# Patient Record
Sex: FEMALE | Race: WHITE | NOT HISPANIC OR LATINO | Employment: OTHER | URBAN - METROPOLITAN AREA
[De-identification: names, ages, dates, MRNs, and addresses within clinical notes are randomized per-mention and may not be internally consistent; named-entity substitution may affect disease eponyms.]

---

## 2017-01-04 ENCOUNTER — INFUSION (OUTPATIENT)
Dept: ONCOLOGY | Facility: HOSPITAL | Age: 82
End: 2017-01-04

## 2017-01-04 VITALS
DIASTOLIC BLOOD PRESSURE: 58 MMHG | SYSTOLIC BLOOD PRESSURE: 117 MMHG | HEIGHT: 63 IN | RESPIRATION RATE: 16 BRPM | WEIGHT: 129 LBS | BODY MASS INDEX: 22.86 KG/M2 | TEMPERATURE: 97.6 F | HEART RATE: 70 BPM

## 2017-01-04 DIAGNOSIS — D50.0 IRON DEFICIENCY ANEMIA DUE TO CHRONIC BLOOD LOSS: Primary | ICD-10-CM

## 2017-01-04 DIAGNOSIS — Z78.9 MEDICATION INTOLERANCE: ICD-10-CM

## 2017-01-04 DIAGNOSIS — K90.9 MALABSORPTION IN THE ELDERLY: ICD-10-CM

## 2017-01-04 PROCEDURE — 25010000002 FERUMOXYTOL 510 MG/17ML SOLUTION 510 MG VIAL: Performed by: INTERNAL MEDICINE

## 2017-01-04 PROCEDURE — 96365 THER/PROPH/DIAG IV INF INIT: CPT

## 2017-01-04 PROCEDURE — 96374 THER/PROPH/DIAG INJ IV PUSH: CPT

## 2017-01-04 RX ORDER — SODIUM CHLORIDE 9 MG/ML
250 INJECTION, SOLUTION INTRAVENOUS ONCE
Status: COMPLETED | OUTPATIENT
Start: 2017-01-04 | End: 2017-01-04

## 2017-01-04 RX ADMIN — SODIUM CHLORIDE 250 ML: 9 INJECTION, SOLUTION INTRAVENOUS at 13:30

## 2017-01-04 RX ADMIN — FERUMOXYTOL 510 MG: 510 INJECTION INTRAVENOUS at 13:34

## 2017-01-11 ENCOUNTER — INFUSION (OUTPATIENT)
Dept: ONCOLOGY | Facility: HOSPITAL | Age: 82
End: 2017-01-11

## 2017-01-11 VITALS
DIASTOLIC BLOOD PRESSURE: 52 MMHG | TEMPERATURE: 97.8 F | BODY MASS INDEX: 23.39 KG/M2 | HEART RATE: 70 BPM | RESPIRATION RATE: 16 BRPM | SYSTOLIC BLOOD PRESSURE: 123 MMHG | HEIGHT: 63 IN | WEIGHT: 132 LBS

## 2017-01-11 DIAGNOSIS — K90.9 MALABSORPTION IN THE ELDERLY: ICD-10-CM

## 2017-01-11 DIAGNOSIS — Z78.9 MEDICATION INTOLERANCE: ICD-10-CM

## 2017-01-11 DIAGNOSIS — D50.0 IRON DEFICIENCY ANEMIA DUE TO CHRONIC BLOOD LOSS: Primary | ICD-10-CM

## 2017-01-11 PROCEDURE — 25010000002 FERUMOXYTOL 510 MG/17ML SOLUTION 510 MG VIAL: Performed by: INTERNAL MEDICINE

## 2017-01-11 PROCEDURE — 96365 THER/PROPH/DIAG IV INF INIT: CPT

## 2017-01-11 PROCEDURE — 96374 THER/PROPH/DIAG INJ IV PUSH: CPT

## 2017-01-11 RX ORDER — SODIUM CHLORIDE 9 MG/ML
250 INJECTION, SOLUTION INTRAVENOUS ONCE
Status: COMPLETED | OUTPATIENT
Start: 2017-01-11 | End: 2017-01-11

## 2017-01-11 RX ADMIN — FERUMOXYTOL 510 MG: 510 INJECTION INTRAVENOUS at 14:46

## 2017-01-11 RX ADMIN — SODIUM CHLORIDE 250 ML: 9 INJECTION, SOLUTION INTRAVENOUS at 14:44

## 2017-01-25 ENCOUNTER — LAB (OUTPATIENT)
Dept: LAB | Facility: HOSPITAL | Age: 82
End: 2017-01-25

## 2017-01-25 DIAGNOSIS — D50.0 IRON DEFICIENCY ANEMIA DUE TO CHRONIC BLOOD LOSS: ICD-10-CM

## 2017-01-25 LAB
ERYTHROCYTE [DISTWIDTH] IN BLOOD BY AUTOMATED COUNT: 16.5 % (ref 11.3–14.5)
HCT VFR BLD AUTO: 33.4 % (ref 34.5–44)
HGB BLD-MCNC: 10.7 G/DL (ref 11.5–15.5)
LYMPHOCYTES # BLD AUTO: 1 10*3/MM3 (ref 0.6–4.8)
LYMPHOCYTES NFR BLD AUTO: 16.3 % (ref 24–44)
MCH RBC QN AUTO: 28.4 PG (ref 27–31)
MCHC RBC AUTO-ENTMCNC: 31.9 G/DL (ref 32–36)
MCV RBC AUTO: 89.2 FL (ref 80–99)
MONOCYTES # BLD AUTO: 0.3 10*3/MM3 (ref 0–1)
MONOCYTES NFR BLD AUTO: 5.9 % (ref 0–12)
NEUTROPHILS # BLD AUTO: 4.6 10*3/MM3 (ref 1.5–8.3)
NEUTROPHILS NFR BLD AUTO: 77.8 % (ref 41–71)
PLATELET # BLD AUTO: 254 10*3/MM3 (ref 150–450)
PMV BLD AUTO: 7.6 FL (ref 6–12)
RBC # BLD AUTO: 3.75 10*6/MM3 (ref 3.89–5.14)
WBC NRBC COR # BLD: 5.9 10*3/MM3 (ref 3.5–10.8)

## 2017-01-25 PROCEDURE — 85025 COMPLETE CBC W/AUTO DIFF WBC: CPT

## 2017-01-25 PROCEDURE — 36415 COLL VENOUS BLD VENIPUNCTURE: CPT

## 2017-01-26 ENCOUNTER — TELEPHONE (OUTPATIENT)
Dept: ONCOLOGY | Facility: CLINIC | Age: 82
End: 2017-01-26

## 2017-01-26 NOTE — TELEPHONE ENCOUNTER
----- Message from Alena Couch sent at 1/26/2017 11:37 AM EST -----  Regarding: [facility]-LAB RESULTS  Contact: [phone]  Patient called and wants to know her lab results she doesn't feel good and thinks it is low.

## 2017-02-01 ENCOUNTER — APPOINTMENT (OUTPATIENT)
Dept: ONCOLOGY | Facility: HOSPITAL | Age: 82
End: 2017-02-01

## 2017-02-13 ENCOUNTER — LAB (OUTPATIENT)
Dept: LAB | Facility: HOSPITAL | Age: 82
End: 2017-02-13

## 2017-02-13 ENCOUNTER — OFFICE VISIT (OUTPATIENT)
Dept: ONCOLOGY | Facility: CLINIC | Age: 82
End: 2017-02-13

## 2017-02-13 VITALS
RESPIRATION RATE: 16 BRPM | HEIGHT: 63 IN | DIASTOLIC BLOOD PRESSURE: 62 MMHG | TEMPERATURE: 97.6 F | WEIGHT: 132 LBS | BODY MASS INDEX: 23.39 KG/M2 | SYSTOLIC BLOOD PRESSURE: 154 MMHG | HEART RATE: 84 BPM

## 2017-02-13 DIAGNOSIS — D50.0 IRON DEFICIENCY ANEMIA DUE TO CHRONIC BLOOD LOSS: ICD-10-CM

## 2017-02-13 DIAGNOSIS — K90.9 MALABSORPTION IN THE ELDERLY: Primary | ICD-10-CM

## 2017-02-13 LAB
ERYTHROCYTE [DISTWIDTH] IN BLOOD BY AUTOMATED COUNT: 13.8 % (ref 11.3–14.5)
FERRITIN SERPL-MCNC: 68 NG/ML (ref 10–291)
HCT VFR BLD AUTO: 28.6 % (ref 34.5–44)
HGB BLD-MCNC: 9.3 G/DL (ref 11.5–15.5)
LYMPHOCYTES # BLD AUTO: 0.8 10*3/MM3 (ref 0.6–4.8)
LYMPHOCYTES NFR BLD AUTO: 15.3 % (ref 24–44)
MCH RBC QN AUTO: 28.3 PG (ref 27–31)
MCHC RBC AUTO-ENTMCNC: 32.7 G/DL (ref 32–36)
MCV RBC AUTO: 86.7 FL (ref 80–99)
MONOCYTES # BLD AUTO: 0.4 10*3/MM3 (ref 0–1)
MONOCYTES NFR BLD AUTO: 7.9 % (ref 0–12)
NEUTROPHILS # BLD AUTO: 3.9 10*3/MM3 (ref 1.5–8.3)
NEUTROPHILS NFR BLD AUTO: 76.8 % (ref 41–71)
PLATELET # BLD AUTO: 313 10*3/MM3 (ref 150–450)
PMV BLD AUTO: 6.4 FL (ref 6–12)
RBC # BLD AUTO: 3.29 10*6/MM3 (ref 3.89–5.14)
WBC NRBC COR # BLD: 5.1 10*3/MM3 (ref 3.5–10.8)

## 2017-02-13 PROCEDURE — 85025 COMPLETE CBC W/AUTO DIFF WBC: CPT

## 2017-02-13 PROCEDURE — 99213 OFFICE O/P EST LOW 20 MIN: CPT | Performed by: INTERNAL MEDICINE

## 2017-02-13 PROCEDURE — 36415 COLL VENOUS BLD VENIPUNCTURE: CPT

## 2017-02-13 PROCEDURE — 82728 ASSAY OF FERRITIN: CPT | Performed by: INTERNAL MEDICINE

## 2017-02-13 NOTE — PROGRESS NOTES
"PROBLEM LIST:  1. 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 Status post EGD colonoscopy  3. Status post Feraheme infusion   4. Bone Marrow biopsy  - normal - done by Dr. Reeves      REASON FOR VISIT: Follow-up of my anemia    HISTORY OF PRESENT ILLNESS:   85-year-old lady with normocytic anemia likely related to chronic blood loss.  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  She is starting to get more anemic.  Her ferritin has dropped in my plan is to see if it's much lower.  She is symptomatic with shortness of breath likely due to her anemia.      Past medical history, social history and family history was reviewed and unchanged from prior visit.    Review of Systems:    Review of Systems   Constitutional: Positive for fatigue.   HENT: Negative.    Eyes: Negative.    Respiratory: Positive for shortness of breath.    Cardiovascular: Negative.    Gastrointestinal: Negative.    Endocrine: Negative.    Genitourinary: Negative.    Musculoskeletal: Negative.    Skin: Negative.    Allergic/Immunologic: Negative.    Neurological: Negative.    Hematological: Negative.    Psychiatric/Behavioral: Negative.       A comprehensive 14 point review of systems was performed and was negative except as mentioned.      Medications:  The current medication list was reviewed in the EMR    ALLERGIES:    Allergies   Allergen Reactions   • Augmentin [Amoxicillin-Pot Clavulanate]    • Celebrex [Celecoxib]    • Codeine Sulfate    • Cozaar [Losartan]    • Crestor [Rosuvastatin]    • Dexlansoprazole    • Lipitor [Atorvastatin]    • Lisinopril    • Nexium [Esomeprazole Magnesium]    • Norvasc [Amlodipine]    • Sulfadiazine    • Toprol Xl [Metoprolol Tartrate]    • Zetia [Ezetimibe]    • Zocor [Simvastatin]          Physical Exam    VITAL SIGNS:  Visit Vitals   • /62   • Pulse 84   • Temp 97.6 °F (36.4 °C) (Temporal Artery )   • Resp 16   • Ht 63\" (160 cm)   • Wt 132 lb (59.9 kg)   • BMI 23.38 kg/m2        Performance " Status: 1    General: well appearing, in no acute distress  HEENT: sclera anicteric, oropharynx clear, neck is supple  Lymphatics: no cervical, supraclavicular, or axillary adenopathy  Cardiovascular: regular rate and rhythm, no murmurs, rubs or gallops  Lungs: clear to auscultation bilaterally  Abdomen: soft, nontender, nondistended.  No palpable organomegaly  Extremities: no lower extremity edema  Skin: no rashes, lesions, bruising, or petechiae  Msk:  Shows no weakness of the large muscle groups  Psych: Mood is stable        RECENT LABS:    Lab Results   Component Value Date    WBC 5.10 02/13/2017    HGB 9.3 (L) 02/13/2017    HCT 28.6 (L) 02/13/2017    MCV 86.7 02/13/2017     02/13/2017     Lab Results   Component Value Date    FERRITIN 25.00 12/05/2016    FERRITIN 192.00 09/26/2016    FERRITIN 15.00 08/29/2016       Assessment/Plan    85-year-old lady with normocytic anemia requiring periodic blood transfusions.  Awaiting her ferritin today.  If this is much lower than we'll plan for IV infusion of iron in the next couple weeks.  Her hemoglobin is 9.3 today and has dropped from 10.7.  We'll see her back in my clinic in 3 months plan to infuse feraheme in the next couple weeks.    Rahat Morris MD  [facility] Hematology and Oncology    2/13/2017

## 2017-03-22 ENCOUNTER — TELEPHONE (OUTPATIENT)
Dept: ONCOLOGY | Facility: CLINIC | Age: 82
End: 2017-03-22

## 2017-03-22 ENCOUNTER — APPOINTMENT (OUTPATIENT)
Dept: LAB | Facility: HOSPITAL | Age: 82
End: 2017-03-22

## 2017-03-22 DIAGNOSIS — D50.0 IRON DEFICIENCY ANEMIA DUE TO CHRONIC BLOOD LOSS: Primary | ICD-10-CM

## 2017-03-22 DIAGNOSIS — K90.9 MALABSORPTION IN THE ELDERLY: ICD-10-CM

## 2017-03-22 DIAGNOSIS — D50.0 IRON DEFICIENCY ANEMIA DUE TO CHRONIC BLOOD LOSS: ICD-10-CM

## 2017-03-22 DIAGNOSIS — Z78.9 MEDICATION INTOLERANCE: ICD-10-CM

## 2017-03-22 LAB
ERYTHROCYTE [DISTWIDTH] IN BLOOD BY AUTOMATED COUNT: 16.9 % (ref 11.3–14.5)
HCT VFR BLD AUTO: 28.9 % (ref 34.5–44)
HGB BLD-MCNC: 8.6 G/DL (ref 11.5–15.5)
LYMPHOCYTES # BLD AUTO: 0.8 10*3/MM3 (ref 0.6–4.8)
LYMPHOCYTES NFR BLD AUTO: 14.9 % (ref 24–44)
MCH RBC QN AUTO: 23.1 PG (ref 27–31)
MCHC RBC AUTO-ENTMCNC: 29.8 G/DL (ref 32–36)
MCV RBC AUTO: 77.6 FL (ref 80–99)
MONOCYTES # BLD AUTO: 0.3 10*3/MM3 (ref 0–1)
MONOCYTES NFR BLD AUTO: 5.2 % (ref 0–12)
NEUTROPHILS # BLD AUTO: 4.5 10*3/MM3 (ref 1.5–8.3)
NEUTROPHILS NFR BLD AUTO: 79.9 % (ref 41–71)
PLATELET # BLD AUTO: 288 10*3/MM3 (ref 150–450)
PMV BLD AUTO: 7.6 FL (ref 6–12)
RBC # BLD AUTO: 3.72 10*6/MM3 (ref 3.89–5.14)
WBC NRBC COR # BLD: 5.6 10*3/MM3 (ref 3.5–10.8)

## 2017-03-22 PROCEDURE — 36415 COLL VENOUS BLD VENIPUNCTURE: CPT | Performed by: INTERNAL MEDICINE

## 2017-03-22 PROCEDURE — 85025 COMPLETE CBC W/AUTO DIFF WBC: CPT | Performed by: INTERNAL MEDICINE

## 2017-03-22 RX ORDER — SODIUM CHLORIDE 9 MG/ML
250 INJECTION, SOLUTION INTRAVENOUS ONCE
Status: CANCELLED | OUTPATIENT
Start: 2017-03-22 | End: 2017-03-22

## 2017-03-22 NOTE — TELEPHONE ENCOUNTER
Dr Hoff verbal order for cbc, HGB 8.6 Dr Hoff verbal orders for ferriheme to be infused 3/27/17 scheduled for 2 pm.  I could not reach patient, but did talk to her daughter to give date and time for infusion.

## 2017-03-27 ENCOUNTER — INFUSION (OUTPATIENT)
Dept: ONCOLOGY | Facility: HOSPITAL | Age: 82
End: 2017-03-27

## 2017-03-27 VITALS
TEMPERATURE: 97.9 F | HEIGHT: 63 IN | HEART RATE: 68 BPM | BODY MASS INDEX: 23.39 KG/M2 | RESPIRATION RATE: 16 BRPM | WEIGHT: 132 LBS | DIASTOLIC BLOOD PRESSURE: 43 MMHG | SYSTOLIC BLOOD PRESSURE: 112 MMHG

## 2017-03-27 DIAGNOSIS — K90.9 MALABSORPTION IN THE ELDERLY: ICD-10-CM

## 2017-03-27 DIAGNOSIS — Z78.9 MEDICATION INTOLERANCE: ICD-10-CM

## 2017-03-27 DIAGNOSIS — D50.0 IRON DEFICIENCY ANEMIA DUE TO CHRONIC BLOOD LOSS: Primary | ICD-10-CM

## 2017-03-27 PROCEDURE — 25010000002 FERUMOXYTOL 510 MG/17ML SOLUTION 510 MG VIAL: Performed by: INTERNAL MEDICINE

## 2017-03-27 PROCEDURE — 96374 THER/PROPH/DIAG INJ IV PUSH: CPT

## 2017-03-27 PROCEDURE — 96365 THER/PROPH/DIAG IV INF INIT: CPT

## 2017-03-27 RX ORDER — SODIUM CHLORIDE 9 MG/ML
250 INJECTION, SOLUTION INTRAVENOUS ONCE
Status: CANCELLED | OUTPATIENT
Start: 2017-03-27

## 2017-03-27 RX ORDER — SODIUM CHLORIDE 9 MG/ML
250 INJECTION, SOLUTION INTRAVENOUS ONCE
Status: CANCELLED | OUTPATIENT
Start: 2017-04-03

## 2017-03-27 RX ORDER — SODIUM CHLORIDE 9 MG/ML
250 INJECTION, SOLUTION INTRAVENOUS ONCE
Status: COMPLETED | OUTPATIENT
Start: 2017-03-27 | End: 2017-03-27

## 2017-03-27 RX ADMIN — FERUMOXYTOL 510 MG: 510 INJECTION INTRAVENOUS at 14:20

## 2017-03-27 RX ADMIN — SODIUM CHLORIDE 250 ML: 9 INJECTION, SOLUTION INTRAVENOUS at 14:20

## 2017-05-05 ENCOUNTER — LAB (OUTPATIENT)
Dept: LAB | Facility: HOSPITAL | Age: 82
End: 2017-05-05

## 2017-05-05 ENCOUNTER — INFUSION (OUTPATIENT)
Dept: ONCOLOGY | Facility: HOSPITAL | Age: 82
End: 2017-05-05

## 2017-05-05 VITALS
RESPIRATION RATE: 16 BRPM | WEIGHT: 133 LBS | SYSTOLIC BLOOD PRESSURE: 139 MMHG | HEIGHT: 63 IN | DIASTOLIC BLOOD PRESSURE: 86 MMHG | BODY MASS INDEX: 23.57 KG/M2 | TEMPERATURE: 97.2 F | HEART RATE: 81 BPM

## 2017-05-05 DIAGNOSIS — K90.9 MALABSORPTION IN THE ELDERLY: ICD-10-CM

## 2017-05-05 DIAGNOSIS — D50.0 IRON DEFICIENCY ANEMIA DUE TO CHRONIC BLOOD LOSS: ICD-10-CM

## 2017-05-05 DIAGNOSIS — D50.0 IRON DEFICIENCY ANEMIA DUE TO CHRONIC BLOOD LOSS: Primary | ICD-10-CM

## 2017-05-05 LAB
ABO GROUP BLD: NORMAL
ANTI-K: NORMAL
BLD GP AB SCN SERPL QL ELUTION: NEGATIVE
BLD GP AB SCN SERPL QL: POSITIVE
DAT IGG GEL: POSITIVE
ERYTHROCYTE [DISTWIDTH] IN BLOOD BY AUTOMATED COUNT: 20.1 % (ref 11.3–14.5)
FERRITIN SERPL-MCNC: 24 NG/ML (ref 10–291)
HCT VFR BLD AUTO: 23.1 % (ref 34.5–44)
HGB BLD-MCNC: 6.9 G/DL (ref 11.5–15.5)
LYMPHOCYTES # BLD AUTO: 0.9 10*3/MM3 (ref 0.6–4.8)
LYMPHOCYTES NFR BLD AUTO: 24 % (ref 24–44)
MCH RBC QN AUTO: 23 PG (ref 27–31)
MCHC RBC AUTO-ENTMCNC: 29.7 G/DL (ref 32–36)
MCV RBC AUTO: 77.4 FL (ref 80–99)
MONOCYTES # BLD AUTO: 0.2 10*3/MM3 (ref 0–1)
MONOCYTES NFR BLD AUTO: 6.3 % (ref 0–12)
NEUTROPHILS # BLD AUTO: 2.6 10*3/MM3 (ref 1.5–8.3)
NEUTROPHILS NFR BLD AUTO: 69.7 % (ref 41–71)
PLATELET # BLD AUTO: 243 10*3/MM3 (ref 150–450)
PMV BLD AUTO: 7.5 FL (ref 6–12)
RBC # BLD AUTO: 2.98 10*6/MM3 (ref 3.89–5.14)
RH BLD: NEGATIVE
WARM AUTOANTIBODY: NORMAL
WBC NRBC COR # BLD: 3.8 10*3/MM3 (ref 3.5–10.8)

## 2017-05-05 PROCEDURE — 86902 BLOOD TYPE ANTIGEN DONOR EA: CPT

## 2017-05-05 PROCEDURE — 36415 COLL VENOUS BLD VENIPUNCTURE: CPT | Performed by: INTERNAL MEDICINE

## 2017-05-05 PROCEDURE — 86860 RBC ANTIBODY ELUTION: CPT

## 2017-05-05 PROCEDURE — 86850 RBC ANTIBODY SCREEN: CPT

## 2017-05-05 PROCEDURE — 86880 COOMBS TEST DIRECT: CPT

## 2017-05-05 PROCEDURE — 86901 BLOOD TYPING SEROLOGIC RH(D): CPT

## 2017-05-05 PROCEDURE — 86900 BLOOD TYPING SEROLOGIC ABO: CPT

## 2017-05-05 PROCEDURE — 86870 RBC ANTIBODY IDENTIFICATION: CPT

## 2017-05-05 PROCEDURE — 36415 COLL VENOUS BLD VENIPUNCTURE: CPT

## 2017-05-05 PROCEDURE — 86920 COMPATIBILITY TEST SPIN: CPT

## 2017-05-05 PROCEDURE — 86905 BLOOD TYPING RBC ANTIGENS: CPT

## 2017-05-05 PROCEDURE — 86922 COMPATIBILITY TEST ANTIGLOB: CPT

## 2017-05-05 RX ORDER — SODIUM CHLORIDE 9 MG/ML
250 INJECTION, SOLUTION INTRAVENOUS AS NEEDED
Status: CANCELLED | OUTPATIENT
Start: 2017-05-05

## 2017-05-05 RX ORDER — ACETAMINOPHEN 325 MG/1
650 TABLET ORAL ONCE
Status: CANCELLED | OUTPATIENT
Start: 2017-05-05 | End: 2017-05-05

## 2017-05-05 RX ORDER — SODIUM CHLORIDE 9 MG/ML
250 INJECTION, SOLUTION INTRAVENOUS AS NEEDED
Status: DISCONTINUED | OUTPATIENT
Start: 2017-05-05 | End: 2017-05-05 | Stop reason: HOSPADM

## 2017-05-05 RX ORDER — DIPHENHYDRAMINE HCL 25 MG
25 CAPSULE ORAL ONCE
Status: CANCELLED | OUTPATIENT
Start: 2017-05-05 | End: 2017-05-05

## 2017-05-05 RX ORDER — DIPHENHYDRAMINE HCL 25 MG
25 CAPSULE ORAL ONCE
Status: DISCONTINUED | OUTPATIENT
Start: 2017-05-05 | End: 2017-05-05 | Stop reason: HOSPADM

## 2017-05-05 RX ORDER — ACETAMINOPHEN 325 MG/1
650 TABLET ORAL ONCE
Status: DISCONTINUED | OUTPATIENT
Start: 2017-05-05 | End: 2017-05-05 | Stop reason: HOSPADM

## 2017-05-08 ENCOUNTER — INFUSION (OUTPATIENT)
Dept: ONCOLOGY | Facility: HOSPITAL | Age: 82
End: 2017-05-08

## 2017-05-08 VITALS
SYSTOLIC BLOOD PRESSURE: 111 MMHG | RESPIRATION RATE: 18 BRPM | TEMPERATURE: 98.1 F | HEART RATE: 64 BPM | DIASTOLIC BLOOD PRESSURE: 47 MMHG

## 2017-05-08 DIAGNOSIS — D50.0 IRON DEFICIENCY ANEMIA DUE TO CHRONIC BLOOD LOSS: ICD-10-CM

## 2017-05-08 DIAGNOSIS — K90.9 MALABSORPTION IN THE ELDERLY: ICD-10-CM

## 2017-05-08 PROCEDURE — 63710000001 DIPHENHYDRAMINE PER 50 MG: Performed by: INTERNAL MEDICINE

## 2017-05-08 PROCEDURE — P9016 RBC LEUKOCYTES REDUCED: HCPCS

## 2017-05-08 PROCEDURE — 86900 BLOOD TYPING SEROLOGIC ABO: CPT

## 2017-05-08 PROCEDURE — 36430 TRANSFUSION BLD/BLD COMPNT: CPT

## 2017-05-08 RX ORDER — SODIUM CHLORIDE 9 MG/ML
250 INJECTION, SOLUTION INTRAVENOUS ONCE
Status: CANCELLED | OUTPATIENT
Start: 2017-05-12

## 2017-05-08 RX ORDER — SODIUM CHLORIDE 9 MG/ML
250 INJECTION, SOLUTION INTRAVENOUS AS NEEDED
Status: DISCONTINUED | OUTPATIENT
Start: 2017-05-08 | End: 2017-05-08 | Stop reason: HOSPADM

## 2017-05-08 RX ORDER — ACETAMINOPHEN 325 MG/1
650 TABLET ORAL ONCE
Status: COMPLETED | OUTPATIENT
Start: 2017-05-08 | End: 2017-05-08

## 2017-05-08 RX ORDER — DIPHENHYDRAMINE HCL 25 MG
25 CAPSULE ORAL ONCE
Status: COMPLETED | OUTPATIENT
Start: 2017-05-08 | End: 2017-05-08

## 2017-05-08 RX ADMIN — ACETAMINOPHEN 650 MG: 325 TABLET ORAL at 07:45

## 2017-05-08 RX ADMIN — DIPHENHYDRAMINE HYDROCHLORIDE 25 MG: 25 CAPSULE ORAL at 07:48

## 2017-05-09 LAB
ABO + RH BLD: NORMAL
ABO + RH BLD: NORMAL
BH BB BLOOD EXPIRATION DATE: NORMAL
BH BB BLOOD EXPIRATION DATE: NORMAL
BH BB BLOOD TYPE BARCODE: 1700
BH BB BLOOD TYPE BARCODE: 1700
BH BB DISPENSE STATUS: NORMAL
BH BB DISPENSE STATUS: NORMAL
BH BB PRODUCT CODE: NORMAL
BH BB PRODUCT CODE: NORMAL
BH BB UNIT NUMBER: NORMAL
BH BB UNIT NUMBER: NORMAL
CROSSMATCH INTERPRETATION: NORMAL
CROSSMATCH INTERPRETATION: NORMAL
UNIT  ABO: NORMAL
UNIT  ABO: NORMAL
UNIT  RH: NORMAL
UNIT  RH: NORMAL

## 2017-05-12 ENCOUNTER — INFUSION (OUTPATIENT)
Dept: ONCOLOGY | Facility: HOSPITAL | Age: 82
End: 2017-05-12

## 2017-05-12 ENCOUNTER — OFFICE VISIT (OUTPATIENT)
Dept: ONCOLOGY | Facility: CLINIC | Age: 82
End: 2017-05-12

## 2017-05-12 VITALS
WEIGHT: 134 LBS | TEMPERATURE: 97.8 F | HEART RATE: 68 BPM | RESPIRATION RATE: 16 BRPM | SYSTOLIC BLOOD PRESSURE: 126 MMHG | HEIGHT: 63 IN | BODY MASS INDEX: 23.74 KG/M2 | DIASTOLIC BLOOD PRESSURE: 52 MMHG

## 2017-05-12 VITALS
DIASTOLIC BLOOD PRESSURE: 72 MMHG | BODY MASS INDEX: 23.74 KG/M2 | RESPIRATION RATE: 16 BRPM | HEART RATE: 78 BPM | SYSTOLIC BLOOD PRESSURE: 135 MMHG | WEIGHT: 134 LBS | HEIGHT: 63 IN | TEMPERATURE: 97.8 F

## 2017-05-12 DIAGNOSIS — D50.0 IRON DEFICIENCY ANEMIA DUE TO CHRONIC BLOOD LOSS: Primary | ICD-10-CM

## 2017-05-12 DIAGNOSIS — K90.9 MALABSORPTION IN THE ELDERLY: ICD-10-CM

## 2017-05-12 DIAGNOSIS — Z78.9 MEDICATION INTOLERANCE: ICD-10-CM

## 2017-05-12 LAB — FERRITIN SERPL-MCNC: 46 NG/ML (ref 10–291)

## 2017-05-12 PROCEDURE — 96375 TX/PRO/DX INJ NEW DRUG ADDON: CPT

## 2017-05-12 PROCEDURE — 25010000002 FERUMOXYTOL 510 MG/17ML SOLUTION 510 MG VIAL: Performed by: INTERNAL MEDICINE

## 2017-05-12 PROCEDURE — 99214 OFFICE O/P EST MOD 30 MIN: CPT | Performed by: INTERNAL MEDICINE

## 2017-05-12 PROCEDURE — A9270 NON-COVERED ITEM OR SERVICE: HCPCS | Performed by: INTERNAL MEDICINE

## 2017-05-12 PROCEDURE — 96374 THER/PROPH/DIAG INJ IV PUSH: CPT

## 2017-05-12 PROCEDURE — 63710000001 DIPHENHYDRAMINE PER 50 MG: Performed by: INTERNAL MEDICINE

## 2017-05-12 PROCEDURE — 82728 ASSAY OF FERRITIN: CPT | Performed by: INTERNAL MEDICINE

## 2017-05-12 RX ORDER — SODIUM CHLORIDE 9 MG/ML
250 INJECTION, SOLUTION INTRAVENOUS ONCE
Status: DISCONTINUED | OUTPATIENT
Start: 2017-05-12 | End: 2017-05-12 | Stop reason: HOSPADM

## 2017-05-12 RX ORDER — DIPHENHYDRAMINE HCL 25 MG
25 CAPSULE ORAL ONCE
Status: COMPLETED | OUTPATIENT
Start: 2017-05-12 | End: 2017-05-12

## 2017-05-12 RX ORDER — SODIUM CHLORIDE 9 MG/ML
250 INJECTION, SOLUTION INTRAVENOUS ONCE
Status: CANCELLED | OUTPATIENT
Start: 2017-05-19

## 2017-05-12 RX ADMIN — FERUMOXYTOL 510 MG: 510 INJECTION INTRAVENOUS at 14:24

## 2017-05-12 RX ADMIN — FAMOTIDINE 20 MG: 10 INJECTION, SOLUTION INTRAVENOUS at 14:18

## 2017-05-12 RX ADMIN — DIPHENHYDRAMINE HYDROCHLORIDE 25 MG: 25 CAPSULE ORAL at 14:18

## 2017-05-19 ENCOUNTER — INFUSION (OUTPATIENT)
Dept: ONCOLOGY | Facility: HOSPITAL | Age: 82
End: 2017-05-19

## 2017-05-19 VITALS
HEIGHT: 63 IN | TEMPERATURE: 96.8 F | HEART RATE: 73 BPM | SYSTOLIC BLOOD PRESSURE: 120 MMHG | WEIGHT: 131 LBS | RESPIRATION RATE: 16 BRPM | BODY MASS INDEX: 23.21 KG/M2 | DIASTOLIC BLOOD PRESSURE: 46 MMHG

## 2017-05-19 DIAGNOSIS — Z78.9 MEDICATION INTOLERANCE: ICD-10-CM

## 2017-05-19 DIAGNOSIS — K90.9 MALABSORPTION IN THE ELDERLY: Primary | ICD-10-CM

## 2017-05-19 DIAGNOSIS — D50.0 IRON DEFICIENCY ANEMIA DUE TO CHRONIC BLOOD LOSS: ICD-10-CM

## 2017-05-19 PROCEDURE — 96375 TX/PRO/DX INJ NEW DRUG ADDON: CPT

## 2017-05-19 PROCEDURE — A9270 NON-COVERED ITEM OR SERVICE: HCPCS | Performed by: INTERNAL MEDICINE

## 2017-05-19 PROCEDURE — 63710000001 DIPHENHYDRAMINE PER 50 MG: Performed by: INTERNAL MEDICINE

## 2017-05-19 PROCEDURE — 25010000002 FERUMOXYTOL 510 MG/17ML SOLUTION 510 MG VIAL: Performed by: INTERNAL MEDICINE

## 2017-05-19 PROCEDURE — 96374 THER/PROPH/DIAG INJ IV PUSH: CPT

## 2017-05-19 RX ORDER — SODIUM CHLORIDE 9 MG/ML
250 INJECTION, SOLUTION INTRAVENOUS ONCE
Status: CANCELLED | OUTPATIENT
Start: 2017-05-26

## 2017-05-19 RX ORDER — SODIUM CHLORIDE 9 MG/ML
250 INJECTION, SOLUTION INTRAVENOUS ONCE
Status: DISCONTINUED | OUTPATIENT
Start: 2017-05-19 | End: 2017-05-19 | Stop reason: HOSPADM

## 2017-05-19 RX ORDER — DIPHENHYDRAMINE HCL 25 MG
25 CAPSULE ORAL ONCE
Status: COMPLETED | OUTPATIENT
Start: 2017-05-19 | End: 2017-05-19

## 2017-05-19 RX ORDER — DIPHENHYDRAMINE HCL 25 MG
25 CAPSULE ORAL ONCE
Status: CANCELLED | OUTPATIENT
Start: 2017-05-26

## 2017-05-19 RX ORDER — DIPHENHYDRAMINE HCL 25 MG
25 CAPSULE ORAL ONCE
Status: CANCELLED | OUTPATIENT
Start: 2017-05-19

## 2017-05-19 RX ADMIN — DIPHENHYDRAMINE HYDROCHLORIDE 25 MG: 25 CAPSULE ORAL at 14:40

## 2017-05-19 RX ADMIN — FERUMOXYTOL 510 MG: 510 INJECTION INTRAVENOUS at 14:46

## 2017-05-19 RX ADMIN — FAMOTIDINE 20 MG: 10 INJECTION, SOLUTION INTRAVENOUS at 14:44

## 2017-06-30 ENCOUNTER — LAB (OUTPATIENT)
Dept: LAB | Facility: HOSPITAL | Age: 82
End: 2017-06-30

## 2017-06-30 ENCOUNTER — OFFICE VISIT (OUTPATIENT)
Dept: ONCOLOGY | Facility: CLINIC | Age: 82
End: 2017-06-30

## 2017-06-30 VITALS
TEMPERATURE: 97.9 F | RESPIRATION RATE: 16 BRPM | WEIGHT: 133 LBS | HEIGHT: 63 IN | DIASTOLIC BLOOD PRESSURE: 72 MMHG | BODY MASS INDEX: 23.57 KG/M2 | SYSTOLIC BLOOD PRESSURE: 140 MMHG | HEART RATE: 74 BPM

## 2017-06-30 DIAGNOSIS — D50.0 IRON DEFICIENCY ANEMIA DUE TO CHRONIC BLOOD LOSS: Primary | ICD-10-CM

## 2017-06-30 DIAGNOSIS — K90.9 MALABSORPTION IN THE ELDERLY: ICD-10-CM

## 2017-06-30 DIAGNOSIS — D50.0 IRON DEFICIENCY ANEMIA DUE TO CHRONIC BLOOD LOSS: ICD-10-CM

## 2017-06-30 LAB
ERYTHROCYTE [DISTWIDTH] IN BLOOD BY AUTOMATED COUNT: 17.1 % (ref 11.3–14.5)
FERRITIN SERPL-MCNC: 31 NG/ML (ref 10–291)
HCT VFR BLD AUTO: 27.1 % (ref 34.5–44)
HGB BLD-MCNC: 8.3 G/DL (ref 11.5–15.5)
LYMPHOCYTES # BLD AUTO: 0.8 10*3/MM3 (ref 0.6–4.8)
LYMPHOCYTES NFR BLD AUTO: 17 % (ref 24–44)
MCH RBC QN AUTO: 25.7 PG (ref 27–31)
MCHC RBC AUTO-ENTMCNC: 30.8 G/DL (ref 32–36)
MCV RBC AUTO: 83.3 FL (ref 80–99)
MONOCYTES # BLD AUTO: 0.4 10*3/MM3 (ref 0–1)
MONOCYTES NFR BLD AUTO: 8.2 % (ref 0–12)
NEUTROPHILS # BLD AUTO: 3.5 10*3/MM3 (ref 1.5–8.3)
NEUTROPHILS NFR BLD AUTO: 74.8 % (ref 41–71)
PLATELET # BLD AUTO: 263 10*3/MM3 (ref 150–450)
PMV BLD AUTO: 7 FL (ref 6–12)
RBC # BLD AUTO: 3.25 10*6/MM3 (ref 3.89–5.14)
WBC NRBC COR # BLD: 4.7 10*3/MM3 (ref 3.5–10.8)

## 2017-06-30 PROCEDURE — 85025 COMPLETE CBC W/AUTO DIFF WBC: CPT

## 2017-06-30 PROCEDURE — 99213 OFFICE O/P EST LOW 20 MIN: CPT | Performed by: INTERNAL MEDICINE

## 2017-06-30 PROCEDURE — 36415 COLL VENOUS BLD VENIPUNCTURE: CPT

## 2017-06-30 PROCEDURE — 82728 ASSAY OF FERRITIN: CPT | Performed by: INTERNAL MEDICINE

## 2017-07-03 NOTE — PROGRESS NOTES
PROBLEM LIST:  1. 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 Status post EGD colonoscopy  3. Status post Feraheme infusion   4. Bone Marrow biopsy  - normal - done by Dr. Reeves      REASON FOR VISIT: Follow-up of my anemia    HISTORY OF PRESENT ILLNESS:   85-year-old lady with normocytic anemia likely related to chronic blood loss.  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  She has been regularly receiving iron infusions and sometimes blood transfusions.  She definitely is having significant iron loss is likely from her bowel.  Clinically otherwise stable.  He denies any headaches or vision changes of michael hematochezia.      Past medical history, social history and family history was reviewed and unchanged from prior visit.    Review of Systems:    Review of Systems   Constitutional: Positive for activity change and fatigue.   HENT: Negative.    Eyes: Negative.    Respiratory: Positive for shortness of breath.    Cardiovascular: Negative.    Gastrointestinal: Negative.    Endocrine: Negative.    Genitourinary: Negative.    Musculoskeletal: Negative.    Skin: Negative.    Allergic/Immunologic: Negative.    Neurological: Negative.    Hematological: Negative.    Psychiatric/Behavioral: Negative.       A comprehensive 14 point review of systems was performed and was negative except as mentioned.      Medications:  The current medication list was reviewed in the EMR    ALLERGIES:    Allergies   Allergen Reactions   • Augmentin [Amoxicillin-Pot Clavulanate]    • Celebrex [Celecoxib]    • Codeine Sulfate    • Cozaar [Losartan]    • Crestor [Rosuvastatin]    • Dexlansoprazole    • Lipitor [Atorvastatin]    • Lisinopril    • Nexium [Esomeprazole Magnesium]    • Norvasc [Amlodipine]    • Sulfadiazine    • Toprol Xl [Metoprolol Tartrate]    • Zetia [Ezetimibe]    • Zocor [Simvastatin]          Physical Exam    VITAL SIGNS:  /72 Comment: LUE  Pulse 74  Temp 97.9 °F (36.6 °C) (Temporal Artery )   Resp 16  " Ht 63\" (160 cm)  Wt 133 lb (60.3 kg)  BMI 23.56 kg/m2     Performance Status: 1    General: well appearing, in no acute distress  HEENT: sclera anicteric, oropharynx clear, neck is supple  Lymphatics: no cervical, supraclavicular, or axillary adenopathy  Cardiovascular: regular rate and rhythm, no murmurs, rubs or gallops  Lungs: clear to auscultation bilaterally  Abdomen: soft, nontender, nondistended.  No palpable organomegaly  Extremities: no lower extremity edema  Skin: no rashes, lesions, bruising, or petechiae  Msk:  Shows no weakness of the large muscle groups  Psych: Mood is stable        RECENT LABS:    Lab Results   Component Value Date    WBC 4.70 06/30/2017    HGB 8.3 (L) 06/30/2017    HCT 27.1 (L) 06/30/2017    MCV 83.3 06/30/2017     06/30/2017     Lab Results   Component Value Date    FERRITIN 31.00 06/30/2017    FERRITIN 46.00 05/12/2017    FERRITIN 24.00 05/05/2017       Assessment/Plan    85-year-old lady with normocytic anemia requiring periodic blood transfusions likely secondary to bleeding AVMs.    Her hemoglobin is relatively stable today.  Her ferritin continues to drop again.  She may require more iron.  I may consider loading her with more iron to get her up to a higher level.  But at this time I think it's reasonable to wait and see how long it takes before she requires another transfusion.  I am going to repeat her labs once a month.  If she is symptomatic.      Rahat Morris MD  [facility] Hematology and Oncology    7/3/2017                  "

## 2017-07-12 ENCOUNTER — TELEPHONE (OUTPATIENT)
Dept: ONCOLOGY | Facility: CLINIC | Age: 82
End: 2017-07-12

## 2017-07-12 ENCOUNTER — LAB (OUTPATIENT)
Dept: LAB | Facility: HOSPITAL | Age: 82
End: 2017-07-12

## 2017-07-12 DIAGNOSIS — D50.0 IRON DEFICIENCY ANEMIA DUE TO CHRONIC BLOOD LOSS: ICD-10-CM

## 2017-07-12 DIAGNOSIS — K90.9 MALABSORPTION IN THE ELDERLY: ICD-10-CM

## 2017-07-12 DIAGNOSIS — Z78.9 MEDICATION INTOLERANCE: ICD-10-CM

## 2017-07-12 DIAGNOSIS — D50.0 IRON DEFICIENCY ANEMIA SECONDARY TO BLOOD LOSS (CHRONIC): Primary | ICD-10-CM

## 2017-07-12 LAB
ERYTHROCYTE [DISTWIDTH] IN BLOOD BY AUTOMATED COUNT: 17.8 % (ref 11.3–14.5)
HCT VFR BLD AUTO: 22 % (ref 34.5–44)
HGB BLD-MCNC: 6.8 G/DL (ref 11.5–15.5)
MCH RBC QN AUTO: 24.7 PG (ref 27–31)
MCHC RBC AUTO-ENTMCNC: 31 G/DL (ref 32–36)
MCV RBC AUTO: 79.7 FL (ref 80–99)
PLATELET # BLD AUTO: 333 10*3/MM3 (ref 150–450)
PMV BLD AUTO: 7.1 FL (ref 6–12)
RBC # BLD AUTO: 2.76 10*6/MM3 (ref 3.89–5.14)
WBC NRBC COR # BLD: 10.4 10*3/MM3 (ref 3.5–10.8)

## 2017-07-12 PROCEDURE — 36415 COLL VENOUS BLD VENIPUNCTURE: CPT

## 2017-07-12 PROCEDURE — 85027 COMPLETE CBC AUTOMATED: CPT

## 2017-07-12 RX ORDER — DIPHENHYDRAMINE HCL 25 MG
25 CAPSULE ORAL ONCE
Status: CANCELLED | OUTPATIENT
Start: 2017-07-12 | End: 2017-07-12

## 2017-07-12 RX ORDER — SODIUM CHLORIDE 9 MG/ML
250 INJECTION, SOLUTION INTRAVENOUS ONCE
Status: CANCELLED | OUTPATIENT
Start: 2017-07-28

## 2017-07-12 RX ORDER — SODIUM CHLORIDE 9 MG/ML
250 INJECTION, SOLUTION INTRAVENOUS AS NEEDED
Status: CANCELLED | OUTPATIENT
Start: 2017-07-12

## 2017-07-12 RX ORDER — SODIUM CHLORIDE 9 MG/ML
250 INJECTION, SOLUTION INTRAVENOUS ONCE
Status: CANCELLED | OUTPATIENT
Start: 2017-09-27

## 2017-07-12 RX ORDER — ACETAMINOPHEN 325 MG/1
650 TABLET ORAL ONCE
Status: CANCELLED | OUTPATIENT
Start: 2017-07-12 | End: 2017-07-12

## 2017-07-12 RX ORDER — SODIUM CHLORIDE 9 MG/ML
250 INJECTION, SOLUTION INTRAVENOUS ONCE
Status: CANCELLED | OUTPATIENT
Start: 2017-07-21

## 2017-07-12 NOTE — TELEPHONE ENCOUNTER
Returned daughters called and informed that type and cross can be done on Thursday and blood transfused on Friday.  Daughter verbalized understanding.

## 2017-07-12 NOTE — TELEPHONE ENCOUNTER
Left message with patients daughter, due to patients phone wasn't accepting calls.  Patient scheduled for blood infusion 7/14/17 8 am, and Iron infusion for next week 7/21/17 1:30 pm  Daughter to call back with any further questions

## 2017-07-13 ENCOUNTER — INFUSION (OUTPATIENT)
Dept: ONCOLOGY | Facility: HOSPITAL | Age: 82
End: 2017-07-13

## 2017-07-13 VITALS
DIASTOLIC BLOOD PRESSURE: 74 MMHG | WEIGHT: 132 LBS | TEMPERATURE: 97.8 F | SYSTOLIC BLOOD PRESSURE: 168 MMHG | HEART RATE: 91 BPM | RESPIRATION RATE: 18 BRPM | BODY MASS INDEX: 23.39 KG/M2 | HEIGHT: 63 IN

## 2017-07-13 DIAGNOSIS — D50.0 IRON DEFICIENCY ANEMIA SECONDARY TO BLOOD LOSS (CHRONIC): ICD-10-CM

## 2017-07-13 LAB
ABO GROUP BLD: NORMAL
ANTI-E: NORMAL
BLD GP AB SCN SERPL QL ELUTION: NEGATIVE
BLD GP AB SCN SERPL QL: POSITIVE
DAT IGG GEL: POSITIVE
LISS SCREEN: POSITIVE
RH BLD: NEGATIVE
WARM AUTOANTIBODY: NORMAL

## 2017-07-13 PROCEDURE — 86850 RBC ANTIBODY SCREEN: CPT

## 2017-07-13 PROCEDURE — 86902 BLOOD TYPE ANTIGEN DONOR EA: CPT

## 2017-07-13 PROCEDURE — 86860 RBC ANTIBODY ELUTION: CPT

## 2017-07-13 PROCEDURE — 86900 BLOOD TYPING SEROLOGIC ABO: CPT

## 2017-07-13 PROCEDURE — 86922 COMPATIBILITY TEST ANTIGLOB: CPT

## 2017-07-13 PROCEDURE — 86870 RBC ANTIBODY IDENTIFICATION: CPT

## 2017-07-13 PROCEDURE — 36415 COLL VENOUS BLD VENIPUNCTURE: CPT

## 2017-07-13 PROCEDURE — 86901 BLOOD TYPING SEROLOGIC RH(D): CPT

## 2017-07-13 PROCEDURE — 86880 COOMBS TEST DIRECT: CPT

## 2017-07-13 PROCEDURE — 86920 COMPATIBILITY TEST SPIN: CPT

## 2017-07-14 ENCOUNTER — INFUSION (OUTPATIENT)
Dept: ONCOLOGY | Facility: HOSPITAL | Age: 82
End: 2017-07-14

## 2017-07-14 VITALS
HEART RATE: 88 BPM | WEIGHT: 132 LBS | SYSTOLIC BLOOD PRESSURE: 152 MMHG | DIASTOLIC BLOOD PRESSURE: 61 MMHG | TEMPERATURE: 98 F | RESPIRATION RATE: 18 BRPM | HEIGHT: 63 IN | BODY MASS INDEX: 23.39 KG/M2

## 2017-07-14 DIAGNOSIS — D50.0 IRON DEFICIENCY ANEMIA SECONDARY TO BLOOD LOSS (CHRONIC): ICD-10-CM

## 2017-07-14 PROCEDURE — 86900 BLOOD TYPING SEROLOGIC ABO: CPT

## 2017-07-14 PROCEDURE — 63710000001 DIPHENHYDRAMINE PER 50 MG: Performed by: INTERNAL MEDICINE

## 2017-07-14 PROCEDURE — 36430 TRANSFUSION BLD/BLD COMPNT: CPT

## 2017-07-14 PROCEDURE — P9016 RBC LEUKOCYTES REDUCED: HCPCS

## 2017-07-14 RX ORDER — ACETAMINOPHEN 325 MG/1
650 TABLET ORAL ONCE
Status: COMPLETED | OUTPATIENT
Start: 2017-07-14 | End: 2017-07-14

## 2017-07-14 RX ORDER — SODIUM CHLORIDE 9 MG/ML
250 INJECTION, SOLUTION INTRAVENOUS AS NEEDED
Status: DISCONTINUED | OUTPATIENT
Start: 2017-07-14 | End: 2017-07-14 | Stop reason: HOSPADM

## 2017-07-14 RX ORDER — DIPHENHYDRAMINE HCL 25 MG
25 CAPSULE ORAL ONCE
Status: COMPLETED | OUTPATIENT
Start: 2017-07-14 | End: 2017-07-14

## 2017-07-14 RX ADMIN — SODIUM CHLORIDE 250 ML: 9 INJECTION, SOLUTION INTRAVENOUS at 08:20

## 2017-07-14 RX ADMIN — ACETAMINOPHEN 650 MG: 325 TABLET, FILM COATED ORAL at 08:30

## 2017-07-14 RX ADMIN — DIPHENHYDRAMINE HYDROCHLORIDE 25 MG: 25 CAPSULE ORAL at 08:31

## 2017-07-15 LAB
ABO + RH BLD: NORMAL
ABO + RH BLD: NORMAL
BH BB BLOOD EXPIRATION DATE: NORMAL
BH BB BLOOD EXPIRATION DATE: NORMAL
BH BB BLOOD TYPE BARCODE: 9500
BH BB BLOOD TYPE BARCODE: 9500
BH BB DISPENSE STATUS: NORMAL
BH BB DISPENSE STATUS: NORMAL
BH BB PRODUCT CODE: NORMAL
BH BB PRODUCT CODE: NORMAL
BH BB UNIT NUMBER: NORMAL
BH BB UNIT NUMBER: NORMAL
CROSSMATCH INTERPRETATION: NORMAL
CROSSMATCH INTERPRETATION: NORMAL
UNIT  ABO: NORMAL
UNIT  ABO: NORMAL
UNIT  RH: NORMAL
UNIT  RH: NORMAL

## 2017-07-21 ENCOUNTER — INFUSION (OUTPATIENT)
Dept: ONCOLOGY | Facility: HOSPITAL | Age: 82
End: 2017-07-21

## 2017-07-21 VITALS
WEIGHT: 132 LBS | BODY MASS INDEX: 23.39 KG/M2 | HEIGHT: 63 IN | HEART RATE: 68 BPM | RESPIRATION RATE: 18 BRPM | SYSTOLIC BLOOD PRESSURE: 117 MMHG | TEMPERATURE: 97.7 F | DIASTOLIC BLOOD PRESSURE: 45 MMHG

## 2017-07-21 DIAGNOSIS — D50.0 IRON DEFICIENCY ANEMIA DUE TO CHRONIC BLOOD LOSS: ICD-10-CM

## 2017-07-21 DIAGNOSIS — K90.9 MALABSORPTION IN THE ELDERLY: Primary | ICD-10-CM

## 2017-07-21 DIAGNOSIS — Z78.9 MEDICATION INTOLERANCE: ICD-10-CM

## 2017-07-21 PROCEDURE — 25010000002 FERUMOXYTOL 510 MG/17ML SOLUTION 510 MG VIAL: Performed by: INTERNAL MEDICINE

## 2017-07-21 PROCEDURE — 96374 THER/PROPH/DIAG INJ IV PUSH: CPT

## 2017-07-21 RX ADMIN — FERUMOXYTOL 510 MG: 510 INJECTION INTRAVENOUS at 13:37

## 2017-07-28 ENCOUNTER — INFUSION (OUTPATIENT)
Dept: ONCOLOGY | Facility: HOSPITAL | Age: 82
End: 2017-07-28

## 2017-07-28 VITALS
RESPIRATION RATE: 16 BRPM | WEIGHT: 132 LBS | BODY MASS INDEX: 23.39 KG/M2 | SYSTOLIC BLOOD PRESSURE: 174 MMHG | HEIGHT: 63 IN | HEART RATE: 78 BPM | TEMPERATURE: 97.5 F | DIASTOLIC BLOOD PRESSURE: 75 MMHG

## 2017-07-28 DIAGNOSIS — D50.0 IRON DEFICIENCY ANEMIA DUE TO CHRONIC BLOOD LOSS: Primary | ICD-10-CM

## 2017-07-28 DIAGNOSIS — Z78.9 MEDICATION INTOLERANCE: ICD-10-CM

## 2017-07-28 DIAGNOSIS — K90.9 MALABSORPTION IN THE ELDERLY: ICD-10-CM

## 2017-07-28 PROCEDURE — 96374 THER/PROPH/DIAG INJ IV PUSH: CPT

## 2017-07-28 PROCEDURE — 25010000002 FERUMOXYTOL 510 MG/17ML SOLUTION 510 MG VIAL: Performed by: INTERNAL MEDICINE

## 2017-07-28 RX ORDER — SODIUM CHLORIDE 9 MG/ML
250 INJECTION, SOLUTION INTRAVENOUS ONCE
Status: COMPLETED | OUTPATIENT
Start: 2017-07-28 | End: 2017-07-28

## 2017-07-28 RX ADMIN — FERUMOXYTOL 510 MG: 510 INJECTION INTRAVENOUS at 13:40

## 2017-07-28 RX ADMIN — SODIUM CHLORIDE 250 ML: 9 INJECTION, SOLUTION INTRAVENOUS at 13:40

## 2017-09-25 ENCOUNTER — LAB (OUTPATIENT)
Dept: ONCOLOGY | Facility: HOSPITAL | Age: 82
End: 2017-09-25

## 2017-09-25 ENCOUNTER — LAB (OUTPATIENT)
Dept: LAB | Facility: HOSPITAL | Age: 82
End: 2017-09-25

## 2017-09-25 VITALS
RESPIRATION RATE: 16 BRPM | WEIGHT: 136 LBS | DIASTOLIC BLOOD PRESSURE: 64 MMHG | HEIGHT: 63 IN | HEART RATE: 79 BPM | SYSTOLIC BLOOD PRESSURE: 139 MMHG | BODY MASS INDEX: 24.1 KG/M2 | TEMPERATURE: 97.5 F

## 2017-09-25 DIAGNOSIS — D50.0 IRON DEFICIENCY ANEMIA DUE TO CHRONIC BLOOD LOSS: Primary | ICD-10-CM

## 2017-09-25 DIAGNOSIS — K90.9 MALABSORPTION IN THE ELDERLY: ICD-10-CM

## 2017-09-25 DIAGNOSIS — D50.0 IRON DEFICIENCY ANEMIA DUE TO CHRONIC BLOOD LOSS: ICD-10-CM

## 2017-09-25 LAB
ABO GROUP BLD: NORMAL
ANTI-E: NORMAL
BLD GP AB SCN SERPL QL ELUTION: NEGATIVE
BLD GP AB SCN SERPL QL: POSITIVE
DAT C3: NEGATIVE
DAT IGG GEL: POSITIVE
ERYTHROCYTE [DISTWIDTH] IN BLOOD BY AUTOMATED COUNT: 19.1 % (ref 11.3–14.5)
HCT VFR BLD AUTO: 22.7 % (ref 34.5–44)
HGB BLD-MCNC: 6.6 G/DL (ref 11.5–15.5)
MCH RBC QN AUTO: 23.3 PG (ref 27–31)
MCHC RBC AUTO-ENTMCNC: 29.2 G/DL (ref 32–36)
MCV RBC AUTO: 79.8 FL (ref 80–99)
PLATELET # BLD AUTO: 264 10*3/MM3 (ref 150–450)
PMV BLD AUTO: 7.6 FL (ref 6–12)
RBC # BLD AUTO: 2.85 10*6/MM3 (ref 3.89–5.14)
RH BLD: NEGATIVE
WARM AUTOANTIBODY: NORMAL
WBC NRBC COR # BLD: 4.9 10*3/MM3 (ref 3.5–10.8)

## 2017-09-25 PROCEDURE — 86920 COMPATIBILITY TEST SPIN: CPT

## 2017-09-25 PROCEDURE — 86860 RBC ANTIBODY ELUTION: CPT

## 2017-09-25 PROCEDURE — 86880 COOMBS TEST DIRECT: CPT

## 2017-09-25 PROCEDURE — 36415 COLL VENOUS BLD VENIPUNCTURE: CPT

## 2017-09-25 PROCEDURE — 86900 BLOOD TYPING SEROLOGIC ABO: CPT

## 2017-09-25 PROCEDURE — 86850 RBC ANTIBODY SCREEN: CPT

## 2017-09-25 PROCEDURE — 86902 BLOOD TYPE ANTIGEN DONOR EA: CPT

## 2017-09-25 PROCEDURE — 85027 COMPLETE CBC AUTOMATED: CPT

## 2017-09-25 PROCEDURE — 86901 BLOOD TYPING SEROLOGIC RH(D): CPT

## 2017-09-25 PROCEDURE — 86870 RBC ANTIBODY IDENTIFICATION: CPT

## 2017-09-25 PROCEDURE — 86922 COMPATIBILITY TEST ANTIGLOB: CPT

## 2017-09-25 RX ORDER — ACETAMINOPHEN 325 MG/1
325 TABLET ORAL ONCE
Status: CANCELLED | OUTPATIENT
Start: 2017-09-25 | End: 2017-09-25

## 2017-09-25 RX ORDER — SODIUM CHLORIDE 9 MG/ML
250 INJECTION, SOLUTION INTRAVENOUS AS NEEDED
Status: CANCELLED | OUTPATIENT
Start: 2017-09-25

## 2017-09-25 RX ORDER — DIPHENHYDRAMINE HCL 25 MG
25 CAPSULE ORAL ONCE
Status: CANCELLED | OUTPATIENT
Start: 2017-09-25 | End: 2017-09-25

## 2017-09-27 ENCOUNTER — INFUSION (OUTPATIENT)
Dept: ONCOLOGY | Facility: HOSPITAL | Age: 82
End: 2017-09-27

## 2017-09-27 VITALS
DIASTOLIC BLOOD PRESSURE: 45 MMHG | SYSTOLIC BLOOD PRESSURE: 121 MMHG | HEART RATE: 70 BPM | RESPIRATION RATE: 16 BRPM | TEMPERATURE: 97.2 F | BODY MASS INDEX: 24.45 KG/M2 | HEIGHT: 63 IN | WEIGHT: 138 LBS

## 2017-09-27 DIAGNOSIS — D50.0 IRON DEFICIENCY ANEMIA DUE TO CHRONIC BLOOD LOSS: Primary | ICD-10-CM

## 2017-09-27 DIAGNOSIS — Z78.9 MEDICATION INTOLERANCE: ICD-10-CM

## 2017-09-27 DIAGNOSIS — K90.9 MALABSORPTION IN THE ELDERLY: ICD-10-CM

## 2017-09-27 PROCEDURE — 63710000001 DIPHENHYDRAMINE PER 50 MG: Performed by: INTERNAL MEDICINE

## 2017-09-27 PROCEDURE — 25010000002 FERUMOXYTOL 510 MG/17ML SOLUTION 510 MG VIAL: Performed by: INTERNAL MEDICINE

## 2017-09-27 PROCEDURE — 86900 BLOOD TYPING SEROLOGIC ABO: CPT

## 2017-09-27 PROCEDURE — P9016 RBC LEUKOCYTES REDUCED: HCPCS

## 2017-09-27 PROCEDURE — 96374 THER/PROPH/DIAG INJ IV PUSH: CPT

## 2017-09-27 PROCEDURE — 36430 TRANSFUSION BLD/BLD COMPNT: CPT

## 2017-09-27 RX ORDER — ACETAMINOPHEN 325 MG/1
325 TABLET ORAL ONCE
Status: COMPLETED | OUTPATIENT
Start: 2017-09-27 | End: 2017-09-27

## 2017-09-27 RX ORDER — DIPHENHYDRAMINE HCL 25 MG
25 CAPSULE ORAL ONCE
Status: COMPLETED | OUTPATIENT
Start: 2017-09-27 | End: 2017-09-27

## 2017-09-27 RX ADMIN — DIPHENHYDRAMINE HYDROCHLORIDE 25 MG: 25 CAPSULE ORAL at 08:38

## 2017-09-27 RX ADMIN — FERUMOXYTOL 510 MG: 510 INJECTION INTRAVENOUS at 13:20

## 2017-09-27 RX ADMIN — ACETAMINOPHEN 325 MG: 325 TABLET ORAL at 08:38

## 2017-09-29 ENCOUNTER — OFFICE VISIT (OUTPATIENT)
Dept: ONCOLOGY | Facility: CLINIC | Age: 82
End: 2017-09-29

## 2017-09-29 VITALS
BODY MASS INDEX: 24.27 KG/M2 | DIASTOLIC BLOOD PRESSURE: 56 MMHG | TEMPERATURE: 97.3 F | RESPIRATION RATE: 16 BRPM | HEART RATE: 72 BPM | HEIGHT: 63 IN | SYSTOLIC BLOOD PRESSURE: 129 MMHG | WEIGHT: 137 LBS

## 2017-09-29 DIAGNOSIS — Z78.9 MEDICATION INTOLERANCE: ICD-10-CM

## 2017-09-29 DIAGNOSIS — K90.9 MALABSORPTION IN THE ELDERLY: ICD-10-CM

## 2017-09-29 DIAGNOSIS — D50.0 IRON DEFICIENCY ANEMIA DUE TO CHRONIC BLOOD LOSS: ICD-10-CM

## 2017-09-29 PROCEDURE — 99214 OFFICE O/P EST MOD 30 MIN: CPT | Performed by: INTERNAL MEDICINE

## 2017-09-29 RX ORDER — SODIUM CHLORIDE 9 MG/ML
250 INJECTION, SOLUTION INTRAVENOUS ONCE
Status: CANCELLED | OUTPATIENT
Start: 2017-11-09

## 2017-09-29 RX ORDER — OXYBUTYNIN CHLORIDE 10 MG/1
TABLET, EXTENDED RELEASE ORAL
COMMUNITY
Start: 2017-09-22 | End: 2018-05-09

## 2017-09-29 RX ORDER — SODIUM CHLORIDE 9 MG/ML
250 INJECTION, SOLUTION INTRAVENOUS ONCE
Status: CANCELLED | OUTPATIENT
Start: 2017-11-16

## 2017-09-29 RX ORDER — CIPROFLOXACIN 500 MG/1
TABLET, FILM COATED ORAL
COMMUNITY
Start: 2017-09-22 | End: 2018-02-21

## 2017-09-30 NOTE — PROGRESS NOTES
PROBLEM LIST:  1. 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 Status post EGD colonoscopy  3. Status post Feraheme infusion   4. Bone Marrow biopsy  - normal - done by Dr. Reeves      REASON FOR VISIT: Follow-up of my anemia    HISTORY OF PRESENT ILLNESS:   86-year-old lady with normocytic anemia likely related to chronic blood loss.  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   She had been feeling very fatigued over the last 2 weeks.  Repeat CBC showed a hemoglobin in the 6 range.  She was transfused with 2 units of blood.  Along with iron infusion.  She has done remarkably well with some improvement in her performance.  Otherwise denies any recent infections or other issues.    Past medical history, social history and family history was reviewed and unchanged from prior visit.    Review of Systems:    Review of Systems   Constitutional: Positive for activity change and fatigue.   HENT: Negative.    Eyes: Negative.    Respiratory: Positive for shortness of breath.    Cardiovascular: Negative.    Gastrointestinal: Negative.    Endocrine: Negative.    Genitourinary: Negative.    Musculoskeletal: Negative.    Skin: Negative.    Allergic/Immunologic: Negative.    Neurological: Negative.    Hematological: Negative.    Psychiatric/Behavioral: Negative.       A comprehensive 14 point review of systems was performed and was negative except as mentioned.      Medications:  The current medication list was reviewed in the EMR    ALLERGIES:    Allergies   Allergen Reactions   • Augmentin [Amoxicillin-Pot Clavulanate]    • Celebrex [Celecoxib]    • Codeine Sulfate    • Cozaar [Losartan]    • Crestor [Rosuvastatin]    • Dexlansoprazole    • Lipitor [Atorvastatin]    • Lisinopril    • Nexium [Esomeprazole Magnesium]    • Norvasc [Amlodipine]    • Sulfadiazine    • Toprol Xl [Metoprolol Tartrate]    • Zetia [Ezetimibe]    • Zocor [Simvastatin]          Physical Exam    VITAL SIGNS:  /56 Comment: ANGELINA  Pulse 72   "Temp 97.3 °F (36.3 °C) (Temporal Artery )   Resp 16  Ht 63\" (160 cm)  Wt 137 lb (62.1 kg)  BMI 24.27 kg/m2     Performance Status: 1    General: well appearing, in no acute distress  HEENT: sclera anicteric, oropharynx clear, neck is supple  Lymphatics: no cervical, supraclavicular, or axillary adenopathy  Cardiovascular: regular rate and rhythm, no murmurs, rubs or gallops  Lungs: clear to auscultation bilaterally  Abdomen: soft, nontender, nondistended.  No palpable organomegaly  Extremities: no lower extremity edema  Skin: no rashes, lesions, bruising, or petechiae  Msk:  Shows no weakness of the large muscle groups  Psych: Mood is stable        RECENT LABS:    Lab Results   Component Value Date    WBC 4.90 09/25/2017    HGB 6.6 (L) 09/25/2017    HCT 22.7 (L) 09/25/2017    MCV 79.8 (L) 09/25/2017     09/25/2017     Lab Results   Component Value Date    FERRITIN 31.00 06/30/2017    FERRITIN 46.00 05/12/2017    FERRITIN 24.00 05/05/2017       Assessment/Plan    86-year-old lady with normocytic anemia requiring periodic blood transfusions likely secondary to bleeding AVMs.    I will continue blood checks every month.  She will need to have another iron infusion in 1 month.  This will limit her need for further transfusion.  Return to clinic in 3 months.    Rahat Morris MD  [facility] Hematology and Oncology    9/30/2017                  "

## 2017-10-16 ENCOUNTER — LAB (OUTPATIENT)
Dept: LAB | Facility: HOSPITAL | Age: 82
End: 2017-10-16

## 2017-10-16 DIAGNOSIS — D50.0 IRON DEFICIENCY ANEMIA DUE TO CHRONIC BLOOD LOSS: ICD-10-CM

## 2017-10-16 DIAGNOSIS — K90.9 MALABSORPTION IN THE ELDERLY: ICD-10-CM

## 2017-10-16 LAB
ERYTHROCYTE [DISTWIDTH] IN BLOOD BY AUTOMATED COUNT: 21.3 % (ref 11.3–14.5)
HCT VFR BLD AUTO: 34.5 % (ref 34.5–44)
HGB BLD-MCNC: 10.5 G/DL (ref 11.5–15.5)
MCH RBC QN AUTO: 25.7 PG (ref 27–31)
MCHC RBC AUTO-ENTMCNC: 30.5 G/DL (ref 32–36)
MCV RBC AUTO: 84.2 FL (ref 80–99)
PLATELET # BLD AUTO: 299 10*3/MM3 (ref 150–450)
PMV BLD AUTO: 7.6 FL (ref 6–12)
RBC # BLD AUTO: 4.1 10*6/MM3 (ref 3.89–5.14)
WBC NRBC COR # BLD: 4.3 10*3/MM3 (ref 3.5–10.8)

## 2017-10-16 PROCEDURE — 36415 COLL VENOUS BLD VENIPUNCTURE: CPT

## 2017-10-16 PROCEDURE — 85027 COMPLETE CBC AUTOMATED: CPT

## 2017-11-09 ENCOUNTER — LAB (OUTPATIENT)
Dept: LAB | Facility: HOSPITAL | Age: 82
End: 2017-11-09

## 2017-11-09 ENCOUNTER — OFFICE VISIT (OUTPATIENT)
Dept: ONCOLOGY | Facility: CLINIC | Age: 82
End: 2017-11-09

## 2017-11-09 ENCOUNTER — INFUSION (OUTPATIENT)
Dept: ONCOLOGY | Facility: HOSPITAL | Age: 82
End: 2017-11-09

## 2017-11-09 VITALS — DIASTOLIC BLOOD PRESSURE: 42 MMHG | HEART RATE: 66 BPM | SYSTOLIC BLOOD PRESSURE: 114 MMHG

## 2017-11-09 VITALS
BODY MASS INDEX: 24.1 KG/M2 | SYSTOLIC BLOOD PRESSURE: 136 MMHG | DIASTOLIC BLOOD PRESSURE: 48 MMHG | TEMPERATURE: 97 F | RESPIRATION RATE: 16 BRPM | HEIGHT: 63 IN | HEART RATE: 83 BPM | WEIGHT: 136 LBS

## 2017-11-09 DIAGNOSIS — Z78.9 MEDICATION INTOLERANCE: ICD-10-CM

## 2017-11-09 DIAGNOSIS — D50.0 IRON DEFICIENCY ANEMIA DUE TO CHRONIC BLOOD LOSS: ICD-10-CM

## 2017-11-09 DIAGNOSIS — K90.9 MALABSORPTION IN THE ELDERLY: ICD-10-CM

## 2017-11-09 DIAGNOSIS — K90.9 MALABSORPTION IN THE ELDERLY: Primary | ICD-10-CM

## 2017-11-09 LAB
ERYTHROCYTE [DISTWIDTH] IN BLOOD BY AUTOMATED COUNT: 19.9 % (ref 11.3–14.5)
FERRITIN SERPL-MCNC: 17 NG/ML (ref 10–291)
HCT VFR BLD AUTO: 25.8 % (ref 34.5–44)
HGB BLD-MCNC: 7.9 G/DL (ref 11.5–15.5)
IRON 24H UR-MRATE: 7 MCG/DL (ref 50–175)
IRON SATN MFR SERPL: 3 % (ref 15–50)
MCH RBC QN AUTO: 24.5 PG (ref 27–31)
MCHC RBC AUTO-ENTMCNC: 30.8 G/DL (ref 32–36)
MCV RBC AUTO: 79.5 FL (ref 80–99)
PLATELET # BLD AUTO: 323 10*3/MM3 (ref 150–450)
PMV BLD AUTO: 6.5 FL (ref 6–12)
RBC # BLD AUTO: 3.25 10*6/MM3 (ref 3.89–5.14)
TIBC SERPL-MCNC: 268 MCG/DL (ref 250–450)
WBC NRBC COR # BLD: 8.9 10*3/MM3 (ref 3.5–10.8)

## 2017-11-09 PROCEDURE — 36415 COLL VENOUS BLD VENIPUNCTURE: CPT

## 2017-11-09 PROCEDURE — 83540 ASSAY OF IRON: CPT

## 2017-11-09 PROCEDURE — 96374 THER/PROPH/DIAG INJ IV PUSH: CPT

## 2017-11-09 PROCEDURE — 25010000002 FERUMOXYTOL 510 MG/17ML SOLUTION 510 MG VIAL: Performed by: INTERNAL MEDICINE

## 2017-11-09 PROCEDURE — 96365 THER/PROPH/DIAG IV INF INIT: CPT

## 2017-11-09 PROCEDURE — 99214 OFFICE O/P EST MOD 30 MIN: CPT | Performed by: INTERNAL MEDICINE

## 2017-11-09 PROCEDURE — 83550 IRON BINDING TEST: CPT

## 2017-11-09 PROCEDURE — 85027 COMPLETE CBC AUTOMATED: CPT

## 2017-11-09 PROCEDURE — 82728 ASSAY OF FERRITIN: CPT | Performed by: INTERNAL MEDICINE

## 2017-11-09 RX ORDER — SODIUM CHLORIDE 9 MG/ML
250 INJECTION, SOLUTION INTRAVENOUS ONCE
Status: DISCONTINUED | OUTPATIENT
Start: 2017-11-09 | End: 2017-11-09 | Stop reason: HOSPADM

## 2017-11-09 RX ORDER — SODIUM CHLORIDE 9 MG/ML
250 INJECTION, SOLUTION INTRAVENOUS ONCE
Status: CANCELLED | OUTPATIENT
Start: 2018-01-10

## 2017-11-09 RX ORDER — SODIUM CHLORIDE 9 MG/ML
250 INJECTION, SOLUTION INTRAVENOUS ONCE
Status: CANCELLED | OUTPATIENT
Start: 2018-01-03

## 2017-11-09 RX ADMIN — FERUMOXYTOL 510 MG: 510 INJECTION INTRAVENOUS at 14:10

## 2017-11-09 NOTE — PROGRESS NOTES
"PROBLEM LIST:  1. 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 Status post EGD colonoscopy  3. Status post Feraheme infusion periodically  4. Bone Marrow biopsy  - normal - done by Dr. Reeves      REASON FOR VISIT: Follow-up of my anemia    HISTORY OF PRESENT ILLNESS:   86-year-old lady with normocytic anemia likely related to chronic blood loss.  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   She reports getting more fatigued recently. Also with dyspneic with exertion.      Past medical history, social history and family history was reviewed and unchanged from prior visit.    Review of Systems:    Review of Systems   Constitutional: Positive for fatigue.   HENT: Negative.    Eyes: Negative.    Respiratory: Positive for shortness of breath.    Cardiovascular: Negative.    Gastrointestinal: Negative.    Endocrine: Negative.    Genitourinary: Negative.    Musculoskeletal: Negative.    Skin: Negative.    Allergic/Immunologic: Negative.    Neurological: Negative.    Hematological: Negative.    Psychiatric/Behavioral: Negative.       A comprehensive 14 point review of systems was performed and was negative except as mentioned.      Medications:  The current medication list was reviewed in the EMR    ALLERGIES:    Allergies   Allergen Reactions   • Augmentin [Amoxicillin-Pot Clavulanate]    • Celebrex [Celecoxib]    • Codeine Sulfate    • Cozaar [Losartan]    • Crestor [Rosuvastatin]    • Dexlansoprazole    • Lipitor [Atorvastatin]    • Lisinopril    • Nexium [Esomeprazole Magnesium]    • Norvasc [Amlodipine]    • Sulfadiazine    • Toprol Xl [Metoprolol Tartrate]    • Zetia [Ezetimibe]    • Zocor [Simvastatin]          Physical Exam    VITAL SIGNS:  /48 Comment: LUE  Pulse 83  Temp 97 °F (36.1 °C) (Temporal Artery )   Resp 16  Ht 63\" (160 cm)  Wt 136 lb (61.7 kg)  BMI 24.09 kg/m2     Performance Status: 1    General: well appearing, in no acute distress  HEENT: sclera anicteric, oropharynx clear, neck " is supple  Lymphatics: no cervical, supraclavicular, or axillary adenopathy  Cardiovascular: regular rate and rhythm, no murmurs, rubs or gallops  Lungs: clear to auscultation bilaterally  Abdomen: soft, nontender, nondistended.  No palpable organomegaly  Extremities: no lower extremity edema  Skin: no rashes, lesions, bruising, or petechiae  Msk:  Shows no weakness of the large muscle groups  Psych: Mood is stable        RECENT LABS:    Lab Results   Component Value Date    WBC 8.90 11/09/2017    HGB 7.9 (L) 11/09/2017    HCT 25.8 (L) 11/09/2017    MCV 79.5 (L) 11/09/2017     11/09/2017     Lab Results   Component Value Date    FERRITIN 31.00 06/30/2017    FERRITIN 46.00 05/12/2017    FERRITIN 24.00 05/05/2017     Lab Results   Component Value Date    IRON 7 (L) 11/09/2017   ]      Assessment/Plan    86-year-old lady with normocytic anemia requiring periodic blood transfusions likely secondary to bleeding AVMs.   We'll re-dose her with iron infusions.  She will need it every other month I suspect.  I like to recheck her labs in few weeks to see if there has been any changes.        Rahat Morris MD  [facility] Hematology and Oncology    11/9/2017

## 2017-11-16 ENCOUNTER — INFUSION (OUTPATIENT)
Dept: ONCOLOGY | Facility: HOSPITAL | Age: 82
End: 2017-11-16

## 2017-11-16 VITALS
TEMPERATURE: 97.3 F | RESPIRATION RATE: 16 BRPM | BODY MASS INDEX: 23.92 KG/M2 | WEIGHT: 135 LBS | SYSTOLIC BLOOD PRESSURE: 132 MMHG | HEART RATE: 74 BPM | HEIGHT: 63 IN | DIASTOLIC BLOOD PRESSURE: 46 MMHG

## 2017-11-16 DIAGNOSIS — Z78.9 MEDICATION INTOLERANCE: ICD-10-CM

## 2017-11-16 DIAGNOSIS — K90.9 MALABSORPTION IN THE ELDERLY: ICD-10-CM

## 2017-11-16 DIAGNOSIS — D50.0 IRON DEFICIENCY ANEMIA DUE TO CHRONIC BLOOD LOSS: Primary | ICD-10-CM

## 2017-11-16 PROCEDURE — 25010000002 FERUMOXYTOL 510 MG/17ML SOLUTION 510 MG VIAL: Performed by: INTERNAL MEDICINE

## 2017-11-16 PROCEDURE — 96374 THER/PROPH/DIAG INJ IV PUSH: CPT

## 2017-11-16 RX ORDER — SODIUM CHLORIDE 9 MG/ML
250 INJECTION, SOLUTION INTRAVENOUS ONCE
Status: COMPLETED | OUTPATIENT
Start: 2017-11-16 | End: 2017-11-16

## 2017-11-16 RX ADMIN — SODIUM CHLORIDE 250 ML: 9 INJECTION, SOLUTION INTRAVENOUS at 13:04

## 2017-11-16 RX ADMIN — FERUMOXYTOL 510 MG: 510 INJECTION INTRAVENOUS at 13:04

## 2017-12-18 ENCOUNTER — LAB (OUTPATIENT)
Dept: LAB | Facility: HOSPITAL | Age: 82
End: 2017-12-18

## 2017-12-18 DIAGNOSIS — K90.9 MALABSORPTION IN THE ELDERLY: ICD-10-CM

## 2017-12-18 DIAGNOSIS — D50.0 IRON DEFICIENCY ANEMIA DUE TO CHRONIC BLOOD LOSS: ICD-10-CM

## 2017-12-18 LAB
ERYTHROCYTE [DISTWIDTH] IN BLOOD BY AUTOMATED COUNT: 18.9 % (ref 11.3–14.5)
HCT VFR BLD AUTO: 33.1 % (ref 34.5–44)
HGB BLD-MCNC: 10.2 G/DL (ref 11.5–15.5)
MCH RBC QN AUTO: 26.5 PG (ref 27–31)
MCHC RBC AUTO-ENTMCNC: 30.8 G/DL (ref 32–36)
MCV RBC AUTO: 86.1 FL (ref 80–99)
PLATELET # BLD AUTO: 333 10*3/MM3 (ref 150–450)
PMV BLD AUTO: 7.4 FL (ref 6–12)
RBC # BLD AUTO: 3.84 10*6/MM3 (ref 3.89–5.14)
WBC NRBC COR # BLD: 5.8 10*3/MM3 (ref 3.5–10.8)

## 2017-12-18 PROCEDURE — 85027 COMPLETE CBC AUTOMATED: CPT

## 2017-12-18 PROCEDURE — 36415 COLL VENOUS BLD VENIPUNCTURE: CPT

## 2018-01-03 ENCOUNTER — LAB (OUTPATIENT)
Dept: LAB | Facility: HOSPITAL | Age: 83
End: 2018-01-03

## 2018-01-03 ENCOUNTER — OFFICE VISIT (OUTPATIENT)
Dept: ONCOLOGY | Facility: CLINIC | Age: 83
End: 2018-01-03

## 2018-01-03 ENCOUNTER — INFUSION (OUTPATIENT)
Dept: ONCOLOGY | Facility: HOSPITAL | Age: 83
End: 2018-01-03

## 2018-01-03 VITALS
DIASTOLIC BLOOD PRESSURE: 101 MMHG | RESPIRATION RATE: 16 BRPM | BODY MASS INDEX: 24.45 KG/M2 | SYSTOLIC BLOOD PRESSURE: 135 MMHG | TEMPERATURE: 96.8 F | WEIGHT: 138 LBS | HEART RATE: 84 BPM

## 2018-01-03 VITALS — SYSTOLIC BLOOD PRESSURE: 150 MMHG | HEART RATE: 76 BPM | DIASTOLIC BLOOD PRESSURE: 57 MMHG

## 2018-01-03 DIAGNOSIS — D50.0 IRON DEFICIENCY ANEMIA DUE TO CHRONIC BLOOD LOSS: Primary | ICD-10-CM

## 2018-01-03 DIAGNOSIS — Z78.9 MEDICATION INTOLERANCE: ICD-10-CM

## 2018-01-03 DIAGNOSIS — K90.9 MALABSORPTION IN THE ELDERLY: ICD-10-CM

## 2018-01-03 DIAGNOSIS — D50.0 IRON DEFICIENCY ANEMIA DUE TO CHRONIC BLOOD LOSS: ICD-10-CM

## 2018-01-03 LAB
ERYTHROCYTE [DISTWIDTH] IN BLOOD BY AUTOMATED COUNT: 18.2 % (ref 11.3–14.5)
FERRITIN SERPL-MCNC: 23 NG/ML (ref 10–291)
HCT VFR BLD AUTO: 29.1 % (ref 34.5–44)
HGB BLD-MCNC: 8.8 G/DL (ref 11.5–15.5)
IRON 24H UR-MRATE: 13 MCG/DL (ref 50–175)
IRON SATN MFR SERPL: 5 % (ref 15–50)
MCH RBC QN AUTO: 24.8 PG (ref 27–31)
MCHC RBC AUTO-ENTMCNC: 30.2 G/DL (ref 32–36)
MCV RBC AUTO: 82.1 FL (ref 80–99)
PLATELET # BLD AUTO: 339 10*3/MM3 (ref 150–450)
PMV BLD AUTO: 7.8 FL (ref 6–12)
RBC # BLD AUTO: 3.54 10*6/MM3 (ref 3.89–5.14)
TIBC SERPL-MCNC: 273 MCG/DL (ref 250–450)
WBC NRBC COR # BLD: 5.1 10*3/MM3 (ref 3.5–10.8)

## 2018-01-03 PROCEDURE — 36415 COLL VENOUS BLD VENIPUNCTURE: CPT

## 2018-01-03 PROCEDURE — 83550 IRON BINDING TEST: CPT

## 2018-01-03 PROCEDURE — 85027 COMPLETE CBC AUTOMATED: CPT

## 2018-01-03 PROCEDURE — 96374 THER/PROPH/DIAG INJ IV PUSH: CPT

## 2018-01-03 PROCEDURE — 99213 OFFICE O/P EST LOW 20 MIN: CPT | Performed by: INTERNAL MEDICINE

## 2018-01-03 PROCEDURE — 83540 ASSAY OF IRON: CPT

## 2018-01-03 PROCEDURE — 82728 ASSAY OF FERRITIN: CPT

## 2018-01-03 PROCEDURE — 25010000002 FERUMOXYTOL 510 MG/17ML SOLUTION 510 MG VIAL: Performed by: INTERNAL MEDICINE

## 2018-01-03 RX ORDER — SODIUM CHLORIDE 9 MG/ML
250 INJECTION, SOLUTION INTRAVENOUS ONCE
Status: CANCELLED | OUTPATIENT
Start: 2018-02-28

## 2018-01-03 RX ORDER — SODIUM CHLORIDE 9 MG/ML
250 INJECTION, SOLUTION INTRAVENOUS ONCE
Status: CANCELLED | OUTPATIENT
Start: 2018-02-21

## 2018-01-03 RX ADMIN — FERUMOXYTOL 510 MG: 510 INJECTION INTRAVENOUS at 14:23

## 2018-01-04 NOTE — PROGRESS NOTES
PROBLEM LIST:  1. 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 Status post EGD colonoscopy  3. Status post Feraheme infusion periodically  4. Bone Marrow biopsy  - normal - done by Dr. Reeves      REASON FOR VISIT: Follow-up of my anemia    HISTORY OF PRESENT ILLNESS:   86-year-old lady with normocytic anemia likely related to chronic blood loss.  She continues to have significant loss of iron.  This is likely from bleeding AVMs.  Otherwise fatigue seems to be an issue.    Past medical history, social history and family history was reviewed and unchanged from prior visit.    Review of Systems:    Review of Systems   Constitutional: Positive for fatigue.   HENT: Negative.    Eyes: Negative.    Respiratory: Positive for shortness of breath.    Cardiovascular: Negative.    Gastrointestinal: Negative.    Endocrine: Negative.    Genitourinary: Negative.    Musculoskeletal: Negative.    Skin: Negative.    Allergic/Immunologic: Negative.    Neurological: Negative.    Hematological: Negative.    Psychiatric/Behavioral: Negative.       A comprehensive 14 point review of systems was performed and was negative except as mentioned.      Medications:  The current medication list was reviewed in the EMR    ALLERGIES:    Allergies   Allergen Reactions   • Augmentin [Amoxicillin-Pot Clavulanate]    • Celebrex [Celecoxib]    • Codeine Sulfate    • Cozaar [Losartan]    • Crestor [Rosuvastatin]    • Dexlansoprazole    • Lipitor [Atorvastatin]    • Lisinopril    • Nexium [Esomeprazole Magnesium]    • Norvasc [Amlodipine]    • Sulfadiazine    • Toprol Xl [Metoprolol Tartrate]    • Zetia [Ezetimibe]    • Zocor [Simvastatin]          Physical Exam    VITAL SIGNS:  BP (!) 135/101  Pulse 84  Temp 96.8 °F (36 °C) (Temporal Artery )   Resp 16  Wt 62.6 kg (138 lb)  BMI 24.45 kg/m2     Performance Status: 1    General: well appearing, in no acute distress  HEENT: sclera anicteric, oropharynx clear, neck is  supple  Lymphatics: no cervical, supraclavicular, or axillary adenopathy  Cardiovascular: regular rate and rhythm, no murmurs, rubs or gallops  Lungs: clear to auscultation bilaterally  Abdomen: soft, nontender, nondistended.  No palpable organomegaly  Extremities: no lower extremity edema  Skin: no rashes, lesions, bruising, or petechiae  Msk:  Shows no weakness of the large muscle groups  Psych: Mood is stable        RECENT LABS:    Lab Results   Component Value Date    WBC 5.10 01/03/2018    HGB 8.8 (L) 01/03/2018    HCT 29.1 (L) 01/03/2018    MCV 82.1 01/03/2018     01/03/2018     Lab Results   Component Value Date    FERRITIN 23.00 01/03/2018    FERRITIN 17.00 11/09/2017    FERRITIN 31.00 06/30/2017     Lab Results   Component Value Date    IRON 13 (L) 01/03/2018   ]      Assessment/Plan    86-year-old lady with normocytic anemia requiring periodic blood transfusions likely secondary to bleeding AVMs.   We'll re-dose her with iron infusions.  She will need it every other month I suspect.I will see her back in my clinic in 3 months.  She'll have monthly labs and iron infusions.    Rahat Morris MD  [facility] Hematology and Oncology    1/4/2018

## 2018-01-10 ENCOUNTER — INFUSION (OUTPATIENT)
Dept: ONCOLOGY | Facility: HOSPITAL | Age: 83
End: 2018-01-10

## 2018-01-10 VITALS
TEMPERATURE: 97.2 F | SYSTOLIC BLOOD PRESSURE: 128 MMHG | WEIGHT: 135 LBS | HEART RATE: 87 BPM | BODY MASS INDEX: 23.92 KG/M2 | DIASTOLIC BLOOD PRESSURE: 48 MMHG | RESPIRATION RATE: 16 BRPM | HEIGHT: 63 IN

## 2018-01-10 DIAGNOSIS — Z78.9 MEDICATION INTOLERANCE: ICD-10-CM

## 2018-01-10 DIAGNOSIS — D50.0 IRON DEFICIENCY ANEMIA DUE TO CHRONIC BLOOD LOSS: Primary | ICD-10-CM

## 2018-01-10 DIAGNOSIS — K90.9 MALABSORPTION IN THE ELDERLY: ICD-10-CM

## 2018-01-10 PROCEDURE — 25010000002 FERUMOXYTOL 510 MG/17ML SOLUTION 510 MG VIAL: Performed by: INTERNAL MEDICINE

## 2018-01-10 PROCEDURE — 96374 THER/PROPH/DIAG INJ IV PUSH: CPT

## 2018-01-10 RX ADMIN — FERUMOXYTOL 510 MG: 510 INJECTION INTRAVENOUS at 13:53

## 2018-01-29 ENCOUNTER — LAB (OUTPATIENT)
Dept: LAB | Facility: HOSPITAL | Age: 83
End: 2018-01-29

## 2018-01-29 DIAGNOSIS — D50.0 IRON DEFICIENCY ANEMIA DUE TO CHRONIC BLOOD LOSS: ICD-10-CM

## 2018-01-29 DIAGNOSIS — K90.9 MALABSORPTION IN THE ELDERLY: ICD-10-CM

## 2018-01-29 LAB
ERYTHROCYTE [DISTWIDTH] IN BLOOD BY AUTOMATED COUNT: 18.7 % (ref 11.3–14.5)
HCT VFR BLD AUTO: 32.5 % (ref 34.5–44)
HGB BLD-MCNC: 10 G/DL (ref 11.5–15.5)
MCH RBC QN AUTO: 27.1 PG (ref 27–31)
MCHC RBC AUTO-ENTMCNC: 30.8 G/DL (ref 32–36)
MCV RBC AUTO: 87.8 FL (ref 80–99)
PLATELET # BLD AUTO: 266 10*3/MM3 (ref 150–450)
PMV BLD AUTO: 7.4 FL (ref 6–12)
RBC # BLD AUTO: 3.7 10*6/MM3 (ref 3.89–5.14)
WBC NRBC COR # BLD: 4.6 10*3/MM3 (ref 3.5–10.8)

## 2018-01-29 PROCEDURE — 36415 COLL VENOUS BLD VENIPUNCTURE: CPT

## 2018-01-29 PROCEDURE — 85027 COMPLETE CBC AUTOMATED: CPT

## 2018-02-21 ENCOUNTER — INFUSION (OUTPATIENT)
Dept: ONCOLOGY | Facility: HOSPITAL | Age: 83
End: 2018-02-21

## 2018-02-21 ENCOUNTER — OFFICE VISIT (OUTPATIENT)
Dept: ONCOLOGY | Facility: CLINIC | Age: 83
End: 2018-02-21

## 2018-02-21 ENCOUNTER — LAB (OUTPATIENT)
Dept: LAB | Facility: HOSPITAL | Age: 83
End: 2018-02-21

## 2018-02-21 VITALS — SYSTOLIC BLOOD PRESSURE: 119 MMHG | HEART RATE: 76 BPM | DIASTOLIC BLOOD PRESSURE: 43 MMHG

## 2018-02-21 VITALS
TEMPERATURE: 96.9 F | RESPIRATION RATE: 14 BRPM | HEART RATE: 91 BPM | WEIGHT: 140 LBS | DIASTOLIC BLOOD PRESSURE: 57 MMHG | SYSTOLIC BLOOD PRESSURE: 140 MMHG | BODY MASS INDEX: 24.8 KG/M2

## 2018-02-21 DIAGNOSIS — D50.0 IRON DEFICIENCY ANEMIA DUE TO CHRONIC BLOOD LOSS: Primary | ICD-10-CM

## 2018-02-21 DIAGNOSIS — D50.0 IRON DEFICIENCY ANEMIA DUE TO CHRONIC BLOOD LOSS: ICD-10-CM

## 2018-02-21 DIAGNOSIS — K90.9 MALABSORPTION IN THE ELDERLY: ICD-10-CM

## 2018-02-21 DIAGNOSIS — Z78.9 MEDICATION INTOLERANCE: ICD-10-CM

## 2018-02-21 LAB
ABO GROUP BLD: NORMAL
ANTI-E: NORMAL
BLD GP AB SCN SERPL QL: POSITIVE
DAT IGG GEL: POSITIVE
ERYTHROCYTE [DISTWIDTH] IN BLOOD BY AUTOMATED COUNT: 18.8 % (ref 11.3–14.5)
HCT VFR BLD AUTO: 21.9 % (ref 34.5–44)
HGB BLD-MCNC: 6.5 G/DL (ref 11.5–15.5)
IRON 24H UR-MRATE: 12 MCG/DL (ref 50–175)
IRON SATN MFR SERPL: 4 % (ref 15–50)
MCH RBC QN AUTO: 23.6 PG (ref 27–31)
MCHC RBC AUTO-ENTMCNC: 29.6 G/DL (ref 32–36)
MCV RBC AUTO: 79.8 FL (ref 80–99)
PLATELET # BLD AUTO: 334 10*3/MM3 (ref 150–450)
PMV BLD AUTO: 7 FL (ref 6–12)
RBC # BLD AUTO: 2.74 10*6/MM3 (ref 3.89–5.14)
RH BLD: NEGATIVE
TIBC SERPL-MCNC: 280 MCG/DL (ref 250–450)
WARM AUTOANTIBODY: NORMAL
WBC NRBC COR # BLD: 6.3 10*3/MM3 (ref 3.5–10.8)

## 2018-02-21 PROCEDURE — 83550 IRON BINDING TEST: CPT

## 2018-02-21 PROCEDURE — 86920 COMPATIBILITY TEST SPIN: CPT

## 2018-02-21 PROCEDURE — 86902 BLOOD TYPE ANTIGEN DONOR EA: CPT

## 2018-02-21 PROCEDURE — 96374 THER/PROPH/DIAG INJ IV PUSH: CPT

## 2018-02-21 PROCEDURE — 86922 COMPATIBILITY TEST ANTIGLOB: CPT

## 2018-02-21 PROCEDURE — 86880 COOMBS TEST DIRECT: CPT

## 2018-02-21 PROCEDURE — 86870 RBC ANTIBODY IDENTIFICATION: CPT

## 2018-02-21 PROCEDURE — 86850 RBC ANTIBODY SCREEN: CPT

## 2018-02-21 PROCEDURE — 85027 COMPLETE CBC AUTOMATED: CPT

## 2018-02-21 PROCEDURE — 82728 ASSAY OF FERRITIN: CPT

## 2018-02-21 PROCEDURE — 86900 BLOOD TYPING SEROLOGIC ABO: CPT

## 2018-02-21 PROCEDURE — 86901 BLOOD TYPING SEROLOGIC RH(D): CPT

## 2018-02-21 PROCEDURE — 99214 OFFICE O/P EST MOD 30 MIN: CPT | Performed by: INTERNAL MEDICINE

## 2018-02-21 PROCEDURE — 25010000002 FERUMOXYTOL 510 MG/17ML SOLUTION 510 MG VIAL: Performed by: INTERNAL MEDICINE

## 2018-02-21 PROCEDURE — 83540 ASSAY OF IRON: CPT

## 2018-02-21 PROCEDURE — 36415 COLL VENOUS BLD VENIPUNCTURE: CPT

## 2018-02-21 RX ORDER — SODIUM CHLORIDE 9 MG/ML
250 INJECTION, SOLUTION INTRAVENOUS ONCE
Status: CANCELLED | OUTPATIENT
Start: 2018-05-09

## 2018-02-21 RX ORDER — SODIUM CHLORIDE 9 MG/ML
250 INJECTION, SOLUTION INTRAVENOUS AS NEEDED
Status: CANCELLED | OUTPATIENT
Start: 2018-02-21

## 2018-02-21 RX ORDER — SODIUM CHLORIDE 9 MG/ML
250 INJECTION, SOLUTION INTRAVENOUS ONCE
Status: CANCELLED | OUTPATIENT
Start: 2018-05-02

## 2018-02-21 RX ORDER — ACETAMINOPHEN 325 MG/1
650 TABLET ORAL ONCE
Status: CANCELLED | OUTPATIENT
Start: 2018-02-21 | End: 2018-02-21

## 2018-02-21 RX ADMIN — FERUMOXYTOL 510 MG: 510 INJECTION INTRAVENOUS at 14:02

## 2018-02-21 NOTE — PROGRESS NOTES
PROBLEM LIST:  1. 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 Status post EGD colonoscopy  3. Status post Feraheme infusion periodically  4. Bone Marrow biopsy  - normal - done by Dr. Reeves      REASON FOR VISIT: Follow-up of my anemia    HISTORY OF PRESENT ILLNESS:   86-year-old lady with normocytic anemia likely related to chronic blood loss.  She continues to have significant loss of iron.  This is likely from bleeding AVMs.  Otherwise fatigue seems to be an issue.  She has severe fatigue at this time.    Past medical history, social history and family history was reviewed and unchanged from prior visit.    Review of Systems:    Review of Systems   Constitutional: Positive for fatigue.   HENT: Negative.    Eyes: Negative.    Respiratory: Positive for shortness of breath.    Cardiovascular: Negative.    Gastrointestinal: Negative.    Endocrine: Negative.    Genitourinary: Negative.    Musculoskeletal: Negative.    Skin: Negative.    Allergic/Immunologic: Negative.    Neurological: Negative.    Hematological: Negative.    Psychiatric/Behavioral: Negative.       A comprehensive 14 point review of systems was performed and was negative except as mentioned.      Medications:  The current medication list was reviewed in the EMR    ALLERGIES:    Allergies   Allergen Reactions   • Augmentin [Amoxicillin-Pot Clavulanate]    • Celebrex [Celecoxib]    • Codeine Sulfate    • Cozaar [Losartan]    • Crestor [Rosuvastatin]    • Dexlansoprazole    • Lipitor [Atorvastatin]    • Lisinopril    • Nexium [Esomeprazole Magnesium]    • Norvasc [Amlodipine]    • Sulfadiazine    • Toprol Xl [Metoprolol Tartrate]    • Zetia [Ezetimibe]    • Zocor [Simvastatin]          Physical Exam    VITAL SIGNS:  /57  Pulse 91  Temp 96.9 °F (36.1 °C) (Temporal Artery )   Resp 14  Wt 63.5 kg (140 lb)  BMI 24.8 kg/m2     Performance Status: 1    General: well appearing, in no acute distress  HEENT: sclera anicteric,  oropharynx clear, neck is supple  Lymphatics: no cervical, supraclavicular, or axillary adenopathy  Cardiovascular: regular rate and rhythm, no murmurs, rubs or gallops  Lungs: clear to auscultation bilaterally  Abdomen: soft, nontender, nondistended.  No palpable organomegaly  Extremities: no lower extremity edema  Skin: no rashes, lesions, bruising, or petechiae  Msk:  Shows no weakness of the large muscle groups  Psych: Mood is stable        RECENT LABS:    Lab Results   Component Value Date    WBC 6.30 02/21/2018    HGB 6.5 (L) 02/21/2018    HCT 21.9 (L) 02/21/2018    MCV 79.8 (L) 02/21/2018     02/21/2018     Lab Results   Component Value Date    FERRITIN 23.00 01/03/2018    FERRITIN 17.00 11/09/2017    FERRITIN 31.00 06/30/2017     Lab Results   Component Value Date    IRON 13 (L) 01/03/2018   ]      Assessment/Plan    86-year-old lady with normocytic anemia requiring periodic blood transfusions likely secondary to bleeding AVMs.   We'll re-dose her with iron infusions.  I will schedule her for 2 units of blood.  She will need iron infusions every other month .  I will see her back in my clinic in 2 months.  She'll have monthly labs and iron infusions.  May have to consider Avastin down the line if she is requiring more and more blood support.    I spent 25 minutes on the patient's plan and care and more than 50% of time counseling the patient    Rahat Morris MD  [facility] Hematology and Oncology    2/21/2018

## 2018-02-22 ENCOUNTER — INFUSION (OUTPATIENT)
Dept: ONCOLOGY | Facility: HOSPITAL | Age: 83
End: 2018-02-22

## 2018-02-22 VITALS
SYSTOLIC BLOOD PRESSURE: 135 MMHG | WEIGHT: 139 LBS | HEART RATE: 82 BPM | DIASTOLIC BLOOD PRESSURE: 43 MMHG | BODY MASS INDEX: 24.63 KG/M2 | TEMPERATURE: 97.2 F | HEIGHT: 63 IN | RESPIRATION RATE: 16 BRPM

## 2018-02-22 DIAGNOSIS — D50.0 IRON DEFICIENCY ANEMIA DUE TO CHRONIC BLOOD LOSS: Primary | ICD-10-CM

## 2018-02-22 DIAGNOSIS — D50.0 IRON DEFICIENCY ANEMIA DUE TO CHRONIC BLOOD LOSS: ICD-10-CM

## 2018-02-22 LAB — FERRITIN SERPL-MCNC: 20 NG/ML (ref 10–291)

## 2018-02-22 PROCEDURE — 36430 TRANSFUSION BLD/BLD COMPNT: CPT

## 2018-02-22 PROCEDURE — 83010 ASSAY OF HAPTOGLOBIN QUANT: CPT

## 2018-02-22 PROCEDURE — P9016 RBC LEUKOCYTES REDUCED: HCPCS

## 2018-02-22 PROCEDURE — 86900 BLOOD TYPING SEROLOGIC ABO: CPT

## 2018-02-22 RX ORDER — ACETAMINOPHEN 325 MG/1
650 TABLET ORAL ONCE
Status: COMPLETED | OUTPATIENT
Start: 2018-02-22 | End: 2018-02-22

## 2018-02-22 RX ORDER — SODIUM CHLORIDE 9 MG/ML
250 INJECTION, SOLUTION INTRAVENOUS AS NEEDED
Status: DISCONTINUED | OUTPATIENT
Start: 2018-02-22 | End: 2018-02-22 | Stop reason: HOSPADM

## 2018-02-22 RX ADMIN — ACETAMINOPHEN 650 MG: 325 TABLET, FILM COATED ORAL at 08:29

## 2018-02-23 LAB
ABO + RH BLD: NORMAL
ABO + RH BLD: NORMAL
BH BB BLOOD EXPIRATION DATE: NORMAL
BH BB BLOOD EXPIRATION DATE: NORMAL
BH BB BLOOD TYPE BARCODE: 9500
BH BB BLOOD TYPE BARCODE: 9500
BH BB DISPENSE STATUS: NORMAL
BH BB DISPENSE STATUS: NORMAL
BH BB PRODUCT CODE: NORMAL
BH BB PRODUCT CODE: NORMAL
BH BB UNIT NUMBER: NORMAL
BH BB UNIT NUMBER: NORMAL
CROSSMATCH INTERPRETATION: NORMAL
CROSSMATCH INTERPRETATION: NORMAL
HAPTOGLOB SERPL-MCNC: 138 MG/DL (ref 34–200)
UNIT  ABO: NORMAL
UNIT  ABO: NORMAL
UNIT  RH: NORMAL
UNIT  RH: NORMAL

## 2018-02-28 ENCOUNTER — APPOINTMENT (OUTPATIENT)
Dept: ONCOLOGY | Facility: HOSPITAL | Age: 83
End: 2018-02-28

## 2018-03-09 ENCOUNTER — TELEPHONE (OUTPATIENT)
Dept: ONCOLOGY | Facility: CLINIC | Age: 83
End: 2018-03-09

## 2018-03-09 DIAGNOSIS — D50.0 IRON DEFICIENCY ANEMIA DUE TO CHRONIC BLOOD LOSS: Primary | ICD-10-CM

## 2018-03-09 RX ORDER — ACETAMINOPHEN 325 MG/1
650 TABLET ORAL ONCE
Status: CANCELLED | OUTPATIENT
Start: 2018-03-09 | End: 2018-03-09

## 2018-03-09 RX ORDER — DIPHENHYDRAMINE HCL 25 MG
25 CAPSULE ORAL ONCE
Status: CANCELLED | OUTPATIENT
Start: 2018-03-09 | End: 2018-03-09

## 2018-03-09 RX ORDER — SODIUM CHLORIDE 9 MG/ML
250 INJECTION, SOLUTION INTRAVENOUS AS NEEDED
Status: CANCELLED | OUTPATIENT
Start: 2018-03-09

## 2018-03-09 NOTE — TELEPHONE ENCOUNTER
Called patient's daughter to give date and time for PRBC for this coming Tuesday.  Daughter Sheyla verbalized understanding.

## 2018-03-09 NOTE — TELEPHONE ENCOUNTER
----- Message from Sienna Puckett sent at 3/9/2018  9:46 AM EST -----  Regarding: [facility] - LABS   Contact: [phone]  PATIENT HAD HER LABS DONE YESTERDAY AND SAID HER BLOOD IS 7.5. SHE SAID [facility]A USUALLY GIVES HER BLOOD IF IT'S LOW, AND SHE WAS JUST CHECKING.

## 2018-03-12 ENCOUNTER — LAB (OUTPATIENT)
Dept: ONCOLOGY | Facility: HOSPITAL | Age: 83
End: 2018-03-12

## 2018-03-12 VITALS
WEIGHT: 137 LBS | TEMPERATURE: 97.3 F | BODY MASS INDEX: 24.27 KG/M2 | SYSTOLIC BLOOD PRESSURE: 123 MMHG | DIASTOLIC BLOOD PRESSURE: 86 MMHG | HEIGHT: 63 IN | RESPIRATION RATE: 16 BRPM | HEART RATE: 91 BPM

## 2018-03-12 DIAGNOSIS — D50.0 IRON DEFICIENCY ANEMIA DUE TO CHRONIC BLOOD LOSS: ICD-10-CM

## 2018-03-12 LAB
ABO GROUP BLD: NORMAL
ANTI-E: NORMAL
BLD GP AB SCN SERPL QL ELUTION: NEGATIVE
BLD GP AB SCN SERPL QL: POSITIVE
DAT IGG GEL: POSITIVE
RH BLD: NEGATIVE
WARM AUTOANTIBODY: NORMAL

## 2018-03-12 PROCEDURE — 86870 RBC ANTIBODY IDENTIFICATION: CPT | Performed by: INTERNAL MEDICINE

## 2018-03-12 PROCEDURE — 86920 COMPATIBILITY TEST SPIN: CPT

## 2018-03-12 PROCEDURE — 36415 COLL VENOUS BLD VENIPUNCTURE: CPT

## 2018-03-12 PROCEDURE — 86900 BLOOD TYPING SEROLOGIC ABO: CPT | Performed by: INTERNAL MEDICINE

## 2018-03-12 PROCEDURE — 86850 RBC ANTIBODY SCREEN: CPT | Performed by: INTERNAL MEDICINE

## 2018-03-12 PROCEDURE — 86902 BLOOD TYPE ANTIGEN DONOR EA: CPT

## 2018-03-12 PROCEDURE — 86860 RBC ANTIBODY ELUTION: CPT | Performed by: INTERNAL MEDICINE

## 2018-03-12 PROCEDURE — 86901 BLOOD TYPING SEROLOGIC RH(D): CPT | Performed by: INTERNAL MEDICINE

## 2018-03-12 PROCEDURE — 86880 COOMBS TEST DIRECT: CPT | Performed by: INTERNAL MEDICINE

## 2018-03-12 PROCEDURE — 86922 COMPATIBILITY TEST ANTIGLOB: CPT

## 2018-03-13 ENCOUNTER — INFUSION (OUTPATIENT)
Dept: ONCOLOGY | Facility: HOSPITAL | Age: 83
End: 2018-03-13

## 2018-03-13 VITALS
BODY MASS INDEX: 24.1 KG/M2 | WEIGHT: 136 LBS | HEART RATE: 79 BPM | TEMPERATURE: 97.5 F | DIASTOLIC BLOOD PRESSURE: 43 MMHG | SYSTOLIC BLOOD PRESSURE: 121 MMHG | HEIGHT: 63 IN | RESPIRATION RATE: 16 BRPM

## 2018-03-13 DIAGNOSIS — D50.0 IRON DEFICIENCY ANEMIA DUE TO CHRONIC BLOOD LOSS: Primary | ICD-10-CM

## 2018-03-13 PROCEDURE — 86900 BLOOD TYPING SEROLOGIC ABO: CPT

## 2018-03-13 PROCEDURE — P9016 RBC LEUKOCYTES REDUCED: HCPCS

## 2018-03-13 PROCEDURE — 63710000001 DIPHENHYDRAMINE PER 50 MG: Performed by: INTERNAL MEDICINE

## 2018-03-13 PROCEDURE — 36430 TRANSFUSION BLD/BLD COMPNT: CPT

## 2018-03-13 RX ORDER — ACETAMINOPHEN 325 MG/1
650 TABLET ORAL ONCE
Status: COMPLETED | OUTPATIENT
Start: 2018-03-13 | End: 2018-03-13

## 2018-03-13 RX ORDER — SODIUM CHLORIDE 9 MG/ML
250 INJECTION, SOLUTION INTRAVENOUS AS NEEDED
Status: DISCONTINUED | OUTPATIENT
Start: 2018-03-13 | End: 2018-03-13 | Stop reason: HOSPADM

## 2018-03-13 RX ORDER — DIPHENHYDRAMINE HCL 25 MG
25 CAPSULE ORAL ONCE
Status: COMPLETED | OUTPATIENT
Start: 2018-03-13 | End: 2018-03-13

## 2018-03-13 RX ADMIN — DIPHENHYDRAMINE HYDROCHLORIDE 25 MG: 25 CAPSULE ORAL at 08:19

## 2018-03-13 RX ADMIN — ACETAMINOPHEN 650 MG: 325 TABLET ORAL at 08:19

## 2018-03-27 ENCOUNTER — LAB (OUTPATIENT)
Dept: LAB | Facility: HOSPITAL | Age: 83
End: 2018-03-27

## 2018-03-27 ENCOUNTER — INFUSION (OUTPATIENT)
Dept: ONCOLOGY | Facility: HOSPITAL | Age: 83
End: 2018-03-27

## 2018-03-27 DIAGNOSIS — K90.9 MALABSORPTION IN THE ELDERLY: ICD-10-CM

## 2018-03-27 DIAGNOSIS — D50.0 IRON DEFICIENCY ANEMIA DUE TO CHRONIC BLOOD LOSS: ICD-10-CM

## 2018-03-27 DIAGNOSIS — Z78.9 MEDICATION INTOLERANCE: ICD-10-CM

## 2018-03-27 DIAGNOSIS — D50.0 IRON DEFICIENCY ANEMIA DUE TO CHRONIC BLOOD LOSS: Primary | ICD-10-CM

## 2018-03-27 LAB
ABO GROUP BLD: NORMAL
ANTI-E: NORMAL
BLD GP AB SCN SERPL QL ELUTION: NEGATIVE
BLD GP AB SCN SERPL QL: POSITIVE
DAT IGG GEL: POSITIVE
ERYTHROCYTE [DISTWIDTH] IN BLOOD BY AUTOMATED COUNT: 19.5 % (ref 11.3–14.5)
FERRITIN SERPL-MCNC: 18 NG/ML (ref 10–291)
HCT VFR BLD AUTO: 20 % (ref 34.5–44)
HGB BLD-MCNC: 5.8 G/DL (ref 11.5–15.5)
IRON 24H UR-MRATE: 9 MCG/DL (ref 50–175)
IRON SATN MFR SERPL: 3 % (ref 15–50)
MCH RBC QN AUTO: 22.8 PG (ref 27–31)
MCHC RBC AUTO-ENTMCNC: 29 G/DL (ref 32–36)
MCV RBC AUTO: 78.3 FL (ref 80–99)
PLATELET # BLD AUTO: 343 10*3/MM3 (ref 150–450)
PMV BLD AUTO: 7.6 FL (ref 6–12)
RBC # BLD AUTO: 2.55 10*6/MM3 (ref 3.89–5.14)
RH BLD: NEGATIVE
TIBC SERPL-MCNC: 279 MCG/DL (ref 250–450)
WBC NRBC COR # BLD: 8.4 10*3/MM3 (ref 3.5–10.8)

## 2018-03-27 PROCEDURE — 86922 COMPATIBILITY TEST ANTIGLOB: CPT

## 2018-03-27 PROCEDURE — 86900 BLOOD TYPING SEROLOGIC ABO: CPT

## 2018-03-27 PROCEDURE — 36415 COLL VENOUS BLD VENIPUNCTURE: CPT

## 2018-03-27 PROCEDURE — 86902 BLOOD TYPE ANTIGEN DONOR EA: CPT

## 2018-03-27 PROCEDURE — 85027 COMPLETE CBC AUTOMATED: CPT

## 2018-03-27 PROCEDURE — 83540 ASSAY OF IRON: CPT

## 2018-03-27 PROCEDURE — 86860 RBC ANTIBODY ELUTION: CPT

## 2018-03-27 PROCEDURE — 86870 RBC ANTIBODY IDENTIFICATION: CPT

## 2018-03-27 PROCEDURE — 86880 COOMBS TEST DIRECT: CPT

## 2018-03-27 PROCEDURE — 86920 COMPATIBILITY TEST SPIN: CPT

## 2018-03-27 PROCEDURE — 82728 ASSAY OF FERRITIN: CPT

## 2018-03-27 PROCEDURE — 86901 BLOOD TYPING SEROLOGIC RH(D): CPT

## 2018-03-27 PROCEDURE — 83550 IRON BINDING TEST: CPT

## 2018-03-27 PROCEDURE — 86850 RBC ANTIBODY SCREEN: CPT

## 2018-03-27 RX ORDER — DIPHENHYDRAMINE HCL 25 MG
25 CAPSULE ORAL ONCE
Status: CANCELLED | OUTPATIENT
Start: 2018-03-27 | End: 2018-03-27

## 2018-03-27 RX ORDER — SODIUM CHLORIDE 9 MG/ML
250 INJECTION, SOLUTION INTRAVENOUS AS NEEDED
Status: CANCELLED | OUTPATIENT
Start: 2018-03-27

## 2018-03-27 RX ORDER — ACETAMINOPHEN 325 MG/1
650 TABLET ORAL ONCE
Status: CANCELLED | OUTPATIENT
Start: 2018-03-27 | End: 2018-03-27

## 2018-03-28 ENCOUNTER — INFUSION (OUTPATIENT)
Dept: ONCOLOGY | Facility: HOSPITAL | Age: 83
End: 2018-03-28

## 2018-03-28 VITALS
DIASTOLIC BLOOD PRESSURE: 44 MMHG | RESPIRATION RATE: 20 BRPM | HEART RATE: 74 BPM | SYSTOLIC BLOOD PRESSURE: 108 MMHG | TEMPERATURE: 96.9 F

## 2018-03-28 DIAGNOSIS — D50.0 IRON DEFICIENCY ANEMIA DUE TO CHRONIC BLOOD LOSS: ICD-10-CM

## 2018-03-28 PROCEDURE — 86900 BLOOD TYPING SEROLOGIC ABO: CPT

## 2018-03-28 PROCEDURE — P9016 RBC LEUKOCYTES REDUCED: HCPCS

## 2018-03-28 PROCEDURE — 36430 TRANSFUSION BLD/BLD COMPNT: CPT

## 2018-03-28 RX ORDER — SODIUM CHLORIDE 9 MG/ML
250 INJECTION, SOLUTION INTRAVENOUS AS NEEDED
Status: DISCONTINUED | OUTPATIENT
Start: 2018-03-28 | End: 2018-03-28 | Stop reason: HOSPADM

## 2018-03-28 RX ORDER — DIPHENHYDRAMINE HCL 25 MG
25 CAPSULE ORAL ONCE
Status: CANCELLED | OUTPATIENT
Start: 2018-03-28

## 2018-03-28 RX ORDER — DIPHENHYDRAMINE HCL 25 MG
25 CAPSULE ORAL ONCE
Status: DISCONTINUED | OUTPATIENT
Start: 2018-03-28 | End: 2018-03-28 | Stop reason: HOSPADM

## 2018-03-28 RX ORDER — ACETAMINOPHEN 325 MG/1
650 TABLET ORAL ONCE
Status: DISCONTINUED | OUTPATIENT
Start: 2018-03-28 | End: 2018-03-28 | Stop reason: HOSPADM

## 2018-03-28 RX ORDER — SODIUM CHLORIDE 9 MG/ML
250 INJECTION, SOLUTION INTRAVENOUS ONCE
Status: CANCELLED | OUTPATIENT
Start: 2018-03-28

## 2018-03-28 RX ORDER — FAMOTIDINE 10 MG/ML
20 INJECTION, SOLUTION INTRAVENOUS ONCE
Status: CANCELLED | OUTPATIENT
Start: 2018-03-28

## 2018-03-28 NOTE — PROGRESS NOTES
Verbal order from Dr Hoff to give patient IV iron day 1,8 and repeat one more month.  If hgb improves will go to give one day monthly.

## 2018-03-29 LAB
ABO + RH BLD: NORMAL
ABO + RH BLD: NORMAL
BH BB BLOOD EXPIRATION DATE: NORMAL
BH BB BLOOD EXPIRATION DATE: NORMAL
BH BB BLOOD TYPE BARCODE: 9500
BH BB BLOOD TYPE BARCODE: 9500
BH BB DISPENSE STATUS: NORMAL
BH BB DISPENSE STATUS: NORMAL
BH BB PRODUCT CODE: NORMAL
BH BB PRODUCT CODE: NORMAL
BH BB UNIT NUMBER: NORMAL
BH BB UNIT NUMBER: NORMAL
CROSSMATCH INTERPRETATION: NORMAL
CROSSMATCH INTERPRETATION: NORMAL
DAT C3: NEGATIVE
UNIT  ABO: NORMAL
UNIT  ABO: NORMAL
UNIT  RH: NORMAL
UNIT  RH: NORMAL

## 2018-04-04 ENCOUNTER — INFUSION (OUTPATIENT)
Dept: ONCOLOGY | Facility: HOSPITAL | Age: 83
End: 2018-04-04

## 2018-04-04 VITALS
BODY MASS INDEX: 24.27 KG/M2 | DIASTOLIC BLOOD PRESSURE: 39 MMHG | HEART RATE: 72 BPM | TEMPERATURE: 96.9 F | WEIGHT: 137 LBS | RESPIRATION RATE: 20 BRPM | HEIGHT: 63 IN | SYSTOLIC BLOOD PRESSURE: 110 MMHG

## 2018-04-04 DIAGNOSIS — Z78.9 MEDICATION INTOLERANCE: ICD-10-CM

## 2018-04-04 DIAGNOSIS — D50.0 IRON DEFICIENCY ANEMIA DUE TO CHRONIC BLOOD LOSS: Primary | ICD-10-CM

## 2018-04-04 DIAGNOSIS — K90.9 MALABSORPTION IN THE ELDERLY: ICD-10-CM

## 2018-04-04 PROCEDURE — 63710000001 DIPHENHYDRAMINE PER 50 MG: Performed by: INTERNAL MEDICINE

## 2018-04-04 PROCEDURE — 96374 THER/PROPH/DIAG INJ IV PUSH: CPT

## 2018-04-04 PROCEDURE — 96375 TX/PRO/DX INJ NEW DRUG ADDON: CPT

## 2018-04-04 PROCEDURE — 25010000002 FERUMOXYTOL 510 MG/17ML SOLUTION 510 MG VIAL: Performed by: INTERNAL MEDICINE

## 2018-04-04 RX ORDER — FAMOTIDINE 10 MG/ML
20 INJECTION, SOLUTION INTRAVENOUS ONCE
Status: CANCELLED | OUTPATIENT
Start: 2018-04-04

## 2018-04-04 RX ORDER — SODIUM CHLORIDE 9 MG/ML
250 INJECTION, SOLUTION INTRAVENOUS ONCE
Status: CANCELLED | OUTPATIENT
Start: 2018-04-04

## 2018-04-04 RX ORDER — DIPHENHYDRAMINE HCL 25 MG
25 CAPSULE ORAL ONCE
Status: CANCELLED | OUTPATIENT
Start: 2018-04-04

## 2018-04-04 RX ORDER — SODIUM CHLORIDE 9 MG/ML
250 INJECTION, SOLUTION INTRAVENOUS ONCE
Status: DISCONTINUED | OUTPATIENT
Start: 2018-04-04 | End: 2018-04-04 | Stop reason: HOSPADM

## 2018-04-04 RX ORDER — DIPHENHYDRAMINE HCL 25 MG
25 CAPSULE ORAL ONCE
Status: COMPLETED | OUTPATIENT
Start: 2018-04-04 | End: 2018-04-04

## 2018-04-04 RX ORDER — FAMOTIDINE 10 MG/ML
20 INJECTION, SOLUTION INTRAVENOUS ONCE
Status: COMPLETED | OUTPATIENT
Start: 2018-04-04 | End: 2018-04-04

## 2018-04-04 RX ADMIN — DIPHENHYDRAMINE HYDROCHLORIDE 25 MG: 25 CAPSULE ORAL at 13:02

## 2018-04-04 RX ADMIN — FAMOTIDINE 20 MG: 10 INJECTION INTRAVENOUS at 13:07

## 2018-04-04 RX ADMIN — FERUMOXYTOL 510 MG: 510 INJECTION INTRAVENOUS at 13:10

## 2018-04-11 ENCOUNTER — INFUSION (OUTPATIENT)
Dept: ONCOLOGY | Facility: HOSPITAL | Age: 83
End: 2018-04-11

## 2018-04-11 ENCOUNTER — LAB (OUTPATIENT)
Dept: LAB | Facility: HOSPITAL | Age: 83
End: 2018-04-11

## 2018-04-11 ENCOUNTER — TELEPHONE (OUTPATIENT)
Dept: ONCOLOGY | Facility: CLINIC | Age: 83
End: 2018-04-11

## 2018-04-11 ENCOUNTER — OFFICE VISIT (OUTPATIENT)
Dept: ONCOLOGY | Facility: CLINIC | Age: 83
End: 2018-04-11

## 2018-04-11 VITALS
RESPIRATION RATE: 20 BRPM | WEIGHT: 136.3 LBS | HEIGHT: 63 IN | OXYGEN SATURATION: 96 % | TEMPERATURE: 97.3 F | SYSTOLIC BLOOD PRESSURE: 160 MMHG | DIASTOLIC BLOOD PRESSURE: 60 MMHG | BODY MASS INDEX: 24.15 KG/M2 | HEART RATE: 85 BPM

## 2018-04-11 VITALS — DIASTOLIC BLOOD PRESSURE: 46 MMHG | SYSTOLIC BLOOD PRESSURE: 136 MMHG | HEART RATE: 85 BPM

## 2018-04-11 DIAGNOSIS — D50.0 IRON DEFICIENCY ANEMIA DUE TO CHRONIC BLOOD LOSS: Primary | ICD-10-CM

## 2018-04-11 DIAGNOSIS — K90.9 MALABSORPTION IN THE ELDERLY: ICD-10-CM

## 2018-04-11 DIAGNOSIS — D50.0 IRON DEFICIENCY ANEMIA DUE TO CHRONIC BLOOD LOSS: ICD-10-CM

## 2018-04-11 DIAGNOSIS — Z78.9 MEDICATION INTOLERANCE: ICD-10-CM

## 2018-04-11 LAB
ABO GROUP BLD: NORMAL
ANTI-E: NORMAL
BLD GP AB SCN SERPL QL ELUTION: NEGATIVE
BLD GP AB SCN SERPL QL: POSITIVE
DAT IGG GEL: POSITIVE
ERYTHROCYTE [DISTWIDTH] IN BLOOD BY AUTOMATED COUNT: 27.2 % (ref 11.3–14.5)
HCT VFR BLD AUTO: 19.3 % (ref 34.5–44)
HGB BLD-MCNC: 5.5 G/DL (ref 11.5–15.5)
MCH RBC QN AUTO: 24.8 PG (ref 27–31)
MCHC RBC AUTO-ENTMCNC: 28.4 G/DL (ref 32–36)
MCV RBC AUTO: 87.3 FL (ref 80–99)
PLATELET # BLD AUTO: 322 10*3/MM3 (ref 150–450)
PMV BLD AUTO: 6.8 FL (ref 6–12)
RBC # BLD AUTO: 2.21 10*6/MM3 (ref 3.89–5.14)
RH BLD: NEGATIVE
T&S EXPIRATION DATE: NORMAL
WBC NRBC COR # BLD: 8.5 10*3/MM3 (ref 3.5–10.8)

## 2018-04-11 PROCEDURE — 86922 COMPATIBILITY TEST ANTIGLOB: CPT

## 2018-04-11 PROCEDURE — 86880 COOMBS TEST DIRECT: CPT

## 2018-04-11 PROCEDURE — 85027 COMPLETE CBC AUTOMATED: CPT

## 2018-04-11 PROCEDURE — 86901 BLOOD TYPING SEROLOGIC RH(D): CPT

## 2018-04-11 PROCEDURE — 86902 BLOOD TYPE ANTIGEN DONOR EA: CPT

## 2018-04-11 PROCEDURE — 86870 RBC ANTIBODY IDENTIFICATION: CPT

## 2018-04-11 PROCEDURE — 86850 RBC ANTIBODY SCREEN: CPT

## 2018-04-11 PROCEDURE — 96374 THER/PROPH/DIAG INJ IV PUSH: CPT

## 2018-04-11 PROCEDURE — 86920 COMPATIBILITY TEST SPIN: CPT

## 2018-04-11 PROCEDURE — 36415 COLL VENOUS BLD VENIPUNCTURE: CPT

## 2018-04-11 PROCEDURE — 86860 RBC ANTIBODY ELUTION: CPT

## 2018-04-11 PROCEDURE — 99214 OFFICE O/P EST MOD 30 MIN: CPT | Performed by: INTERNAL MEDICINE

## 2018-04-11 PROCEDURE — 25010000002 FERUMOXYTOL 510 MG/17ML SOLUTION 510 MG VIAL: Performed by: INTERNAL MEDICINE

## 2018-04-11 PROCEDURE — 86900 BLOOD TYPING SEROLOGIC ABO: CPT

## 2018-04-11 RX ORDER — ACETAMINOPHEN 325 MG/1
650 TABLET ORAL ONCE
Status: CANCELLED | OUTPATIENT
Start: 2018-04-11 | End: 2018-04-11

## 2018-04-11 RX ORDER — SODIUM CHLORIDE 9 MG/ML
250 INJECTION, SOLUTION INTRAVENOUS ONCE
Status: CANCELLED | OUTPATIENT
Start: 2018-05-30

## 2018-04-11 RX ORDER — SODIUM CHLORIDE 9 MG/ML
250 INJECTION, SOLUTION INTRAVENOUS ONCE
Status: CANCELLED | OUTPATIENT
Start: 2018-07-13

## 2018-04-11 RX ORDER — SODIUM CHLORIDE 9 MG/ML
250 INJECTION, SOLUTION INTRAVENOUS ONCE
Status: CANCELLED | OUTPATIENT
Start: 2018-07-06

## 2018-04-11 RX ORDER — DIPHENHYDRAMINE HCL 25 MG
25 CAPSULE ORAL ONCE
Status: CANCELLED | OUTPATIENT
Start: 2018-04-11 | End: 2018-04-11

## 2018-04-11 RX ORDER — SODIUM CHLORIDE 9 MG/ML
250 INJECTION, SOLUTION INTRAVENOUS ONCE
Status: DISCONTINUED | OUTPATIENT
Start: 2018-04-11 | End: 2018-04-11 | Stop reason: HOSPADM

## 2018-04-11 RX ORDER — SODIUM CHLORIDE 9 MG/ML
250 INJECTION, SOLUTION INTRAVENOUS ONCE
Status: CANCELLED | OUTPATIENT
Start: 2018-06-13

## 2018-04-11 RX ORDER — SODIUM CHLORIDE 9 MG/ML
250 INJECTION, SOLUTION INTRAVENOUS AS NEEDED
Status: CANCELLED | OUTPATIENT
Start: 2018-04-11

## 2018-04-11 RX ADMIN — FERUMOXYTOL 510 MG: 510 INJECTION INTRAVENOUS at 12:02

## 2018-04-11 NOTE — TELEPHONE ENCOUNTER
----- Message from Kathleen Slater RN sent at 4/11/2018 10:55 AM EDT -----  Regarding: critical lab      Critical Test Results      MD: Alexandra    Date: 4-11-18     Critical test result: hgb 5.5    Time results received: 1030

## 2018-04-11 NOTE — PROGRESS NOTES
"PROBLEM LIST:  1. 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 Status post EGD colonoscopy  3. Status post Feraheme infusion periodically  4. Bone Marrow biopsy  - normal - done by Dr. Reeves      REASON FOR VISIT: Follow-up of my anemia    HISTORY OF PRESENT ILLNESS:   86-year-old lady with normocytic anemia likely related to chronic blood loss.  She continues to have significant loss of blood.  This is likely from bleeding AVMs.  It's been a long time since her egd and colonoscopy.    Past medical history, social history and family history was reviewed and unchanged from prior visit.    Review of Systems:    Review of Systems   Constitutional: Positive for fatigue.   HENT: Negative.    Eyes: Negative.    Respiratory: Positive for shortness of breath.    Cardiovascular: Negative.    Gastrointestinal: Negative.    Endocrine: Negative.    Genitourinary: Negative.    Musculoskeletal: Negative.    Skin: Negative.    Allergic/Immunologic: Negative.    Neurological: Negative.    Hematological: Negative.    Psychiatric/Behavioral: Negative.       A comprehensive 14 point review of systems was performed and was negative except as mentioned.      Medications:  The current medication list was reviewed in the EMR    ALLERGIES:    Allergies   Allergen Reactions   • Augmentin [Amoxicillin-Pot Clavulanate]    • Celebrex [Celecoxib]    • Codeine Sulfate    • Cozaar [Losartan]    • Crestor [Rosuvastatin]    • Dexlansoprazole    • Lipitor [Atorvastatin]    • Lisinopril    • Nexium [Esomeprazole Magnesium]    • Norvasc [Amlodipine]    • Sulfadiazine    • Toprol Xl [Metoprolol Tartrate]    • Zetia [Ezetimibe]    • Zocor [Simvastatin]          Physical Exam    VITAL SIGNS:  /60   Pulse 85   Temp 97.3 °F (36.3 °C) (Temporal Artery )   Resp 20   Ht 160 cm (62.99\")   Wt 61.8 kg (136 lb 4.8 oz)   SpO2 96%   BMI 24.15 kg/m²      Performance Status: 1    General: well appearing, in no acute distress  HEENT: " sclera anicteric, oropharynx clear, neck is supple  Lymphatics: no cervical, supraclavicular, or axillary adenopathy  Cardiovascular: regular rate and rhythm, no murmurs, rubs or gallops  Lungs: clear to auscultation bilaterally  Abdomen: soft, nontender, nondistended.  No palpable organomegaly  Extremities: no lower extremity edema  Skin: no rashes, lesions, bruising, or petechiae  Msk:  Shows no weakness of the large muscle groups  Psych: Mood is stable        RECENT LABS:    Lab Results   Component Value Date    WBC 8.50 04/11/2018    HGB 5.5 (C) 04/11/2018    HCT 19.3 (L) 04/11/2018    MCV 87.3 04/11/2018     04/11/2018     Lab Results   Component Value Date    FERRITIN 18.00 03/27/2018    FERRITIN 20.00 02/21/2018    FERRITIN 23.00 01/03/2018     Lab Results   Component Value Date    IRON 9 (L) 03/27/2018   ]      Assessment/Plan    86-year-old lady with normocytic anemia requiring periodic blood transfusions likely secondary to bleeding AVMs.   We'll re-dose her with iron infusions.  I will schedule her for 2 units of blood.  She will need iron infusions every other week for now . She will require an EGD and colonoscopy.  I will ask Dr. Llamas to do that.  I suspect she has small bowel AVMs.  I will see her back in my clinic in 1 month.   May have to consider Avastin down the line if she is requiring more and more blood support.    I spent 25 minutes on the patient's plan and care and more than 50% of time counseling the patient    Rahat Morris MD  [facility] Hematology and Oncology    4/11/2018

## 2018-04-11 NOTE — TELEPHONE ENCOUNTER
Name of Physician notified: Dr Hoff    Time Physician Notified:  10 45 am      [x]  Orders received    []  Protocol/Standing orders followed    []  No new orders    2 units of blood

## 2018-04-12 ENCOUNTER — INFUSION (OUTPATIENT)
Dept: ONCOLOGY | Facility: HOSPITAL | Age: 83
End: 2018-04-12

## 2018-04-12 VITALS
TEMPERATURE: 98.1 F | WEIGHT: 135 LBS | BODY MASS INDEX: 23.92 KG/M2 | SYSTOLIC BLOOD PRESSURE: 133 MMHG | DIASTOLIC BLOOD PRESSURE: 44 MMHG | OXYGEN SATURATION: 98 % | HEART RATE: 74 BPM | HEIGHT: 63 IN | RESPIRATION RATE: 16 BRPM

## 2018-04-12 DIAGNOSIS — D50.0 IRON DEFICIENCY ANEMIA DUE TO CHRONIC BLOOD LOSS: ICD-10-CM

## 2018-04-12 PROCEDURE — 86900 BLOOD TYPING SEROLOGIC ABO: CPT

## 2018-04-12 PROCEDURE — 63710000001 DIPHENHYDRAMINE PER 50 MG: Performed by: INTERNAL MEDICINE

## 2018-04-12 PROCEDURE — P9016 RBC LEUKOCYTES REDUCED: HCPCS

## 2018-04-12 PROCEDURE — 36430 TRANSFUSION BLD/BLD COMPNT: CPT

## 2018-04-12 PROCEDURE — 96360 HYDRATION IV INFUSION INIT: CPT

## 2018-04-12 PROCEDURE — 36415 COLL VENOUS BLD VENIPUNCTURE: CPT

## 2018-04-12 PROCEDURE — 96361 HYDRATE IV INFUSION ADD-ON: CPT

## 2018-04-12 RX ORDER — ACETAMINOPHEN 325 MG/1
650 TABLET ORAL ONCE
Status: COMPLETED | OUTPATIENT
Start: 2018-04-12 | End: 2018-04-12

## 2018-04-12 RX ORDER — SODIUM CHLORIDE 9 MG/ML
250 INJECTION, SOLUTION INTRAVENOUS AS NEEDED
Status: DISCONTINUED | OUTPATIENT
Start: 2018-04-12 | End: 2018-04-12 | Stop reason: HOSPADM

## 2018-04-12 RX ORDER — DIPHENHYDRAMINE HCL 25 MG
25 CAPSULE ORAL ONCE
Status: COMPLETED | OUTPATIENT
Start: 2018-04-12 | End: 2018-04-12

## 2018-04-12 RX ADMIN — DIPHENHYDRAMINE HYDROCHLORIDE 25 MG: 25 CAPSULE ORAL at 09:53

## 2018-04-12 RX ADMIN — ACETAMINOPHEN 650 MG: 325 TABLET ORAL at 09:53

## 2018-04-12 RX ADMIN — SODIUM CHLORIDE 250 ML: 9 INJECTION, SOLUTION INTRAVENOUS at 09:55

## 2018-04-13 ENCOUNTER — APPOINTMENT (OUTPATIENT)
Dept: ONCOLOGY | Facility: HOSPITAL | Age: 83
End: 2018-04-13

## 2018-04-18 ENCOUNTER — APPOINTMENT (OUTPATIENT)
Dept: ONCOLOGY | Facility: HOSPITAL | Age: 83
End: 2018-04-18

## 2018-04-18 ENCOUNTER — LAB (OUTPATIENT)
Dept: LAB | Facility: HOSPITAL | Age: 83
End: 2018-04-18

## 2018-04-18 DIAGNOSIS — K90.9 MALABSORPTION IN THE ELDERLY: ICD-10-CM

## 2018-04-18 DIAGNOSIS — D50.0 IRON DEFICIENCY ANEMIA DUE TO CHRONIC BLOOD LOSS: ICD-10-CM

## 2018-04-18 DIAGNOSIS — Z78.9 MEDICATION INTOLERANCE: ICD-10-CM

## 2018-04-18 LAB
ERYTHROCYTE [DISTWIDTH] IN BLOOD BY AUTOMATED COUNT: 24.6 % (ref 11.3–14.5)
HCT VFR BLD AUTO: 27.5 % (ref 34.5–44)
HGB BLD-MCNC: 8.1 G/DL (ref 11.5–15.5)
MCH RBC QN AUTO: 25.8 PG (ref 27–31)
MCHC RBC AUTO-ENTMCNC: 29.5 G/DL (ref 32–36)
MCV RBC AUTO: 87.5 FL (ref 80–99)
PLATELET # BLD AUTO: 289 10*3/MM3 (ref 150–450)
PMV BLD AUTO: 7.4 FL (ref 6–12)
RBC # BLD AUTO: 3.14 10*6/MM3 (ref 3.89–5.14)
WBC NRBC COR # BLD: 5.5 10*3/MM3 (ref 3.5–10.8)

## 2018-04-18 PROCEDURE — 85027 COMPLETE CBC AUTOMATED: CPT

## 2018-04-18 PROCEDURE — 36415 COLL VENOUS BLD VENIPUNCTURE: CPT

## 2018-04-25 ENCOUNTER — LAB (OUTPATIENT)
Dept: LAB | Facility: HOSPITAL | Age: 83
End: 2018-04-25

## 2018-04-25 ENCOUNTER — LAB (OUTPATIENT)
Dept: ONCOLOGY | Facility: HOSPITAL | Age: 83
End: 2018-04-25

## 2018-04-25 VITALS
TEMPERATURE: 97.4 F | RESPIRATION RATE: 16 BRPM | HEART RATE: 87 BPM | WEIGHT: 134 LBS | HEIGHT: 63 IN | BODY MASS INDEX: 23.74 KG/M2 | SYSTOLIC BLOOD PRESSURE: 126 MMHG | DIASTOLIC BLOOD PRESSURE: 58 MMHG

## 2018-04-25 DIAGNOSIS — D50.0 IRON DEFICIENCY ANEMIA DUE TO CHRONIC BLOOD LOSS: Primary | ICD-10-CM

## 2018-04-25 DIAGNOSIS — D50.0 IRON DEFICIENCY ANEMIA DUE TO CHRONIC BLOOD LOSS: ICD-10-CM

## 2018-04-25 DIAGNOSIS — K90.9 MALABSORPTION IN THE ELDERLY: ICD-10-CM

## 2018-04-25 LAB
ABO GROUP BLD: NORMAL
ANTI-E: NORMAL
BLD GP AB SCN SERPL QL ELUTION: NEGATIVE
BLD GP AB SCN SERPL QL: POSITIVE
DAT IGG GEL: POSITIVE
ERYTHROCYTE [DISTWIDTH] IN BLOOD BY AUTOMATED COUNT: 20.1 % (ref 11.3–14.5)
HCT VFR BLD AUTO: 22.3 % (ref 34.5–44)
HGB BLD-MCNC: 6.5 G/DL (ref 11.5–15.5)
MCH RBC QN AUTO: 25.4 PG (ref 27–31)
MCHC RBC AUTO-ENTMCNC: 29.2 G/DL (ref 32–36)
MCV RBC AUTO: 87 FL (ref 80–99)
PLATELET # BLD AUTO: 286 10*3/MM3 (ref 150–450)
PMV BLD AUTO: 6.6 FL (ref 6–12)
RBC # BLD AUTO: 2.56 10*6/MM3 (ref 3.89–5.14)
RH BLD: NEGATIVE
T&S EXPIRATION DATE: NORMAL
WBC NRBC COR # BLD: 4.9 10*3/MM3 (ref 3.5–10.8)

## 2018-04-25 PROCEDURE — 86870 RBC ANTIBODY IDENTIFICATION: CPT

## 2018-04-25 PROCEDURE — 36415 COLL VENOUS BLD VENIPUNCTURE: CPT

## 2018-04-25 PROCEDURE — 86922 COMPATIBILITY TEST ANTIGLOB: CPT

## 2018-04-25 PROCEDURE — 86900 BLOOD TYPING SEROLOGIC ABO: CPT

## 2018-04-25 PROCEDURE — 86860 RBC ANTIBODY ELUTION: CPT

## 2018-04-25 PROCEDURE — 85027 COMPLETE CBC AUTOMATED: CPT

## 2018-04-25 PROCEDURE — 86850 RBC ANTIBODY SCREEN: CPT

## 2018-04-25 PROCEDURE — 86880 COOMBS TEST DIRECT: CPT

## 2018-04-25 PROCEDURE — 86901 BLOOD TYPING SEROLOGIC RH(D): CPT

## 2018-04-25 PROCEDURE — 86920 COMPATIBILITY TEST SPIN: CPT

## 2018-04-25 PROCEDURE — 86902 BLOOD TYPE ANTIGEN DONOR EA: CPT

## 2018-04-25 RX ORDER — PREDNISONE 20 MG/1
40 TABLET ORAL DAILY
Qty: 20 TABLET | Refills: 1 | Status: SHIPPED | OUTPATIENT
Start: 2018-04-25 | End: 2018-05-09 | Stop reason: HOSPADM

## 2018-04-25 RX ORDER — SODIUM CHLORIDE 9 MG/ML
250 INJECTION, SOLUTION INTRAVENOUS AS NEEDED
Status: CANCELLED | OUTPATIENT
Start: 2018-04-25

## 2018-04-25 RX ORDER — DIPHENHYDRAMINE HCL 25 MG
25 CAPSULE ORAL ONCE
Status: CANCELLED | OUTPATIENT
Start: 2018-04-25 | End: 2018-04-25

## 2018-04-25 RX ORDER — PREDNISONE 20 MG/1
40 TABLET ORAL DAILY
Qty: 20 TABLET | Refills: 1 | Status: SHIPPED | OUTPATIENT
Start: 2018-04-25 | End: 2018-04-25 | Stop reason: SDUPTHER

## 2018-04-25 RX ORDER — ACETAMINOPHEN 325 MG/1
650 TABLET ORAL ONCE
Status: CANCELLED | OUTPATIENT
Start: 2018-04-25 | End: 2018-04-25

## 2018-04-25 NOTE — PROGRESS NOTES
Verbal order from Dr Hoff for 40 mg predinsone for 10 days and repeat haptoglobin and CBC.  Patient called and notified and give education.

## 2018-04-26 ENCOUNTER — INFUSION (OUTPATIENT)
Dept: ONCOLOGY | Facility: HOSPITAL | Age: 83
End: 2018-04-26

## 2018-04-26 VITALS
DIASTOLIC BLOOD PRESSURE: 40 MMHG | HEART RATE: 68 BPM | WEIGHT: 135 LBS | TEMPERATURE: 97.3 F | HEIGHT: 63 IN | SYSTOLIC BLOOD PRESSURE: 104 MMHG | RESPIRATION RATE: 18 BRPM | BODY MASS INDEX: 23.92 KG/M2

## 2018-04-26 DIAGNOSIS — D50.0 IRON DEFICIENCY ANEMIA DUE TO CHRONIC BLOOD LOSS: ICD-10-CM

## 2018-04-26 DIAGNOSIS — D50.0 IRON DEFICIENCY ANEMIA DUE TO CHRONIC BLOOD LOSS: Primary | ICD-10-CM

## 2018-04-26 PROCEDURE — P9016 RBC LEUKOCYTES REDUCED: HCPCS

## 2018-04-26 PROCEDURE — 36430 TRANSFUSION BLD/BLD COMPNT: CPT

## 2018-04-26 PROCEDURE — 63710000001 DIPHENHYDRAMINE PER 50 MG: Performed by: INTERNAL MEDICINE

## 2018-04-26 PROCEDURE — 86900 BLOOD TYPING SEROLOGIC ABO: CPT

## 2018-04-26 PROCEDURE — 83010 ASSAY OF HAPTOGLOBIN QUANT: CPT

## 2018-04-26 RX ORDER — SODIUM CHLORIDE 9 MG/ML
250 INJECTION, SOLUTION INTRAVENOUS AS NEEDED
Status: DISCONTINUED | OUTPATIENT
Start: 2018-04-26 | End: 2018-04-26 | Stop reason: HOSPADM

## 2018-04-26 RX ORDER — ACETAMINOPHEN 325 MG/1
650 TABLET ORAL ONCE
Status: COMPLETED | OUTPATIENT
Start: 2018-04-26 | End: 2018-04-26

## 2018-04-26 RX ORDER — DIPHENHYDRAMINE HCL 25 MG
25 CAPSULE ORAL ONCE
Status: COMPLETED | OUTPATIENT
Start: 2018-04-26 | End: 2018-04-26

## 2018-04-26 RX ADMIN — ACETAMINOPHEN 650 MG: 325 TABLET ORAL at 08:58

## 2018-04-26 RX ADMIN — DIPHENHYDRAMINE HYDROCHLORIDE 25 MG: 25 CAPSULE ORAL at 08:59

## 2018-04-26 NOTE — PROGRESS NOTES
Pt here for 2 units PRBCS today, patient to return next week on 5-2-18 for feraheme infusion day 1. She will see Dr Hoff 5-9-18 for f/u  appt and feraheme day 8 infusion to follow. LM with patient regarding infusion appointments. Dr. Hoff notified.

## 2018-04-27 LAB
ABO + RH BLD: NORMAL
ABO + RH BLD: NORMAL
BH BB BLOOD EXPIRATION DATE: NORMAL
BH BB BLOOD EXPIRATION DATE: NORMAL
BH BB BLOOD TYPE BARCODE: 9500
BH BB BLOOD TYPE BARCODE: NORMAL
BH BB DISPENSE STATUS: NORMAL
BH BB DISPENSE STATUS: NORMAL
BH BB PRODUCT CODE: NORMAL
BH BB PRODUCT CODE: NORMAL
BH BB UNIT NUMBER: NORMAL
BH BB UNIT NUMBER: NORMAL
CROSSMATCH INTERPRETATION: NORMAL
CROSSMATCH INTERPRETATION: NORMAL
HAPTOGLOB SERPL-MCNC: 105 MG/DL (ref 34–200)
UNIT  ABO: NORMAL
UNIT  ABO: NORMAL
UNIT  RH: NORMAL
UNIT  RH: NORMAL

## 2018-05-02 ENCOUNTER — LAB (OUTPATIENT)
Dept: LAB | Facility: HOSPITAL | Age: 83
End: 2018-05-02

## 2018-05-02 ENCOUNTER — INFUSION (OUTPATIENT)
Dept: ONCOLOGY | Facility: HOSPITAL | Age: 83
End: 2018-05-02

## 2018-05-02 VITALS
BODY MASS INDEX: 22.86 KG/M2 | SYSTOLIC BLOOD PRESSURE: 128 MMHG | TEMPERATURE: 98 F | WEIGHT: 129 LBS | HEART RATE: 65 BPM | DIASTOLIC BLOOD PRESSURE: 46 MMHG | RESPIRATION RATE: 16 BRPM | HEIGHT: 63 IN

## 2018-05-02 DIAGNOSIS — K90.9 MALABSORPTION IN THE ELDERLY: ICD-10-CM

## 2018-05-02 DIAGNOSIS — D50.0 IRON DEFICIENCY ANEMIA DUE TO CHRONIC BLOOD LOSS: ICD-10-CM

## 2018-05-02 DIAGNOSIS — D50.0 IRON DEFICIENCY ANEMIA DUE TO CHRONIC BLOOD LOSS: Primary | ICD-10-CM

## 2018-05-02 DIAGNOSIS — Z78.9 MEDICATION INTOLERANCE: ICD-10-CM

## 2018-05-02 LAB
ERYTHROCYTE [DISTWIDTH] IN BLOOD BY AUTOMATED COUNT: 17 % (ref 11.3–14.5)
FERRITIN SERPL-MCNC: 56 NG/ML (ref 10–291)
HCT VFR BLD AUTO: 30.4 % (ref 34.5–44)
HGB BLD-MCNC: 9.3 G/DL (ref 11.5–15.5)
IRON 24H UR-MRATE: 13 MCG/DL (ref 50–175)
IRON SATN MFR SERPL: 5 % (ref 15–50)
MCH RBC QN AUTO: 25.9 PG (ref 27–31)
MCHC RBC AUTO-ENTMCNC: 30.5 G/DL (ref 32–36)
MCV RBC AUTO: 84.9 FL (ref 80–99)
PLATELET # BLD AUTO: 312 10*3/MM3 (ref 150–450)
PMV BLD AUTO: 7.1 FL (ref 6–12)
RBC # BLD AUTO: 3.58 10*6/MM3 (ref 3.89–5.14)
TIBC SERPL-MCNC: 288 MCG/DL (ref 250–450)
WBC NRBC COR # BLD: 5.2 10*3/MM3 (ref 3.5–10.8)

## 2018-05-02 PROCEDURE — 82728 ASSAY OF FERRITIN: CPT

## 2018-05-02 PROCEDURE — 25010000002 FERUMOXYTOL 510 MG/17ML SOLUTION 510 MG VIAL: Performed by: INTERNAL MEDICINE

## 2018-05-02 PROCEDURE — 83540 ASSAY OF IRON: CPT

## 2018-05-02 PROCEDURE — 36415 COLL VENOUS BLD VENIPUNCTURE: CPT

## 2018-05-02 PROCEDURE — 83550 IRON BINDING TEST: CPT

## 2018-05-02 PROCEDURE — 83010 ASSAY OF HAPTOGLOBIN QUANT: CPT

## 2018-05-02 PROCEDURE — 96374 THER/PROPH/DIAG INJ IV PUSH: CPT

## 2018-05-02 PROCEDURE — 85027 COMPLETE CBC AUTOMATED: CPT

## 2018-05-02 RX ADMIN — FERUMOXYTOL 510 MG: 510 INJECTION INTRAVENOUS at 08:29

## 2018-05-03 LAB — HAPTOGLOB SERPL-MCNC: 138 MG/DL (ref 34–200)

## 2018-05-09 ENCOUNTER — INFUSION (OUTPATIENT)
Dept: ONCOLOGY | Facility: HOSPITAL | Age: 83
End: 2018-05-09

## 2018-05-09 ENCOUNTER — OFFICE VISIT (OUTPATIENT)
Dept: ONCOLOGY | Facility: CLINIC | Age: 83
End: 2018-05-09

## 2018-05-09 ENCOUNTER — LAB (OUTPATIENT)
Dept: LAB | Facility: HOSPITAL | Age: 83
End: 2018-05-09

## 2018-05-09 VITALS — HEART RATE: 69 BPM | DIASTOLIC BLOOD PRESSURE: 71 MMHG | SYSTOLIC BLOOD PRESSURE: 111 MMHG

## 2018-05-09 VITALS
HEART RATE: 79 BPM | SYSTOLIC BLOOD PRESSURE: 114 MMHG | WEIGHT: 131 LBS | DIASTOLIC BLOOD PRESSURE: 71 MMHG | TEMPERATURE: 97.8 F | RESPIRATION RATE: 16 BRPM | BODY MASS INDEX: 23.21 KG/M2 | OXYGEN SATURATION: 94 %

## 2018-05-09 DIAGNOSIS — K90.9 MALABSORPTION IN THE ELDERLY: ICD-10-CM

## 2018-05-09 DIAGNOSIS — D50.0 IRON DEFICIENCY ANEMIA DUE TO CHRONIC BLOOD LOSS: Primary | ICD-10-CM

## 2018-05-09 DIAGNOSIS — D50.0 IRON DEFICIENCY ANEMIA DUE TO CHRONIC BLOOD LOSS: ICD-10-CM

## 2018-05-09 DIAGNOSIS — Z78.9 MEDICATION INTOLERANCE: ICD-10-CM

## 2018-05-09 LAB
ERYTHROCYTE [DISTWIDTH] IN BLOOD BY AUTOMATED COUNT: 19.6 % (ref 11.3–14.5)
HCT VFR BLD AUTO: 28.4 % (ref 34.5–44)
HGB BLD-MCNC: 8.6 G/DL (ref 11.5–15.5)
MCH RBC QN AUTO: 25.6 PG (ref 27–31)
MCHC RBC AUTO-ENTMCNC: 30.4 G/DL (ref 32–36)
MCV RBC AUTO: 84.4 FL (ref 80–99)
PLATELET # BLD AUTO: 280 10*3/MM3 (ref 150–450)
PMV BLD AUTO: 7.4 FL (ref 6–12)
RBC # BLD AUTO: 3.36 10*6/MM3 (ref 3.89–5.14)
WBC NRBC COR # BLD: 8.6 10*3/MM3 (ref 3.5–10.8)

## 2018-05-09 PROCEDURE — 96374 THER/PROPH/DIAG INJ IV PUSH: CPT

## 2018-05-09 PROCEDURE — 36415 COLL VENOUS BLD VENIPUNCTURE: CPT

## 2018-05-09 PROCEDURE — 99214 OFFICE O/P EST MOD 30 MIN: CPT | Performed by: INTERNAL MEDICINE

## 2018-05-09 PROCEDURE — 85027 COMPLETE CBC AUTOMATED: CPT

## 2018-05-09 PROCEDURE — 25010000002 FERUMOXYTOL 510 MG/17ML SOLUTION 510 MG VIAL: Performed by: INTERNAL MEDICINE

## 2018-05-09 RX ADMIN — FERUMOXYTOL 510 MG: 510 INJECTION INTRAVENOUS at 14:23

## 2018-05-10 NOTE — PROGRESS NOTES
PROBLEM LIST:  1. 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 Status post EGD colonoscopy  3. Status post Feraheme infusion periodically  4. Bone Marrow biopsy  - normal - done by Dr. Reeves      REASON FOR VISIT: Follow-up of my anemia    HISTORY OF PRESENT ILLNESS:   86-year-old lady with normocytic anemia likely related to chronic blood loss.  She continues to have significant loss of blood.  This is likely from bleeding AVMs.  She is scheduled to see Dr. Llamas for EGD and colonoscopy.  She will possibly need a pill endoscopy.    Past medical history, social history and family history was reviewed and unchanged from prior visit.    Review of Systems:    Review of Systems   Constitutional: Positive for fatigue.   HENT: Negative.    Eyes: Negative.    Respiratory: Positive for shortness of breath.    Cardiovascular: Negative.    Gastrointestinal: Negative.    Endocrine: Negative.    Genitourinary: Negative.    Musculoskeletal: Negative.    Skin: Negative.    Allergic/Immunologic: Negative.    Neurological: Negative.    Hematological: Negative.    Psychiatric/Behavioral: Negative.       A comprehensive 14 point review of systems was performed and was negative except as mentioned.      Medications:  The current medication list was reviewed in the EMR    ALLERGIES:    Allergies   Allergen Reactions   • Augmentin [Amoxicillin-Pot Clavulanate]    • Celebrex [Celecoxib]    • Codeine Sulfate    • Cozaar [Losartan]    • Crestor [Rosuvastatin]    • Dexlansoprazole    • Lipitor [Atorvastatin]    • Lisinopril    • Nexium [Esomeprazole Magnesium]    • Norvasc [Amlodipine]    • Sulfadiazine    • Toprol Xl [Metoprolol Tartrate]    • Zetia [Ezetimibe]    • Zocor [Simvastatin]          Physical Exam    VITAL SIGNS:  /71   Pulse 79   Temp 97.8 °F (36.6 °C) (Temporal Artery )   Resp 16   Wt 59.4 kg (131 lb)   SpO2 94%   BMI 23.21 kg/m²      Performance Status: 1    General: well appearing, in no  acute distress  HEENT: sclera anicteric, oropharynx clear, neck is supple  Lymphatics: no cervical, supraclavicular, or axillary adenopathy  Cardiovascular: regular rate and rhythm, no murmurs, rubs or gallops  Lungs: clear to auscultation bilaterally  Abdomen: soft, nontender, nondistended.  No palpable organomegaly  Extremities: no lower extremity edema  Skin: no rashes, lesions, bruising, or petechiae  Msk:  Shows no weakness of the large muscle groups  Psych: Mood is stable        RECENT LABS:    Lab Results   Component Value Date    WBC 8.60 05/09/2018    HGB 8.6 (L) 05/09/2018    HCT 28.4 (L) 05/09/2018    MCV 84.4 05/09/2018     05/09/2018     Lab Results   Component Value Date    FERRITIN 56.00 05/02/2018    FERRITIN 18.00 03/27/2018    FERRITIN 20.00 02/21/2018     Lab Results   Component Value Date    IRON 13 (L) 05/02/2018   ]      Assessment/Plan    86-year-old lady with normocytic anemia requiring periodic blood transfusions likely secondary to bleeding AVMs.   Continue iron infusions.  Patient otherwise requires blood transfusions.  I will see how she does over the next month or so.  We may have to consider Avastin if she continues to bleed and requires so much blood.  She will see Dr. Llamas for possible endoscopy.      I spent 25 minutes on the patient's plan and care and more than 50% of time counseling the patient    Rahat Morris MD  [facility] Hematology and Oncology    5/10/2018

## 2018-05-16 ENCOUNTER — INFUSION (OUTPATIENT)
Dept: ONCOLOGY | Facility: HOSPITAL | Age: 83
End: 2018-05-16

## 2018-05-16 ENCOUNTER — LAB (OUTPATIENT)
Dept: LAB | Facility: HOSPITAL | Age: 83
End: 2018-05-16

## 2018-05-16 VITALS
HEIGHT: 63 IN | RESPIRATION RATE: 20 BRPM | DIASTOLIC BLOOD PRESSURE: 51 MMHG | WEIGHT: 134 LBS | BODY MASS INDEX: 23.74 KG/M2 | TEMPERATURE: 98.4 F | SYSTOLIC BLOOD PRESSURE: 106 MMHG | HEART RATE: 90 BPM

## 2018-05-16 DIAGNOSIS — K90.9 MALABSORPTION IN THE ELDERLY: ICD-10-CM

## 2018-05-16 DIAGNOSIS — D50.0 IRON DEFICIENCY ANEMIA DUE TO CHRONIC BLOOD LOSS: Primary | ICD-10-CM

## 2018-05-16 DIAGNOSIS — D50.0 IRON DEFICIENCY ANEMIA DUE TO CHRONIC BLOOD LOSS: ICD-10-CM

## 2018-05-16 DIAGNOSIS — Z78.9 MEDICATION INTOLERANCE: ICD-10-CM

## 2018-05-16 LAB
ABO GROUP BLD: NORMAL
BLD GP AB SCN SERPL QL ELUTION: NEGATIVE
BLD GP AB SCN SERPL QL: POSITIVE
DAT IGG GEL: POSITIVE
ERYTHROCYTE [DISTWIDTH] IN BLOOD BY AUTOMATED COUNT: 24.1 % (ref 11.3–14.5)
HCT VFR BLD AUTO: 24.2 % (ref 34.5–44)
HGB BLD-MCNC: 7.1 G/DL (ref 11.5–15.5)
MCH RBC QN AUTO: 26.9 PG (ref 27–31)
MCHC RBC AUTO-ENTMCNC: 29.6 G/DL (ref 32–36)
MCV RBC AUTO: 91.1 FL (ref 80–99)
PLATELET # BLD AUTO: 223 10*3/MM3 (ref 150–450)
PMV BLD AUTO: 7.4 FL (ref 6–12)
RBC # BLD AUTO: 2.66 10*6/MM3 (ref 3.89–5.14)
RH BLD: NEGATIVE
T&S EXPIRATION DATE: NORMAL
WBC NRBC COR # BLD: 6.9 10*3/MM3 (ref 3.5–10.8)

## 2018-05-16 PROCEDURE — 96365 THER/PROPH/DIAG IV INF INIT: CPT

## 2018-05-16 PROCEDURE — 86920 COMPATIBILITY TEST SPIN: CPT

## 2018-05-16 PROCEDURE — 25010000002 FERUMOXYTOL 510 MG/17ML SOLUTION 510 MG VIAL: Performed by: INTERNAL MEDICINE

## 2018-05-16 PROCEDURE — 96375 TX/PRO/DX INJ NEW DRUG ADDON: CPT

## 2018-05-16 PROCEDURE — 86901 BLOOD TYPING SEROLOGIC RH(D): CPT

## 2018-05-16 PROCEDURE — 86860 RBC ANTIBODY ELUTION: CPT

## 2018-05-16 PROCEDURE — 86900 BLOOD TYPING SEROLOGIC ABO: CPT

## 2018-05-16 PROCEDURE — 36415 COLL VENOUS BLD VENIPUNCTURE: CPT

## 2018-05-16 PROCEDURE — 85027 COMPLETE CBC AUTOMATED: CPT

## 2018-05-16 PROCEDURE — 96361 HYDRATE IV INFUSION ADD-ON: CPT

## 2018-05-16 PROCEDURE — 86870 RBC ANTIBODY IDENTIFICATION: CPT

## 2018-05-16 PROCEDURE — 86902 BLOOD TYPE ANTIGEN DONOR EA: CPT

## 2018-05-16 PROCEDURE — 63710000001 DIPHENHYDRAMINE PER 50 MG: Performed by: INTERNAL MEDICINE

## 2018-05-16 PROCEDURE — 96374 THER/PROPH/DIAG INJ IV PUSH: CPT

## 2018-05-16 PROCEDURE — 86880 COOMBS TEST DIRECT: CPT

## 2018-05-16 PROCEDURE — 96366 THER/PROPH/DIAG IV INF ADDON: CPT

## 2018-05-16 PROCEDURE — 86922 COMPATIBILITY TEST ANTIGLOB: CPT

## 2018-05-16 PROCEDURE — 86850 RBC ANTIBODY SCREEN: CPT

## 2018-05-16 RX ORDER — FAMOTIDINE 10 MG/ML
20 INJECTION, SOLUTION INTRAVENOUS ONCE
Status: CANCELLED | OUTPATIENT
Start: 2018-05-16

## 2018-05-16 RX ORDER — ACETAMINOPHEN 325 MG/1
650 TABLET ORAL ONCE
Status: CANCELLED | OUTPATIENT
Start: 2018-05-16 | End: 2018-05-16

## 2018-05-16 RX ORDER — SODIUM CHLORIDE 9 MG/ML
250 INJECTION, SOLUTION INTRAVENOUS ONCE
Status: CANCELLED | OUTPATIENT
Start: 2018-05-16

## 2018-05-16 RX ORDER — DIPHENHYDRAMINE HCL 25 MG
25 CAPSULE ORAL ONCE
Status: CANCELLED | OUTPATIENT
Start: 2018-05-16 | End: 2018-05-16

## 2018-05-16 RX ORDER — SODIUM CHLORIDE 9 MG/ML
250 INJECTION, SOLUTION INTRAVENOUS AS NEEDED
Status: CANCELLED | OUTPATIENT
Start: 2018-05-16

## 2018-05-16 RX ORDER — DIPHENHYDRAMINE HCL 25 MG
25 CAPSULE ORAL ONCE
Status: COMPLETED | OUTPATIENT
Start: 2018-05-16 | End: 2018-05-16

## 2018-05-16 RX ORDER — DIPHENHYDRAMINE HCL 25 MG
25 CAPSULE ORAL ONCE
Status: CANCELLED | OUTPATIENT
Start: 2018-05-16

## 2018-05-16 RX ORDER — FAMOTIDINE 10 MG/ML
20 INJECTION, SOLUTION INTRAVENOUS ONCE
Status: COMPLETED | OUTPATIENT
Start: 2018-05-16 | End: 2018-05-16

## 2018-05-16 RX ORDER — SODIUM CHLORIDE 9 MG/ML
250 INJECTION, SOLUTION INTRAVENOUS ONCE
Status: COMPLETED | OUTPATIENT
Start: 2018-05-16 | End: 2018-05-16

## 2018-05-16 RX ADMIN — FERUMOXYTOL 510 MG: 510 INJECTION INTRAVENOUS at 14:09

## 2018-05-16 RX ADMIN — SODIUM CHLORIDE: 9 INJECTION, SOLUTION INTRAVENOUS at 14:02

## 2018-05-16 RX ADMIN — FAMOTIDINE 20 MG: 10 INJECTION INTRAVENOUS at 14:03

## 2018-05-16 RX ADMIN — DIPHENHYDRAMINE HYDROCHLORIDE 25 MG: 25 CAPSULE ORAL at 14:02

## 2018-05-17 ENCOUNTER — INFUSION (OUTPATIENT)
Dept: ONCOLOGY | Facility: HOSPITAL | Age: 83
End: 2018-05-17

## 2018-05-17 ENCOUNTER — OFFICE VISIT (OUTPATIENT)
Dept: GASTROENTEROLOGY | Facility: CLINIC | Age: 83
End: 2018-05-17

## 2018-05-17 VITALS
DIASTOLIC BLOOD PRESSURE: 51 MMHG | HEIGHT: 63 IN | SYSTOLIC BLOOD PRESSURE: 129 MMHG | BODY MASS INDEX: 24.27 KG/M2 | WEIGHT: 137 LBS | TEMPERATURE: 98.3 F | RESPIRATION RATE: 18 BRPM | HEART RATE: 78 BPM

## 2018-05-17 VITALS
TEMPERATURE: 97.6 F | OXYGEN SATURATION: 92 % | BODY MASS INDEX: 24.38 KG/M2 | HEIGHT: 63 IN | SYSTOLIC BLOOD PRESSURE: 120 MMHG | DIASTOLIC BLOOD PRESSURE: 50 MMHG | HEART RATE: 76 BPM | WEIGHT: 137.6 LBS

## 2018-05-17 DIAGNOSIS — D50.0 IRON DEFICIENCY ANEMIA DUE TO CHRONIC BLOOD LOSS: ICD-10-CM

## 2018-05-17 DIAGNOSIS — K31.819 GAVE (GASTRIC ANTRAL VASCULAR ECTASIA): ICD-10-CM

## 2018-05-17 DIAGNOSIS — D50.0 IRON DEFICIENCY ANEMIA DUE TO CHRONIC BLOOD LOSS: Primary | ICD-10-CM

## 2018-05-17 LAB
ANTI-E: NORMAL
WARM AUTOANTIBODY: NORMAL

## 2018-05-17 PROCEDURE — 36430 TRANSFUSION BLD/BLD COMPNT: CPT

## 2018-05-17 PROCEDURE — 99213 OFFICE O/P EST LOW 20 MIN: CPT | Performed by: INTERNAL MEDICINE

## 2018-05-17 PROCEDURE — 86900 BLOOD TYPING SEROLOGIC ABO: CPT

## 2018-05-17 PROCEDURE — P9016 RBC LEUKOCYTES REDUCED: HCPCS

## 2018-05-17 PROCEDURE — 63710000001 DIPHENHYDRAMINE PER 50 MG: Performed by: INTERNAL MEDICINE

## 2018-05-17 RX ORDER — DIPHENHYDRAMINE HCL 25 MG
25 CAPSULE ORAL ONCE
Status: COMPLETED | OUTPATIENT
Start: 2018-05-17 | End: 2018-05-17

## 2018-05-17 RX ORDER — ACETAMINOPHEN 325 MG/1
650 TABLET ORAL ONCE
Status: COMPLETED | OUTPATIENT
Start: 2018-05-17 | End: 2018-05-17

## 2018-05-17 RX ORDER — SODIUM CHLORIDE 9 MG/ML
250 INJECTION, SOLUTION INTRAVENOUS AS NEEDED
Status: DISCONTINUED | OUTPATIENT
Start: 2018-05-17 | End: 2018-05-17 | Stop reason: HOSPADM

## 2018-05-17 RX ADMIN — DIPHENHYDRAMINE HYDROCHLORIDE 25 MG: 25 CAPSULE ORAL at 09:41

## 2018-05-17 RX ADMIN — SODIUM CHLORIDE 250 ML: 9 INJECTION, SOLUTION INTRAVENOUS at 10:00

## 2018-05-17 RX ADMIN — ACETAMINOPHEN 650 MG: 325 TABLET ORAL at 09:41

## 2018-05-17 NOTE — PROGRESS NOTES
PCP: Vadim Diaz MD    Chief Complaint   Patient presents with   • Anemia       History of Present Illness:   HPI  Mrs. Conner returns to the office today for a follow-up visit.  The patient continues to have issues with recurrent anemia.  The patient has required periodic blood transfusions.  She denies any abdominal pain with meals.  Her appetite overall is good.  Mrs. Conner denies any unexplained weight loss.  She states that at times her stool can be dark.  She has not been aware of any bright red blood per rectum.  There is no history of difficult or painful swallowing.  She denies any night sweats, fever or chills.  Past Medical History:   Diagnosis Date   • Anemia    • Arthritis    • Fibromyalgia    • History of transfusion    • Hypertension    • MG (myasthenia gravis)     history of       Past Surgical History:   Procedure Laterality Date   • CARDIAC SURGERY     • COLONOSCOPY     • HEMORRHOIDECTOMY     • TUBAL ABDOMINAL LIGATION     • UPPER GASTROINTESTINAL ENDOSCOPY           Current Outpatient Prescriptions:   •  amitriptyline (ELAVIL) 25 MG tablet, Take 25 mg by mouth 2 (two) times a day., Disp: , Rfl:   •  amlodipine-olmesartan (BRUNILDA) 10-20 MG per tablet, Take 1 tablet by mouth daily., Disp: , Rfl:   •  cetirizine (ZyrTEC) 10 MG tablet, Take 10 mg by mouth daily., Disp: , Rfl:   •  fentaNYL (DURAGESIC) 75 MCG/HR patch, Place 1 patch on the skin Every Other Day., Disp: , Rfl:   •  gabapentin (NEURONTIN) 300 MG capsule, Take 300 mg by mouth 3 (three) times a day., Disp: , Rfl:   •  HYDROcodone-acetaminophen (NORCO)  MG per tablet, Take 1 tablet by mouth every 6 (six) hours as needed for moderate pain (4-6)., Disp: , Rfl:   •  pantoprazole (PROTONIX) 40 MG EC tablet, Take 40 mg by mouth daily., Disp: , Rfl:   •  promethazine (PHENERGAN) 25 MG tablet, Take 25 mg by mouth every 6 (six) hours as needed for nausea or vomiting., Disp: , Rfl:   •  VOLTAREN 1 % gel gel, , Disp: , Rfl: 3  No current  facility-administered medications for this visit.     Allergies   Allergen Reactions   • Augmentin [Amoxicillin-Pot Clavulanate]    • Celebrex [Celecoxib]    • Codeine Sulfate    • Cozaar [Losartan]    • Crestor [Rosuvastatin]    • Dexlansoprazole    • Lipitor [Atorvastatin]    • Lisinopril    • Nexium [Esomeprazole Magnesium]    • Norvasc [Amlodipine]    • Sulfadiazine    • Toprol Xl [Metoprolol Tartrate]    • Zetia [Ezetimibe]    • Zocor [Simvastatin]        Family History   Problem Relation Age of Onset   • No Known Problems Mother    • Diabetes Father    • Cancer Son        Social History     Social History   • Marital status:      Spouse name: N/A   • Number of children: N/A   • Years of education: N/A     Occupational History   • Not on file.     Social History Main Topics   • Smoking status: Never Smoker   • Smokeless tobacco: Never Used   • Alcohol use No   • Drug use: No   • Sexual activity: Defer     Other Topics Concern   • Not on file     Social History Narrative   • No narrative on file       Review of Systems   Constitutional: Positive for fatigue. Negative for activity change, appetite change, fever and unexpected weight change.   HENT: Negative for dental problem, hearing loss, mouth sores, postnasal drip, sneezing, trouble swallowing and voice change.    Eyes: Negative for pain, redness, itching and visual disturbance.   Respiratory: Negative for cough, choking, chest tightness, shortness of breath and wheezing.    Cardiovascular: Negative for chest pain, palpitations and leg swelling.   Gastrointestinal: Negative for abdominal distention (bloating), abdominal pain (cramping), anal bleeding, blood in stool, constipation, diarrhea, nausea, rectal pain and vomiting.        Heartburn   Endocrine: Negative for cold intolerance, heat intolerance, polydipsia, polyphagia and polyuria.   Genitourinary: Negative.  Negative for dysuria, enuresis, flank pain, hematuria and urgency.   Musculoskeletal:  Negative for arthralgias, back pain, gait problem, joint swelling and myalgias.   Skin: Negative for color change, pallor and rash.   Allergic/Immunologic: Negative for environmental allergies, food allergies and immunocompromised state.   Neurological: Positive for weakness. Negative for dizziness, tremors, seizures, facial asymmetry, speech difficulty, numbness and headaches.   Hematological: Negative for adenopathy.   Psychiatric/Behavioral: Negative for behavioral problems, confusion, dysphoric mood, hallucinations and self-injury.       Vitals:    05/17/18 1430   BP: 120/50   Pulse: 76   Temp: 97.6 °F (36.4 °C)   SpO2: 92%       Physical Exam   Constitutional: She is oriented to person, place, and time. She appears well-nourished. No distress.   HENT:   Head: Normocephalic and atraumatic.   Mouth/Throat: Oropharynx is clear and moist. No oropharyngeal exudate.   Eyes: EOM are normal. No scleral icterus.   Neck: Neck supple. No thyromegaly present.   Cardiovascular: Normal rate and regular rhythm.  Exam reveals no gallop.    Murmur heard.  Pulmonary/Chest: Effort normal and breath sounds normal. She has no wheezes. She has no rales.   Abdominal: Soft. Bowel sounds are normal. There is no tenderness. There is no rebound and no guarding.   Musculoskeletal: Normal range of motion. She exhibits no edema or tenderness.   Lymphadenopathy:     She has no cervical adenopathy.   Neurological: She is alert and oriented to person, place, and time. She exhibits normal muscle tone.   Skin: Skin is dry. No rash noted. No erythema.   Psychiatric: She has a normal mood and affect. Her behavior is normal. Thought content normal.   Vitals reviewed.      Sheryl was seen today for anemia.    Diagnoses and all orders for this visit:    Iron deficiency anemia due to chronic blood loss  -     Esophagogastroduodenoscopy; Future    GAVE (gastric antral vascular ectasia)  -     Esophagogastroduodenoscopy; Future    The patient had a  colonoscopy in 2014 at the [facility] and there was no evidence for polyps or AVMs.  She did have some internal hemorrhoids.  The patient had an upper endoscopy in 2016 and there was evidence for GAVE.  She will need further intervention endoscopically after no improvement with medication.      Plan: Will schedule for an EGD with [facility].  I discussed with Mrs. Conner that a couple of sessions may be needed for treatment.      I spent over 50% of the office visit counseling and answering questions from the patient.

## 2018-05-18 LAB
ABO + RH BLD: NORMAL
BH BB BLOOD EXPIRATION DATE: NORMAL
BH BB BLOOD TYPE BARCODE: 1700
BH BB DISPENSE STATUS: NORMAL
BH BB PRODUCT CODE: NORMAL
BH BB UNIT NUMBER: NORMAL
CROSSMATCH INTERPRETATION: NORMAL
UNIT  ABO: NORMAL
UNIT  RH: NORMAL

## 2018-05-24 ENCOUNTER — INFUSION (OUTPATIENT)
Dept: ONCOLOGY | Facility: HOSPITAL | Age: 83
End: 2018-05-24

## 2018-05-24 VITALS
BODY MASS INDEX: 23.92 KG/M2 | RESPIRATION RATE: 18 BRPM | HEIGHT: 63 IN | HEART RATE: 87 BPM | SYSTOLIC BLOOD PRESSURE: 122 MMHG | TEMPERATURE: 97.7 F | WEIGHT: 135 LBS | DIASTOLIC BLOOD PRESSURE: 51 MMHG

## 2018-05-24 DIAGNOSIS — K90.9 MALABSORPTION IN THE ELDERLY: ICD-10-CM

## 2018-05-24 DIAGNOSIS — D50.0 IRON DEFICIENCY ANEMIA DUE TO CHRONIC BLOOD LOSS: Primary | ICD-10-CM

## 2018-05-24 LAB
ABO GROUP BLD: NORMAL
ANTI-E: NORMAL
BLD GP AB SCN SERPL QL ELUTION: NEGATIVE
BLD GP AB SCN SERPL QL: POSITIVE
DAT IGG GEL: POSITIVE
ERYTHROCYTE [DISTWIDTH] IN BLOOD BY AUTOMATED COUNT: 21.9 % (ref 11.3–14.5)
HCT VFR BLD AUTO: 23 % (ref 34.5–44)
HGB BLD-MCNC: 6.8 G/DL (ref 11.5–15.5)
MCH RBC QN AUTO: 28 PG (ref 27–31)
MCHC RBC AUTO-ENTMCNC: 29.5 G/DL (ref 32–36)
MCV RBC AUTO: 94.7 FL (ref 80–99)
PLATELET # BLD AUTO: 279 10*3/MM3 (ref 150–450)
PMV BLD AUTO: 6.7 FL (ref 6–12)
RBC # BLD AUTO: 2.43 10*6/MM3 (ref 3.89–5.14)
RH BLD: NEGATIVE
T&S EXPIRATION DATE: NORMAL
WBC NRBC COR # BLD: 3.3 10*3/MM3 (ref 3.5–10.8)

## 2018-05-24 PROCEDURE — 86850 RBC ANTIBODY SCREEN: CPT

## 2018-05-24 PROCEDURE — 86922 COMPATIBILITY TEST ANTIGLOB: CPT

## 2018-05-24 PROCEDURE — 85027 COMPLETE CBC AUTOMATED: CPT

## 2018-05-24 PROCEDURE — 86870 RBC ANTIBODY IDENTIFICATION: CPT

## 2018-05-24 PROCEDURE — 86901 BLOOD TYPING SEROLOGIC RH(D): CPT

## 2018-05-24 PROCEDURE — 86900 BLOOD TYPING SEROLOGIC ABO: CPT

## 2018-05-24 PROCEDURE — 86880 COOMBS TEST DIRECT: CPT

## 2018-05-24 PROCEDURE — 86902 BLOOD TYPE ANTIGEN DONOR EA: CPT

## 2018-05-24 PROCEDURE — 36415 COLL VENOUS BLD VENIPUNCTURE: CPT

## 2018-05-24 PROCEDURE — 86860 RBC ANTIBODY ELUTION: CPT

## 2018-05-24 PROCEDURE — 86920 COMPATIBILITY TEST SPIN: CPT

## 2018-05-24 RX ORDER — DIPHENHYDRAMINE HCL 25 MG
25 CAPSULE ORAL ONCE
Status: CANCELLED | OUTPATIENT
Start: 2018-05-24 | End: 2018-05-24

## 2018-05-24 RX ORDER — SODIUM CHLORIDE 9 MG/ML
250 INJECTION, SOLUTION INTRAVENOUS AS NEEDED
Status: CANCELLED | OUTPATIENT
Start: 2018-05-24

## 2018-05-24 RX ORDER — ACETAMINOPHEN 325 MG/1
650 TABLET ORAL ONCE
Status: CANCELLED | OUTPATIENT
Start: 2018-05-24 | End: 2018-05-24

## 2018-05-25 ENCOUNTER — INFUSION (OUTPATIENT)
Dept: ONCOLOGY | Facility: HOSPITAL | Age: 83
End: 2018-05-25

## 2018-05-25 VITALS
SYSTOLIC BLOOD PRESSURE: 116 MMHG | DIASTOLIC BLOOD PRESSURE: 43 MMHG | TEMPERATURE: 98.2 F | RESPIRATION RATE: 18 BRPM | WEIGHT: 134 LBS | HEIGHT: 63 IN | BODY MASS INDEX: 23.74 KG/M2 | HEART RATE: 70 BPM

## 2018-05-25 DIAGNOSIS — D50.0 IRON DEFICIENCY ANEMIA DUE TO CHRONIC BLOOD LOSS: ICD-10-CM

## 2018-05-25 PROCEDURE — 86900 BLOOD TYPING SEROLOGIC ABO: CPT

## 2018-05-25 PROCEDURE — 63710000001 DIPHENHYDRAMINE PER 50 MG: Performed by: INTERNAL MEDICINE

## 2018-05-25 PROCEDURE — P9016 RBC LEUKOCYTES REDUCED: HCPCS

## 2018-05-25 PROCEDURE — 36430 TRANSFUSION BLD/BLD COMPNT: CPT

## 2018-05-25 RX ORDER — DIPHENHYDRAMINE HCL 25 MG
25 CAPSULE ORAL ONCE
Status: COMPLETED | OUTPATIENT
Start: 2018-05-25 | End: 2018-05-25

## 2018-05-25 RX ORDER — SODIUM CHLORIDE 9 MG/ML
250 INJECTION, SOLUTION INTRAVENOUS AS NEEDED
Status: DISCONTINUED | OUTPATIENT
Start: 2018-05-25 | End: 2018-05-25 | Stop reason: HOSPADM

## 2018-05-25 RX ORDER — ACETAMINOPHEN 325 MG/1
650 TABLET ORAL ONCE
Status: COMPLETED | OUTPATIENT
Start: 2018-05-25 | End: 2018-05-25

## 2018-05-25 RX ADMIN — DIPHENHYDRAMINE HYDROCHLORIDE 25 MG: 25 CAPSULE ORAL at 08:32

## 2018-05-25 RX ADMIN — ACETAMINOPHEN 650 MG: 325 TABLET, FILM COATED ORAL at 08:32

## 2018-05-29 ENCOUNTER — OUTSIDE FACILITY SERVICE (OUTPATIENT)
Dept: GASTROENTEROLOGY | Facility: CLINIC | Age: 83
End: 2018-05-29

## 2018-05-29 PROCEDURE — 43255 EGD CONTROL BLEEDING ANY: CPT | Performed by: INTERNAL MEDICINE

## 2018-05-30 ENCOUNTER — APPOINTMENT (OUTPATIENT)
Dept: ONCOLOGY | Facility: HOSPITAL | Age: 83
End: 2018-05-30

## 2018-06-01 ENCOUNTER — APPOINTMENT (OUTPATIENT)
Dept: LAB | Facility: HOSPITAL | Age: 83
End: 2018-06-01

## 2018-06-01 ENCOUNTER — INFUSION (OUTPATIENT)
Dept: ONCOLOGY | Facility: HOSPITAL | Age: 83
End: 2018-06-01

## 2018-06-01 VITALS
WEIGHT: 135 LBS | DIASTOLIC BLOOD PRESSURE: 42 MMHG | HEART RATE: 71 BPM | SYSTOLIC BLOOD PRESSURE: 113 MMHG | HEIGHT: 63 IN | TEMPERATURE: 98.4 F | RESPIRATION RATE: 18 BRPM | BODY MASS INDEX: 23.92 KG/M2

## 2018-06-01 DIAGNOSIS — D50.0 IRON DEFICIENCY ANEMIA DUE TO CHRONIC BLOOD LOSS: ICD-10-CM

## 2018-06-01 DIAGNOSIS — K90.9 MALABSORPTION IN THE ELDERLY: Primary | ICD-10-CM

## 2018-06-01 DIAGNOSIS — Z78.9 MEDICATION INTOLERANCE: ICD-10-CM

## 2018-06-01 LAB
ERYTHROCYTE [DISTWIDTH] IN BLOOD BY AUTOMATED COUNT: 18.1 % (ref 11.3–14.5)
FERRITIN SERPL-MCNC: 169 NG/ML (ref 10–291)
HCT VFR BLD AUTO: 30.8 % (ref 34.5–44)
HGB BLD-MCNC: 9 G/DL (ref 11.5–15.5)
IRON 24H UR-MRATE: 26 MCG/DL (ref 50–175)
IRON SATN MFR SERPL: 13 % (ref 15–50)
MCH RBC QN AUTO: 27.8 PG (ref 27–31)
MCHC RBC AUTO-ENTMCNC: 29.2 G/DL (ref 32–36)
MCV RBC AUTO: 95 FL (ref 80–99)
PLATELET # BLD AUTO: 251 10*3/MM3 (ref 150–450)
PMV BLD AUTO: 7.6 FL (ref 6–12)
RBC # BLD AUTO: 3.25 10*6/MM3 (ref 3.89–5.14)
TIBC SERPL-MCNC: 204 MCG/DL (ref 250–450)
WBC NRBC COR # BLD: 4.3 10*3/MM3 (ref 3.5–10.8)

## 2018-06-01 PROCEDURE — 82728 ASSAY OF FERRITIN: CPT

## 2018-06-01 PROCEDURE — 36415 COLL VENOUS BLD VENIPUNCTURE: CPT

## 2018-06-01 PROCEDURE — 83550 IRON BINDING TEST: CPT

## 2018-06-01 PROCEDURE — 96374 THER/PROPH/DIAG INJ IV PUSH: CPT

## 2018-06-01 PROCEDURE — 25010000002 FERUMOXYTOL 510 MG/17ML SOLUTION 510 MG VIAL: Performed by: INTERNAL MEDICINE

## 2018-06-01 PROCEDURE — 83540 ASSAY OF IRON: CPT

## 2018-06-01 PROCEDURE — 85027 COMPLETE CBC AUTOMATED: CPT

## 2018-06-01 RX ORDER — SODIUM CHLORIDE 9 MG/ML
250 INJECTION, SOLUTION INTRAVENOUS ONCE
Status: COMPLETED | OUTPATIENT
Start: 2018-06-01 | End: 2018-06-01

## 2018-06-01 RX ADMIN — SODIUM CHLORIDE 250 ML: 9 INJECTION, SOLUTION INTRAVENOUS at 14:50

## 2018-06-01 RX ADMIN — FERUMOXYTOL 510 MG: 510 INJECTION INTRAVENOUS at 14:50

## 2018-06-06 ENCOUNTER — LAB (OUTPATIENT)
Dept: LAB | Facility: HOSPITAL | Age: 83
End: 2018-06-06

## 2018-06-06 DIAGNOSIS — D50.0 IRON DEFICIENCY ANEMIA DUE TO CHRONIC BLOOD LOSS: ICD-10-CM

## 2018-06-06 LAB
ERYTHROCYTE [DISTWIDTH] IN BLOOD BY AUTOMATED COUNT: 18 % (ref 11.3–14.5)
FERRITIN SERPL-MCNC: 416 NG/ML (ref 10–291)
HCT VFR BLD AUTO: 26.5 % (ref 34.5–44)
HGB BLD-MCNC: 8 G/DL (ref 11.5–15.5)
MCH RBC QN AUTO: 28.5 PG (ref 27–31)
MCHC RBC AUTO-ENTMCNC: 30.3 G/DL (ref 32–36)
MCV RBC AUTO: 93.8 FL (ref 80–99)
PLATELET # BLD AUTO: 281 10*3/MM3 (ref 150–450)
PMV BLD AUTO: 7.2 FL (ref 6–12)
RBC # BLD AUTO: 2.83 10*6/MM3 (ref 3.89–5.14)
WBC NRBC COR # BLD: 6 10*3/MM3 (ref 3.5–10.8)

## 2018-06-06 PROCEDURE — 85027 COMPLETE CBC AUTOMATED: CPT

## 2018-06-06 PROCEDURE — 36415 COLL VENOUS BLD VENIPUNCTURE: CPT

## 2018-06-06 PROCEDURE — 82728 ASSAY OF FERRITIN: CPT

## 2018-06-13 ENCOUNTER — INFUSION (OUTPATIENT)
Dept: ONCOLOGY | Facility: HOSPITAL | Age: 83
End: 2018-06-13

## 2018-06-13 ENCOUNTER — OFFICE VISIT (OUTPATIENT)
Dept: ONCOLOGY | Facility: CLINIC | Age: 83
End: 2018-06-13

## 2018-06-13 ENCOUNTER — LAB (OUTPATIENT)
Dept: LAB | Facility: HOSPITAL | Age: 83
End: 2018-06-13

## 2018-06-13 VITALS
HEIGHT: 63 IN | SYSTOLIC BLOOD PRESSURE: 139 MMHG | WEIGHT: 135 LBS | HEART RATE: 76 BPM | RESPIRATION RATE: 18 BRPM | TEMPERATURE: 97.9 F | DIASTOLIC BLOOD PRESSURE: 65 MMHG | BODY MASS INDEX: 23.92 KG/M2

## 2018-06-13 VITALS — SYSTOLIC BLOOD PRESSURE: 150 MMHG | DIASTOLIC BLOOD PRESSURE: 56 MMHG | HEART RATE: 79 BPM

## 2018-06-13 DIAGNOSIS — K90.9 MALABSORPTION IN THE ELDERLY: ICD-10-CM

## 2018-06-13 DIAGNOSIS — K31.819 GAVE (GASTRIC ANTRAL VASCULAR ECTASIA): Primary | ICD-10-CM

## 2018-06-13 DIAGNOSIS — Z78.9 MEDICATION INTOLERANCE: ICD-10-CM

## 2018-06-13 DIAGNOSIS — D50.0 IRON DEFICIENCY ANEMIA DUE TO CHRONIC BLOOD LOSS: ICD-10-CM

## 2018-06-13 DIAGNOSIS — D50.0 IRON DEFICIENCY ANEMIA DUE TO CHRONIC BLOOD LOSS: Primary | ICD-10-CM

## 2018-06-13 LAB
ERYTHROCYTE [DISTWIDTH] IN BLOOD BY AUTOMATED COUNT: 16.6 % (ref 11.3–14.5)
HCT VFR BLD AUTO: 26 % (ref 34.5–44)
HGB BLD-MCNC: 7.7 G/DL (ref 11.5–15.5)
MCH RBC QN AUTO: 28.1 PG (ref 27–31)
MCHC RBC AUTO-ENTMCNC: 29.7 G/DL (ref 32–36)
MCV RBC AUTO: 94.5 FL (ref 80–99)
PLATELET # BLD AUTO: 280 10*3/MM3 (ref 150–450)
PMV BLD AUTO: 6.7 FL (ref 6–12)
RBC # BLD AUTO: 2.75 10*6/MM3 (ref 3.89–5.14)
WBC NRBC COR # BLD: 4.9 10*3/MM3 (ref 3.5–10.8)

## 2018-06-13 PROCEDURE — 99214 OFFICE O/P EST MOD 30 MIN: CPT | Performed by: INTERNAL MEDICINE

## 2018-06-13 PROCEDURE — 25010000002 FERUMOXYTOL 510 MG/17ML SOLUTION 510 MG VIAL: Performed by: INTERNAL MEDICINE

## 2018-06-13 PROCEDURE — 85027 COMPLETE CBC AUTOMATED: CPT

## 2018-06-13 PROCEDURE — 36415 COLL VENOUS BLD VENIPUNCTURE: CPT

## 2018-06-13 PROCEDURE — 96374 THER/PROPH/DIAG INJ IV PUSH: CPT

## 2018-06-13 RX ORDER — SODIUM CHLORIDE 9 MG/ML
250 INJECTION, SOLUTION INTRAVENOUS ONCE
Status: COMPLETED | OUTPATIENT
Start: 2018-06-13 | End: 2018-06-13

## 2018-06-13 RX ADMIN — SODIUM CHLORIDE 250 ML: 9 INJECTION, SOLUTION INTRAVENOUS at 12:19

## 2018-06-13 RX ADMIN — FERUMOXYTOL 510 MG: 510 INJECTION INTRAVENOUS at 12:21

## 2018-06-13 NOTE — PROGRESS NOTES
"PROBLEM LIST:  1. 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 Status post EGD colonoscopy  3. Status post Iv iron  infusion periodically  4. Bone Marrow biopsy  - normal - done by Dr. Reeves  5. Repeat EGD should GAVE: \"watermelon stomach\" in 6/2018      REASON FOR VISIT: Follow-up of my anemia    HISTORY OF PRESENT ILLNESS:   86-year-old lady with normocytic anemia likely related to chronic blood loss.  She continues to have significant loss of blood.  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  This is likely the cause of her bleeding.  She has not required any blood since May.  She is scheduled for iron infusion today.  She has been placed on twice a day of protonix by Dr. Garcia.      Past medical history, social history and family history was reviewed and unchanged from prior visit.    Review of Systems:    Review of Systems   Constitutional: Positive for fatigue.   HENT: Negative.    Eyes: Negative.    Respiratory: Positive for shortness of breath.    Cardiovascular: Negative.    Gastrointestinal: Negative.    Endocrine: Negative.    Genitourinary: Negative.    Musculoskeletal: Negative.    Skin: Negative.    Allergic/Immunologic: Negative.    Neurological: Negative.    Hematological: Negative.    Psychiatric/Behavioral: Negative.       A comprehensive 14 point review of systems was performed and was negative except as mentioned.      Medications:  The current medication list was reviewed in the EMR    ALLERGIES:    Allergies   Allergen Reactions   • Augmentin [Amoxicillin-Pot Clavulanate]    • Celebrex [Celecoxib]    • Codeine Sulfate    • Cozaar [Losartan]    • Crestor [Rosuvastatin]    • Dexlansoprazole    • Lipitor [Atorvastatin]    • Lisinopril    • Nexium [Esomeprazole Magnesium]    • Norvasc [Amlodipine]    • Sulfadiazine    • Toprol Xl [Metoprolol Tartrate]    • Zetia [Ezetimibe]    • Zocor [Simvastatin]  " "        Physical Exam    VITAL SIGNS:  /65 Comment: LUE  Pulse 76   Temp 97.9 °F (36.6 °C) (Temporal Artery )   Resp 18   Ht 160 cm (63\")   Wt 61.2 kg (135 lb)   BMI 23.91 kg/m²      Performance Status: 1    General: well appearing, in no acute distress  HEENT: sclera anicteric, oropharynx clear, neck is supple  Lymphatics: no cervical, supraclavicular, or axillary adenopathy  Cardiovascular: regular rate and rhythm, no murmurs, rubs or gallops  Lungs: clear to auscultation bilaterally  Abdomen: soft, nontender, nondistended.  No palpable organomegaly  Extremities: no lower extremity edema  Skin: no rashes, lesions, bruising, or petechiae  Msk:  Shows no weakness of the large muscle groups  Psych: Mood is stable        RECENT LABS:    Lab Results   Component Value Date    WBC 4.90 06/13/2018    HGB 7.7 (L) 06/13/2018    HCT 26.0 (L) 06/13/2018    MCV 94.5 06/13/2018     06/13/2018     Lab Results   Component Value Date    FERRITIN 416.00 (H) 06/06/2018    FERRITIN 169.00 06/01/2018    FERRITIN 56.00 05/02/2018     Lab Results   Component Value Date    IRON 26 (L) 06/01/2018   ]      Assessment/Plan    1.normocytic anemia requiring periodic blood transfusions secondary to bleeding AVMs.   Continue iron infusions, this has limited her need for transfusions. Will switch to Injectofer to be able to give her more iron with less infusions.  Patient otherwise requires blood transfusions.  I will see how she does over the next month or so.      2. GAVE: will get US of the liver to make sure she does not have Portal Hypertension.  May have to consider Argon or Yag therapy to ablate her bleeding sites.      I spent 25 minutes on the patient's plan and care and more than 50% of time counseling the patient    Rahat Morris MD  [facility] Hematology and Oncology    6/13/2018                  "

## 2018-06-14 RX ORDER — SODIUM CHLORIDE 9 MG/ML
250 INJECTION, SOLUTION INTRAVENOUS ONCE
Status: CANCELLED | OUTPATIENT
Start: 2018-07-16

## 2018-06-14 RX ORDER — SODIUM CHLORIDE 9 MG/ML
250 INJECTION, SOLUTION INTRAVENOUS ONCE
Status: CANCELLED | OUTPATIENT
Start: 2018-07-06

## 2018-06-20 ENCOUNTER — INFUSION (OUTPATIENT)
Dept: ONCOLOGY | Facility: HOSPITAL | Age: 83
End: 2018-06-20

## 2018-06-20 ENCOUNTER — LAB (OUTPATIENT)
Dept: LAB | Facility: HOSPITAL | Age: 83
End: 2018-06-20

## 2018-06-20 VITALS
HEIGHT: 63 IN | HEART RATE: 80 BPM | TEMPERATURE: 97.5 F | WEIGHT: 135 LBS | DIASTOLIC BLOOD PRESSURE: 63 MMHG | BODY MASS INDEX: 23.92 KG/M2 | SYSTOLIC BLOOD PRESSURE: 145 MMHG | RESPIRATION RATE: 20 BRPM

## 2018-06-20 DIAGNOSIS — D50.0 IRON DEFICIENCY ANEMIA DUE TO CHRONIC BLOOD LOSS: ICD-10-CM

## 2018-06-20 DIAGNOSIS — D50.0 IRON DEFICIENCY ANEMIA DUE TO CHRONIC BLOOD LOSS: Primary | ICD-10-CM

## 2018-06-20 DIAGNOSIS — K90.9 MALABSORPTION IN THE ELDERLY: ICD-10-CM

## 2018-06-20 LAB
ABO GROUP BLD: NORMAL
BLD GP AB SCN SERPL QL: NEGATIVE
ERYTHROCYTE [DISTWIDTH] IN BLOOD BY AUTOMATED COUNT: 17.9 % (ref 11.3–14.5)
HCT VFR BLD AUTO: 21.9 % (ref 34.5–44)
HGB BLD-MCNC: 6.4 G/DL (ref 11.5–15.5)
MCH RBC QN AUTO: 28.4 PG (ref 27–31)
MCHC RBC AUTO-ENTMCNC: 29.4 G/DL (ref 32–36)
MCV RBC AUTO: 96.4 FL (ref 80–99)
PLATELET # BLD AUTO: 311 10*3/MM3 (ref 150–450)
PMV BLD AUTO: 6.9 FL (ref 6–12)
RBC # BLD AUTO: 2.27 10*6/MM3 (ref 3.89–5.14)
RH BLD: NEGATIVE
T&S EXPIRATION DATE: NORMAL
WBC NRBC COR # BLD: 5.2 10*3/MM3 (ref 3.5–10.8)

## 2018-06-20 PROCEDURE — 86920 COMPATIBILITY TEST SPIN: CPT

## 2018-06-20 PROCEDURE — 85027 COMPLETE CBC AUTOMATED: CPT

## 2018-06-20 PROCEDURE — 86900 BLOOD TYPING SEROLOGIC ABO: CPT

## 2018-06-20 PROCEDURE — 86901 BLOOD TYPING SEROLOGIC RH(D): CPT

## 2018-06-20 PROCEDURE — 86850 RBC ANTIBODY SCREEN: CPT

## 2018-06-20 PROCEDURE — 36415 COLL VENOUS BLD VENIPUNCTURE: CPT

## 2018-06-20 PROCEDURE — 86922 COMPATIBILITY TEST ANTIGLOB: CPT

## 2018-06-20 RX ORDER — ACETAMINOPHEN 325 MG/1
650 TABLET ORAL ONCE
Status: CANCELLED | OUTPATIENT
Start: 2018-06-20 | End: 2018-06-20

## 2018-06-20 RX ORDER — DIPHENHYDRAMINE HCL 25 MG
25 CAPSULE ORAL ONCE
Status: CANCELLED | OUTPATIENT
Start: 2018-06-20 | End: 2018-06-20

## 2018-06-20 RX ORDER — SODIUM CHLORIDE 9 MG/ML
250 INJECTION, SOLUTION INTRAVENOUS AS NEEDED
Status: CANCELLED | OUTPATIENT
Start: 2018-06-20

## 2018-06-21 ENCOUNTER — INFUSION (OUTPATIENT)
Dept: ONCOLOGY | Facility: HOSPITAL | Age: 83
End: 2018-06-21

## 2018-06-21 VITALS
DIASTOLIC BLOOD PRESSURE: 41 MMHG | HEART RATE: 79 BPM | HEIGHT: 63 IN | TEMPERATURE: 97.6 F | WEIGHT: 136 LBS | BODY MASS INDEX: 24.1 KG/M2 | SYSTOLIC BLOOD PRESSURE: 108 MMHG | RESPIRATION RATE: 18 BRPM

## 2018-06-21 DIAGNOSIS — D50.0 IRON DEFICIENCY ANEMIA DUE TO CHRONIC BLOOD LOSS: ICD-10-CM

## 2018-06-21 PROCEDURE — P9016 RBC LEUKOCYTES REDUCED: HCPCS

## 2018-06-21 PROCEDURE — 63710000001 DIPHENHYDRAMINE PER 50 MG: Performed by: INTERNAL MEDICINE

## 2018-06-21 PROCEDURE — 36430 TRANSFUSION BLD/BLD COMPNT: CPT

## 2018-06-21 PROCEDURE — 86900 BLOOD TYPING SEROLOGIC ABO: CPT

## 2018-06-21 PROCEDURE — 86902 BLOOD TYPE ANTIGEN DONOR EA: CPT

## 2018-06-21 RX ORDER — ACETAMINOPHEN 325 MG/1
650 TABLET ORAL ONCE
Status: COMPLETED | OUTPATIENT
Start: 2018-06-21 | End: 2018-06-21

## 2018-06-21 RX ORDER — SODIUM CHLORIDE 9 MG/ML
250 INJECTION, SOLUTION INTRAVENOUS AS NEEDED
Status: DISCONTINUED | OUTPATIENT
Start: 2018-06-21 | End: 2018-06-21 | Stop reason: HOSPADM

## 2018-06-21 RX ORDER — DIPHENHYDRAMINE HCL 25 MG
25 CAPSULE ORAL ONCE
Status: COMPLETED | OUTPATIENT
Start: 2018-06-21 | End: 2018-06-21

## 2018-06-21 RX ADMIN — ACETAMINOPHEN 650 MG: 325 TABLET ORAL at 08:54

## 2018-06-21 RX ADMIN — DIPHENHYDRAMINE HYDROCHLORIDE 25 MG: 25 CAPSULE ORAL at 08:54

## 2018-06-27 ENCOUNTER — LAB (OUTPATIENT)
Dept: LAB | Facility: HOSPITAL | Age: 83
End: 2018-06-27

## 2018-06-27 DIAGNOSIS — K90.9 MALABSORPTION IN THE ELDERLY: ICD-10-CM

## 2018-06-27 DIAGNOSIS — D50.0 IRON DEFICIENCY ANEMIA DUE TO CHRONIC BLOOD LOSS: ICD-10-CM

## 2018-06-27 LAB
ERYTHROCYTE [DISTWIDTH] IN BLOOD BY AUTOMATED COUNT: 17.5 % (ref 11.3–14.5)
HCT VFR BLD AUTO: 27.6 % (ref 34.5–44)
HGB BLD-MCNC: 8.1 G/DL (ref 11.5–15.5)
MCH RBC QN AUTO: 27.5 PG (ref 27–31)
MCHC RBC AUTO-ENTMCNC: 29.3 G/DL (ref 32–36)
MCV RBC AUTO: 93.9 FL (ref 80–99)
PLATELET # BLD AUTO: 268 10*3/MM3 (ref 150–450)
PMV BLD AUTO: 6.9 FL (ref 6–12)
RBC # BLD AUTO: 2.94 10*6/MM3 (ref 3.89–5.14)
WBC NRBC COR # BLD: 4.4 10*3/MM3 (ref 3.5–10.8)

## 2018-06-27 PROCEDURE — 85027 COMPLETE CBC AUTOMATED: CPT

## 2018-06-27 PROCEDURE — 36415 COLL VENOUS BLD VENIPUNCTURE: CPT

## 2018-06-28 ENCOUNTER — HOSPITAL ENCOUNTER (OUTPATIENT)
Dept: GENERAL RADIOLOGY | Facility: HOSPITAL | Age: 83
Discharge: HOME | End: 2018-06-28

## 2018-06-28 ENCOUNTER — HOSPITAL ENCOUNTER (OUTPATIENT)
Facility: HOSPITAL | Age: 83
Setting detail: OBSERVATION
Discharge: HOME | End: 2018-06-29
Attending: INTERNAL MEDICINE | Admitting: INTERNAL MEDICINE

## 2018-06-28 ENCOUNTER — OUTSIDE FACILITY SERVICE (OUTPATIENT)
Dept: GASTROENTEROLOGY | Facility: CLINIC | Age: 83
End: 2018-06-28

## 2018-06-28 ENCOUNTER — TRANSCRIBE ORDERS (OUTPATIENT)
Dept: GENERAL RADIOLOGY | Facility: HOSPITAL | Age: 83
End: 2018-06-28

## 2018-06-28 DIAGNOSIS — D50.9 IRON DEFICIENCY ANEMIA, UNSPECIFIED IRON DEFICIENCY ANEMIA TYPE: Primary | ICD-10-CM

## 2018-06-28 DIAGNOSIS — D50.9 IRON DEFICIENCY ANEMIA, UNSPECIFIED IRON DEFICIENCY ANEMIA TYPE: ICD-10-CM

## 2018-06-28 PROBLEM — R09.02 HYPOXIA: Status: ACTIVE | Noted: 2018-06-28

## 2018-06-28 PROCEDURE — 94799 UNLISTED PULMONARY SVC/PX: CPT

## 2018-06-28 PROCEDURE — 71045 X-RAY EXAM CHEST 1 VIEW: CPT

## 2018-06-28 PROCEDURE — 96374 THER/PROPH/DIAG INJ IV PUSH: CPT

## 2018-06-28 PROCEDURE — G0378 HOSPITAL OBSERVATION PER HR: HCPCS

## 2018-06-28 PROCEDURE — 25010000002 HEPARIN (PORCINE) PER 1000 UNITS: Performed by: HOSPITALIST

## 2018-06-28 PROCEDURE — 96372 THER/PROPH/DIAG INJ SC/IM: CPT

## 2018-06-28 PROCEDURE — 99220 PR INITIAL OBSERVATION CARE/DAY 70 MINUTES: CPT | Performed by: HOSPITALIST

## 2018-06-28 PROCEDURE — 94660 CPAP INITIATION&MGMT: CPT

## 2018-06-28 PROCEDURE — 25010000002 PROMETHAZINE PER 50 MG: Performed by: HOSPITALIST

## 2018-06-28 PROCEDURE — 43255 EGD CONTROL BLEEDING ANY: CPT | Performed by: INTERNAL MEDICINE

## 2018-06-28 RX ORDER — PROMETHAZINE HYDROCHLORIDE 6.25 MG/5ML
12.5 SYRUP ORAL EVERY 6 HOURS PRN
Status: DISCONTINUED | OUTPATIENT
Start: 2018-06-28 | End: 2018-06-29 | Stop reason: HOSPADM

## 2018-06-28 RX ORDER — HYDROCODONE BITARTRATE AND ACETAMINOPHEN 5; 325 MG/1; MG/1
1 TABLET ORAL EVERY 6 HOURS PRN
Status: DISCONTINUED | OUTPATIENT
Start: 2018-06-28 | End: 2018-06-29 | Stop reason: HOSPADM

## 2018-06-28 RX ORDER — PROMETHAZINE HYDROCHLORIDE 12.5 MG/1
6.25 TABLET ORAL EVERY 6 HOURS PRN
Status: DISCONTINUED | OUTPATIENT
Start: 2018-06-28 | End: 2018-06-29 | Stop reason: HOSPADM

## 2018-06-28 RX ORDER — AMLODIPINE BESYLATE 10 MG/1
10 TABLET ORAL
Status: DISCONTINUED | OUTPATIENT
Start: 2018-06-28 | End: 2018-06-29 | Stop reason: HOSPADM

## 2018-06-28 RX ORDER — PANTOPRAZOLE SODIUM 40 MG/1
40 TABLET, DELAYED RELEASE ORAL
Status: DISCONTINUED | OUTPATIENT
Start: 2018-06-29 | End: 2018-06-29 | Stop reason: HOSPADM

## 2018-06-28 RX ORDER — SODIUM CHLORIDE 0.9 % (FLUSH) 0.9 %
1-10 SYRINGE (ML) INJECTION AS NEEDED
Status: DISCONTINUED | OUTPATIENT
Start: 2018-06-28 | End: 2018-06-29

## 2018-06-28 RX ORDER — GABAPENTIN 300 MG/1
300 CAPSULE ORAL EVERY 8 HOURS SCHEDULED
Status: DISCONTINUED | OUTPATIENT
Start: 2018-06-28 | End: 2018-06-29 | Stop reason: HOSPADM

## 2018-06-28 RX ORDER — HEPARIN SODIUM 5000 [USP'U]/ML
5000 INJECTION, SOLUTION INTRAVENOUS; SUBCUTANEOUS EVERY 12 HOURS SCHEDULED
Status: DISCONTINUED | OUTPATIENT
Start: 2018-06-28 | End: 2018-06-29 | Stop reason: HOSPADM

## 2018-06-28 RX ORDER — PROMETHAZINE HYDROCHLORIDE 25 MG/ML
12.5 INJECTION, SOLUTION INTRAMUSCULAR; INTRAVENOUS EVERY 6 HOURS PRN
Status: DISCONTINUED | OUTPATIENT
Start: 2018-06-28 | End: 2018-06-29 | Stop reason: HOSPADM

## 2018-06-28 RX ADMIN — GABAPENTIN 300 MG: 300 CAPSULE ORAL at 21:08

## 2018-06-28 RX ADMIN — HEPARIN SODIUM 5000 UNITS: 5000 INJECTION, SOLUTION INTRAVENOUS; SUBCUTANEOUS at 21:09

## 2018-06-28 RX ADMIN — HYDROCODONE BITARTRATE AND ACETAMINOPHEN 1 TABLET: 5; 325 TABLET ORAL at 18:17

## 2018-06-28 RX ADMIN — AMLODIPINE BESYLATE 10 MG: 10 TABLET ORAL at 18:17

## 2018-06-28 RX ADMIN — PROMETHAZINE HYDROCHLORIDE 12.5 MG: 25 INJECTION INTRAMUSCULAR; INTRAVENOUS at 17:32

## 2018-06-29 ENCOUNTER — APPOINTMENT (OUTPATIENT)
Dept: CARDIOLOGY | Facility: HOSPITAL | Age: 83
End: 2018-06-29
Attending: INTERNAL MEDICINE

## 2018-06-29 VITALS
TEMPERATURE: 97 F | SYSTOLIC BLOOD PRESSURE: 106 MMHG | OXYGEN SATURATION: 92 % | HEART RATE: 89 BPM | WEIGHT: 136.02 LBS | RESPIRATION RATE: 18 BRPM | HEIGHT: 63 IN | BODY MASS INDEX: 24.1 KG/M2 | DIASTOLIC BLOOD PRESSURE: 46 MMHG

## 2018-06-29 LAB
BH CV ECHO MEAS - AO MAX PG (FULL): 15.5 MMHG
BH CV ECHO MEAS - AO MAX PG: 22 MMHG
BH CV ECHO MEAS - AO MEAN PG (FULL): 9.3 MMHG
BH CV ECHO MEAS - AO MEAN PG: 12.9 MMHG
BH CV ECHO MEAS - AO ROOT AREA (BSA CORRECTED): 1.7
BH CV ECHO MEAS - AO ROOT AREA: 6 CM^2
BH CV ECHO MEAS - AO ROOT DIAM: 2.8 CM
BH CV ECHO MEAS - AO V2 MAX: 234.9 CM/SEC
BH CV ECHO MEAS - AO V2 MEAN: 170.4 CM/SEC
BH CV ECHO MEAS - AO V2 VTI: 45.3 CM
BH CV ECHO MEAS - AVA(I,A): 1.5 CM^2
BH CV ECHO MEAS - AVA(I,D): 1.5 CM^2
BH CV ECHO MEAS - AVA(V,A): 1.7 CM^2
BH CV ECHO MEAS - AVA(V,D): 1.7 CM^2
BH CV ECHO MEAS - BSA(HAYCOCK): 1.7 M^2
BH CV ECHO MEAS - BSA: 1.6 M^2
BH CV ECHO MEAS - BZI_BMI: 24.9 KILOGRAMS/M^2
BH CV ECHO MEAS - BZI_METRIC_HEIGHT: 157.5 CM
BH CV ECHO MEAS - BZI_METRIC_WEIGHT: 61.7 KG
BH CV ECHO MEAS - EDV(CUBED): 84.7 ML
BH CV ECHO MEAS - EDV(TEICH): 87.3 ML
BH CV ECHO MEAS - EF(CUBED): 81.1 %
BH CV ECHO MEAS - EF(TEICH): 73.9 %
BH CV ECHO MEAS - ESV(CUBED): 16 ML
BH CV ECHO MEAS - ESV(TEICH): 22.8 ML
BH CV ECHO MEAS - FS: 42.6 %
BH CV ECHO MEAS - IVS/LVPW: 1
BH CV ECHO MEAS - IVSD: 1.1 CM
BH CV ECHO MEAS - LA DIMENSION: 4.5 CM
BH CV ECHO MEAS - LA/AO: 1.6
BH CV ECHO MEAS - LV MASS(C)D: 158.8 GRAMS
BH CV ECHO MEAS - LV MASS(C)DI: 97.8 GRAMS/M^2
BH CV ECHO MEAS - LV MAX PG: 6.5 MMHG
BH CV ECHO MEAS - LV MEAN PG: 3.7 MMHG
BH CV ECHO MEAS - LV V1 MAX: 127.3 CM/SEC
BH CV ECHO MEAS - LV V1 MEAN: 90.3 CM/SEC
BH CV ECHO MEAS - LV V1 VTI: 21.9 CM
BH CV ECHO MEAS - LVIDD: 4.4 CM
BH CV ECHO MEAS - LVIDS: 2.5 CM
BH CV ECHO MEAS - LVOT AREA (M): 3.1 CM^2
BH CV ECHO MEAS - LVOT AREA: 3.1 CM^2
BH CV ECHO MEAS - LVOT DIAM: 2 CM
BH CV ECHO MEAS - LVPWD: 1 CM
BH CV ECHO MEAS - MV A MAX VEL: 125.9 CM/SEC
BH CV ECHO MEAS - MV DEC SLOPE: 1271 CM/SEC^2
BH CV ECHO MEAS - MV DEC TIME: 0.17 SEC
BH CV ECHO MEAS - MV E MAX VEL: 139.2 CM/SEC
BH CV ECHO MEAS - MV E/A: 1.1
BH CV ECHO MEAS - MV MAX PG: 49.3 MMHG
BH CV ECHO MEAS - MV MEAN PG: 13.5 MMHG
BH CV ECHO MEAS - MV P1/2T MAX VEL: 222.8 CM/SEC
BH CV ECHO MEAS - MV P1/2T: 51.4 MSEC
BH CV ECHO MEAS - MV V2 MAX: 349.5 CM/SEC
BH CV ECHO MEAS - MV V2 MEAN: 166.5 CM/SEC
BH CV ECHO MEAS - MV V2 VTI: 65.3 CM
BH CV ECHO MEAS - MVA P1/2T LCG: 0.99 CM^2
BH CV ECHO MEAS - MVA(P1/2T): 4.3 CM^2
BH CV ECHO MEAS - MVA(VTI): 1 CM^2
BH CV ECHO MEAS - PA ACC SLOPE: 1034 CM/SEC^2
BH CV ECHO MEAS - PA ACC TIME: 0.12 SEC
BH CV ECHO MEAS - PA PR(ACCEL): 25.5 MMHG
BH CV ECHO MEAS - RAP SYSTOLE: 3 MMHG
BH CV ECHO MEAS - RV MAX PG: 2.5 MMHG
BH CV ECHO MEAS - RV V1 MAX: 79.5 CM/SEC
BH CV ECHO MEAS - RVSP: 35 MMHG
BH CV ECHO MEAS - SI(AO): 167.6 ML/M^2
BH CV ECHO MEAS - SI(CUBED): 42.3 ML/M^2
BH CV ECHO MEAS - SI(LVOT): 41.6 ML/M^2
BH CV ECHO MEAS - SI(TEICH): 39.8 ML/M^2
BH CV ECHO MEAS - SV(AO): 272 ML
BH CV ECHO MEAS - SV(CUBED): 68.7 ML
BH CV ECHO MEAS - SV(LVOT): 67.5 ML
BH CV ECHO MEAS - SV(TEICH): 64.5 ML
BH CV ECHO MEAS - TR MAX VEL: 280.8 CM/SEC
BH CV XLRA - RV MID: 3.2 CM
FERRITIN SERPL-MCNC: 123 NG/ML (ref 10–291)
LV EF 2D ECHO EST: 75 %
MAXIMAL PREDICTED HEART RATE: 134 BPM
STRESS TARGET HR: 114 BPM

## 2018-06-29 PROCEDURE — 93306 TTE W/DOPPLER COMPLETE: CPT

## 2018-06-29 PROCEDURE — 63710000001 PROMETHAZINE PER 12.5 MG: Performed by: HOSPITALIST

## 2018-06-29 PROCEDURE — 96372 THER/PROPH/DIAG INJ SC/IM: CPT

## 2018-06-29 PROCEDURE — G0378 HOSPITAL OBSERVATION PER HR: HCPCS

## 2018-06-29 PROCEDURE — 25010000002 HEPARIN (PORCINE) PER 1000 UNITS: Performed by: HOSPITALIST

## 2018-06-29 PROCEDURE — 25010000002 PROMETHAZINE PER 50 MG: Performed by: HOSPITALIST

## 2018-06-29 PROCEDURE — 99217 PR OBSERVATION CARE DISCHARGE MANAGEMENT: CPT | Performed by: INTERNAL MEDICINE

## 2018-06-29 PROCEDURE — 96376 TX/PRO/DX INJ SAME DRUG ADON: CPT

## 2018-06-29 PROCEDURE — 96375 TX/PRO/DX INJ NEW DRUG ADDON: CPT

## 2018-06-29 PROCEDURE — 25010000002 FUROSEMIDE PER 20 MG: Performed by: INTERNAL MEDICINE

## 2018-06-29 PROCEDURE — 93306 TTE W/DOPPLER COMPLETE: CPT | Performed by: INTERNAL MEDICINE

## 2018-06-29 PROCEDURE — 96374 THER/PROPH/DIAG INJ IV PUSH: CPT

## 2018-06-29 PROCEDURE — 82728 ASSAY OF FERRITIN: CPT | Performed by: INTERNAL MEDICINE

## 2018-06-29 RX ORDER — GABAPENTIN 300 MG/1
300 CAPSULE ORAL 3 TIMES DAILY
Status: DISCONTINUED | OUTPATIENT
Start: 2018-06-29 | End: 2018-06-29

## 2018-06-29 RX ORDER — FENTANYL 75 UG/H
1 PATCH TRANSDERMAL
Status: DISCONTINUED | OUTPATIENT
Start: 2018-06-29 | End: 2018-06-29

## 2018-06-29 RX ORDER — HYDROCODONE BITARTRATE AND ACETAMINOPHEN 10; 325 MG/1; MG/1
1 TABLET ORAL EVERY 6 HOURS PRN
Status: DISCONTINUED | OUTPATIENT
Start: 2018-06-29 | End: 2018-06-29

## 2018-06-29 RX ORDER — PANTOPRAZOLE SODIUM 40 MG/1
40 TABLET, DELAYED RELEASE ORAL DAILY
Status: DISCONTINUED | OUTPATIENT
Start: 2018-06-29 | End: 2018-06-29

## 2018-06-29 RX ORDER — FENTANYL 75 UG/H
1 PATCH TRANSDERMAL
Status: DISCONTINUED | OUTPATIENT
Start: 2018-06-29 | End: 2018-06-29 | Stop reason: HOSPADM

## 2018-06-29 RX ORDER — FUROSEMIDE 10 MG/ML
40 INJECTION INTRAMUSCULAR; INTRAVENOUS ONCE
Status: COMPLETED | OUTPATIENT
Start: 2018-06-29 | End: 2018-06-29

## 2018-06-29 RX ORDER — CETIRIZINE HYDROCHLORIDE 10 MG/1
5 TABLET ORAL DAILY
Status: DISCONTINUED | OUTPATIENT
Start: 2018-06-29 | End: 2018-06-29 | Stop reason: HOSPADM

## 2018-06-29 RX ORDER — AMITRIPTYLINE HYDROCHLORIDE 50 MG/1
25 TABLET, FILM COATED ORAL NIGHTLY
Status: DISCONTINUED | OUTPATIENT
Start: 2018-06-29 | End: 2018-06-29 | Stop reason: HOSPADM

## 2018-06-29 RX ADMIN — HYDROCODONE BITARTRATE AND ACETAMINOPHEN 1 TABLET: 5; 325 TABLET ORAL at 12:23

## 2018-06-29 RX ADMIN — HEPARIN SODIUM 5000 UNITS: 5000 INJECTION, SOLUTION INTRAVENOUS; SUBCUTANEOUS at 08:15

## 2018-06-29 RX ADMIN — HYDROCODONE BITARTRATE AND ACETAMINOPHEN 1 TABLET: 5; 325 TABLET ORAL at 01:11

## 2018-06-29 RX ADMIN — PANTOPRAZOLE SODIUM 40 MG: 40 TABLET, DELAYED RELEASE ORAL at 07:28

## 2018-06-29 RX ADMIN — DICLOFENAC SODIUM 2 G: 10 GEL TOPICAL at 10:46

## 2018-06-29 RX ADMIN — FUROSEMIDE 40 MG: 10 INJECTION, SOLUTION INTRAMUSCULAR; INTRAVENOUS at 09:39

## 2018-06-29 RX ADMIN — PROMETHAZINE HYDROCHLORIDE 12.5 MG: 25 INJECTION INTRAMUSCULAR; INTRAVENOUS at 01:30

## 2018-06-29 RX ADMIN — PROMETHAZINE HYDROCHLORIDE 6.25 MG: 12.5 TABLET ORAL at 08:15

## 2018-06-29 RX ADMIN — HYDROCODONE BITARTRATE AND ACETAMINOPHEN 1 TABLET: 5; 325 TABLET ORAL at 07:29

## 2018-06-29 RX ADMIN — FENTANYL 1 PATCH: 75 PATCH TRANSDERMAL at 10:46

## 2018-06-29 RX ADMIN — CETIRIZINE HYDROCHLORIDE 5 MG: 10 TABLET, FILM COATED ORAL at 10:46

## 2018-06-29 RX ADMIN — GABAPENTIN 300 MG: 300 CAPSULE ORAL at 07:29

## 2018-06-29 RX ADMIN — SODIUM CHLORIDE 250 ML: 9 INJECTION, SOLUTION INTRAVENOUS at 01:11

## 2018-07-05 ENCOUNTER — LAB (OUTPATIENT)
Dept: LAB | Facility: HOSPITAL | Age: 83
End: 2018-07-05

## 2018-07-05 DIAGNOSIS — D50.0 IRON DEFICIENCY ANEMIA DUE TO CHRONIC BLOOD LOSS: ICD-10-CM

## 2018-07-05 DIAGNOSIS — D50.0 IRON DEFICIENCY ANEMIA DUE TO CHRONIC BLOOD LOSS: Primary | ICD-10-CM

## 2018-07-05 LAB
ERYTHROCYTE [DISTWIDTH] IN BLOOD BY AUTOMATED COUNT: 15.6 % (ref 11.3–14.5)
FERRITIN SERPL-MCNC: 67 NG/ML (ref 10–291)
HCT VFR BLD AUTO: 23.1 % (ref 34.5–44)
HGB BLD-MCNC: 7.3 G/DL (ref 11.5–15.5)
LYMPHOCYTES # BLD AUTO: 0.6 10*3/MM3 (ref 0.6–4.8)
LYMPHOCYTES NFR BLD AUTO: 12.7 % (ref 24–44)
MCH RBC QN AUTO: 28.4 PG (ref 27–31)
MCHC RBC AUTO-ENTMCNC: 31.5 G/DL (ref 32–36)
MCV RBC AUTO: 90.2 FL (ref 80–99)
MONOCYTES # BLD AUTO: 0.2 10*3/MM3 (ref 0–1)
MONOCYTES NFR BLD AUTO: 4.6 % (ref 0–12)
NEUTROPHILS # BLD AUTO: 4.1 10*3/MM3 (ref 1.5–8.3)
NEUTROPHILS NFR BLD AUTO: 82.7 % (ref 41–71)
PLATELET # BLD AUTO: 297 10*3/MM3 (ref 150–450)
PMV BLD AUTO: 7.1 FL (ref 6–12)
RBC # BLD AUTO: 2.56 10*6/MM3 (ref 3.89–5.14)
WBC NRBC COR # BLD: 4.9 10*3/MM3 (ref 3.5–10.8)

## 2018-07-05 PROCEDURE — 85025 COMPLETE CBC W/AUTO DIFF WBC: CPT

## 2018-07-05 PROCEDURE — 82728 ASSAY OF FERRITIN: CPT

## 2018-07-05 PROCEDURE — 36415 COLL VENOUS BLD VENIPUNCTURE: CPT

## 2018-07-06 ENCOUNTER — INFUSION (OUTPATIENT)
Dept: ONCOLOGY | Facility: HOSPITAL | Age: 83
End: 2018-07-06

## 2018-07-06 VITALS
SYSTOLIC BLOOD PRESSURE: 114 MMHG | TEMPERATURE: 98.1 F | HEART RATE: 73 BPM | HEIGHT: 63 IN | DIASTOLIC BLOOD PRESSURE: 50 MMHG | WEIGHT: 135 LBS | BODY MASS INDEX: 23.92 KG/M2 | RESPIRATION RATE: 18 BRPM

## 2018-07-06 DIAGNOSIS — K90.9 MALABSORPTION IN THE ELDERLY: ICD-10-CM

## 2018-07-06 DIAGNOSIS — K31.819 GAVE (GASTRIC ANTRAL VASCULAR ECTASIA): Primary | ICD-10-CM

## 2018-07-06 DIAGNOSIS — Z78.9 MEDICATION INTOLERANCE: ICD-10-CM

## 2018-07-06 DIAGNOSIS — D50.0 IRON DEFICIENCY ANEMIA DUE TO CHRONIC BLOOD LOSS: ICD-10-CM

## 2018-07-06 PROCEDURE — 25010000002 FERRIC CARBOXYMALTOSE 750 MG/15ML SOLUTION 15 ML VIAL: Performed by: INTERNAL MEDICINE

## 2018-07-06 PROCEDURE — 96365 THER/PROPH/DIAG IV INF INIT: CPT

## 2018-07-06 RX ORDER — SODIUM CHLORIDE 9 MG/ML
250 INJECTION, SOLUTION INTRAVENOUS ONCE
Status: COMPLETED | OUTPATIENT
Start: 2018-07-06 | End: 2018-07-06

## 2018-07-06 RX ADMIN — FERRIC CARBOXYMALTOSE INJECTION 750 MG: 50 INJECTION, SOLUTION INTRAVENOUS at 14:12

## 2018-07-06 RX ADMIN — SODIUM CHLORIDE 250 ML: 9 INJECTION, SOLUTION INTRAVENOUS at 14:11

## 2018-07-11 ENCOUNTER — LAB (OUTPATIENT)
Dept: LAB | Facility: HOSPITAL | Age: 83
End: 2018-07-11

## 2018-07-11 ENCOUNTER — INFUSION (OUTPATIENT)
Dept: ONCOLOGY | Facility: HOSPITAL | Age: 83
End: 2018-07-11

## 2018-07-11 VITALS
HEIGHT: 63 IN | BODY MASS INDEX: 24.1 KG/M2 | RESPIRATION RATE: 16 BRPM | HEART RATE: 81 BPM | TEMPERATURE: 98.6 F | SYSTOLIC BLOOD PRESSURE: 122 MMHG | DIASTOLIC BLOOD PRESSURE: 49 MMHG | WEIGHT: 136 LBS

## 2018-07-11 DIAGNOSIS — D50.0 IRON DEFICIENCY ANEMIA DUE TO CHRONIC BLOOD LOSS: Primary | ICD-10-CM

## 2018-07-11 DIAGNOSIS — D50.0 IRON DEFICIENCY ANEMIA DUE TO CHRONIC BLOOD LOSS: ICD-10-CM

## 2018-07-11 LAB
ABO GROUP BLD: NORMAL
BLD GP AB SCN SERPL QL: NEGATIVE
ERYTHROCYTE [DISTWIDTH] IN BLOOD BY AUTOMATED COUNT: 19.9 % (ref 11.3–14.5)
HCT VFR BLD AUTO: 20.3 % (ref 34.5–44)
HGB BLD-MCNC: 6.3 G/DL (ref 11.5–15.5)
LYMPHOCYTES # BLD AUTO: 1 10*3/MM3 (ref 0.6–4.8)
LYMPHOCYTES NFR BLD AUTO: 10.9 % (ref 24–44)
MCH RBC QN AUTO: 28.6 PG (ref 27–31)
MCHC RBC AUTO-ENTMCNC: 30.8 G/DL (ref 32–36)
MCV RBC AUTO: 92.8 FL (ref 80–99)
MONOCYTES # BLD AUTO: 0.5 10*3/MM3 (ref 0–1)
MONOCYTES NFR BLD AUTO: 5.7 % (ref 0–12)
NEUTROPHILS # BLD AUTO: 7.5 10*3/MM3 (ref 1.5–8.3)
NEUTROPHILS NFR BLD AUTO: 83.4 % (ref 41–71)
PLATELET # BLD AUTO: 335 10*3/MM3 (ref 150–450)
PMV BLD AUTO: 6.7 FL (ref 6–12)
RBC # BLD AUTO: 2.19 10*6/MM3 (ref 3.89–5.14)
RH BLD: NEGATIVE
T&S EXPIRATION DATE: NORMAL
WBC NRBC COR # BLD: 9 10*3/MM3 (ref 3.5–10.8)

## 2018-07-11 PROCEDURE — 36415 COLL VENOUS BLD VENIPUNCTURE: CPT

## 2018-07-11 PROCEDURE — 86900 BLOOD TYPING SEROLOGIC ABO: CPT

## 2018-07-11 PROCEDURE — 85025 COMPLETE CBC W/AUTO DIFF WBC: CPT

## 2018-07-11 PROCEDURE — 86922 COMPATIBILITY TEST ANTIGLOB: CPT

## 2018-07-11 PROCEDURE — 86901 BLOOD TYPING SEROLOGIC RH(D): CPT

## 2018-07-11 PROCEDURE — 86920 COMPATIBILITY TEST SPIN: CPT

## 2018-07-11 PROCEDURE — 86850 RBC ANTIBODY SCREEN: CPT

## 2018-07-11 RX ORDER — SODIUM CHLORIDE 9 MG/ML
250 INJECTION, SOLUTION INTRAVENOUS AS NEEDED
Status: CANCELLED | OUTPATIENT
Start: 2018-07-11

## 2018-07-11 RX ORDER — ACETAMINOPHEN 325 MG/1
650 TABLET ORAL ONCE
Status: CANCELLED | OUTPATIENT
Start: 2018-07-11 | End: 2018-07-11

## 2018-07-11 RX ORDER — DIPHENHYDRAMINE HCL 25 MG
25 CAPSULE ORAL ONCE
Status: CANCELLED | OUTPATIENT
Start: 2018-07-11 | End: 2018-07-11

## 2018-07-12 ENCOUNTER — INFUSION (OUTPATIENT)
Dept: ONCOLOGY | Facility: HOSPITAL | Age: 83
End: 2018-07-12

## 2018-07-12 VITALS
HEART RATE: 75 BPM | SYSTOLIC BLOOD PRESSURE: 113 MMHG | TEMPERATURE: 97.9 F | RESPIRATION RATE: 20 BRPM | DIASTOLIC BLOOD PRESSURE: 47 MMHG

## 2018-07-12 DIAGNOSIS — D50.0 IRON DEFICIENCY ANEMIA DUE TO CHRONIC BLOOD LOSS: ICD-10-CM

## 2018-07-12 PROCEDURE — 36430 TRANSFUSION BLD/BLD COMPNT: CPT

## 2018-07-12 PROCEDURE — 63710000001 DIPHENHYDRAMINE PER 50 MG: Performed by: INTERNAL MEDICINE

## 2018-07-12 PROCEDURE — 86900 BLOOD TYPING SEROLOGIC ABO: CPT

## 2018-07-12 PROCEDURE — 86902 BLOOD TYPE ANTIGEN DONOR EA: CPT

## 2018-07-12 PROCEDURE — P9016 RBC LEUKOCYTES REDUCED: HCPCS

## 2018-07-12 RX ORDER — ACETAMINOPHEN 325 MG/1
650 TABLET ORAL ONCE
Status: COMPLETED | OUTPATIENT
Start: 2018-07-12 | End: 2018-07-12

## 2018-07-12 RX ORDER — SODIUM CHLORIDE 9 MG/ML
250 INJECTION, SOLUTION INTRAVENOUS AS NEEDED
Status: DISCONTINUED | OUTPATIENT
Start: 2018-07-12 | End: 2018-07-12 | Stop reason: HOSPADM

## 2018-07-12 RX ORDER — DIPHENHYDRAMINE HCL 25 MG
25 CAPSULE ORAL ONCE
Status: COMPLETED | OUTPATIENT
Start: 2018-07-12 | End: 2018-07-12

## 2018-07-12 RX ADMIN — ACETAMINOPHEN 650 MG: 325 TABLET, FILM COATED ORAL at 07:37

## 2018-07-12 RX ADMIN — SODIUM CHLORIDE 250 ML: 9 INJECTION, SOLUTION INTRAVENOUS at 07:45

## 2018-07-12 RX ADMIN — DIPHENHYDRAMINE HYDROCHLORIDE 25 MG: 25 CAPSULE ORAL at 07:37

## 2018-07-16 ENCOUNTER — INFUSION (OUTPATIENT)
Dept: ONCOLOGY | Facility: HOSPITAL | Age: 83
End: 2018-07-16

## 2018-07-16 ENCOUNTER — LAB (OUTPATIENT)
Dept: LAB | Facility: HOSPITAL | Age: 83
End: 2018-07-16

## 2018-07-16 ENCOUNTER — OFFICE VISIT (OUTPATIENT)
Dept: ONCOLOGY | Facility: CLINIC | Age: 83
End: 2018-07-16

## 2018-07-16 VITALS
BODY MASS INDEX: 24.45 KG/M2 | DIASTOLIC BLOOD PRESSURE: 63 MMHG | HEART RATE: 75 BPM | SYSTOLIC BLOOD PRESSURE: 136 MMHG | TEMPERATURE: 97.8 F | HEIGHT: 63 IN | RESPIRATION RATE: 18 BRPM | WEIGHT: 138 LBS

## 2018-07-16 VITALS — HEART RATE: 78 BPM | SYSTOLIC BLOOD PRESSURE: 138 MMHG | DIASTOLIC BLOOD PRESSURE: 54 MMHG

## 2018-07-16 DIAGNOSIS — D50.0 IRON DEFICIENCY ANEMIA DUE TO CHRONIC BLOOD LOSS: ICD-10-CM

## 2018-07-16 DIAGNOSIS — K31.819 GAVE (GASTRIC ANTRAL VASCULAR ECTASIA): Primary | ICD-10-CM

## 2018-07-16 DIAGNOSIS — Z78.9 MEDICATION INTOLERANCE: ICD-10-CM

## 2018-07-16 DIAGNOSIS — K31.819 GAVE (GASTRIC ANTRAL VASCULAR ECTASIA): ICD-10-CM

## 2018-07-16 DIAGNOSIS — K90.9 MALABSORPTION IN THE ELDERLY: ICD-10-CM

## 2018-07-16 LAB
ERYTHROCYTE [DISTWIDTH] IN BLOOD BY AUTOMATED COUNT: 18.9 % (ref 11.3–14.5)
HCT VFR BLD AUTO: 31 % (ref 34.5–44)
HGB BLD-MCNC: 9.5 G/DL (ref 11.5–15.5)
LYMPHOCYTES # BLD AUTO: 0.6 10*3/MM3 (ref 0.6–4.8)
LYMPHOCYTES NFR BLD AUTO: 12.5 % (ref 24–44)
MCH RBC QN AUTO: 29.1 PG (ref 27–31)
MCHC RBC AUTO-ENTMCNC: 30.7 G/DL (ref 32–36)
MCV RBC AUTO: 94.7 FL (ref 80–99)
MONOCYTES # BLD AUTO: 0.4 10*3/MM3 (ref 0–1)
MONOCYTES NFR BLD AUTO: 7.5 % (ref 0–12)
NEUTROPHILS # BLD AUTO: 4.1 10*3/MM3 (ref 1.5–8.3)
NEUTROPHILS NFR BLD AUTO: 80 % (ref 41–71)
PLATELET # BLD AUTO: 244 10*3/MM3 (ref 150–450)
PMV BLD AUTO: 6.7 FL (ref 6–12)
RBC # BLD AUTO: 3.28 10*6/MM3 (ref 3.89–5.14)
WBC NRBC COR # BLD: 5.1 10*3/MM3 (ref 3.5–10.8)

## 2018-07-16 PROCEDURE — 99214 OFFICE O/P EST MOD 30 MIN: CPT | Performed by: INTERNAL MEDICINE

## 2018-07-16 PROCEDURE — 85025 COMPLETE CBC W/AUTO DIFF WBC: CPT

## 2018-07-16 PROCEDURE — 96365 THER/PROPH/DIAG IV INF INIT: CPT

## 2018-07-16 PROCEDURE — 25010000002 FERRIC CARBOXYMALTOSE 750 MG/15ML SOLUTION 15 ML VIAL: Performed by: INTERNAL MEDICINE

## 2018-07-16 PROCEDURE — 36415 COLL VENOUS BLD VENIPUNCTURE: CPT

## 2018-07-16 RX ORDER — SODIUM CHLORIDE 9 MG/ML
250 INJECTION, SOLUTION INTRAVENOUS ONCE
Status: DISCONTINUED | OUTPATIENT
Start: 2018-07-16 | End: 2018-07-16 | Stop reason: HOSPADM

## 2018-07-16 RX ORDER — SODIUM CHLORIDE 9 MG/ML
250 INJECTION, SOLUTION INTRAVENOUS ONCE
Status: CANCELLED | OUTPATIENT
Start: 2018-09-11

## 2018-07-16 RX ORDER — SODIUM CHLORIDE 9 MG/ML
250 INJECTION, SOLUTION INTRAVENOUS ONCE
Status: CANCELLED | OUTPATIENT
Start: 2018-09-04

## 2018-07-16 RX ORDER — SODIUM CHLORIDE 9 MG/ML
250 INJECTION, SOLUTION INTRAVENOUS ONCE
Status: CANCELLED | OUTPATIENT
Start: 2018-08-14

## 2018-07-16 RX ORDER — SODIUM CHLORIDE 9 MG/ML
250 INJECTION, SOLUTION INTRAVENOUS ONCE
Status: CANCELLED | OUTPATIENT
Start: 2018-08-07

## 2018-07-16 RX ADMIN — FERRIC CARBOXYMALTOSE INJECTION 750 MG: 50 INJECTION, SOLUTION INTRAVENOUS at 14:55

## 2018-07-17 NOTE — PROGRESS NOTES
"PROBLEM LIST:  1. 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 Status post EGD colonoscopy  3. Status post Iv iron  infusion periodically  4. Bone Marrow biopsy  - normal - done by Dr. Reeves  5. Repeat EGD should GAVE: \"watermelon stomach\" in 6/2018      REASON FOR VISIT: Follow-up of my anemia    HISTORY OF PRESENT ILLNESS:   86-year-old lady with normocytic anemia likely related to chronic blood loss.  She continues to have significant loss of blood.  Her recent EGD showed improvement of her GAVE.  Should require 2 units of blood last week.  Denies any significant change in overall well-being otherwise..      Past medical history, social history and family history was reviewed and unchanged from prior visit.    Review of Systems:    Review of Systems   Constitutional: Positive for fatigue.   HENT: Negative.    Eyes: Negative.    Respiratory: Positive for shortness of breath.    Cardiovascular: Negative.    Gastrointestinal: Negative.    Endocrine: Negative.    Genitourinary: Negative.    Musculoskeletal: Negative.    Skin: Negative.    Allergic/Immunologic: Negative.    Neurological: Negative.    Hematological: Negative.    Psychiatric/Behavioral: Negative.       A comprehensive 14 point review of systems was performed and was negative except as mentioned.      Medications:  The current medication list was reviewed in the EMR    ALLERGIES:    Allergies   Allergen Reactions   • Codeine Sulfate Unknown (See Comments)     Pt took in the 70's-pt not sure of what happened   • Cozaar [Losartan] Unknown (See Comments)     Pt can not remember   • Crestor [Rosuvastatin] Unknown (See Comments)     Pt can not remember   • Dexlansoprazole Unknown (See Comments)     Pt can not remember   • Lipitor [Atorvastatin] Unknown (See Comments)     Pt can not remember   • Lisinopril Unknown (See Comments)     Pt can not remember   • Nexium [Esomeprazole Magnesium] Unknown (See Comments)     Pt can not remember   • " "Norvasc [Amlodipine] Unknown (See Comments)     Pt can not remember   • Sulfadiazine Unknown (See Comments)     Pt can not remember   • Toprol Xl [Metoprolol Tartrate] Unknown (See Comments)     Pt can not remember   • Zetia [Ezetimibe] Unknown (See Comments)     Pt can not remember   • Zocor [Simvastatin] Unknown (See Comments)     Pt can not remember   • Augmentin [Amoxicillin-Pot Clavulanate] GI Intolerance     nausea   • Celebrex [Celecoxib] GI Intolerance         Physical Exam    VITAL SIGNS:  /63 Comment: LUE  Pulse 75   Temp 97.8 °F (36.6 °C) (Temporal Artery )   Resp 18   Ht 160 cm (63\")   Wt 62.6 kg (138 lb)   BMI 24.45 kg/m²      Performance Status: 1    General: well appearing, in no acute distress  HEENT: sclera anicteric, oropharynx clear, neck is supple  Lymphatics: no cervical, supraclavicular, or axillary adenopathy  Cardiovascular: regular rate and rhythm, no murmurs, rubs or gallops  Lungs: clear to auscultation bilaterally  Abdomen: soft, nontender, nondistended.  No palpable organomegaly  Extremities: no lower extremity edema  Skin: no rashes, lesions, bruising, or petechiae  Msk:  Shows no weakness of the large muscle groups  Psych: Mood is stable        RECENT LABS:    Lab Results   Component Value Date    WBC 5.10 07/16/2018    HGB 9.5 (L) 07/16/2018    HCT 31.0 (L) 07/16/2018    MCV 94.7 07/16/2018     07/16/2018     Lab Results   Component Value Date    FERRITIN 67.00 07/05/2018    FERRITIN 123.00 06/29/2018    FERRITIN 416.00 (H) 06/06/2018     Lab Results   Component Value Date    IRON 26 (L) 06/01/2018   ]      Assessment/Plan    1.normocytic anemia requiring periodic blood transfusions secondary to bleeding AVMs.   Continue iron infusions, this has limited her need for transfusions.  Patient otherwise requires blood transfusions.  I will see how she does over the 2 months      2. GAVE: will get US of the liver to make sure she does not have Portal Hypertension.  May " have to consider Argon or Yag therapy to ablate her bleeding sites if this continues.      I spent 25 minutes on the patient's plan and care and more than 50% of time counseling the patient    Rahat Morris MD  [facility] Hematology and Oncology    7/16/2018

## 2018-07-25 ENCOUNTER — LAB (OUTPATIENT)
Dept: LAB | Facility: HOSPITAL | Age: 83
End: 2018-07-25

## 2018-07-25 DIAGNOSIS — D50.0 IRON DEFICIENCY ANEMIA DUE TO CHRONIC BLOOD LOSS: ICD-10-CM

## 2018-07-25 LAB
ERYTHROCYTE [DISTWIDTH] IN BLOOD BY AUTOMATED COUNT: 17.6 % (ref 11.3–14.5)
HCT VFR BLD AUTO: 24.9 % (ref 34.5–44)
HGB BLD-MCNC: 7.7 G/DL (ref 11.5–15.5)
LYMPHOCYTES # BLD AUTO: 0.7 10*3/MM3 (ref 0.6–4.8)
LYMPHOCYTES NFR BLD AUTO: 12.7 % (ref 24–44)
MCH RBC QN AUTO: 29.9 PG (ref 27–31)
MCHC RBC AUTO-ENTMCNC: 30.7 G/DL (ref 32–36)
MCV RBC AUTO: 97.5 FL (ref 80–99)
MONOCYTES # BLD AUTO: 0.3 10*3/MM3 (ref 0–1)
MONOCYTES NFR BLD AUTO: 5.1 % (ref 0–12)
NEUTROPHILS # BLD AUTO: 4.3 10*3/MM3 (ref 1.5–8.3)
NEUTROPHILS NFR BLD AUTO: 82.2 % (ref 41–71)
PLATELET # BLD AUTO: 263 10*3/MM3 (ref 150–450)
PMV BLD AUTO: 7 FL (ref 6–12)
RBC # BLD AUTO: 2.56 10*6/MM3 (ref 3.89–5.14)
WBC NRBC COR # BLD: 5.2 10*3/MM3 (ref 3.5–10.8)

## 2018-07-25 PROCEDURE — 85025 COMPLETE CBC W/AUTO DIFF WBC: CPT

## 2018-07-25 PROCEDURE — 36415 COLL VENOUS BLD VENIPUNCTURE: CPT

## 2018-07-30 ENCOUNTER — LAB (OUTPATIENT)
Dept: LAB | Facility: HOSPITAL | Age: 83
End: 2018-07-30

## 2018-07-30 ENCOUNTER — OFFICE VISIT (OUTPATIENT)
Dept: CARDIOLOGY | Facility: CLINIC | Age: 83
End: 2018-07-30

## 2018-07-30 VITALS
HEART RATE: 75 BPM | SYSTOLIC BLOOD PRESSURE: 114 MMHG | WEIGHT: 135 LBS | DIASTOLIC BLOOD PRESSURE: 50 MMHG | HEIGHT: 63 IN | BODY MASS INDEX: 23.92 KG/M2

## 2018-07-30 DIAGNOSIS — I48.0 PAROXYSMAL ATRIAL FIBRILLATION (HCC): Primary | ICD-10-CM

## 2018-07-30 DIAGNOSIS — I10 ESSENTIAL HYPERTENSION: ICD-10-CM

## 2018-07-30 DIAGNOSIS — I49.5 SSS (SICK SINUS SYNDROME) (HCC): ICD-10-CM

## 2018-07-30 DIAGNOSIS — E03.9 HYPOTHYROIDISM, UNSPECIFIED TYPE: ICD-10-CM

## 2018-07-30 DIAGNOSIS — D50.0 IRON DEFICIENCY ANEMIA DUE TO CHRONIC BLOOD LOSS: ICD-10-CM

## 2018-07-30 LAB
ALBUMIN SERPL-MCNC: 3.43 G/DL (ref 3.2–4.8)
ALBUMIN/GLOB SERPL: 1.4 G/DL (ref 1.5–2.5)
ALP SERPL-CCNC: 61 U/L (ref 25–100)
ALT SERPL W P-5'-P-CCNC: 11 U/L (ref 7–40)
ANION GAP SERPL CALCULATED.3IONS-SCNC: 4 MMOL/L (ref 3–11)
AST SERPL-CCNC: 26 U/L (ref 0–33)
BILIRUB SERPL-MCNC: 0.3 MG/DL (ref 0.3–1.2)
BUN BLD-MCNC: 11 MG/DL (ref 9–23)
BUN/CREAT SERPL: 11.2 (ref 7–25)
CALCIUM SPEC-SCNC: 8.4 MG/DL (ref 8.7–10.4)
CHLORIDE SERPL-SCNC: 106 MMOL/L (ref 99–109)
CO2 SERPL-SCNC: 30 MMOL/L (ref 20–31)
CREAT BLD-MCNC: 0.98 MG/DL (ref 0.6–1.3)
ERYTHROCYTE [DISTWIDTH] IN BLOOD BY AUTOMATED COUNT: 17 % (ref 11.3–14.5)
GFR SERPL CREATININE-BSD FRML MDRD: 54 ML/MIN/1.73
GLOBULIN UR ELPH-MCNC: 2.5 GM/DL
GLUCOSE BLD-MCNC: 91 MG/DL (ref 70–100)
HCT VFR BLD AUTO: 25.9 % (ref 34.5–44)
HGB BLD-MCNC: 7.9 G/DL (ref 11.5–15.5)
LYMPHOCYTES # BLD AUTO: 1 10*3/MM3 (ref 0.6–4.8)
LYMPHOCYTES NFR BLD AUTO: 17.3 % (ref 24–44)
MCH RBC QN AUTO: 29.7 PG (ref 27–31)
MCHC RBC AUTO-ENTMCNC: 30.4 G/DL (ref 32–36)
MCV RBC AUTO: 97.6 FL (ref 80–99)
MONOCYTES # BLD AUTO: 0.1 10*3/MM3 (ref 0–1)
MONOCYTES NFR BLD AUTO: 1.8 % (ref 0–12)
NEUTROPHILS # BLD AUTO: 4.7 10*3/MM3 (ref 1.5–8.3)
NEUTROPHILS NFR BLD AUTO: 80.9 % (ref 41–71)
PLATELET # BLD AUTO: 280 10*3/MM3 (ref 150–450)
PMV BLD AUTO: 7.5 FL (ref 6–12)
POTASSIUM BLD-SCNC: 4.4 MMOL/L (ref 3.5–5.5)
PROT SERPL-MCNC: 5.9 G/DL (ref 5.7–8.2)
RBC # BLD AUTO: 2.65 10*6/MM3 (ref 3.89–5.14)
SODIUM BLD-SCNC: 140 MMOL/L (ref 132–146)
TSH SERPL DL<=0.05 MIU/L-ACNC: 4.35 MIU/ML (ref 0.35–5.35)
WBC NRBC COR # BLD: 5.8 10*3/MM3 (ref 3.5–10.8)

## 2018-07-30 PROCEDURE — 80053 COMPREHEN METABOLIC PANEL: CPT

## 2018-07-30 PROCEDURE — 85025 COMPLETE CBC W/AUTO DIFF WBC: CPT

## 2018-07-30 PROCEDURE — 93280 PM DEVICE PROGR EVAL DUAL: CPT | Performed by: INTERNAL MEDICINE

## 2018-07-30 PROCEDURE — 84443 ASSAY THYROID STIM HORMONE: CPT

## 2018-07-30 PROCEDURE — 99204 OFFICE O/P NEW MOD 45 MIN: CPT | Performed by: INTERNAL MEDICINE

## 2018-07-30 PROCEDURE — 36415 COLL VENOUS BLD VENIPUNCTURE: CPT

## 2018-07-30 RX ORDER — GABAPENTIN 600 MG/1
600 TABLET ORAL 3 TIMES DAILY
Refills: 1 | Status: ON HOLD | COMMUNITY
Start: 2018-07-26 | End: 2022-01-01 | Stop reason: SDUPTHER

## 2018-07-30 RX ORDER — OXYBUTYNIN CHLORIDE 10 MG/1
10 TABLET, EXTENDED RELEASE ORAL DAILY
COMMUNITY
End: 2018-08-10

## 2018-07-30 RX ORDER — FUROSEMIDE 20 MG/1
20 TABLET ORAL DAILY
Qty: 30 TABLET | Refills: 11 | Status: SHIPPED | OUTPATIENT
Start: 2018-07-30 | End: 2018-08-10

## 2018-07-30 RX ORDER — POTASSIUM CHLORIDE 750 MG/1
10 TABLET, FILM COATED, EXTENDED RELEASE ORAL DAILY
Qty: 30 TABLET | Refills: 5 | Status: SHIPPED | OUTPATIENT
Start: 2018-07-30 | End: 2018-08-16 | Stop reason: SDUPTHER

## 2018-07-30 NOTE — PROGRESS NOTES
[facility] at [facility]  Consultation H&P  Sheryl ROSAS Dlaila  8/17/1931    There is no work phone number on file..    VISIT DATE:  07/30/2018    PCP: Vadim Diaz MD  [address] [place], [place] [id number]    CC:  Chief Complaint   Patient presents with   • Shortness of Breath   • Edema     Previous cardiac studies and procedures:  2012 diagnosed with symptomatic paroxysmal A. fib and tachybradycardia syndrome.  2012 Medtronic pacemaker  2012 cardiac catheterization: No flow limiting stenosis.  June 2018 echo  · Left ventricular systolic function is hyperdynamic (EF > 70).  · Left atrial cavity size is mildly dilated.  · Mean aortic valve gradient is 13 mmhg which is borderline mild aortic stenosis    ASSESSMENT:   Diagnosis Plan   1. Paroxysmal atrial fibrillation (CMS/HCC)     2. SSS (sick sinus syndrome) (CMS/HCC)     3. Essential hypertension       Device interrogation  Dual chamber Medtronic  2.5% atrial pacing, sensed P-wave 0.5-0.7 mV, threshold 4.5 V at 1 ms, impedance 242 ohms  100% RV pacing, no underlying at 30 bpm, threshold 1.25 V at 0.4 ms, impedance 613 ohms  Estimated battery life 3 years  0.1% mode switching, longest less than 2 minutes    PLAN:  Sick sinus syndrome: Currently stable and asymptomatic.  Continue routine follow-up in device clinic.    Paroxysmal atrial fibrillation: Chads Vas equal to 4. Not a candidate for chronic anticoagulation due to upper GI bleeding and recurrent issues with symptomatic anemia.Minimal burden of arrhythmia.    Hypertension: Goal less than 140/90 mmHg.  Discontinuing combination of amlodipine and olmasartan due to worsening venous insufficiency.  Keep a blood pressure log at home.  Currently well-controlled.    Venous insufficiency: Discontinuing calcium channel blockade.  Starting Lasix 20 mg by mouth daily and potassium 10 mEq by mouth daily.  CMP pending today.  Has had difficulty with compliance using compression stockings  "previously.    History of Present Illness   86-year-old female with a history of paroxysmal atrial fibrillation, tachybradycardia syndrome status post Medtronic dual-chamber pacemaker and gastric antral vascular ectasia with chronic anemia requiring intermittent blood transfusion.  Denies chest pain, palpitations or dyspnea on exertion.  Mild generalized fatigue.  Has not taken her amlodipine on losartan in 2 days due to lower extremity edema.  Her blood pressure is very well controlled today.  She is had with recent worsening lower extremity edema.  She reports some weeping from her legs.  Edema worsens throughout the day.  She is compliant with medical therapy.    PHYSICAL EXAMINATION:  Vitals:    07/30/18 0958   Weight: 61.2 kg (135 lb)   Height: 160 cm (63\")     General Appearance:    Alert, cooperative, no distress, appears stated age   Head:    Normocephalic, without obvious abnormality, atraumatic   Eyes:    conjunctiva/corneas clear, EOM's intact, fundi     benign, both eyes   Ears:    Normal TM's and external ear canals, both ears   Nose:   Nares normal, septum midline, mucosa normal, no drainage    or sinus tenderness   Throat:   Lips, mucosa, and tongue normal; teeth and gums normal   Neck:   Supple, symmetrical, trachea midline, no adenopathy;     thyroid:  no enlargement/tenderness/nodules; no carotid    bruit or JVD   Back:     Symmetric, no curvature, ROM normal, no CVA tenderness   Lungs:     Clear to auscultation bilaterally, respirations unlabored   Chest Wall:    No tenderness or deformity    Heart:    Regular rate and rhythm, S1 and S2 normal, no murmur, rub   or gallop, normal carotid impulse bilaterally without bruit.   Abdomen:     Soft, non-tender, bowel sounds active all four quadrants,     no masses, no organomegaly   Extremities:   Extremities normal, atraumatic, no cyanosis, 2-3+ bilateral pretibial edema which fades out at the knee    Pulses:   2+ and symmetric all extremities   Skin:   " Skin color, texture, turgor normal, no rashes or lesions   Lymph nodes:   Cervical, supraclavicular, and axillary nodes normal   Neurologic:   normal strength, sensation intact     throughout       Diagnostic Data:    ECG 12 Lead  Date/Time: 7/30/2018 10:05 AM  Performed by: BALTAZAR UPTON III  Authorized by: BALTAZAR UPTON III   Previous ECG: no previous ECG available  Rhythm: sinus rhythm and paced  Other findings comments: Atrial sensed, ventricular paced  Clinical impression: abnormal ECG          No results found for: CHLPL, TRIG, HDL, LDLDIRECT  Lab Results   Component Value Date    GLUCOSE 85 09/16/2016    BUN 10 09/16/2016    CREATININE 0.80 09/16/2016     09/16/2016    K 4.3 09/16/2016     09/16/2016    CO2 28.0 09/16/2016     No results found for: HGBA1C  Lab Results   Component Value Date    WBC 5.20 07/25/2018    HGB 7.7 (L) 07/25/2018    HCT 24.9 (L) 07/25/2018     07/25/2018       PROBLEM LIST:  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   • Right knee pain   • GAVE (gastric antral vascular ectasia)   • Hypoxia   • Paroxysmal atrial fibrillation (CMS/HCC)   • SSS (sick sinus syndrome) (CMS/HCC)   • Essential hypertension       PAST MEDICAL HX  Past Medical History:   Diagnosis Date   • Anemia    • Arthritis    • Fibromyalgia    • History of transfusion    • Hypertension    • MG (myasthenia gravis) (CMS/HCC)     history of       Allergies  Allergies   Allergen Reactions   • Codeine Sulfate Unknown (See Comments)     Pt took in the 70's-pt not sure of what happened   • Cozaar [Losartan] Unknown (See Comments)     Pt can not remember   • Crestor [Rosuvastatin] Unknown (See Comments)     Pt can not remember   • Dexlansoprazole Unknown (See Comments)     Pt can not remember   • Lipitor [Atorvastatin] Unknown (See Comments)     Pt can not remember   • Lisinopril Unknown (See  Comments)     Pt can not remember   • Nexium [Esomeprazole Magnesium] Unknown (See Comments)     Pt can not remember   • Norvasc [Amlodipine] Unknown (See Comments)     Pt can not remember   • Sulfadiazine Unknown (See Comments)     Pt can not remember   • Toprol Xl [Metoprolol Tartrate] Unknown (See Comments)     Pt can not remember   • Zetia [Ezetimibe] Unknown (See Comments)     Pt can not remember   • Zocor [Simvastatin] Unknown (See Comments)     Pt can not remember   • Augmentin [Amoxicillin-Pot Clavulanate] GI Intolerance     nausea   • Celebrex [Celecoxib] GI Intolerance       Current Medications    Current Outpatient Prescriptions:   •  amitriptyline (ELAVIL) 25 MG tablet, Take 25 mg by mouth 2 (two) times a day., Disp: , Rfl:   •  amlodipine-olmesartan (BRUNILDA) 10-20 MG per tablet, Take 1 tablet by mouth daily., Disp: , Rfl:   •  cetirizine (ZyrTEC) 10 MG tablet, Take 10 mg by mouth daily., Disp: , Rfl:   •  fentaNYL (DURAGESIC) 75 MCG/HR patch, Place 1 patch on the skin Every Other Day., Disp: , Rfl:   •  gabapentin (NEURONTIN) 600 MG tablet, Take 600 mg by mouth 3 (Three) Times a Day., Disp: , Rfl: 1  •  gabapentin (NEURONTIN) 300 MG capsule, Take 300 mg by mouth 3 (three) times a day., Disp: , Rfl:   •  HYDROcodone-acetaminophen (NORCO)  MG per tablet, Take 1 tablet by mouth every 6 (six) hours as needed for moderate pain (4-6)., Disp: , Rfl:   •  pantoprazole (PROTONIX) 40 MG EC tablet, Take 40 mg by mouth daily., Disp: , Rfl:   •  promethazine (PHENERGAN) 25 MG tablet, Take 25 mg by mouth every 6 (six) hours as needed for nausea or vomiting., Disp: , Rfl:   •  VOLTAREN 1 % gel gel, , Disp: , Rfl: 3         ROS  Review of Systems   Constitution: Positive for malaise/fatigue.   Cardiovascular: Positive for leg swelling.   Respiratory: Positive for shortness of breath and snoring.    Musculoskeletal: Positive for arthritis and muscle weakness.   Gastrointestinal: Positive for hematochezia.    Psychiatric/Behavioral: Positive for memory loss.     All other body systems reviewed and are negative    SOCIAL HX  Social History     Social History   • Marital status:      Spouse name: N/A   • Number of children: N/A   • Years of education: N/A     Occupational History   • Not on file.     Social History Main Topics   • Smoking status: Never Smoker   • Smokeless tobacco: Never Used   • Alcohol use No   • Drug use: No   • Sexual activity: Defer     Other Topics Concern   • Not on file     Social History Narrative   • No narrative on file       FAMILY HX  Family History   Problem Relation Age of Onset   • No Known Problems Mother    • Diabetes Father    • Cancer Son              Andi eSals III, MD, FACC

## 2018-08-07 ENCOUNTER — INFUSION (OUTPATIENT)
Dept: ONCOLOGY | Facility: HOSPITAL | Age: 83
End: 2018-08-07

## 2018-08-07 ENCOUNTER — LAB (OUTPATIENT)
Dept: LAB | Facility: HOSPITAL | Age: 83
End: 2018-08-07

## 2018-08-07 VITALS
DIASTOLIC BLOOD PRESSURE: 61 MMHG | HEIGHT: 63 IN | BODY MASS INDEX: 23.39 KG/M2 | WEIGHT: 132 LBS | SYSTOLIC BLOOD PRESSURE: 149 MMHG | RESPIRATION RATE: 18 BRPM | TEMPERATURE: 97.8 F | HEART RATE: 92 BPM

## 2018-08-07 DIAGNOSIS — D50.0 IRON DEFICIENCY ANEMIA DUE TO CHRONIC BLOOD LOSS: ICD-10-CM

## 2018-08-07 DIAGNOSIS — D50.0 IRON DEFICIENCY ANEMIA DUE TO CHRONIC BLOOD LOSS: Primary | ICD-10-CM

## 2018-08-07 LAB
ABO GROUP BLD: NORMAL
ANTI-E: NORMAL
BLD GP AB SCN SERPL QL ELUTION: NEGATIVE
BLD GP AB SCN SERPL QL: POSITIVE
DAT IGG GEL: POSITIVE
ERYTHROCYTE [DISTWIDTH] IN BLOOD BY AUTOMATED COUNT: 17.5 % (ref 11.3–14.5)
HCT VFR BLD AUTO: 21.8 % (ref 34.5–44)
HGB BLD-MCNC: 6.3 G/DL (ref 11.5–15.5)
LYMPHOCYTES # BLD AUTO: 0.9 10*3/MM3 (ref 0.6–4.8)
LYMPHOCYTES NFR BLD AUTO: 16 % (ref 24–44)
MCH RBC QN AUTO: 27.6 PG (ref 27–31)
MCHC RBC AUTO-ENTMCNC: 28.7 G/DL (ref 32–36)
MCV RBC AUTO: 96.2 FL (ref 80–99)
MONOCYTES # BLD AUTO: 0.3 10*3/MM3 (ref 0–1)
MONOCYTES NFR BLD AUTO: 5.1 % (ref 0–12)
NEUTROPHILS # BLD AUTO: 4.3 10*3/MM3 (ref 1.5–8.3)
NEUTROPHILS NFR BLD AUTO: 78.9 % (ref 41–71)
PLATELET # BLD AUTO: 272 10*3/MM3 (ref 150–450)
PMV BLD AUTO: 6.7 FL (ref 6–12)
RBC # BLD AUTO: 2.27 10*6/MM3 (ref 3.89–5.14)
RH BLD: NEGATIVE
T&S EXPIRATION DATE: NORMAL
WBC NRBC COR # BLD: 5.4 10*3/MM3 (ref 3.5–10.8)

## 2018-08-07 PROCEDURE — 86920 COMPATIBILITY TEST SPIN: CPT

## 2018-08-07 PROCEDURE — 86880 COOMBS TEST DIRECT: CPT

## 2018-08-07 PROCEDURE — 86922 COMPATIBILITY TEST ANTIGLOB: CPT

## 2018-08-07 PROCEDURE — 36415 COLL VENOUS BLD VENIPUNCTURE: CPT

## 2018-08-07 PROCEDURE — 86860 RBC ANTIBODY ELUTION: CPT

## 2018-08-07 PROCEDURE — 86850 RBC ANTIBODY SCREEN: CPT

## 2018-08-07 PROCEDURE — 86902 BLOOD TYPE ANTIGEN DONOR EA: CPT

## 2018-08-07 PROCEDURE — 85025 COMPLETE CBC W/AUTO DIFF WBC: CPT

## 2018-08-07 PROCEDURE — 86870 RBC ANTIBODY IDENTIFICATION: CPT

## 2018-08-07 PROCEDURE — 86901 BLOOD TYPING SEROLOGIC RH(D): CPT

## 2018-08-07 PROCEDURE — 86900 BLOOD TYPING SEROLOGIC ABO: CPT

## 2018-08-07 RX ORDER — SODIUM CHLORIDE 9 MG/ML
250 INJECTION, SOLUTION INTRAVENOUS AS NEEDED
Status: CANCELLED | OUTPATIENT
Start: 2018-08-07

## 2018-08-07 RX ORDER — ACETAMINOPHEN 325 MG/1
650 TABLET ORAL ONCE
Status: CANCELLED | OUTPATIENT
Start: 2018-08-07 | End: 2018-08-07

## 2018-08-07 RX ORDER — DIPHENHYDRAMINE HCL 25 MG
25 CAPSULE ORAL ONCE
Status: CANCELLED | OUTPATIENT
Start: 2018-08-07 | End: 2018-08-07

## 2018-08-08 ENCOUNTER — INFUSION (OUTPATIENT)
Dept: ONCOLOGY | Facility: HOSPITAL | Age: 83
End: 2018-08-08

## 2018-08-08 VITALS
TEMPERATURE: 97.7 F | DIASTOLIC BLOOD PRESSURE: 67 MMHG | SYSTOLIC BLOOD PRESSURE: 109 MMHG | HEART RATE: 60 BPM | RESPIRATION RATE: 17 BRPM | WEIGHT: 133 LBS | BODY MASS INDEX: 23.57 KG/M2 | HEIGHT: 63 IN

## 2018-08-08 DIAGNOSIS — K90.9 MALABSORPTION IN THE ELDERLY: ICD-10-CM

## 2018-08-08 DIAGNOSIS — Z78.9 MEDICATION INTOLERANCE: ICD-10-CM

## 2018-08-08 DIAGNOSIS — D50.0 IRON DEFICIENCY ANEMIA DUE TO CHRONIC BLOOD LOSS: Primary | ICD-10-CM

## 2018-08-08 DIAGNOSIS — K31.819 GAVE (GASTRIC ANTRAL VASCULAR ECTASIA): ICD-10-CM

## 2018-08-08 PROCEDURE — 25010000002 FERRIC CARBOXYMALTOSE 750 MG/15ML SOLUTION 15 ML VIAL: Performed by: INTERNAL MEDICINE

## 2018-08-08 PROCEDURE — 86900 BLOOD TYPING SEROLOGIC ABO: CPT

## 2018-08-08 PROCEDURE — 96365 THER/PROPH/DIAG IV INF INIT: CPT

## 2018-08-08 PROCEDURE — 63710000001 DIPHENHYDRAMINE PER 50 MG: Performed by: INTERNAL MEDICINE

## 2018-08-08 PROCEDURE — P9040 RBC LEUKOREDUCED IRRADIATED: HCPCS

## 2018-08-08 PROCEDURE — 36430 TRANSFUSION BLD/BLD COMPNT: CPT

## 2018-08-08 PROCEDURE — P9016 RBC LEUKOCYTES REDUCED: HCPCS

## 2018-08-08 RX ORDER — SODIUM CHLORIDE 9 MG/ML
250 INJECTION, SOLUTION INTRAVENOUS AS NEEDED
Status: DISCONTINUED | OUTPATIENT
Start: 2018-08-08 | End: 2018-08-08 | Stop reason: HOSPADM

## 2018-08-08 RX ORDER — SODIUM CHLORIDE 9 MG/ML
250 INJECTION, SOLUTION INTRAVENOUS ONCE
Status: COMPLETED | OUTPATIENT
Start: 2018-08-08 | End: 2018-08-08

## 2018-08-08 RX ORDER — DIPHENHYDRAMINE HCL 25 MG
25 CAPSULE ORAL ONCE
Status: COMPLETED | OUTPATIENT
Start: 2018-08-08 | End: 2018-08-08

## 2018-08-08 RX ORDER — ACETAMINOPHEN 325 MG/1
650 TABLET ORAL ONCE
Status: COMPLETED | OUTPATIENT
Start: 2018-08-08 | End: 2018-08-08

## 2018-08-08 RX ADMIN — ACETAMINOPHEN 650 MG: 325 TABLET, FILM COATED ORAL at 08:17

## 2018-08-08 RX ADMIN — FERRIC CARBOXYMALTOSE INJECTION 750 MG: 50 INJECTION, SOLUTION INTRAVENOUS at 12:53

## 2018-08-08 RX ADMIN — SODIUM CHLORIDE 250 ML: 9 INJECTION, SOLUTION INTRAVENOUS at 08:44

## 2018-08-08 RX ADMIN — DIPHENHYDRAMINE HYDROCHLORIDE 25 MG: 25 CAPSULE ORAL at 08:17

## 2018-08-08 RX ADMIN — SODIUM CHLORIDE 250 ML: 9 INJECTION, SOLUTION INTRAVENOUS at 12:53

## 2018-08-10 ENCOUNTER — APPOINTMENT (OUTPATIENT)
Dept: GENERAL RADIOLOGY | Facility: HOSPITAL | Age: 83
End: 2018-08-10

## 2018-08-10 ENCOUNTER — LAB (OUTPATIENT)
Dept: LAB | Facility: HOSPITAL | Age: 83
End: 2018-08-10

## 2018-08-10 ENCOUNTER — HOSPITAL ENCOUNTER (EMERGENCY)
Facility: HOSPITAL | Age: 83
Discharge: HOME | End: 2018-08-10
Attending: EMERGENCY MEDICINE | Admitting: EMERGENCY MEDICINE

## 2018-08-10 VITALS
SYSTOLIC BLOOD PRESSURE: 139 MMHG | HEIGHT: 63 IN | OXYGEN SATURATION: 90 % | HEART RATE: 66 BPM | WEIGHT: 130 LBS | RESPIRATION RATE: 18 BRPM | TEMPERATURE: 99 F | DIASTOLIC BLOOD PRESSURE: 62 MMHG | BODY MASS INDEX: 23.04 KG/M2

## 2018-08-10 DIAGNOSIS — I48.0 PAROXYSMAL ATRIAL FIBRILLATION (HCC): ICD-10-CM

## 2018-08-10 DIAGNOSIS — E87.71 TRANSFUSION ASSOCIATED CIRCULATORY OVERLOAD: Primary | ICD-10-CM

## 2018-08-10 DIAGNOSIS — Z86.2 HISTORY OF ANEMIA: ICD-10-CM

## 2018-08-10 DIAGNOSIS — F11.20 UNCOMPLICATED OPIOID DEPENDENCE (HCC): ICD-10-CM

## 2018-08-10 DIAGNOSIS — R60.9 PERIPHERAL EDEMA: ICD-10-CM

## 2018-08-10 DIAGNOSIS — D50.0 IRON DEFICIENCY ANEMIA DUE TO CHRONIC BLOOD LOSS: ICD-10-CM

## 2018-08-10 DIAGNOSIS — I10 ELEVATED BLOOD PRESSURE READING WITH DIAGNOSIS OF HYPERTENSION: ICD-10-CM

## 2018-08-10 DIAGNOSIS — G89.4 CHRONIC PAIN SYNDROME: ICD-10-CM

## 2018-08-10 LAB
ALBUMIN SERPL-MCNC: 3.37 G/DL (ref 3.2–4.8)
ALBUMIN/GLOB SERPL: 1.2 G/DL (ref 1.5–2.5)
ALP SERPL-CCNC: 63 U/L (ref 25–100)
ALT SERPL W P-5'-P-CCNC: 14 U/L (ref 7–40)
ANION GAP SERPL CALCULATED.3IONS-SCNC: 3 MMOL/L (ref 3–11)
AST SERPL-CCNC: 31 U/L (ref 0–33)
BASOPHILS # BLD AUTO: 0.06 10*3/MM3 (ref 0–0.2)
BASOPHILS NFR BLD AUTO: 1.3 % (ref 0–1)
BILIRUB SERPL-MCNC: 0.3 MG/DL (ref 0.3–1.2)
BNP SERPL-MCNC: 594 PG/ML (ref 0–100)
BUN BLD-MCNC: 9 MG/DL (ref 9–23)
BUN/CREAT SERPL: 9 (ref 7–25)
CALCIUM SPEC-SCNC: 8.5 MG/DL (ref 8.7–10.4)
CHLORIDE SERPL-SCNC: 108 MMOL/L (ref 99–109)
CO2 SERPL-SCNC: 28 MMOL/L (ref 20–31)
CREAT BLD-MCNC: 1 MG/DL (ref 0.6–1.3)
DEPRECATED RDW RBC AUTO: 59.8 FL (ref 37–54)
EOSINOPHIL # BLD AUTO: 0.17 10*3/MM3 (ref 0–0.3)
EOSINOPHIL NFR BLD AUTO: 3.7 % (ref 0–3)
ERYTHROCYTE [DISTWIDTH] IN BLOOD BY AUTOMATED COUNT: 17.4 % (ref 11.3–14.5)
ERYTHROCYTE [DISTWIDTH] IN BLOOD BY AUTOMATED COUNT: 19 % (ref 11.3–14.5)
GFR SERPL CREATININE-BSD FRML MDRD: 53 ML/MIN/1.73
GLOBULIN UR ELPH-MCNC: 2.7 GM/DL
GLUCOSE BLD-MCNC: 99 MG/DL (ref 70–100)
HCT VFR BLD AUTO: 29 % (ref 34.5–44)
HCT VFR BLD AUTO: 29.9 % (ref 34.5–44)
HGB BLD-MCNC: 8.8 G/DL (ref 11.5–15.5)
HGB BLD-MCNC: 9.4 G/DL (ref 11.5–15.5)
HOLD SPECIMEN: NORMAL
HOLD SPECIMEN: NORMAL
IMM GRANULOCYTES # BLD: 0.02 10*3/MM3 (ref 0–0.03)
IMM GRANULOCYTES NFR BLD: 0.4 % (ref 0–0.6)
LYMPHOCYTES # BLD AUTO: 0.5 10*3/MM3 (ref 0.6–4.8)
LYMPHOCYTES # BLD AUTO: 0.9 10*3/MM3 (ref 0.6–4.8)
LYMPHOCYTES NFR BLD AUTO: 12 % (ref 24–44)
LYMPHOCYTES NFR BLD AUTO: 19.5 % (ref 24–44)
MCH RBC QN AUTO: 28.7 PG (ref 27–31)
MCH RBC QN AUTO: 28.8 PG (ref 27–31)
MCHC RBC AUTO-ENTMCNC: 30.3 G/DL (ref 32–36)
MCHC RBC AUTO-ENTMCNC: 31.4 G/DL (ref 32–36)
MCV RBC AUTO: 91.5 FL (ref 80–99)
MCV RBC AUTO: 94.8 FL (ref 80–99)
MONOCYTES # BLD AUTO: 0.1 10*3/MM3 (ref 0–1)
MONOCYTES # BLD AUTO: 0.5 10*3/MM3 (ref 0–1)
MONOCYTES NFR BLD AUTO: 10.8 % (ref 0–12)
MONOCYTES NFR BLD AUTO: 3.2 % (ref 0–12)
NEUTROPHILS # BLD AUTO: 2.97 10*3/MM3 (ref 1.5–8.3)
NEUTROPHILS # BLD AUTO: 3.7 10*3/MM3 (ref 1.5–8.3)
NEUTROPHILS NFR BLD AUTO: 64.3 % (ref 41–71)
NEUTROPHILS NFR BLD AUTO: 84.8 % (ref 41–71)
PLATELET # BLD AUTO: 232 10*3/MM3 (ref 150–450)
PLATELET # BLD AUTO: 253 10*3/MM3 (ref 150–450)
PMV BLD AUTO: 7.5 FL (ref 6–12)
PMV BLD AUTO: 9.5 FL (ref 6–12)
POTASSIUM BLD-SCNC: 4.8 MMOL/L (ref 3.5–5.5)
PROT SERPL-MCNC: 6.1 G/DL (ref 5.7–8.2)
RBC # BLD AUTO: 3.06 10*6/MM3 (ref 3.89–5.14)
RBC # BLD AUTO: 3.27 10*6/MM3 (ref 3.89–5.14)
SODIUM BLD-SCNC: 139 MMOL/L (ref 132–146)
TROPONIN I SERPL-MCNC: 0 NG/ML (ref 0–0.07)
WBC NRBC COR # BLD: 4.4 10*3/MM3 (ref 3.5–10.8)
WBC NRBC COR # BLD: 4.62 10*3/MM3 (ref 3.5–10.8)
WHOLE BLOOD HOLD SPECIMEN: NORMAL
WHOLE BLOOD HOLD SPECIMEN: NORMAL

## 2018-08-10 PROCEDURE — 85025 COMPLETE CBC W/AUTO DIFF WBC: CPT

## 2018-08-10 PROCEDURE — 84484 ASSAY OF TROPONIN QUANT: CPT

## 2018-08-10 PROCEDURE — 93005 ELECTROCARDIOGRAM TRACING: CPT | Performed by: EMERGENCY MEDICINE

## 2018-08-10 PROCEDURE — 96374 THER/PROPH/DIAG INJ IV PUSH: CPT

## 2018-08-10 PROCEDURE — 36415 COLL VENOUS BLD VENIPUNCTURE: CPT

## 2018-08-10 PROCEDURE — 83880 ASSAY OF NATRIURETIC PEPTIDE: CPT

## 2018-08-10 PROCEDURE — 93005 ELECTROCARDIOGRAM TRACING: CPT

## 2018-08-10 PROCEDURE — 71045 X-RAY EXAM CHEST 1 VIEW: CPT

## 2018-08-10 PROCEDURE — 80053 COMPREHEN METABOLIC PANEL: CPT

## 2018-08-10 PROCEDURE — 99284 EMERGENCY DEPT VISIT MOD MDM: CPT

## 2018-08-10 RX ORDER — BUMETANIDE 0.25 MG/ML
1 INJECTION INTRAMUSCULAR; INTRAVENOUS ONCE
Status: COMPLETED | OUTPATIENT
Start: 2018-08-10 | End: 2018-08-10

## 2018-08-10 RX ORDER — SODIUM CHLORIDE 0.9 % (FLUSH) 0.9 %
10 SYRINGE (ML) INJECTION AS NEEDED
Status: DISCONTINUED | OUTPATIENT
Start: 2018-08-10 | End: 2018-08-11 | Stop reason: HOSPADM

## 2018-08-10 RX ORDER — BUMETANIDE 0.5 MG/1
0.5 TABLET ORAL DAILY
Qty: 5 TABLET | Refills: 0 | Status: SHIPPED | OUTPATIENT
Start: 2018-08-10 | End: 2018-08-16 | Stop reason: SDUPTHER

## 2018-08-10 RX ADMIN — BUMETANIDE 1 MG: 0.25 INJECTION INTRAMUSCULAR; INTRAVENOUS at 23:13

## 2018-08-11 NOTE — ED PROVIDER NOTES
Subjective   Sheryl Conner is a 86 y.o.female who presents to the emergency department with complaints of shortness of breath which began sometime in the last week. The patient was recently admitted here from 6/28/2018 to 6/29/2018 for multifactorial hypoxia thought to be due to oversedation, atelectasis, and possible mild volume overload in setting of abnormal baseline lungs. She had a blood and iron transfusion two days ago for watermelon stomach after her H&H was found to be 6.3 and 21.8 the day before. When she had it rechecked earlier today it was 9.4 and 29.9. Additionally the patient complains of a one month history of leg swelling. She was given Lasix by her cardiologist but tells us that has been unable to take it because it makes her feel nauseous and weak. There are no other acute complaints at this time.        History provided by:  Patient  Shortness of Breath   Severity:  Moderate  Onset quality:  Gradual  Duration:  1 week  Timing:  Constant  Progression:  Worsening  Chronicity:  New  Relieved by:  None tried  Worsened by:  Nothing  Ineffective treatments:  None tried      Review of Systems   Respiratory: Positive for shortness of breath.    Cardiovascular: Positive for leg swelling.   All other systems reviewed and are negative.      Past Medical History:   Diagnosis Date   • Anemia    • Arthritis    • Fibromyalgia    • History of transfusion    • Hypertension    • MG (myasthenia gravis) (CMS/[facility])     history of       Allergies   Allergen Reactions   • Codeine Sulfate Unknown (See Comments)     Pt took in the 70's-pt not sure of what happened   • Cozaar [Losartan] Unknown (See Comments)     Pt can not remember   • Crestor [Rosuvastatin] Unknown (See Comments)     Pt can not remember   • Dexlansoprazole Unknown (See Comments)     Pt can not remember   • Lipitor [Atorvastatin] Unknown (See Comments)     Pt can not remember   • Lisinopril Unknown (See Comments)     Pt can not remember   • Nexium  [Esomeprazole Magnesium] Unknown (See Comments)     Pt can not remember   • Norvasc [Amlodipine] Unknown (See Comments)     Pt can not remember   • Sulfadiazine Unknown (See Comments)     Pt can not remember   • Toprol Xl [Metoprolol Tartrate] Unknown (See Comments)     Pt can not remember   • Zetia [Ezetimibe] Unknown (See Comments)     Pt can not remember   • Zocor [Simvastatin] Unknown (See Comments)     Pt can not remember   • Augmentin [Amoxicillin-Pot Clavulanate] GI Intolerance     nausea   • Celebrex [Celecoxib] GI Intolerance       Past Surgical History:   Procedure Laterality Date   • CARDIAC SURGERY     • COLONOSCOPY     • HEMORRHOIDECTOMY     • TUBAL ABDOMINAL LIGATION     • UPPER GASTROINTESTINAL ENDOSCOPY     • UPPER GASTROINTESTINAL ENDOSCOPY  05/29/2018       Family History   Problem Relation Age of Onset   • No Known Problems Mother    • Diabetes Father    • Cancer Son        Social History     Social History   • Marital status:      Social History Main Topics   • Smoking status: Never Smoker   • Smokeless tobacco: Never Used   • Alcohol use No   • Drug use: No   • Sexual activity: Defer     Other Topics Concern   • Not on file         Objective   Physical Exam   Constitutional: She is oriented to person, place, and time. She appears well-developed and well-nourished. No distress.   HENT:   Head: Normocephalic and atraumatic.   Eyes: Conjunctivae are normal. No scleral icterus.   Neck: Normal range of motion. Neck supple.   Cardiovascular: Normal rate, regular rhythm, normal heart sounds and intact distal pulses.    No murmur heard.  Pulmonary/Chest: Effort normal and breath sounds normal. No respiratory distress.   Abdominal: Soft. Bowel sounds are normal. There is no tenderness. There is no rebound and no guarding.   Musculoskeletal: She exhibits edema and tenderness.   2+ bilateral lower extremity edema.   Neurological: She is alert and oriented to person, place, and time.   Skin: Skin is  warm and dry. No erythema.   Psychiatric: She has a normal mood and affect. Her behavior is normal.   Nursing note and vitals reviewed.      Procedures         ED Course  ED Course as of Aug 11 0253   Fri Aug 10, 2018   2141 Hemoglobin: (!) 8.8 [RS]   2141 Hematocrit: (!) 29.0 [RS]   2144 BNP: (!) 594.0 [RS]   2217 I talked with the patient's daughter.  She is concerned that the patient's visits to the emergency department may be more related to chronic pain and opioid dependence.  She has a history of chronic pain and is currently on fentanyl patches but the daughter reports that the patient has currently been having several changes from her pain clinic.  At this point, I believe the patient's issue is more related to volume overload from the recent transfusion with evidence on labs, chest x-ray, and physical exam of the lower extremities showing edema.  We'll give the patient dose of Bumex here in the emergency department.  We'll plan to refer her to the heart failure clinic.  She voices understanding and agrees.  [RS]   2221 Troponin I: 0.00 [RS]   2335 Patient reports feeling much better and states she is ready to go home.  We'll discharge to follow-up with her heart failure clinic with return to the ER for any concerns.  Also talked with the daughter about pain management and recovery resources.  [RS]      ED Course User Index  [RS] Jimi Brock MD     Recent Results (from the past 24 hour(s))   CBC Auto Differential    Collection Time: 08/10/18 11:32 AM   Result Value Ref Range    WBC 4.40 3.50 - 10.80 10*3/mm3    RBC 3.27 (L) 3.89 - 5.14 10*6/mm3    Hemoglobin 9.4 (L) 11.5 - 15.5 g/dL    Hematocrit 29.9 (L) 34.5 - 44.0 %    RDW 19.0 (H) 11.3 - 14.5 %    MCV 91.5 80.0 - 99.0 fL    MCH 28.7 27.0 - 31.0 pg    MCHC 31.4 (L) 32.0 - 36.0 g/dL    MPV 7.5 6.0 - 12.0 fL    Platelets 253 150 - 450 10*3/mm3    Neutrophil % 84.8 (H) 41.0 - 71.0 %    Lymphocyte % 12.0 (L) 24.0 - 44.0 %    Monocyte % 3.2 0.0 -  12.0 %    Neutrophils, Absolute 3.70 1.50 - 8.30 10*3/mm3    Lymphocytes, Absolute 0.50 (L) 0.60 - 4.80 10*3/mm3    Monocytes, Absolute 0.10 0.00 - 1.00 10*3/mm3   Comprehensive Metabolic Panel    Collection Time: 08/10/18  9:11 PM   Result Value Ref Range    Glucose 99 70 - 100 mg/dL    BUN 9 9 - 23 mg/dL    Creatinine 1.00 0.60 - 1.30 mg/dL    Sodium 139 132 - 146 mmol/L    Potassium 4.8 3.5 - 5.5 mmol/L    Chloride 108 99 - 109 mmol/L    CO2 28.0 20.0 - 31.0 mmol/L    Calcium 8.5 (L) 8.7 - 10.4 mg/dL    Total Protein 6.1 5.7 - 8.2 g/dL    Albumin 3.37 3.20 - 4.80 g/dL    ALT (SGPT) 14 7 - 40 U/L    AST (SGOT) 31 0 - 33 U/L    Alkaline Phosphatase 63 25 - 100 U/L    Total Bilirubin 0.3 0.3 - 1.2 mg/dL    eGFR Non African Amer 53 (L) >60 mL/min/1.73    Globulin 2.7 gm/dL    A/G Ratio 1.2 (L) 1.5 - 2.5 g/dL    BUN/Creatinine Ratio 9.0 7.0 - 25.0    Anion Gap 3.0 3.0 - 11.0 mmol/L   BNP    Collection Time: 08/10/18  9:11 PM   Result Value Ref Range    .0 (H) 0.0 - 100.0 pg/mL   Light Blue Top    Collection Time: 08/10/18  9:11 PM   Result Value Ref Range    Extra Tube hold for add-on    Green Top (Gel)    Collection Time: 08/10/18  9:11 PM   Result Value Ref Range    Extra Tube Hold for add-ons.    Lavender Top    Collection Time: 08/10/18  9:11 PM   Result Value Ref Range    Extra Tube hold for add-on    Gold Top - SST    Collection Time: 08/10/18  9:11 PM   Result Value Ref Range    Extra Tube Hold for add-ons.    CBC Auto Differential    Collection Time: 08/10/18  9:11 PM   Result Value Ref Range    WBC 4.62 3.50 - 10.80 10*3/mm3    RBC 3.06 (L) 3.89 - 5.14 10*6/mm3    Hemoglobin 8.8 (L) 11.5 - 15.5 g/dL    Hematocrit 29.0 (L) 34.5 - 44.0 %    MCV 94.8 80.0 - 99.0 fL    MCH 28.8 27.0 - 31.0 pg    MCHC 30.3 (L) 32.0 - 36.0 g/dL    RDW 17.4 (H) 11.3 - 14.5 %    RDW-SD 59.8 (H) 37.0 - 54.0 fl    MPV 9.5 6.0 - 12.0 fL    Platelets 232 150 - 450 10*3/mm3    Neutrophil % 64.3 41.0 - 71.0 %    Lymphocyte % 19.5  (L) 24.0 - 44.0 %    Monocyte % 10.8 0.0 - 12.0 %    Eosinophil % 3.7 (H) 0.0 - 3.0 %    Basophil % 1.3 (H) 0.0 - 1.0 %    Immature Grans % 0.4 0.0 - 0.6 %    Neutrophils, Absolute 2.97 1.50 - 8.30 10*3/mm3    Lymphocytes, Absolute 0.90 0.60 - 4.80 10*3/mm3    Monocytes, Absolute 0.50 0.00 - 1.00 10*3/mm3    Eosinophils, Absolute 0.17 0.00 - 0.30 10*3/mm3    Basophils, Absolute 0.06 0.00 - 0.20 10*3/mm3    Immature Grans, Absolute 0.02 0.00 - 0.03 10*3/mm3   POC Troponin, Rapid    Collection Time: 08/10/18  9:17 PM   Result Value Ref Range    Troponin I 0.00 0.00 - 0.07 ng/mL     Note: In addition to lab results from this visit, the labs listed above may include labs taken at another facility or during a different encounter within the last 24 hours. Please correlate lab times with ED admission and discharge times for further clarification of the services performed during this visit.    XR Chest 1 View   Final Result     Small pleural effusion and mild basal subsegmental atelectasis or scarring on    the left.      THIS DOCUMENT HAS BEEN ELECTRONICALLY SIGNED BY CRISELDA GRIFFITHS MD        Vitals:    08/10/18 2245 08/10/18 2300 08/10/18 2315 08/10/18 2330   BP:  132/58  139/62   BP Location:       Patient Position:       Pulse: 68 67 65 66   Resp:       Temp:       TempSrc:       SpO2: 92% 92% 93% 90%   Weight:       Height:         Medications   bumetanide (BUMEX) injection 1 mg (1 mg Intravenous Given 8/10/18 2313)     ECG/EMG Results (last 24 hours)     Procedure Component Value Units Date/Time    ECG 12 Lead [[id number]] Collected:  08/10/18 2059     Updated:  08/10/18 2155    Narrative:       Test Reason : SOA Protocol  Blood Pressure : **/** mmHG  Vent. Rate : 073 BPM     Atrial Rate : 073 BPM     P-R Int : 122 ms          QRS Dur : 156 ms      QT Int : 422 ms       P-R-T Axes : 035 -66 068 degrees     QTc Int : 464 ms    Atrial-sensed ventricular-paced rhythm  Abnormal ECG  When compared with ECG of 13-MAY-2016  13:15,  Vent. rate has decreased BY  15 BPM  Confirmed by CHAO JOEL MD ([id number]) on 8/10/2018 9:55:00 PM    Referred By:  EDMD           Confirmed By:CHAO JOEL MD                       [facility]  Number of Diagnoses or Management Options  Chronic pain syndrome:   Elevated blood pressure reading with diagnosis of hypertension:   History of anemia:   Paroxysmal atrial fibrillation (CMS/HCC):   Peripheral edema:   Transfusion associated circulatory overload:   Uncomplicated opioid dependence (CMS/HCC):   Diagnosis management comments: ECG/EMG Results (last 24 hours)     Procedure Component Value Units Date/Time    ECG 12 Lead ([id number]) Collected:  08/10/18 2059     Updated:  08/10/18 2155    Narrative:       Test Reason : SOA Protocol  Blood Pressure : **/** mmHG  Vent. Rate : 073 BPM     Atrial Rate : 073 BPM     P-R Int : 122 ms          QRS Dur : 156 ms      QT Int : 422 ms       P-R-T Axes : 035 -66 068 degrees     QTc Int : 464 ms    Atrial-sensed ventricular-paced rhythm  Abnormal ECG  When compared with ECG of 13-MAY-2016 13:15,  Vent. rate has decreased BY  15 BPM  Confirmed by CHAO JOEL MD ([id number]) on 8/10/2018 9:55:00 PM    Referred By:  EDMD           Confirmed By:CHAO JOEL MD             Amount and/or Complexity of Data Reviewed  Clinical lab tests: reviewed  Tests in the radiology section of CPT®: reviewed  Decide to obtain previous medical records or to obtain history from someone other than the patient: yes  Obtain history from someone other than the patient: yes  Independent visualization of images, tracings, or specimens: yes        Final diagnoses:   Transfusion associated circulatory overload   Peripheral edema   History of anemia   Elevated blood pressure reading with diagnosis of hypertension   Paroxysmal atrial fibrillation (CMS/HCC)   Chronic pain syndrome   Uncomplicated opioid dependence (CMS/HCC)       Documentation assistance provided by christine Chaves.  Information recorded  by the scribe was done at my direction and has been verified and validated by me.     Damaris Chaves  08/10/18 [id number]       Jimi Brock MD  08/11/18 1722

## 2018-08-13 ENCOUNTER — INFUSION (OUTPATIENT)
Dept: ONCOLOGY | Facility: HOSPITAL | Age: 83
End: 2018-08-13

## 2018-08-13 VITALS
BODY MASS INDEX: 23.21 KG/M2 | SYSTOLIC BLOOD PRESSURE: 130 MMHG | HEART RATE: 71 BPM | RESPIRATION RATE: 16 BRPM | HEIGHT: 63 IN | TEMPERATURE: 97.1 F | WEIGHT: 131 LBS | DIASTOLIC BLOOD PRESSURE: 44 MMHG

## 2018-08-13 DIAGNOSIS — K31.819 GAVE (GASTRIC ANTRAL VASCULAR ECTASIA): Primary | ICD-10-CM

## 2018-08-13 DIAGNOSIS — K90.9 MALABSORPTION IN THE ELDERLY: ICD-10-CM

## 2018-08-13 DIAGNOSIS — D50.0 IRON DEFICIENCY ANEMIA DUE TO CHRONIC BLOOD LOSS: ICD-10-CM

## 2018-08-13 DIAGNOSIS — Z78.9 MEDICATION INTOLERANCE: ICD-10-CM

## 2018-08-13 PROCEDURE — 96365 THER/PROPH/DIAG IV INF INIT: CPT

## 2018-08-13 PROCEDURE — 25010000002 FERRIC CARBOXYMALTOSE 750 MG/15ML SOLUTION 15 ML VIAL: Performed by: INTERNAL MEDICINE

## 2018-08-13 PROCEDURE — 96374 THER/PROPH/DIAG INJ IV PUSH: CPT

## 2018-08-13 RX ORDER — SODIUM CHLORIDE 9 MG/ML
250 INJECTION, SOLUTION INTRAVENOUS ONCE
Status: COMPLETED | OUTPATIENT
Start: 2018-08-13 | End: 2018-08-13

## 2018-08-13 RX ADMIN — FERRIC CARBOXYMALTOSE INJECTION 750 MG: 50 INJECTION, SOLUTION INTRAVENOUS at 13:14

## 2018-08-13 RX ADMIN — SODIUM CHLORIDE 250 ML: 9 INJECTION, SOLUTION INTRAVENOUS at 13:13

## 2018-08-16 ENCOUNTER — OFFICE VISIT (OUTPATIENT)
Dept: CARDIOLOGY | Facility: HOSPITAL | Age: 83
End: 2018-08-16

## 2018-08-16 ENCOUNTER — LAB (OUTPATIENT)
Dept: LAB | Facility: HOSPITAL | Age: 83
End: 2018-08-16

## 2018-08-16 VITALS
SYSTOLIC BLOOD PRESSURE: 135 MMHG | HEART RATE: 74 BPM | BODY MASS INDEX: 23.04 KG/M2 | HEIGHT: 63 IN | RESPIRATION RATE: 18 BRPM | WEIGHT: 130 LBS | TEMPERATURE: 97.4 F | DIASTOLIC BLOOD PRESSURE: 63 MMHG | OXYGEN SATURATION: 96 %

## 2018-08-16 DIAGNOSIS — E87.70 HYPERVOLEMIA, UNSPECIFIED HYPERVOLEMIA TYPE: ICD-10-CM

## 2018-08-16 DIAGNOSIS — I48.0 PAROXYSMAL ATRIAL FIBRILLATION (HCC): ICD-10-CM

## 2018-08-16 DIAGNOSIS — R60.9 EDEMA, UNSPECIFIED TYPE: ICD-10-CM

## 2018-08-16 DIAGNOSIS — I10 ESSENTIAL HYPERTENSION: ICD-10-CM

## 2018-08-16 DIAGNOSIS — R06.00 DYSPNEA, UNSPECIFIED TYPE: Primary | ICD-10-CM

## 2018-08-16 DIAGNOSIS — I49.5 SSS (SICK SINUS SYNDROME) (HCC): ICD-10-CM

## 2018-08-16 DIAGNOSIS — D50.0 IRON DEFICIENCY ANEMIA DUE TO CHRONIC BLOOD LOSS: ICD-10-CM

## 2018-08-16 LAB
ERYTHROCYTE [DISTWIDTH] IN BLOOD BY AUTOMATED COUNT: 20.7 % (ref 11.3–14.5)
HCT VFR BLD AUTO: 28 % (ref 34.5–44)
HGB BLD-MCNC: 8.3 G/DL (ref 11.5–15.5)
LYMPHOCYTES # BLD AUTO: 0.7 10*3/MM3 (ref 0.6–4.8)
LYMPHOCYTES NFR BLD AUTO: 17.9 % (ref 24–44)
MCH RBC QN AUTO: 28.4 PG (ref 27–31)
MCHC RBC AUTO-ENTMCNC: 29.7 G/DL (ref 32–36)
MCV RBC AUTO: 95.5 FL (ref 80–99)
MONOCYTES # BLD AUTO: 0.3 10*3/MM3 (ref 0–1)
MONOCYTES NFR BLD AUTO: 8.5 % (ref 0–12)
NEUTROPHILS # BLD AUTO: 3 10*3/MM3 (ref 1.5–8.3)
NEUTROPHILS NFR BLD AUTO: 73.6 % (ref 41–71)
PLATELET # BLD AUTO: 284 10*3/MM3 (ref 150–450)
PMV BLD AUTO: 7.1 FL (ref 6–12)
RBC # BLD AUTO: 2.93 10*6/MM3 (ref 3.89–5.14)
WBC NRBC COR # BLD: 4.1 10*3/MM3 (ref 3.5–10.8)

## 2018-08-16 PROCEDURE — 99214 OFFICE O/P EST MOD 30 MIN: CPT | Performed by: NURSE PRACTITIONER

## 2018-08-16 PROCEDURE — 85025 COMPLETE CBC W/AUTO DIFF WBC: CPT

## 2018-08-16 PROCEDURE — 36415 COLL VENOUS BLD VENIPUNCTURE: CPT

## 2018-08-16 RX ORDER — POTASSIUM CHLORIDE 750 MG/1
10 TABLET, FILM COATED, EXTENDED RELEASE ORAL DAILY
Qty: 15 TABLET | Refills: 1 | Status: SHIPPED | OUTPATIENT
Start: 2018-08-16 | End: 2018-08-16 | Stop reason: SDUPTHER

## 2018-08-16 RX ORDER — POTASSIUM CHLORIDE 750 MG/1
10 TABLET, FILM COATED, EXTENDED RELEASE ORAL EVERY OTHER DAY
Qty: 15 TABLET | Refills: 1 | Status: SHIPPED | OUTPATIENT
Start: 2018-08-16 | End: 2018-08-23 | Stop reason: SDUPTHER

## 2018-08-16 RX ORDER — BUMETANIDE 0.5 MG/1
0.5 TABLET ORAL EVERY OTHER DAY
Qty: 15 TABLET | Refills: 1 | Status: SHIPPED | OUTPATIENT
Start: 2018-08-16 | End: 2018-08-23 | Stop reason: SDUPTHER

## 2018-08-16 NOTE — PROGRESS NOTES
"[facility]  [facility]      Encounter Date:08/16/2018     Sheryl Fajardorp  [address] [place] [address]      8/17/1931    Vadim Diaz MD    Sheryl Conner is a 86 y.o. female.      Subjective:     Chief Complaint:  Shortness of Breath and Edema (f/u ED visit for volume overload. )       HPI     86-year-old female presented to [facility] emergency department on 8/10/18 with complaints of shortness of breath.  Started one week prior to ED visit.  Previous hospitalizations 6/28/18 for multifactorial hypoxia thought to be due to oversedation, atelectasis, and mild volume overload in the setting of abnormal baseline lungs.  Patient recently completed a blood and iron transfusion 2 days prior to ED visit (hx of \"watermelon stomach\") when H&H is found to be 6.3.  Patient is also noted increased swelling over the last month.  Previously ordered Lasix the patient unable to take due to nausea and weakness.  Pt followed by Dr. Seals with cardiology.  Patient has a history of symptomatic paroxysmal atrial fibrillation and tachybradycardia syndrome status post Medtronic pacemaker in 2012.  Last heart catheterization 2012 was no flow limiting stenosis.  Echocardiogram 6/18, EF greater than 70%, mild aortic stenosis.  Patient is not a candidate for anticoagulation due to GI bleeding.  Amlodipine recently discontinued due to worsening edema.  Patient was given Bumex during ER visit with improvement of symptoms. D/c on Bumex 0.5 mg daily and KCl supplement.    Pt reports dyspnea back to normal.  Edema has improved, but not at baseline.  Weep on left leg.  Denies PND/orthopnea, CP, pressure.  Pt reports has not taken bumex today due to weakness associated with med.    Patient Active Problem List    Diagnosis   • Paroxysmal atrial fibrillation (CMS/HCC) [I48.0]     Overview Note:     · 2012 diagnosed with symptomatic paroxysmal A. fib and tachybradycardia syndrome.  · 2012 Medtronic " pacemaker  · 2012 cardiac catheterization: No flow limiting stenosis.  · June 2018 echo:   hyperdynamic (EF > 70), LA mildly dilated.  Mean aortic valve gradient is 13 mmhg which is borderline mild aortic stenosis     • SSS (sick sinus syndrome) (CMS/HCC) [I49.5]   • Essential hypertension [I10]   • Hypoxia [R09.02]   • GAVE (gastric antral vascular ectasia) [K31.819]   • Right knee pain [M25.561]   • Iron deficiency anemia due to chronic blood loss [D50.0]   • Medication intolerance [Z78.9]   • Malabsorption in the elderly [K90.89]         Past Surgical History:   Procedure Laterality Date   • CARDIAC SURGERY     • COLONOSCOPY     • HEMORRHOIDECTOMY     • TUBAL ABDOMINAL LIGATION     • UPPER GASTROINTESTINAL ENDOSCOPY     • UPPER GASTROINTESTINAL ENDOSCOPY  05/29/2018       Allergies   Allergen Reactions   • Codeine Sulfate Unknown (See Comments)     Pt took in the 70's-pt not sure of what happened   • Cozaar [Losartan] Unknown (See Comments)     Pt can not remember   • Crestor [Rosuvastatin] Unknown (See Comments)     Pt can not remember   • Dexlansoprazole Unknown (See Comments)     Pt can not remember   • Lipitor [Atorvastatin] Unknown (See Comments)     Pt can not remember   • Lisinopril Unknown (See Comments)     Pt can not remember   • Nexium [Esomeprazole Magnesium] Unknown (See Comments)     Pt can not remember   • Norvasc [Amlodipine] Unknown (See Comments)     Pt can not remember   • Sulfadiazine Unknown (See Comments)     Pt can not remember   • Toprol Xl [Metoprolol Tartrate] Unknown (See Comments)     Pt can not remember   • Zetia [Ezetimibe] Unknown (See Comments)     Pt can not remember   • Zocor [Simvastatin] Unknown (See Comments)     Pt can not remember   • Augmentin [Amoxicillin-Pot Clavulanate] GI Intolerance     nausea   • Celebrex [Celecoxib] GI Intolerance         Current Outpatient Prescriptions:   •  amitriptyline (ELAVIL) 25 MG tablet, Take 25 mg by mouth 2 (two) times a day., Disp: , Rfl:  "  •  bumetanide (BUMEX) 0.5 MG tablet, Take 1 tablet by mouth Every Other Day., Disp: 15 tablet, Rfl: 1  •  cetirizine (ZyrTEC) 10 MG tablet, Take 10 mg by mouth daily., Disp: , Rfl:   •  fentaNYL (DURAGESIC) 75 MCG/HR patch, Place 1 patch on the skin Every Other Day., Disp: , Rfl:   •  gabapentin (NEURONTIN) 600 MG tablet, Take 600 mg by mouth 3 (Three) Times a Day., Disp: , Rfl: 1  •  HYDROcodone-acetaminophen (NORCO)  MG per tablet, Take 1 tablet by mouth every 6 (six) hours as needed for moderate pain (4-6)., Disp: , Rfl:   •  pantoprazole (PROTONIX) 40 MG EC tablet, Take 40 mg by mouth daily., Disp: , Rfl:   •  potassium chloride (K-DUR) 10 MEQ CR tablet, Take 1 tablet by mouth Daily., Disp: 15 tablet, Rfl: 1  •  promethazine (PHENERGAN) 25 MG tablet, Take 25 mg by mouth every 6 (six) hours as needed for nausea or vomiting., Disp: , Rfl:   •  VOLTAREN 1 % gel gel, , Disp: , Rfl: 3    The following portions of the patient's history were reviewed and updated as appropriate: allergies, current medications, past family history, past medical history, past social history, past surgical history and problem list.    Review of Systems   Constitution: Positive for weakness and malaise/fatigue.   Cardiovascular: Positive for leg swelling.   Respiratory: Positive for shortness of breath.    Gastrointestinal: Positive for nausea.   Neurological: Positive for dizziness.   All other systems reviewed and are negative.      Objective:     Vitals:    08/16/18 1021 08/16/18 1024 08/16/18 1025 08/16/18 1026   BP: 128/63 125/57 131/60 135/63   BP Location: Right arm Left arm Left arm Right arm   Patient Position: Sitting Sitting Standing Standing   Pulse: 70  74 74   Resp: 18      Temp: 97.4 °F (36.3 °C)      TempSrc: Temporal Artery       SpO2: 96%      Weight: 59 kg (130 lb)      Height: 160 cm (63\")            Physical Exam   Constitutional: She is oriented to person, place, and time. She appears well-developed and " well-nourished. No distress.   HENT:   Head: Normocephalic and atraumatic.   Mouth/Throat: Oropharynx is clear and moist.   Eyes: Pupils are equal, round, and reactive to light. Conjunctivae are normal. No scleral icterus.   Neck: No hepatojugular reflux and no JVD present. Carotid bruit is not present. No tracheal deviation present. No thyromegaly present.   Cardiovascular: Normal rate, regular rhythm, normal heart sounds and intact distal pulses.  Exam reveals no friction rub.    No murmur heard.  Pulmonary/Chest: Effort normal and breath sounds normal.   Abdominal: Soft. Bowel sounds are normal. She exhibits no distension. There is no tenderness.   Musculoskeletal: She exhibits edema (1+.  weeping noted on left lower extremity). She exhibits no tenderness.   Lymphadenopathy:     She has no cervical adenopathy.   Neurological: She is alert and oriented to person, place, and time.   Skin: Skin is warm, dry and intact. No rash noted. No cyanosis or erythema. No pallor.   Psychiatric: She has a normal mood and affect. Her behavior is normal. Thought content normal.   Vitals reviewed.      Lab and Diagnostic Review:  Lab Results   Component Value Date    GLUCOSE 99 08/10/2018    CALCIUM 8.5 (L) 08/10/2018     08/10/2018    K 4.8 08/10/2018    CO2 28.0 08/10/2018     08/10/2018    BUN 9 08/10/2018    CREATININE 1.00 08/10/2018    EGFRIFNONA 53 (L) 08/10/2018    BCR 9.0 08/10/2018    ANIONGAP 3.0 08/10/2018     Lab Results   Component Value Date    WBC 4.10 08/16/2018    HGB 8.3 (L) 08/16/2018    HCT 28.0 (L) 08/16/2018    MCV 95.5 08/16/2018     08/16/2018     .la  Assessment and Plan:         1. Dyspnea, unspecified type  improved    2. Hypervolemia, unspecified hypervolemia type  Improving    - Basic Metabolic Panel; 1 week    - potassium chloride (K-DUR) 10 MEQ CR tablet; Take 1 tablet by mouth every other day  Dispense: 15 tablet; Refill: 1  - bumetanide (BUMEX) 0.5 MG tablet; Take 1 tablet by  mouth Every Other Day.  Dispense: 15 tablet; Refill: 1    3. Paroxysmal atrial fibrillation (CMS/HCC)  Rate controlled  No anticoagluation    4. SSS (sick sinus syndrome) (CMS/HCC)  PPM    5. Essential hypertension  Controlled off BP meds    6.  Edema  Compression stockings.    F/u 2 weeks.    *Please note that portions of this note were completed with a voice recognition program. Efforts were made to edit the dictations, but occasionally words are mistranscribed.

## 2018-08-22 ENCOUNTER — LAB (OUTPATIENT)
Dept: LAB | Facility: HOSPITAL | Age: 83
End: 2018-08-22

## 2018-08-22 DIAGNOSIS — E87.70 HYPERVOLEMIA, UNSPECIFIED HYPERVOLEMIA TYPE: ICD-10-CM

## 2018-08-22 DIAGNOSIS — D50.0 IRON DEFICIENCY ANEMIA DUE TO CHRONIC BLOOD LOSS: ICD-10-CM

## 2018-08-22 DIAGNOSIS — I10 ESSENTIAL HYPERTENSION: ICD-10-CM

## 2018-08-22 LAB
ANION GAP SERPL CALCULATED.3IONS-SCNC: 5 MMOL/L (ref 3–11)
BUN BLD-MCNC: 14 MG/DL (ref 9–23)
BUN/CREAT SERPL: 13.9 (ref 7–25)
CALCIUM SPEC-SCNC: 8.2 MG/DL (ref 8.7–10.4)
CHLORIDE SERPL-SCNC: 109 MMOL/L (ref 99–109)
CO2 SERPL-SCNC: 26 MMOL/L (ref 20–31)
CREAT BLD-MCNC: 1.01 MG/DL (ref 0.6–1.3)
ERYTHROCYTE [DISTWIDTH] IN BLOOD BY AUTOMATED COUNT: 17.1 % (ref 11.3–14.5)
GFR SERPL CREATININE-BSD FRML MDRD: 52 ML/MIN/1.73
GLUCOSE BLD-MCNC: 77 MG/DL (ref 70–100)
HCT VFR BLD AUTO: 26.6 % (ref 34.5–44)
HGB BLD-MCNC: 7.7 G/DL (ref 11.5–15.5)
LYMPHOCYTES # BLD AUTO: 0.8 10*3/MM3 (ref 0.6–4.8)
LYMPHOCYTES NFR BLD AUTO: 17.7 % (ref 24–44)
MCH RBC QN AUTO: 28.1 PG (ref 27–31)
MCHC RBC AUTO-ENTMCNC: 28.9 G/DL (ref 32–36)
MCV RBC AUTO: 97.1 FL (ref 80–99)
MONOCYTES # BLD AUTO: 0.3 10*3/MM3 (ref 0–1)
MONOCYTES NFR BLD AUTO: 6.6 % (ref 0–12)
NEUTROPHILS # BLD AUTO: 3.6 10*3/MM3 (ref 1.5–8.3)
NEUTROPHILS NFR BLD AUTO: 75.7 % (ref 41–71)
PLATELET # BLD AUTO: 263 10*3/MM3 (ref 150–450)
PMV BLD AUTO: 7 FL (ref 6–12)
POTASSIUM BLD-SCNC: 4.6 MMOL/L (ref 3.5–5.5)
RBC # BLD AUTO: 2.74 10*6/MM3 (ref 3.89–5.14)
SODIUM BLD-SCNC: 140 MMOL/L (ref 132–146)
WBC NRBC COR # BLD: 4.8 10*3/MM3 (ref 3.5–10.8)

## 2018-08-22 PROCEDURE — 85025 COMPLETE CBC W/AUTO DIFF WBC: CPT

## 2018-08-22 PROCEDURE — 36415 COLL VENOUS BLD VENIPUNCTURE: CPT

## 2018-08-22 PROCEDURE — 80048 BASIC METABOLIC PNL TOTAL CA: CPT

## 2018-08-23 DIAGNOSIS — E87.70 HYPERVOLEMIA, UNSPECIFIED HYPERVOLEMIA TYPE: ICD-10-CM

## 2018-08-23 RX ORDER — BUMETANIDE 0.5 MG/1
0.5 TABLET ORAL DAILY PRN
Qty: 15 TABLET | Refills: 1 | Status: SHIPPED | OUTPATIENT
Start: 2018-08-23 | End: 2018-09-17

## 2018-08-23 RX ORDER — POTASSIUM CHLORIDE 750 MG/1
10 TABLET, FILM COATED, EXTENDED RELEASE ORAL DAILY PRN
Qty: 15 TABLET | Refills: 1 | Status: SHIPPED | OUTPATIENT
Start: 2018-08-23 | End: 2018-09-17

## 2018-08-23 NOTE — PROGRESS NOTES
Called and reviewed labs with pt.    Lab on 08/22/2018   Component Date Value Ref Range Status   • Glucose 08/22/2018 77  70 - 100 mg/dL Final   • BUN 08/22/2018 14  9 - 23 mg/dL Final   • Creatinine 08/22/2018 1.01  0.60 - 1.30 mg/dL Final   • Sodium 08/22/2018 140  132 - 146 mmol/L Final   • Potassium 08/22/2018 4.6  3.5 - 5.5 mmol/L Final   • Chloride 08/22/2018 109  99 - 109 mmol/L Final   • CO2 08/22/2018 26.0  20.0 - 31.0 mmol/L Final   • Calcium 08/22/2018 8.2* 8.7 - 10.4 mg/dL Final   • eGFR Non African Amer 08/22/2018 52* >60 mL/min/1.73 Final   • BUN/Creatinine Ratio 08/22/2018 13.9  7.0 - 25.0 Final   • Anion Gap 08/22/2018 5.0  3.0 - 11.0 mmol/L Final   • WBC 08/22/2018 4.80  3.50 - 10.80 10*3/mm3 Final   • RBC 08/22/2018 2.74* 3.89 - 5.14 10*6/mm3 Final   • Hemoglobin 08/22/2018 7.7* 11.5 - 15.5 g/dL Final   • Hematocrit 08/22/2018 26.6* 34.5 - 44.0 % Final   • RDW 08/22/2018 17.1* 11.3 - 14.5 % Final   • MCV 08/22/2018 97.1  80.0 - 99.0 fL Final   • MCH 08/22/2018 28.1  27.0 - 31.0 pg Final   • MCHC 08/22/2018 28.9* 32.0 - 36.0 g/dL Final   • MPV 08/22/2018 7.0  6.0 - 12.0 fL Final   • Platelets 08/22/2018 263  150 - 450 10*3/mm3 Final   • Neutrophil % 08/22/2018 75.7* 41.0 - 71.0 % Final   • Lymphocyte % 08/22/2018 17.7* 24.0 - 44.0 % Final   • Monocyte % 08/22/2018 6.6  0.0 - 12.0 % Final   • Neutrophils, Absolute 08/22/2018 3.60  1.50 - 8.30 10*3/mm3 Final   • Lymphocytes, Absolute 08/22/2018 0.80  0.60 - 4.80 10*3/mm3 Final   • Monocytes, Absolute 08/22/2018 0.30  0.00 - 1.00 10*3/mm3 Final     Her kidney functions is stable.  She tells me she has not been taking her potassium or bumex since last visit.  Denies edema, dyspnea or weight gain.  She will only take PRN for edema, dyspnea or 3lb weight gain (1-2 days).      Discussed H&H.  Followed by hematology.  She will f/u with their office for management.  She has repeat labs weakly with hemo.

## 2018-08-29 ENCOUNTER — LAB (OUTPATIENT)
Dept: LAB | Facility: HOSPITAL | Age: 83
End: 2018-08-29

## 2018-08-29 ENCOUNTER — INFUSION (OUTPATIENT)
Dept: ONCOLOGY | Facility: HOSPITAL | Age: 83
End: 2018-08-29

## 2018-08-29 VITALS
HEIGHT: 63 IN | RESPIRATION RATE: 16 BRPM | SYSTOLIC BLOOD PRESSURE: 157 MMHG | BODY MASS INDEX: 23.57 KG/M2 | WEIGHT: 133 LBS | TEMPERATURE: 98.6 F | DIASTOLIC BLOOD PRESSURE: 66 MMHG | HEART RATE: 83 BPM

## 2018-08-29 DIAGNOSIS — D50.0 IRON DEFICIENCY ANEMIA DUE TO CHRONIC BLOOD LOSS: ICD-10-CM

## 2018-08-29 DIAGNOSIS — D50.0 IRON DEFICIENCY ANEMIA DUE TO CHRONIC BLOOD LOSS: Primary | ICD-10-CM

## 2018-08-29 LAB
ABO GROUP BLD: NORMAL
ANTI-E: NORMAL
BLD GP AB SCN SERPL QL ELUTION: NEGATIVE
BLD GP AB SCN SERPL QL: POSITIVE
DAT IGG GEL: POSITIVE
ERYTHROCYTE [DISTWIDTH] IN BLOOD BY AUTOMATED COUNT: 17.3 % (ref 11.3–14.5)
HCT VFR BLD AUTO: 25.7 % (ref 34.5–44)
HGB BLD-MCNC: 7.4 G/DL (ref 11.5–15.5)
LYMPHOCYTES # BLD AUTO: 0.8 10*3/MM3 (ref 0.6–4.8)
LYMPHOCYTES NFR BLD AUTO: 18.4 % (ref 24–44)
MCH RBC QN AUTO: 29.1 PG (ref 27–31)
MCHC RBC AUTO-ENTMCNC: 28.8 G/DL (ref 32–36)
MCV RBC AUTO: 100.8 FL (ref 80–99)
MONOCYTES # BLD AUTO: 0.4 10*3/MM3 (ref 0–1)
MONOCYTES NFR BLD AUTO: 9.4 % (ref 0–12)
NEUTROPHILS # BLD AUTO: 3 10*3/MM3 (ref 1.5–8.3)
NEUTROPHILS NFR BLD AUTO: 72.2 % (ref 41–71)
PLATELET # BLD AUTO: 249 10*3/MM3 (ref 150–450)
PMV BLD AUTO: 7.1 FL (ref 6–12)
RBC # BLD AUTO: 2.55 10*6/MM3 (ref 3.89–5.14)
RH BLD: NEGATIVE
T&S EXPIRATION DATE: NORMAL
WARM AUTOANTIBODY: NORMAL
WBC NRBC COR # BLD: 4.2 10*3/MM3 (ref 3.5–10.8)

## 2018-08-29 PROCEDURE — 86902 BLOOD TYPE ANTIGEN DONOR EA: CPT

## 2018-08-29 PROCEDURE — 86880 COOMBS TEST DIRECT: CPT

## 2018-08-29 PROCEDURE — 86900 BLOOD TYPING SEROLOGIC ABO: CPT

## 2018-08-29 PROCEDURE — 86920 COMPATIBILITY TEST SPIN: CPT

## 2018-08-29 PROCEDURE — 86860 RBC ANTIBODY ELUTION: CPT

## 2018-08-29 PROCEDURE — 86870 RBC ANTIBODY IDENTIFICATION: CPT

## 2018-08-29 PROCEDURE — 86850 RBC ANTIBODY SCREEN: CPT

## 2018-08-29 PROCEDURE — 85025 COMPLETE CBC W/AUTO DIFF WBC: CPT

## 2018-08-29 PROCEDURE — 86922 COMPATIBILITY TEST ANTIGLOB: CPT

## 2018-08-29 PROCEDURE — 86901 BLOOD TYPING SEROLOGIC RH(D): CPT

## 2018-08-29 PROCEDURE — 36415 COLL VENOUS BLD VENIPUNCTURE: CPT

## 2018-08-29 RX ORDER — SODIUM CHLORIDE 9 MG/ML
250 INJECTION, SOLUTION INTRAVENOUS AS NEEDED
Status: CANCELLED | OUTPATIENT
Start: 2018-08-29

## 2018-08-29 RX ORDER — ACETAMINOPHEN 325 MG/1
650 TABLET ORAL ONCE
Status: CANCELLED | OUTPATIENT
Start: 2018-08-29 | End: 2018-08-29

## 2018-08-29 RX ORDER — DIPHENHYDRAMINE HCL 25 MG
25 CAPSULE ORAL ONCE
Status: CANCELLED | OUTPATIENT
Start: 2018-08-29 | End: 2018-08-29

## 2018-08-30 ENCOUNTER — INFUSION (OUTPATIENT)
Dept: ONCOLOGY | Facility: HOSPITAL | Age: 83
End: 2018-08-30

## 2018-08-30 DIAGNOSIS — D50.0 IRON DEFICIENCY ANEMIA DUE TO CHRONIC BLOOD LOSS: ICD-10-CM

## 2018-08-30 PROCEDURE — 63710000001 DIPHENHYDRAMINE PER 50 MG: Performed by: INTERNAL MEDICINE

## 2018-08-30 PROCEDURE — 86900 BLOOD TYPING SEROLOGIC ABO: CPT

## 2018-08-30 PROCEDURE — P9016 RBC LEUKOCYTES REDUCED: HCPCS

## 2018-08-30 PROCEDURE — 36430 TRANSFUSION BLD/BLD COMPNT: CPT

## 2018-08-30 RX ORDER — DIPHENHYDRAMINE HCL 25 MG
25 CAPSULE ORAL ONCE
Status: COMPLETED | OUTPATIENT
Start: 2018-08-30 | End: 2018-08-30

## 2018-08-30 RX ORDER — SODIUM CHLORIDE 9 MG/ML
250 INJECTION, SOLUTION INTRAVENOUS AS NEEDED
Status: DISCONTINUED | OUTPATIENT
Start: 2018-08-30 | End: 2018-08-30 | Stop reason: HOSPADM

## 2018-08-30 RX ORDER — ACETAMINOPHEN 325 MG/1
650 TABLET ORAL ONCE
Status: COMPLETED | OUTPATIENT
Start: 2018-08-30 | End: 2018-08-30

## 2018-08-30 RX ADMIN — ACETAMINOPHEN 650 MG: 325 TABLET ORAL at 08:44

## 2018-08-30 RX ADMIN — SODIUM CHLORIDE 250 ML: 9 INJECTION, SOLUTION INTRAVENOUS at 08:48

## 2018-08-30 RX ADMIN — DIPHENHYDRAMINE HYDROCHLORIDE 25 MG: 25 CAPSULE ORAL at 08:44

## 2018-08-31 ENCOUNTER — OFFICE VISIT (OUTPATIENT)
Dept: CARDIOLOGY | Facility: HOSPITAL | Age: 83
End: 2018-08-31

## 2018-08-31 VITALS
HEIGHT: 63 IN | HEART RATE: 73 BPM | RESPIRATION RATE: 16 BRPM | SYSTOLIC BLOOD PRESSURE: 142 MMHG | WEIGHT: 135 LBS | DIASTOLIC BLOOD PRESSURE: 58 MMHG | OXYGEN SATURATION: 97 % | BODY MASS INDEX: 23.92 KG/M2

## 2018-08-31 DIAGNOSIS — I10 ESSENTIAL HYPERTENSION: Primary | ICD-10-CM

## 2018-08-31 DIAGNOSIS — I48.0 PAROXYSMAL ATRIAL FIBRILLATION (HCC): ICD-10-CM

## 2018-08-31 DIAGNOSIS — I49.5 SSS (SICK SINUS SYNDROME) (HCC): ICD-10-CM

## 2018-08-31 DIAGNOSIS — R60.0 LOCALIZED EDEMA: ICD-10-CM

## 2018-08-31 PROCEDURE — 99213 OFFICE O/P EST LOW 20 MIN: CPT | Performed by: NURSE PRACTITIONER

## 2018-09-05 ENCOUNTER — INFUSION (OUTPATIENT)
Dept: ONCOLOGY | Facility: HOSPITAL | Age: 83
End: 2018-09-05

## 2018-09-05 ENCOUNTER — LAB (OUTPATIENT)
Dept: LAB | Facility: HOSPITAL | Age: 83
End: 2018-09-05

## 2018-09-05 VITALS
BODY MASS INDEX: 23.39 KG/M2 | HEART RATE: 74 BPM | RESPIRATION RATE: 16 BRPM | DIASTOLIC BLOOD PRESSURE: 71 MMHG | HEIGHT: 63 IN | TEMPERATURE: 97.4 F | WEIGHT: 132 LBS | SYSTOLIC BLOOD PRESSURE: 129 MMHG

## 2018-09-05 VITALS
WEIGHT: 133 LBS | RESPIRATION RATE: 20 BRPM | DIASTOLIC BLOOD PRESSURE: 60 MMHG | HEIGHT: 63 IN | SYSTOLIC BLOOD PRESSURE: 133 MMHG | BODY MASS INDEX: 23.57 KG/M2 | HEART RATE: 68 BPM | TEMPERATURE: 98.2 F

## 2018-09-05 DIAGNOSIS — Z78.9 MEDICATION INTOLERANCE: ICD-10-CM

## 2018-09-05 DIAGNOSIS — K90.9 MALABSORPTION IN THE ELDERLY: ICD-10-CM

## 2018-09-05 DIAGNOSIS — D50.0 IRON DEFICIENCY ANEMIA DUE TO CHRONIC BLOOD LOSS: ICD-10-CM

## 2018-09-05 DIAGNOSIS — K31.819 GAVE (GASTRIC ANTRAL VASCULAR ECTASIA): Primary | ICD-10-CM

## 2018-09-05 LAB
ERYTHROCYTE [DISTWIDTH] IN BLOOD BY AUTOMATED COUNT: 13.9 % (ref 11.3–14.5)
HCT VFR BLD AUTO: 28.8 % (ref 34.5–44)
HGB BLD-MCNC: 9.3 G/DL (ref 11.5–15.5)
LYMPHOCYTES # BLD AUTO: 0.8 10*3/MM3 (ref 0.6–4.8)
LYMPHOCYTES NFR BLD AUTO: 21.5 % (ref 24–44)
MCH RBC QN AUTO: 31.5 PG (ref 27–31)
MCHC RBC AUTO-ENTMCNC: 32.2 G/DL (ref 32–36)
MCV RBC AUTO: 97.8 FL (ref 80–99)
MONOCYTES # BLD AUTO: 0.3 10*3/MM3 (ref 0–1)
MONOCYTES NFR BLD AUTO: 7.5 % (ref 0–12)
NEUTROPHILS # BLD AUTO: 2.5 10*3/MM3 (ref 1.5–8.3)
NEUTROPHILS NFR BLD AUTO: 71 % (ref 41–71)
PLATELET # BLD AUTO: 216 10*3/MM3 (ref 150–450)
PMV BLD AUTO: 6.9 FL (ref 6–12)
RBC # BLD AUTO: 2.94 10*6/MM3 (ref 3.89–5.14)
WBC NRBC COR # BLD: 3.5 10*3/MM3 (ref 3.5–10.8)

## 2018-09-05 PROCEDURE — 25010000002 FERRIC CARBOXYMALTOSE 750 MG/15ML SOLUTION 15 ML VIAL: Performed by: INTERNAL MEDICINE

## 2018-09-05 PROCEDURE — 96374 THER/PROPH/DIAG INJ IV PUSH: CPT

## 2018-09-05 PROCEDURE — 36415 COLL VENOUS BLD VENIPUNCTURE: CPT

## 2018-09-05 PROCEDURE — 85025 COMPLETE CBC W/AUTO DIFF WBC: CPT

## 2018-09-05 RX ORDER — SODIUM CHLORIDE 9 MG/ML
250 INJECTION, SOLUTION INTRAVENOUS ONCE
Status: COMPLETED | OUTPATIENT
Start: 2018-09-05 | End: 2018-09-05

## 2018-09-05 RX ADMIN — SODIUM CHLORIDE 250 ML: 9 INJECTION, SOLUTION INTRAVENOUS at 15:25

## 2018-09-05 RX ADMIN — FERRIC CARBOXYMALTOSE INJECTION 750 MG: 50 INJECTION, SOLUTION INTRAVENOUS at 15:25

## 2018-09-10 ENCOUNTER — OFFICE VISIT (OUTPATIENT)
Dept: ONCOLOGY | Facility: CLINIC | Age: 83
End: 2018-09-10

## 2018-09-10 ENCOUNTER — INFUSION (OUTPATIENT)
Dept: ONCOLOGY | Facility: HOSPITAL | Age: 83
End: 2018-09-10

## 2018-09-10 VITALS
HEART RATE: 78 BPM | RESPIRATION RATE: 12 BRPM | DIASTOLIC BLOOD PRESSURE: 60 MMHG | BODY MASS INDEX: 23.21 KG/M2 | WEIGHT: 131 LBS | TEMPERATURE: 97.8 F | HEIGHT: 63 IN | SYSTOLIC BLOOD PRESSURE: 142 MMHG

## 2018-09-10 VITALS — DIASTOLIC BLOOD PRESSURE: 51 MMHG | SYSTOLIC BLOOD PRESSURE: 158 MMHG | HEART RATE: 72 BPM

## 2018-09-10 DIAGNOSIS — K31.819 GAVE (GASTRIC ANTRAL VASCULAR ECTASIA): ICD-10-CM

## 2018-09-10 DIAGNOSIS — K31.819 GAVE (GASTRIC ANTRAL VASCULAR ECTASIA): Primary | ICD-10-CM

## 2018-09-10 DIAGNOSIS — Z78.9 MEDICATION INTOLERANCE: ICD-10-CM

## 2018-09-10 DIAGNOSIS — D50.0 IRON DEFICIENCY ANEMIA DUE TO CHRONIC BLOOD LOSS: ICD-10-CM

## 2018-09-10 DIAGNOSIS — D50.0 IRON DEFICIENCY ANEMIA DUE TO CHRONIC BLOOD LOSS: Primary | ICD-10-CM

## 2018-09-10 DIAGNOSIS — K90.9 MALABSORPTION IN THE ELDERLY: ICD-10-CM

## 2018-09-10 LAB
ERYTHROCYTE [DISTWIDTH] IN BLOOD BY AUTOMATED COUNT: 15.5 % (ref 11.3–14.5)
HCT VFR BLD AUTO: 26.7 % (ref 34.5–44)
HGB BLD-MCNC: 8.2 G/DL (ref 11.5–15.5)
LYMPHOCYTES # BLD AUTO: 0.8 10*3/MM3 (ref 0.6–4.8)
LYMPHOCYTES NFR BLD AUTO: 17.4 % (ref 24–44)
MCH RBC QN AUTO: 30.3 PG (ref 27–31)
MCHC RBC AUTO-ENTMCNC: 30.6 G/DL (ref 32–36)
MCV RBC AUTO: 99.2 FL (ref 80–99)
MONOCYTES # BLD AUTO: 0.2 10*3/MM3 (ref 0–1)
MONOCYTES NFR BLD AUTO: 4.9 % (ref 0–12)
NEUTROPHILS # BLD AUTO: 3.5 10*3/MM3 (ref 1.5–8.3)
NEUTROPHILS NFR BLD AUTO: 77.7 % (ref 41–71)
PLATELET # BLD AUTO: 275 10*3/MM3 (ref 150–450)
PMV BLD AUTO: 6.7 FL (ref 6–12)
RBC # BLD AUTO: 2.69 10*6/MM3 (ref 3.89–5.14)
WBC NRBC COR # BLD: 4.5 10*3/MM3 (ref 3.5–10.8)

## 2018-09-10 PROCEDURE — 85025 COMPLETE CBC W/AUTO DIFF WBC: CPT

## 2018-09-10 PROCEDURE — 25010000002 FERRIC CARBOXYMALTOSE 750 MG/15ML SOLUTION 15 ML VIAL: Performed by: INTERNAL MEDICINE

## 2018-09-10 PROCEDURE — 96365 THER/PROPH/DIAG IV INF INIT: CPT

## 2018-09-10 PROCEDURE — 99214 OFFICE O/P EST MOD 30 MIN: CPT | Performed by: INTERNAL MEDICINE

## 2018-09-10 RX ORDER — SODIUM CHLORIDE 9 MG/ML
250 INJECTION, SOLUTION INTRAVENOUS ONCE
Status: COMPLETED | OUTPATIENT
Start: 2018-09-10 | End: 2018-09-10

## 2018-09-10 RX ADMIN — SODIUM CHLORIDE 250 ML: 9 INJECTION, SOLUTION INTRAVENOUS at 14:08

## 2018-09-10 RX ADMIN — FERRIC CARBOXYMALTOSE INJECTION 750 MG: 50 INJECTION, SOLUTION INTRAVENOUS at 14:08

## 2018-09-11 NOTE — PROGRESS NOTES
"PROBLEM LIST:  1. 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 Status post EGD colonoscopy  3. Status post Iv iron  infusion periodically  4. Bone Marrow biopsy  - normal - done by Dr. Reeves  5. Repeat EGD should GAVE: \"watermelon stomach\" in 6/2018      REASON FOR VISIT: Follow-up of my anemia    HISTORY OF PRESENT ILLNESS:   87-year-old lady with normocytic anemia likely related to chronic blood loss.  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     Required  2 units of blood last week.  Denies any significant change in overall well-being otherwise..      Past medical history, social history and family history was reviewed and unchanged from prior visit.    Review of Systems:    Review of Systems   Constitutional: Positive for fatigue.   HENT: Negative.    Eyes: Negative.    Respiratory: Positive for shortness of breath.    Cardiovascular: Negative.    Gastrointestinal: Negative.    Endocrine: Negative.    Genitourinary: Negative.    Musculoskeletal: Negative.    Skin: Negative.    Allergic/Immunologic: Negative.    Neurological: Negative.    Hematological: Negative.    Psychiatric/Behavioral: Negative.       A comprehensive 14 point review of systems was performed and was negative except as mentioned.      Medications:  The current medication list was reviewed in the EMR    ALLERGIES:    Allergies   Allergen Reactions   • Codeine Sulfate Unknown (See Comments)     Pt took in the 70's-pt not sure of what happened   • Cozaar [Losartan] Unknown (See Comments)     Pt can not remember   • Crestor [Rosuvastatin] Unknown (See Comments)     Pt can not remember   • Dexlansoprazole Unknown (See Comments)     Pt can not remember   • Lipitor [Atorvastatin] Unknown (See Comments)     Pt can not remember   • Lisinopril Unknown (See Comments)     Pt can not remember   • Nexium [Esomeprazole Magnesium] Unknown (See Comments)     Pt can not remember   • Norvasc [Amlodipine] Unknown (See " "Comments)     Pt can not remember   • Sulfadiazine Unknown (See Comments)     Pt can not remember   • Toprol Xl [Metoprolol Tartrate] Unknown (See Comments)     Pt can not remember   • Zetia [Ezetimibe] Unknown (See Comments)     Pt can not remember   • Zocor [Simvastatin] Unknown (See Comments)     Pt can not remember   • Augmentin [Amoxicillin-Pot Clavulanate] GI Intolerance     nausea   • Celebrex [Celecoxib] GI Intolerance         Physical Exam    VITAL SIGNS:  /60   Pulse 78   Temp 97.8 °F (36.6 °C) (Temporal Artery )   Resp 12   Ht 160 cm (62.99\")   Wt 59.4 kg (131 lb)   BMI 23.21 kg/m²      Performance Status: 1    General: well appearing, in no acute distress  HEENT: sclera anicteric, oropharynx clear, neck is supple  Lymphatics: no cervical, supraclavicular, or axillary adenopathy  Cardiovascular: regular rate and rhythm, no murmurs, rubs or gallops  Lungs: clear to auscultation bilaterally  Abdomen: soft, nontender, nondistended.  No palpable organomegaly  Extremities: no lower extremity edema  Skin: no rashes, lesions, bruising, or petechiae  Msk:  Shows no weakness of the large muscle groups  Psych: Mood is stable        RECENT LABS:    Lab Results   Component Value Date    WBC 4.50 09/10/2018    HGB 8.2 (L) 09/10/2018    HCT 26.7 (L) 09/10/2018    MCV 99.2 (H) 09/10/2018     09/10/2018     Lab Results   Component Value Date    FERRITIN 67.00 07/05/2018    FERRITIN 123.00 06/29/2018    FERRITIN 416.00 (H) 06/06/2018     Lab Results   Component Value Date    IRON 26 (L) 06/01/2018   ]      Assessment/Plan    1.normocytic anemia requiring periodic blood transfusions secondary to bleeding AVMs and a Watermelon stomach.  Underwent argon treatment recently.  Continue iron infusions, this has limited her need for transfusions.  Patient otherwise requires blood transfusions every 2-3 weeks.  I will see how she does over the 3 months      2. GAVE:  This is a major issue and difficult to treat " entity.        I spent 25 minutes on the patient's plan and care and more than 50% of time counseling the patient    Rahat Morris MD  [facility]    9/11/2018

## 2018-09-17 ENCOUNTER — OFFICE VISIT (OUTPATIENT)
Dept: CARDIOLOGY | Facility: CLINIC | Age: 83
End: 2018-09-17

## 2018-09-17 VITALS
HEIGHT: 63 IN | DIASTOLIC BLOOD PRESSURE: 60 MMHG | HEART RATE: 77 BPM | SYSTOLIC BLOOD PRESSURE: 132 MMHG | OXYGEN SATURATION: 94 % | WEIGHT: 131 LBS | BODY MASS INDEX: 23.21 KG/M2

## 2018-09-17 DIAGNOSIS — I48.0 PAROXYSMAL ATRIAL FIBRILLATION (HCC): Primary | ICD-10-CM

## 2018-09-17 DIAGNOSIS — I49.5 SSS (SICK SINUS SYNDROME) (HCC): ICD-10-CM

## 2018-09-17 DIAGNOSIS — I10 ESSENTIAL HYPERTENSION: ICD-10-CM

## 2018-09-17 PROCEDURE — 99214 OFFICE O/P EST MOD 30 MIN: CPT | Performed by: INTERNAL MEDICINE

## 2018-09-17 RX ORDER — OXYBUTYNIN CHLORIDE 10 MG/1
10 TABLET, EXTENDED RELEASE ORAL AS NEEDED
COMMUNITY
End: 2021-04-07 | Stop reason: SDUPTHER

## 2018-09-17 RX ORDER — AMLODIPINE AND OLMESARTAN MEDOXOMIL 10; 20 MG/1; MG/1
1 TABLET ORAL DAILY
COMMUNITY
End: 2018-09-17

## 2018-09-17 NOTE — PROGRESS NOTES
[facility]  Office visit  Sheryl ROSAS Dalila  8/17/1931    There is no work phone number on file.    VISIT DATE:  09/17/2018    PCP: Vadim Diaz MD  [address] [place], [place] [id number]    CC:  Chief Complaint   Patient presents with   • Atrial Fibrillation       Previous cardiac studies and procedures:  2012 diagnosed with symptomatic paroxysmal A. fib and tachybradycardia syndrome.  2012 Medtronic pacemaker  2012 cardiac catheterization: No flow limiting stenosis.  June 2018 echo  · Left ventricular systolic function is hyperdynamic (EF > 70).  · Left atrial cavity size is mildly dilated.  · Mean aortic valve gradient is 13 mmhg which is borderline mild aortic stenosis    ASSESSMENT:   Diagnosis Plan   1. Paroxysmal atrial fibrillation (CMS/HCC)     2. SSS (sick sinus syndrome) (CMS/HCC)     3. Essential hypertension         PLAN:  Sick sinus syndrome: Currently stable and asymptomatic.  Continue routine follow-up in device clinic.     Paroxysmal atrial fibrillation: Chads Vas equal to 4. Not a candidate for chronic anticoagulation due to upper GI bleeding and recurrent issues with symptomatic anemia. Minimal burden of arrhythmia.     Hypertension: Goal less than 140/90 mmHg.   currently labile but with overall reasonable control.  Previously discontinued combination of amlodipine and olmesartan and due to worsening lower extremity edema.  Would be reasonable to consider thiazide diuretic or Aldactone in the future if we need better afterload reduction.     Venous insufficiency: Reasonably well-controlled on every other day Bumex.   Has had difficulty with compliance using compression stockings previously.       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  Denies chest pain,  palpitations or dyspnea on exertion.    edema has been reasonably well-controlled on current dose of Bumex, every other day.  Did not tolerate Lasix due to nausea.  Systolic blood pressures have ranged from 110 to 180 mmHg based on home blood pressure log.    PHYSICAL EXAMINATION:  There were no vitals filed for this visit.  General Appearance:    Alert, cooperative, no distress, appears stated age   Head:    Normocephalic, without obvious abnormality, atraumatic   Eyes:    conjunctiva/corneas clear   Nose:   Nares normal, septum midline, mucosa normal, no drainage   Throat:   Lips, teeth and gums normal   Neck:   Supple, symmetrical, trachea midline, no carotid    bruit or JVD   Lungs:     Clear to auscultation bilaterally, respirations unlabored   Chest Wall:    No tenderness or deformity    Heart:    Regular rate and rhythm, S1 and S2 normal, no murmur, rub   or gallop, normal carotid impulse bilaterally without bruit.   Abdomen:     Soft, non-tender   Extremities:   Extremities normal, atraumatic, no cyanosis or edema   Pulses:   2+ and symmetric all extremities   Skin:   Skin color, texture, turgor normal, no rashes or lesions       Diagnostic Data:  Procedures  No results found for: CHLPL, TRIG, HDL, LDLDIRECT  Lab Results   Component Value Date    GLUCOSE 77 08/22/2018    BUN 14 08/22/2018    CREATININE 1.01 08/22/2018     08/22/2018    K 4.6 08/22/2018     08/22/2018    CO2 26.0 08/22/2018     No results found for: HGBA1C  Lab Results   Component Value Date    WBC 4.50 09/10/2018    HGB 8.2 (L) 09/10/2018    HCT 26.7 (L) 09/10/2018     09/10/2018       Allergies  Allergies   Allergen Reactions   • Codeine Sulfate Unknown (See Comments)     Pt took in the 70's-pt not sure of what happened   • Cozaar [Losartan] Unknown (See Comments)     Pt can not remember   • Crestor [Rosuvastatin] Unknown (See Comments)     Pt can not remember   • Dexlansoprazole Unknown (See Comments)     Pt can not  remember   • Lipitor [Atorvastatin] Unknown (See Comments)     Pt can not remember   • Lisinopril Unknown (See Comments)     Pt can not remember   • Nexium [Esomeprazole Magnesium] Unknown (See Comments)     Pt can not remember   • Norvasc [Amlodipine] Unknown (See Comments)     Pt can not remember   • Sulfadiazine Unknown (See Comments)     Pt can not remember   • Toprol Xl [Metoprolol Tartrate] Unknown (See Comments)     Pt can not remember   • Zetia [Ezetimibe] Unknown (See Comments)     Pt can not remember   • Zocor [Simvastatin] Unknown (See Comments)     Pt can not remember   • Augmentin [Amoxicillin-Pot Clavulanate] GI Intolerance     nausea   • Celebrex [Celecoxib] GI Intolerance       Current Medications    Current Outpatient Prescriptions:   •  amitriptyline (ELAVIL) 25 MG tablet, Take 25 mg by mouth 2 (two) times a day., Disp: , Rfl:   •  amlodipine-olmesartan (BRUNILDA) 10-20 MG per tablet, Take 1 tablet by mouth Daily., Disp: , Rfl:   •  cetirizine (ZyrTEC) 10 MG tablet, Take 10 mg by mouth daily., Disp: , Rfl:   •  fentaNYL (DURAGESIC) 75 MCG/HR patch, Place 1 patch on the skin as directed by provider Every 72 (Seventy-Two) Hours., Disp: , Rfl:   •  gabapentin (NEURONTIN) 600 MG tablet, Take 600 mg by mouth 3 (Three) Times a Day., Disp: , Rfl: 1  •  HYDROcodone-acetaminophen (NORCO)  MG per tablet, Take 1 tablet by mouth every 6 (six) hours as needed for moderate pain (4-6)., Disp: , Rfl:   •  oxybutynin XL (DITROPAN-XL) 10 MG 24 hr tablet, Take 10 mg by mouth Daily., Disp: , Rfl:   •  pantoprazole (PROTONIX) 40 MG EC tablet, Take 40 mg by mouth daily., Disp: , Rfl:   •  promethazine (PHENERGAN) 25 MG tablet, Take 25 mg by mouth every 6 (six) hours as needed for nausea or vomiting., Disp: , Rfl:   •  VOLTAREN 1 % gel gel, , Disp: , Rfl: 3          ROS  Review of Systems   Cardiovascular: Negative for chest pain, dyspnea on exertion, irregular heartbeat and palpitations.   Respiratory: Negative for  cough and snoring.        SOCIAL HX  Social History     Social History   • Marital status:      Spouse name: N/A   • Number of children: N/A   • Years of education: N/A     Occupational History   • Not on file.     Social History Main Topics   • Smoking status: Never Smoker   • Smokeless tobacco: Never Used   • Alcohol use No   • Drug use: No   • Sexual activity: Defer     Other Topics Concern   • Not on file     Social History Narrative    Caffeine: 1 serving per day. Pt lives at home alone.           FAMILY HX  Family History   Problem Relation Age of Onset   • No Known Problems Mother    • Diabetes Father    • Cancer Son              Andi Seals III, MD, FACC

## 2018-09-20 ENCOUNTER — LAB (OUTPATIENT)
Dept: LAB | Facility: HOSPITAL | Age: 83
End: 2018-09-20

## 2018-09-20 DIAGNOSIS — D50.0 IRON DEFICIENCY ANEMIA DUE TO CHRONIC BLOOD LOSS: ICD-10-CM

## 2018-09-20 LAB
ERYTHROCYTE [DISTWIDTH] IN BLOOD BY AUTOMATED COUNT: 15 % (ref 11.3–14.5)
HCT VFR BLD AUTO: 28.9 % (ref 34.5–44)
HGB BLD-MCNC: 9.2 G/DL (ref 11.5–15.5)
LYMPHOCYTES # BLD AUTO: 0.6 10*3/MM3 (ref 0.6–4.8)
LYMPHOCYTES NFR BLD AUTO: 15.6 % (ref 24–44)
MCH RBC QN AUTO: 31.1 PG (ref 27–31)
MCHC RBC AUTO-ENTMCNC: 31.7 G/DL (ref 32–36)
MCV RBC AUTO: 98.2 FL (ref 80–99)
MONOCYTES # BLD AUTO: 0.2 10*3/MM3 (ref 0–1)
MONOCYTES NFR BLD AUTO: 4.6 % (ref 0–12)
NEUTROPHILS # BLD AUTO: 3.2 10*3/MM3 (ref 1.5–8.3)
NEUTROPHILS NFR BLD AUTO: 79.8 % (ref 41–71)
PLATELET # BLD AUTO: 257 10*3/MM3 (ref 150–450)
PMV BLD AUTO: 7.1 FL (ref 6–12)
RBC # BLD AUTO: 2.95 10*6/MM3 (ref 3.89–5.14)
WBC NRBC COR # BLD: 4 10*3/MM3 (ref 3.5–10.8)

## 2018-09-20 PROCEDURE — 36415 COLL VENOUS BLD VENIPUNCTURE: CPT

## 2018-09-20 PROCEDURE — 85025 COMPLETE CBC W/AUTO DIFF WBC: CPT

## 2018-09-27 ENCOUNTER — LAB (OUTPATIENT)
Dept: LAB | Facility: HOSPITAL | Age: 83
End: 2018-09-27

## 2018-09-27 DIAGNOSIS — D50.0 IRON DEFICIENCY ANEMIA DUE TO CHRONIC BLOOD LOSS: ICD-10-CM

## 2018-09-27 LAB
ERYTHROCYTE [DISTWIDTH] IN BLOOD BY AUTOMATED COUNT: 15.8 % (ref 11.3–14.5)
HCT VFR BLD AUTO: 29 % (ref 34.5–44)
HGB BLD-MCNC: 9.1 G/DL (ref 11.5–15.5)
LYMPHOCYTES # BLD AUTO: 0.7 10*3/MM3 (ref 0.6–4.8)
LYMPHOCYTES NFR BLD AUTO: 19.2 % (ref 24–44)
MCH RBC QN AUTO: 30.9 PG (ref 27–31)
MCHC RBC AUTO-ENTMCNC: 31.4 G/DL (ref 32–36)
MCV RBC AUTO: 98.7 FL (ref 80–99)
MONOCYTES # BLD AUTO: 0.3 10*3/MM3 (ref 0–1)
MONOCYTES NFR BLD AUTO: 7.2 % (ref 0–12)
NEUTROPHILS # BLD AUTO: 2.6 10*3/MM3 (ref 1.5–8.3)
NEUTROPHILS NFR BLD AUTO: 73.6 % (ref 41–71)
PLATELET # BLD AUTO: 205 10*3/MM3 (ref 150–450)
PMV BLD AUTO: 7.6 FL (ref 6–12)
RBC # BLD AUTO: 2.94 10*6/MM3 (ref 3.89–5.14)
WBC NRBC COR # BLD: 3.5 10*3/MM3 (ref 3.5–10.8)

## 2018-09-27 PROCEDURE — 85025 COMPLETE CBC W/AUTO DIFF WBC: CPT

## 2018-09-27 PROCEDURE — 36415 COLL VENOUS BLD VENIPUNCTURE: CPT

## 2018-10-01 DIAGNOSIS — K90.9 MALABSORPTION IN THE ELDERLY: ICD-10-CM

## 2018-10-01 DIAGNOSIS — K31.819 GAVE (GASTRIC ANTRAL VASCULAR ECTASIA): ICD-10-CM

## 2018-10-01 DIAGNOSIS — Z78.9 MEDICATION INTOLERANCE: ICD-10-CM

## 2018-10-01 DIAGNOSIS — D50.0 IRON DEFICIENCY ANEMIA DUE TO CHRONIC BLOOD LOSS: ICD-10-CM

## 2018-10-01 RX ORDER — SODIUM CHLORIDE 9 MG/ML
250 INJECTION, SOLUTION INTRAVENOUS ONCE
Status: CANCELLED | OUTPATIENT
Start: 2018-10-09

## 2018-10-01 RX ORDER — SODIUM CHLORIDE 9 MG/ML
250 INJECTION, SOLUTION INTRAVENOUS ONCE
Status: CANCELLED | OUTPATIENT
Start: 2018-11-06

## 2018-10-01 RX ORDER — SODIUM CHLORIDE 9 MG/ML
250 INJECTION, SOLUTION INTRAVENOUS ONCE
Status: CANCELLED | OUTPATIENT
Start: 2018-10-30

## 2018-10-01 RX ORDER — SODIUM CHLORIDE 9 MG/ML
250 INJECTION, SOLUTION INTRAVENOUS ONCE
Status: CANCELLED | OUTPATIENT
Start: 2018-10-02

## 2018-10-02 ENCOUNTER — INFUSION (OUTPATIENT)
Dept: ONCOLOGY | Facility: HOSPITAL | Age: 83
End: 2018-10-02

## 2018-10-02 VITALS
TEMPERATURE: 97.8 F | HEIGHT: 63 IN | RESPIRATION RATE: 16 BRPM | DIASTOLIC BLOOD PRESSURE: 49 MMHG | SYSTOLIC BLOOD PRESSURE: 147 MMHG | BODY MASS INDEX: 23.39 KG/M2 | HEART RATE: 68 BPM | WEIGHT: 132 LBS

## 2018-10-02 DIAGNOSIS — Z78.9 MEDICATION INTOLERANCE: ICD-10-CM

## 2018-10-02 DIAGNOSIS — D50.0 IRON DEFICIENCY ANEMIA DUE TO CHRONIC BLOOD LOSS: ICD-10-CM

## 2018-10-02 DIAGNOSIS — K31.819 GAVE (GASTRIC ANTRAL VASCULAR ECTASIA): Primary | ICD-10-CM

## 2018-10-02 DIAGNOSIS — K90.9 MALABSORPTION IN THE ELDERLY: ICD-10-CM

## 2018-10-02 PROCEDURE — 25010000002 FERRIC CARBOXYMALTOSE 750 MG/15ML SOLUTION 15 ML VIAL: Performed by: INTERNAL MEDICINE

## 2018-10-02 PROCEDURE — 96365 THER/PROPH/DIAG IV INF INIT: CPT

## 2018-10-02 RX ORDER — SODIUM CHLORIDE 9 MG/ML
250 INJECTION, SOLUTION INTRAVENOUS ONCE
Status: COMPLETED | OUTPATIENT
Start: 2018-10-02 | End: 2018-10-02

## 2018-10-02 RX ADMIN — SODIUM CHLORIDE 250 ML: 9 INJECTION, SOLUTION INTRAVENOUS at 13:20

## 2018-10-02 RX ADMIN — FERRIC CARBOXYMALTOSE INJECTION 750 MG: 50 INJECTION, SOLUTION INTRAVENOUS at 13:21

## 2018-10-03 ENCOUNTER — LAB (OUTPATIENT)
Dept: LAB | Facility: HOSPITAL | Age: 83
End: 2018-10-03

## 2018-10-03 DIAGNOSIS — D50.0 IRON DEFICIENCY ANEMIA DUE TO CHRONIC BLOOD LOSS: ICD-10-CM

## 2018-10-03 LAB
ERYTHROCYTE [DISTWIDTH] IN BLOOD BY AUTOMATED COUNT: 13.8 % (ref 11.3–14.5)
HCT VFR BLD AUTO: 29.6 % (ref 34.5–44)
HGB BLD-MCNC: 9.3 G/DL (ref 11.5–15.5)
LYMPHOCYTES # BLD AUTO: 0.6 10*3/MM3 (ref 0.6–4.8)
LYMPHOCYTES NFR BLD AUTO: 20.5 % (ref 24–44)
MCH RBC QN AUTO: 30.4 PG (ref 27–31)
MCHC RBC AUTO-ENTMCNC: 31.4 G/DL (ref 32–36)
MCV RBC AUTO: 96.8 FL (ref 80–99)
MONOCYTES # BLD AUTO: 0.2 10*3/MM3 (ref 0–1)
MONOCYTES NFR BLD AUTO: 5.1 % (ref 0–12)
NEUTROPHILS # BLD AUTO: 2.2 10*3/MM3 (ref 1.5–8.3)
NEUTROPHILS NFR BLD AUTO: 74.4 % (ref 41–71)
PLATELET # BLD AUTO: 221 10*3/MM3 (ref 150–450)
PMV BLD AUTO: 7.1 FL (ref 6–12)
RBC # BLD AUTO: 3.06 10*6/MM3 (ref 3.89–5.14)
WBC NRBC COR # BLD: 3 10*3/MM3 (ref 3.5–10.8)

## 2018-10-03 PROCEDURE — 85025 COMPLETE CBC W/AUTO DIFF WBC: CPT

## 2018-10-03 PROCEDURE — 36415 COLL VENOUS BLD VENIPUNCTURE: CPT

## 2018-10-09 ENCOUNTER — INFUSION (OUTPATIENT)
Dept: ONCOLOGY | Facility: HOSPITAL | Age: 83
End: 2018-10-09

## 2018-10-09 ENCOUNTER — LAB (OUTPATIENT)
Dept: LAB | Facility: HOSPITAL | Age: 83
End: 2018-10-09

## 2018-10-09 VITALS
HEIGHT: 63 IN | HEART RATE: 66 BPM | DIASTOLIC BLOOD PRESSURE: 52 MMHG | SYSTOLIC BLOOD PRESSURE: 139 MMHG | RESPIRATION RATE: 16 BRPM | WEIGHT: 131 LBS | TEMPERATURE: 97.9 F | BODY MASS INDEX: 23.21 KG/M2

## 2018-10-09 DIAGNOSIS — K31.819 GAVE (GASTRIC ANTRAL VASCULAR ECTASIA): Primary | ICD-10-CM

## 2018-10-09 DIAGNOSIS — K90.9 MALABSORPTION IN THE ELDERLY: ICD-10-CM

## 2018-10-09 DIAGNOSIS — D50.0 IRON DEFICIENCY ANEMIA DUE TO CHRONIC BLOOD LOSS: ICD-10-CM

## 2018-10-09 DIAGNOSIS — Z78.9 MEDICATION INTOLERANCE: ICD-10-CM

## 2018-10-09 LAB
ERYTHROCYTE [DISTWIDTH] IN BLOOD BY AUTOMATED COUNT: 14.8 % (ref 11.3–14.5)
HCT VFR BLD AUTO: 29.3 % (ref 34.5–44)
HGB BLD-MCNC: 9.3 G/DL (ref 11.5–15.5)
LYMPHOCYTES # BLD AUTO: 0.7 10*3/MM3 (ref 0.6–4.8)
LYMPHOCYTES NFR BLD AUTO: 18.2 % (ref 24–44)
MCH RBC QN AUTO: 31.1 PG (ref 27–31)
MCHC RBC AUTO-ENTMCNC: 31.8 G/DL (ref 32–36)
MCV RBC AUTO: 97.9 FL (ref 80–99)
MONOCYTES # BLD AUTO: 0.1 10*3/MM3 (ref 0–1)
MONOCYTES NFR BLD AUTO: 4 % (ref 0–12)
NEUTROPHILS # BLD AUTO: 2.9 10*3/MM3 (ref 1.5–8.3)
NEUTROPHILS NFR BLD AUTO: 77.8 % (ref 41–71)
PLATELET # BLD AUTO: 252 10*3/MM3 (ref 150–450)
PMV BLD AUTO: 7.2 FL (ref 6–12)
RBC # BLD AUTO: 2.99 10*6/MM3 (ref 3.89–5.14)
WBC NRBC COR # BLD: 3.7 10*3/MM3 (ref 3.5–10.8)

## 2018-10-09 PROCEDURE — 25010000002 FERRIC CARBOXYMALTOSE 750 MG/15ML SOLUTION 15 ML VIAL: Performed by: INTERNAL MEDICINE

## 2018-10-09 PROCEDURE — 36415 COLL VENOUS BLD VENIPUNCTURE: CPT

## 2018-10-09 PROCEDURE — 96365 THER/PROPH/DIAG IV INF INIT: CPT

## 2018-10-09 PROCEDURE — 85025 COMPLETE CBC W/AUTO DIFF WBC: CPT

## 2018-10-09 RX ORDER — SODIUM CHLORIDE 9 MG/ML
250 INJECTION, SOLUTION INTRAVENOUS ONCE
Status: DISCONTINUED | OUTPATIENT
Start: 2018-10-09 | End: 2018-10-09 | Stop reason: HOSPADM

## 2018-10-09 RX ADMIN — FERRIC CARBOXYMALTOSE INJECTION 750 MG: 50 INJECTION, SOLUTION INTRAVENOUS at 13:25

## 2018-10-17 ENCOUNTER — INFUSION (OUTPATIENT)
Dept: ONCOLOGY | Facility: HOSPITAL | Age: 83
End: 2018-10-17

## 2018-10-17 ENCOUNTER — LAB (OUTPATIENT)
Dept: LAB | Facility: HOSPITAL | Age: 83
End: 2018-10-17

## 2018-10-17 VITALS
TEMPERATURE: 97.4 F | DIASTOLIC BLOOD PRESSURE: 72 MMHG | HEIGHT: 62 IN | SYSTOLIC BLOOD PRESSURE: 172 MMHG | HEART RATE: 87 BPM | RESPIRATION RATE: 16 BRPM

## 2018-10-17 DIAGNOSIS — D50.0 IRON DEFICIENCY ANEMIA DUE TO CHRONIC BLOOD LOSS: ICD-10-CM

## 2018-10-17 LAB
ERYTHROCYTE [DISTWIDTH] IN BLOOD BY AUTOMATED COUNT: 16.1 % (ref 11.3–14.5)
HCT VFR BLD AUTO: 27.2 % (ref 34.5–44)
HGB BLD-MCNC: 8.6 G/DL (ref 11.5–15.5)
LYMPHOCYTES # BLD AUTO: 0.5 10*3/MM3 (ref 0.6–4.8)
LYMPHOCYTES NFR BLD AUTO: 14.2 % (ref 24–44)
MCH RBC QN AUTO: 31.8 PG (ref 27–31)
MCHC RBC AUTO-ENTMCNC: 31.7 G/DL (ref 32–36)
MCV RBC AUTO: 100.3 FL (ref 80–99)
MONOCYTES # BLD AUTO: 0.2 10*3/MM3 (ref 0–1)
MONOCYTES NFR BLD AUTO: 6.7 % (ref 0–12)
NEUTROPHILS # BLD AUTO: 2.9 10*3/MM3 (ref 1.5–8.3)
NEUTROPHILS NFR BLD AUTO: 79.1 % (ref 41–71)
PLATELET # BLD AUTO: 226 10*3/MM3 (ref 150–450)
PMV BLD AUTO: 7.4 FL (ref 6–12)
RBC # BLD AUTO: 2.71 10*6/MM3 (ref 3.89–5.14)
WBC NRBC COR # BLD: 3.7 10*3/MM3 (ref 3.5–10.8)

## 2018-10-17 PROCEDURE — 85025 COMPLETE CBC W/AUTO DIFF WBC: CPT

## 2018-10-17 PROCEDURE — 36415 COLL VENOUS BLD VENIPUNCTURE: CPT

## 2018-10-24 ENCOUNTER — LAB (OUTPATIENT)
Dept: LAB | Facility: HOSPITAL | Age: 83
End: 2018-10-24

## 2018-10-24 DIAGNOSIS — D50.0 IRON DEFICIENCY ANEMIA DUE TO CHRONIC BLOOD LOSS: ICD-10-CM

## 2018-10-24 LAB
ERYTHROCYTE [DISTWIDTH] IN BLOOD BY AUTOMATED COUNT: 15.9 % (ref 11.3–14.5)
HCT VFR BLD AUTO: 26.5 % (ref 34.5–44)
HGB BLD-MCNC: 8.6 G/DL (ref 11.5–15.5)
LYMPHOCYTES # BLD AUTO: 0.6 10*3/MM3 (ref 0.6–4.8)
LYMPHOCYTES NFR BLD AUTO: 13 % (ref 24–44)
MCH RBC QN AUTO: 32.2 PG (ref 27–31)
MCHC RBC AUTO-ENTMCNC: 32.4 G/DL (ref 32–36)
MCV RBC AUTO: 99.6 FL (ref 80–99)
MONOCYTES # BLD AUTO: 0.2 10*3/MM3 (ref 0–1)
MONOCYTES NFR BLD AUTO: 3.1 % (ref 0–12)
NEUTROPHILS # BLD AUTO: 4.2 10*3/MM3 (ref 1.5–8.3)
NEUTROPHILS NFR BLD AUTO: 83.9 % (ref 41–71)
PLATELET # BLD AUTO: 220 10*3/MM3 (ref 150–450)
PMV BLD AUTO: 7 FL (ref 6–12)
RBC # BLD AUTO: 2.66 10*6/MM3 (ref 3.89–5.14)
WBC NRBC COR # BLD: 5 10*3/MM3 (ref 3.5–10.8)

## 2018-10-24 PROCEDURE — 36415 COLL VENOUS BLD VENIPUNCTURE: CPT

## 2018-10-24 PROCEDURE — 85025 COMPLETE CBC W/AUTO DIFF WBC: CPT

## 2018-10-30 ENCOUNTER — INFUSION (OUTPATIENT)
Dept: ONCOLOGY | Facility: HOSPITAL | Age: 83
End: 2018-10-30

## 2018-10-30 VITALS
BODY MASS INDEX: 24.29 KG/M2 | RESPIRATION RATE: 18 BRPM | WEIGHT: 132 LBS | DIASTOLIC BLOOD PRESSURE: 61 MMHG | TEMPERATURE: 97.9 F | HEIGHT: 62 IN | SYSTOLIC BLOOD PRESSURE: 163 MMHG | HEART RATE: 70 BPM

## 2018-10-30 DIAGNOSIS — Z78.9 MEDICATION INTOLERANCE: ICD-10-CM

## 2018-10-30 DIAGNOSIS — D50.0 IRON DEFICIENCY ANEMIA DUE TO CHRONIC BLOOD LOSS: ICD-10-CM

## 2018-10-30 DIAGNOSIS — K31.819 GAVE (GASTRIC ANTRAL VASCULAR ECTASIA): Primary | ICD-10-CM

## 2018-10-30 DIAGNOSIS — K90.9 MALABSORPTION IN THE ELDERLY: ICD-10-CM

## 2018-10-30 LAB
ERYTHROCYTE [DISTWIDTH] IN BLOOD BY AUTOMATED COUNT: 14.4 % (ref 11.3–14.5)
HCT VFR BLD AUTO: 27.2 % (ref 34.5–44)
HGB BLD-MCNC: 8.8 G/DL (ref 11.5–15.5)
LYMPHOCYTES # BLD AUTO: 0.5 10*3/MM3 (ref 0.6–4.8)
LYMPHOCYTES NFR BLD AUTO: 13.7 % (ref 24–44)
MCH RBC QN AUTO: 31.9 PG (ref 27–31)
MCHC RBC AUTO-ENTMCNC: 32.2 G/DL (ref 32–36)
MCV RBC AUTO: 99 FL (ref 80–99)
MONOCYTES # BLD AUTO: 0.2 10*3/MM3 (ref 0–1)
MONOCYTES NFR BLD AUTO: 5.5 % (ref 0–12)
NEUTROPHILS # BLD AUTO: 3 10*3/MM3 (ref 1.5–8.3)
NEUTROPHILS NFR BLD AUTO: 80.8 % (ref 41–71)
PLATELET # BLD AUTO: 208 10*3/MM3 (ref 150–450)
PMV BLD AUTO: 7 FL (ref 6–12)
RBC # BLD AUTO: 2.75 10*6/MM3 (ref 3.89–5.14)
WBC NRBC COR # BLD: 3.7 10*3/MM3 (ref 3.5–10.8)

## 2018-10-30 PROCEDURE — 85025 COMPLETE CBC W/AUTO DIFF WBC: CPT

## 2018-10-30 PROCEDURE — 96365 THER/PROPH/DIAG IV INF INIT: CPT

## 2018-10-30 PROCEDURE — 25010000002 FERRIC CARBOXYMALTOSE 750 MG/15ML SOLUTION 15 ML VIAL: Performed by: INTERNAL MEDICINE

## 2018-10-30 RX ORDER — SODIUM CHLORIDE 9 MG/ML
250 INJECTION, SOLUTION INTRAVENOUS ONCE
Status: COMPLETED | OUTPATIENT
Start: 2018-10-30 | End: 2018-10-30

## 2018-10-30 RX ADMIN — FERRIC CARBOXYMALTOSE INJECTION 750 MG: 50 INJECTION, SOLUTION INTRAVENOUS at 13:24

## 2018-10-30 RX ADMIN — SODIUM CHLORIDE 250 ML: 9 INJECTION, SOLUTION INTRAVENOUS at 13:20

## 2018-10-31 ENCOUNTER — TELEPHONE (OUTPATIENT)
Dept: CARDIOLOGY | Facility: CLINIC | Age: 83
End: 2018-10-31

## 2018-10-31 RX ORDER — BUMETANIDE 0.5 MG/1
TABLET ORAL
Qty: 15 TABLET | Refills: 2 | Status: SHIPPED | OUTPATIENT
Start: 2018-10-31 | End: 2019-02-01 | Stop reason: SDUPTHER

## 2018-10-31 NOTE — TELEPHONE ENCOUNTER
Patients pharmacy called and is requesting an order for Bumex. They only have an order for Lasix and she cannot take Lasix because it causes nausea. Please advise.

## 2018-10-31 NOTE — TELEPHONE ENCOUNTER
Called patient. Takes Bumex 0.5 mg every other day as needed for swelling. New Rx sent to her pharmacy.

## 2018-11-06 ENCOUNTER — INFUSION (OUTPATIENT)
Dept: ONCOLOGY | Facility: HOSPITAL | Age: 83
End: 2018-11-06

## 2018-11-06 ENCOUNTER — LAB (OUTPATIENT)
Dept: LAB | Facility: HOSPITAL | Age: 83
End: 2018-11-06

## 2018-11-06 VITALS
WEIGHT: 132 LBS | HEIGHT: 62 IN | SYSTOLIC BLOOD PRESSURE: 129 MMHG | BODY MASS INDEX: 24.29 KG/M2 | TEMPERATURE: 98 F | HEART RATE: 71 BPM | RESPIRATION RATE: 16 BRPM | DIASTOLIC BLOOD PRESSURE: 48 MMHG

## 2018-11-06 DIAGNOSIS — Z78.9 MEDICATION INTOLERANCE: ICD-10-CM

## 2018-11-06 DIAGNOSIS — K90.9 MALABSORPTION IN THE ELDERLY: ICD-10-CM

## 2018-11-06 DIAGNOSIS — D50.0 IRON DEFICIENCY ANEMIA DUE TO CHRONIC BLOOD LOSS: ICD-10-CM

## 2018-11-06 DIAGNOSIS — K31.819 GAVE (GASTRIC ANTRAL VASCULAR ECTASIA): Primary | ICD-10-CM

## 2018-11-06 LAB
ERYTHROCYTE [DISTWIDTH] IN BLOOD BY AUTOMATED COUNT: 15.6 % (ref 11.3–14.5)
HCT VFR BLD AUTO: 28.5 % (ref 34.5–44)
HGB BLD-MCNC: 9 G/DL (ref 11.5–15.5)
LYMPHOCYTES # BLD AUTO: 0.7 10*3/MM3 (ref 0.6–4.8)
LYMPHOCYTES NFR BLD AUTO: 17.2 % (ref 24–44)
MCH RBC QN AUTO: 31.7 PG (ref 27–31)
MCHC RBC AUTO-ENTMCNC: 31.7 G/DL (ref 32–36)
MCV RBC AUTO: 100.3 FL (ref 80–99)
MONOCYTES # BLD AUTO: 0.3 10*3/MM3 (ref 0–1)
MONOCYTES NFR BLD AUTO: 6.5 % (ref 0–12)
NEUTROPHILS # BLD AUTO: 3.1 10*3/MM3 (ref 1.5–8.3)
NEUTROPHILS NFR BLD AUTO: 76.3 % (ref 41–71)
PLATELET # BLD AUTO: 238 10*3/MM3 (ref 150–450)
PMV BLD AUTO: 7 FL (ref 6–12)
RBC # BLD AUTO: 2.84 10*6/MM3 (ref 3.89–5.14)
WBC NRBC COR # BLD: 4 10*3/MM3 (ref 3.5–10.8)

## 2018-11-06 PROCEDURE — 25010000002 FERRIC CARBOXYMALTOSE 750 MG/15ML SOLUTION 15 ML VIAL: Performed by: INTERNAL MEDICINE

## 2018-11-06 PROCEDURE — 96365 THER/PROPH/DIAG IV INF INIT: CPT

## 2018-11-06 PROCEDURE — 36415 COLL VENOUS BLD VENIPUNCTURE: CPT

## 2018-11-06 PROCEDURE — 85025 COMPLETE CBC W/AUTO DIFF WBC: CPT

## 2018-11-06 RX ADMIN — FERRIC CARBOXYMALTOSE INJECTION 750 MG: 50 INJECTION, SOLUTION INTRAVENOUS at 13:43

## 2018-11-14 ENCOUNTER — CLINICAL SUPPORT NO REQUIREMENTS (OUTPATIENT)
Dept: CARDIOLOGY | Facility: CLINIC | Age: 83
End: 2018-11-14

## 2018-11-14 DIAGNOSIS — I49.5 SSS (SICK SINUS SYNDROME) (HCC): ICD-10-CM

## 2018-11-14 PROCEDURE — 93296 REM INTERROG EVL PM/IDS: CPT | Performed by: INTERNAL MEDICINE

## 2018-11-14 PROCEDURE — 93294 REM INTERROG EVL PM/LDLS PM: CPT | Performed by: INTERNAL MEDICINE

## 2018-11-15 ENCOUNTER — LAB (OUTPATIENT)
Dept: LAB | Facility: HOSPITAL | Age: 83
End: 2018-11-15

## 2018-11-15 DIAGNOSIS — D50.0 IRON DEFICIENCY ANEMIA DUE TO CHRONIC BLOOD LOSS: ICD-10-CM

## 2018-11-15 LAB
ERYTHROCYTE [DISTWIDTH] IN BLOOD BY AUTOMATED COUNT: 15.2 % (ref 11.3–14.5)
HCT VFR BLD AUTO: 30.1 % (ref 34.5–44)
HGB BLD-MCNC: 9.5 G/DL (ref 11.5–15.5)
LYMPHOCYTES # BLD AUTO: 0.6 10*3/MM3 (ref 0.6–4.8)
LYMPHOCYTES NFR BLD AUTO: 13.8 % (ref 24–44)
MCH RBC QN AUTO: 31.5 PG (ref 27–31)
MCHC RBC AUTO-ENTMCNC: 31.7 G/DL (ref 32–36)
MCV RBC AUTO: 99.4 FL (ref 80–99)
MONOCYTES # BLD AUTO: 0.3 10*3/MM3 (ref 0–1)
MONOCYTES NFR BLD AUTO: 6.9 % (ref 0–12)
NEUTROPHILS # BLD AUTO: 3.3 10*3/MM3 (ref 1.5–8.3)
NEUTROPHILS NFR BLD AUTO: 79.3 % (ref 41–71)
PLATELET # BLD AUTO: 257 10*3/MM3 (ref 150–450)
PMV BLD AUTO: 6.8 FL (ref 6–12)
RBC # BLD AUTO: 3.03 10*6/MM3 (ref 3.89–5.14)
WBC NRBC COR # BLD: 4.2 10*3/MM3 (ref 3.5–10.8)

## 2018-11-15 PROCEDURE — 85025 COMPLETE CBC W/AUTO DIFF WBC: CPT

## 2018-11-15 PROCEDURE — 36415 COLL VENOUS BLD VENIPUNCTURE: CPT

## 2018-11-21 ENCOUNTER — LAB (OUTPATIENT)
Dept: LAB | Facility: HOSPITAL | Age: 83
End: 2018-11-21

## 2018-11-21 DIAGNOSIS — D50.0 IRON DEFICIENCY ANEMIA DUE TO CHRONIC BLOOD LOSS: ICD-10-CM

## 2018-11-21 LAB
ERYTHROCYTE [DISTWIDTH] IN BLOOD BY AUTOMATED COUNT: 14.2 % (ref 11.3–14.5)
HCT VFR BLD AUTO: 32.2 % (ref 34.5–44)
HGB BLD-MCNC: 10.4 G/DL (ref 11.5–15.5)
LYMPHOCYTES # BLD AUTO: 0.5 10*3/MM3 (ref 0.6–4.8)
LYMPHOCYTES NFR BLD AUTO: 17.6 % (ref 24–44)
MCH RBC QN AUTO: 31.9 PG (ref 27–31)
MCHC RBC AUTO-ENTMCNC: 32.3 G/DL (ref 32–36)
MCV RBC AUTO: 98.7 FL (ref 80–99)
MONOCYTES # BLD AUTO: 0.3 10*3/MM3 (ref 0–1)
MONOCYTES NFR BLD AUTO: 8.6 % (ref 0–12)
NEUTROPHILS # BLD AUTO: 2.3 10*3/MM3 (ref 1.5–8.3)
NEUTROPHILS NFR BLD AUTO: 73.8 % (ref 41–71)
PLATELET # BLD AUTO: 225 10*3/MM3 (ref 150–450)
PMV BLD AUTO: 7 FL (ref 6–12)
RBC # BLD AUTO: 3.26 10*6/MM3 (ref 3.89–5.14)
WBC NRBC COR # BLD: 3.1 10*3/MM3 (ref 3.5–10.8)

## 2018-11-21 PROCEDURE — 36415 COLL VENOUS BLD VENIPUNCTURE: CPT

## 2018-11-21 PROCEDURE — 85025 COMPLETE CBC W/AUTO DIFF WBC: CPT

## 2018-11-26 DIAGNOSIS — Z78.9 MEDICATION INTOLERANCE: ICD-10-CM

## 2018-11-26 DIAGNOSIS — K90.9 MALABSORPTION IN THE ELDERLY: ICD-10-CM

## 2018-11-26 DIAGNOSIS — K31.819 GAVE (GASTRIC ANTRAL VASCULAR ECTASIA): ICD-10-CM

## 2018-11-26 DIAGNOSIS — D50.0 IRON DEFICIENCY ANEMIA DUE TO CHRONIC BLOOD LOSS: ICD-10-CM

## 2018-11-26 RX ORDER — SODIUM CHLORIDE 9 MG/ML
250 INJECTION, SOLUTION INTRAVENOUS ONCE
Status: CANCELLED | OUTPATIENT
Start: 2018-11-27

## 2018-11-27 ENCOUNTER — LAB (OUTPATIENT)
Dept: LAB | Facility: HOSPITAL | Age: 83
End: 2018-11-27

## 2018-11-27 ENCOUNTER — INFUSION (OUTPATIENT)
Dept: ONCOLOGY | Facility: HOSPITAL | Age: 83
End: 2018-11-27

## 2018-11-27 VITALS
DIASTOLIC BLOOD PRESSURE: 56 MMHG | BODY MASS INDEX: 23.92 KG/M2 | HEART RATE: 67 BPM | RESPIRATION RATE: 16 BRPM | TEMPERATURE: 98.1 F | WEIGHT: 130 LBS | SYSTOLIC BLOOD PRESSURE: 135 MMHG | HEIGHT: 62 IN

## 2018-11-27 DIAGNOSIS — K90.9 MALABSORPTION IN THE ELDERLY: ICD-10-CM

## 2018-11-27 DIAGNOSIS — D50.0 IRON DEFICIENCY ANEMIA DUE TO CHRONIC BLOOD LOSS: ICD-10-CM

## 2018-11-27 DIAGNOSIS — K31.819 GAVE (GASTRIC ANTRAL VASCULAR ECTASIA): Primary | ICD-10-CM

## 2018-11-27 DIAGNOSIS — Z78.9 MEDICATION INTOLERANCE: ICD-10-CM

## 2018-11-27 LAB
ERYTHROCYTE [DISTWIDTH] IN BLOOD BY AUTOMATED COUNT: 14.4 % (ref 11.3–14.5)
HCT VFR BLD AUTO: 31.7 % (ref 34.5–44)
HGB BLD-MCNC: 10.4 G/DL (ref 11.5–15.5)
LYMPHOCYTES # BLD AUTO: 0.6 10*3/MM3 (ref 0.6–4.8)
LYMPHOCYTES NFR BLD AUTO: 14.9 % (ref 24–44)
MCH RBC QN AUTO: 32.2 PG (ref 27–31)
MCHC RBC AUTO-ENTMCNC: 32.9 G/DL (ref 32–36)
MCV RBC AUTO: 97.7 FL (ref 80–99)
MONOCYTES # BLD AUTO: 0.1 10*3/MM3 (ref 0–1)
MONOCYTES NFR BLD AUTO: 3.4 % (ref 0–12)
NEUTROPHILS # BLD AUTO: 3 10*3/MM3 (ref 1.5–8.3)
NEUTROPHILS NFR BLD AUTO: 81.7 % (ref 41–71)
PLATELET # BLD AUTO: 203 10*3/MM3 (ref 150–450)
PMV BLD AUTO: 7 FL (ref 6–12)
RBC # BLD AUTO: 3.24 10*6/MM3 (ref 3.89–5.14)
WBC NRBC COR # BLD: 3.7 10*3/MM3 (ref 3.5–10.8)

## 2018-11-27 PROCEDURE — 25010000002 FERRIC CARBOXYMALTOSE 750 MG/15ML SOLUTION 15 ML VIAL: Performed by: INTERNAL MEDICINE

## 2018-11-27 PROCEDURE — 85025 COMPLETE CBC W/AUTO DIFF WBC: CPT

## 2018-11-27 PROCEDURE — 96365 THER/PROPH/DIAG IV INF INIT: CPT

## 2018-11-27 PROCEDURE — 36415 COLL VENOUS BLD VENIPUNCTURE: CPT

## 2018-11-27 RX ADMIN — FERRIC CARBOXYMALTOSE INJECTION 750 MG: 50 INJECTION, SOLUTION INTRAVENOUS at 13:46

## 2018-12-03 ENCOUNTER — OFFICE VISIT (OUTPATIENT)
Dept: ONCOLOGY | Facility: CLINIC | Age: 83
End: 2018-12-03

## 2018-12-03 VITALS
BODY MASS INDEX: 24.11 KG/M2 | TEMPERATURE: 97.9 F | HEIGHT: 62 IN | RESPIRATION RATE: 18 BRPM | HEART RATE: 84 BPM | WEIGHT: 131 LBS | OXYGEN SATURATION: 94 % | DIASTOLIC BLOOD PRESSURE: 66 MMHG | SYSTOLIC BLOOD PRESSURE: 154 MMHG

## 2018-12-03 DIAGNOSIS — Z78.9 MEDICATION INTOLERANCE: ICD-10-CM

## 2018-12-03 DIAGNOSIS — D50.0 IRON DEFICIENCY ANEMIA DUE TO CHRONIC BLOOD LOSS: ICD-10-CM

## 2018-12-03 DIAGNOSIS — K90.9 MALABSORPTION IN THE ELDERLY: ICD-10-CM

## 2018-12-03 DIAGNOSIS — K31.819 GAVE (GASTRIC ANTRAL VASCULAR ECTASIA): ICD-10-CM

## 2018-12-03 PROCEDURE — 99214 OFFICE O/P EST MOD 30 MIN: CPT | Performed by: INTERNAL MEDICINE

## 2018-12-03 RX ORDER — SODIUM CHLORIDE 9 MG/ML
250 INJECTION, SOLUTION INTRAVENOUS ONCE
Status: CANCELLED | OUTPATIENT
Start: 2019-01-30

## 2018-12-03 RX ORDER — SODIUM CHLORIDE 9 MG/ML
250 INJECTION, SOLUTION INTRAVENOUS ONCE
Status: CANCELLED | OUTPATIENT
Start: 2018-12-27

## 2018-12-03 RX ORDER — SODIUM CHLORIDE 9 MG/ML
250 INJECTION, SOLUTION INTRAVENOUS ONCE
Status: CANCELLED | OUTPATIENT
Start: 2019-01-23

## 2018-12-03 RX ORDER — SODIUM CHLORIDE 9 MG/ML
250 INJECTION, SOLUTION INTRAVENOUS ONCE
Status: CANCELLED | OUTPATIENT
Start: 2019-01-03

## 2018-12-03 RX ORDER — SODIUM CHLORIDE 9 MG/ML
250 INJECTION, SOLUTION INTRAVENOUS ONCE
Status: CANCELLED | OUTPATIENT
Start: 2018-12-04

## 2018-12-03 NOTE — PROGRESS NOTES
"PROBLEM LIST:  1. 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 Status post EGD colonoscopy  3. Status post Iv iron  infusion periodically  4. Bone Marrow biopsy  - normal - done by Dr. Reeves  5. 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  lady with normocytic anemia likely related to chronic blood loss.  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  Last blood transfusion on 8/31/18.    She has done remarkably well since then.  Receiving periodic iron infusions.  Denies any active bleeding.  Fatigue seems to be tolerating well.       Past medical history, social history and family history was reviewed and unchanged from prior visit.    Review of Systems:    Review of Systems   Constitutional: Positive for fatigue.   HENT: Negative.    Eyes: Negative.    Respiratory: Positive for shortness of breath.    Cardiovascular: Negative.    Gastrointestinal: Negative.    Endocrine: Negative.    Genitourinary: Negative.    Musculoskeletal: Negative.    Skin: Negative.    Allergic/Immunologic: Negative.    Neurological: Negative.    Hematological: Negative.    Psychiatric/Behavioral: Negative.       A comprehensive 14 point review of systems was performed and was negative except as mentioned.      Medications:  The current medication list was reviewed in the EMR    ALLERGIES:    Allergies   Allergen Reactions   • Codeine Sulfate Unknown (See Comments)     Pt took in the 70's-pt not sure of what happened   • Cozaar [Losartan] Unknown (See Comments)     Pt can not remember   • Crestor [Rosuvastatin] Unknown (See Comments)     Pt can not remember   • Dexlansoprazole Unknown (See Comments)     Pt can not remember   • Lipitor [Atorvastatin] Unknown (See Comments)     Pt can not remember   • Lisinopril Unknown (See Comments)     Pt can not remember   • Nexium [Esomeprazole Magnesium] Unknown (See " "Comments)     Pt can not remember   • Norvasc [Amlodipine] Unknown (See Comments)     Pt can not remember   • Sulfadiazine Unknown (See Comments)     Pt can not remember   • Toprol Xl [Metoprolol Tartrate] Unknown (See Comments)     Pt can not remember   • Zetia [Ezetimibe] Unknown (See Comments)     Pt can not remember   • Zocor [Simvastatin] Unknown (See Comments)     Pt can not remember   • Augmentin [Amoxicillin-Pot Clavulanate] GI Intolerance     nausea   • Celebrex [Celecoxib] GI Intolerance         Physical Exam    VITAL SIGNS:  /66 Comment: LUE  Pulse 84   Temp 97.9 °F (36.6 °C) (Temporal)   Resp 18   Ht 157.5 cm (62\")   Wt 59.4 kg (131 lb)   SpO2 94% Comment: RA  BMI 23.96 kg/m²      Performance Status: 1    General: well appearing, in no acute distress  HEENT: sclera anicteric, oropharynx clear, neck is supple  Lymphatics: no cervical, supraclavicular, or axillary adenopathy  Cardiovascular: regular rate and rhythm, no murmurs, rubs or gallops  Lungs: clear to auscultation bilaterally  Abdomen: soft, nontender, nondistended.  No palpable organomegaly  Extremities: no lower extremity edema  Skin: no rashes, lesions, bruising, or petechiae  Msk:  Shows no weakness of the large muscle groups  Psych: Mood is stable        RECENT LABS:    Lab Results   Component Value Date    WBC 3.70 11/27/2018    HGB 10.4 (L) 11/27/2018    HCT 31.7 (L) 11/27/2018    MCV 97.7 11/27/2018     11/27/2018     Lab Results   Component Value Date    FERRITIN 67.00 07/05/2018    FERRITIN 123.00 06/29/2018    FERRITIN 416.00 (H) 06/06/2018     Lab Results   Component Value Date    HGB 10.4 (L) 11/27/2018    HGB 10.4 (L) 11/21/2018    HGB 9.5 (L) 11/15/2018    HGB 9.0 (L) 11/06/2018    HGB 8.8 (L) 10/30/2018    HGB 8.6 (L) 10/24/2018    HGB 8.6 (L) 10/17/2018    HGB 9.3 (L) 10/09/2018    HGB 9.3 (L) 10/03/2018    HGB 9.1 (L) 09/27/2018       Assessment/Plan    1.normocytic anemia requiring periodic blood transfusions " secondary to bleeding AVMs and a Watermelon stomach.  Underwent argon treatment with Dr. Llamas.  Continue iron infusions, this has limited her need for transfusions.  I will see how she does over the 3 months      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        I spent 25 minutes on the patient's plan and care and more than 50% of time counseling the patient    Rahat Morris MD  [facility]    12/3/2018

## 2018-12-04 ENCOUNTER — INFUSION (OUTPATIENT)
Dept: ONCOLOGY | Facility: HOSPITAL | Age: 83
End: 2018-12-04

## 2018-12-04 VITALS
RESPIRATION RATE: 16 BRPM | HEIGHT: 62 IN | TEMPERATURE: 97.4 F | SYSTOLIC BLOOD PRESSURE: 132 MMHG | WEIGHT: 129 LBS | HEART RATE: 64 BPM | BODY MASS INDEX: 23.74 KG/M2 | DIASTOLIC BLOOD PRESSURE: 54 MMHG

## 2018-12-04 DIAGNOSIS — K31.819 GAVE (GASTRIC ANTRAL VASCULAR ECTASIA): Primary | ICD-10-CM

## 2018-12-04 DIAGNOSIS — K90.9 MALABSORPTION IN THE ELDERLY: ICD-10-CM

## 2018-12-04 DIAGNOSIS — D50.0 IRON DEFICIENCY ANEMIA DUE TO CHRONIC BLOOD LOSS: ICD-10-CM

## 2018-12-04 DIAGNOSIS — Z78.9 MEDICATION INTOLERANCE: ICD-10-CM

## 2018-12-04 LAB
ERYTHROCYTE [DISTWIDTH] IN BLOOD BY AUTOMATED COUNT: 14.4 % (ref 11.3–14.5)
FERRITIN SERPL-MCNC: 590 NG/ML (ref 10–291)
HCT VFR BLD AUTO: 28.8 % (ref 34.5–44)
HGB BLD-MCNC: 9.2 G/DL (ref 11.5–15.5)
LYMPHOCYTES # BLD AUTO: 0.5 10*3/MM3 (ref 0.6–4.8)
LYMPHOCYTES NFR BLD AUTO: 16.1 % (ref 24–44)
MCH RBC QN AUTO: 31.5 PG (ref 27–31)
MCHC RBC AUTO-ENTMCNC: 32.1 G/DL (ref 32–36)
MCV RBC AUTO: 98 FL (ref 80–99)
MONOCYTES # BLD AUTO: 0.3 10*3/MM3 (ref 0–1)
MONOCYTES NFR BLD AUTO: 8.1 % (ref 0–12)
NEUTROPHILS # BLD AUTO: 2.5 10*3/MM3 (ref 1.5–8.3)
NEUTROPHILS NFR BLD AUTO: 75.8 % (ref 41–71)
PLATELET # BLD AUTO: 208 10*3/MM3 (ref 150–450)
PMV BLD AUTO: 7.6 FL (ref 6–12)
RBC # BLD AUTO: 2.94 10*6/MM3 (ref 3.89–5.14)
WBC NRBC COR # BLD: 3.3 10*3/MM3 (ref 3.5–10.8)

## 2018-12-04 PROCEDURE — 96365 THER/PROPH/DIAG IV INF INIT: CPT

## 2018-12-04 PROCEDURE — 85025 COMPLETE CBC W/AUTO DIFF WBC: CPT

## 2018-12-04 PROCEDURE — 25010000002 FERRIC CARBOXYMALTOSE 750 MG/15ML SOLUTION 15 ML VIAL: Performed by: INTERNAL MEDICINE

## 2018-12-04 PROCEDURE — 82728 ASSAY OF FERRITIN: CPT

## 2018-12-04 RX ORDER — SODIUM CHLORIDE 9 MG/ML
250 INJECTION, SOLUTION INTRAVENOUS ONCE
Status: COMPLETED | OUTPATIENT
Start: 2018-12-04 | End: 2018-12-04

## 2018-12-04 RX ADMIN — FERRIC CARBOXYMALTOSE INJECTION 750 MG: 50 INJECTION, SOLUTION INTRAVENOUS at 13:16

## 2018-12-04 RX ADMIN — SODIUM CHLORIDE 250 ML: 9 INJECTION, SOLUTION INTRAVENOUS at 13:15

## 2018-12-12 ENCOUNTER — LAB (OUTPATIENT)
Dept: LAB | Facility: HOSPITAL | Age: 83
End: 2018-12-12

## 2018-12-12 DIAGNOSIS — Z78.9 MEDICATION INTOLERANCE: ICD-10-CM

## 2018-12-12 DIAGNOSIS — K90.9 MALABSORPTION IN THE ELDERLY: ICD-10-CM

## 2018-12-12 DIAGNOSIS — D50.0 IRON DEFICIENCY ANEMIA DUE TO CHRONIC BLOOD LOSS: ICD-10-CM

## 2018-12-12 DIAGNOSIS — K31.819 GAVE (GASTRIC ANTRAL VASCULAR ECTASIA): ICD-10-CM

## 2018-12-12 LAB
ERYTHROCYTE [DISTWIDTH] IN BLOOD BY AUTOMATED COUNT: 14.6 % (ref 11.3–14.5)
HCT VFR BLD AUTO: 30.9 % (ref 34.5–44)
HGB BLD-MCNC: 9.9 G/DL (ref 11.5–15.5)
MCH RBC QN AUTO: 31.6 PG (ref 27–31)
MCHC RBC AUTO-ENTMCNC: 32.1 G/DL (ref 32–36)
MCV RBC AUTO: 98.5 FL (ref 80–99)
PLATELET # BLD AUTO: 266 10*3/MM3 (ref 150–450)
PMV BLD AUTO: 7.4 FL (ref 6–12)
RBC # BLD AUTO: 3.14 10*6/MM3 (ref 3.89–5.14)
WBC NRBC COR # BLD: 4.1 10*3/MM3 (ref 3.5–10.8)

## 2018-12-12 PROCEDURE — 36415 COLL VENOUS BLD VENIPUNCTURE: CPT

## 2018-12-12 PROCEDURE — 85027 COMPLETE CBC AUTOMATED: CPT

## 2018-12-19 ENCOUNTER — LAB (OUTPATIENT)
Dept: LAB | Facility: HOSPITAL | Age: 83
End: 2018-12-19

## 2018-12-19 DIAGNOSIS — D50.0 IRON DEFICIENCY ANEMIA DUE TO CHRONIC BLOOD LOSS: ICD-10-CM

## 2018-12-19 LAB
ERYTHROCYTE [DISTWIDTH] IN BLOOD BY AUTOMATED COUNT: 14.4 % (ref 11.3–14.5)
HCT VFR BLD AUTO: 32.6 % (ref 34.5–44)
HGB BLD-MCNC: 10.3 G/DL (ref 11.5–15.5)
LYMPHOCYTES # BLD AUTO: 0.6 10*3/MM3 (ref 0.6–4.8)
LYMPHOCYTES NFR BLD AUTO: 16.6 % (ref 24–44)
MCH RBC QN AUTO: 31.1 PG (ref 27–31)
MCHC RBC AUTO-ENTMCNC: 31.5 G/DL (ref 32–36)
MCV RBC AUTO: 98.7 FL (ref 80–99)
MONOCYTES # BLD AUTO: 0.2 10*3/MM3 (ref 0–1)
MONOCYTES NFR BLD AUTO: 7.3 % (ref 0–12)
NEUTROPHILS # BLD AUTO: 2.6 10*3/MM3 (ref 1.5–8.3)
NEUTROPHILS NFR BLD AUTO: 76.1 % (ref 41–71)
PLATELET # BLD AUTO: 239 10*3/MM3 (ref 150–450)
PMV BLD AUTO: 7.1 FL (ref 6–12)
RBC # BLD AUTO: 3.3 10*6/MM3 (ref 3.89–5.14)
WBC NRBC COR # BLD: 3.4 10*3/MM3 (ref 3.5–10.8)

## 2018-12-19 PROCEDURE — 36415 COLL VENOUS BLD VENIPUNCTURE: CPT

## 2018-12-19 PROCEDURE — 85025 COMPLETE CBC W/AUTO DIFF WBC: CPT

## 2018-12-27 ENCOUNTER — HOSPITAL ENCOUNTER (OUTPATIENT)
Dept: ONCOLOGY | Facility: HOSPITAL | Age: 83
Setting detail: INFUSION SERIES
Discharge: HOME | End: 2018-12-27

## 2018-12-27 VITALS
TEMPERATURE: 97.6 F | WEIGHT: 130 LBS | HEART RATE: 69 BPM | BODY MASS INDEX: 23.92 KG/M2 | DIASTOLIC BLOOD PRESSURE: 47 MMHG | RESPIRATION RATE: 16 BRPM | HEIGHT: 62 IN | SYSTOLIC BLOOD PRESSURE: 157 MMHG

## 2018-12-27 DIAGNOSIS — Z78.9 MEDICATION INTOLERANCE: ICD-10-CM

## 2018-12-27 DIAGNOSIS — K31.819 GAVE (GASTRIC ANTRAL VASCULAR ECTASIA): Primary | ICD-10-CM

## 2018-12-27 DIAGNOSIS — D50.0 IRON DEFICIENCY ANEMIA DUE TO CHRONIC BLOOD LOSS: ICD-10-CM

## 2018-12-27 DIAGNOSIS — K90.9 MALABSORPTION IN THE ELDERLY: ICD-10-CM

## 2018-12-27 LAB
ERYTHROCYTE [DISTWIDTH] IN BLOOD BY AUTOMATED COUNT: 14.7 % (ref 11.3–14.5)
FERRITIN SERPL-MCNC: 328 NG/ML (ref 10–291)
HCT VFR BLD AUTO: 29.6 % (ref 34.5–44)
HGB BLD-MCNC: 9.6 G/DL (ref 11.5–15.5)
MCH RBC QN AUTO: 32 PG (ref 27–31)
MCHC RBC AUTO-ENTMCNC: 32.5 G/DL (ref 32–36)
MCV RBC AUTO: 98.6 FL (ref 80–99)
PLATELET # BLD AUTO: 217 10*3/MM3 (ref 150–450)
PMV BLD AUTO: 7.2 FL (ref 6–12)
RBC # BLD AUTO: 3 10*6/MM3 (ref 3.89–5.14)
WBC NRBC COR # BLD: 3.4 10*3/MM3 (ref 3.5–10.8)

## 2018-12-27 PROCEDURE — 85027 COMPLETE CBC AUTOMATED: CPT | Performed by: INTERNAL MEDICINE

## 2018-12-27 PROCEDURE — 82728 ASSAY OF FERRITIN: CPT | Performed by: INTERNAL MEDICINE

## 2018-12-27 PROCEDURE — 96374 THER/PROPH/DIAG INJ IV PUSH: CPT

## 2018-12-27 PROCEDURE — 25010000002 FERRIC CARBOXYMALTOSE 750 MG/15ML SOLUTION 15 ML VIAL: Performed by: INTERNAL MEDICINE

## 2018-12-27 RX ORDER — SODIUM CHLORIDE 9 MG/ML
250 INJECTION, SOLUTION INTRAVENOUS ONCE
Status: COMPLETED | OUTPATIENT
Start: 2018-12-27 | End: 2018-12-27

## 2018-12-27 RX ADMIN — SODIUM CHLORIDE 250 ML: 9 INJECTION, SOLUTION INTRAVENOUS at 13:05

## 2018-12-27 RX ADMIN — FERRIC CARBOXYMALTOSE INJECTION 750 MG: 50 INJECTION, SOLUTION INTRAVENOUS at 13:06

## 2019-01-02 ENCOUNTER — LAB (OUTPATIENT)
Dept: LAB | Facility: HOSPITAL | Age: 84
End: 2019-01-02

## 2019-01-02 DIAGNOSIS — D50.0 IRON DEFICIENCY ANEMIA DUE TO CHRONIC BLOOD LOSS: ICD-10-CM

## 2019-01-02 LAB
ERYTHROCYTE [DISTWIDTH] IN BLOOD BY AUTOMATED COUNT: 14.8 % (ref 11.3–14.5)
HCT VFR BLD AUTO: 29.1 % (ref 34.5–44)
HGB BLD-MCNC: 9.4 G/DL (ref 11.5–15.5)
LYMPHOCYTES # BLD AUTO: 0.6 10*3/MM3 (ref 0.6–4.8)
LYMPHOCYTES NFR BLD AUTO: 18.1 % (ref 24–44)
MCH RBC QN AUTO: 32.1 PG (ref 27–31)
MCHC RBC AUTO-ENTMCNC: 32.3 G/DL (ref 32–36)
MCV RBC AUTO: 99.2 FL (ref 80–99)
MONOCYTES # BLD AUTO: 0.3 10*3/MM3 (ref 0–1)
MONOCYTES NFR BLD AUTO: 8.5 % (ref 0–12)
NEUTROPHILS # BLD AUTO: 2.5 10*3/MM3 (ref 1.5–8.3)
NEUTROPHILS NFR BLD AUTO: 73.4 % (ref 41–71)
PLATELET # BLD AUTO: 255 10*3/MM3 (ref 150–450)
PMV BLD AUTO: 6.9 FL (ref 6–12)
RBC # BLD AUTO: 2.93 10*6/MM3 (ref 3.89–5.14)
WBC NRBC COR # BLD: 3.4 10*3/MM3 (ref 3.5–10.8)

## 2019-01-02 PROCEDURE — 36415 COLL VENOUS BLD VENIPUNCTURE: CPT

## 2019-01-02 PROCEDURE — 85025 COMPLETE CBC W/AUTO DIFF WBC: CPT

## 2019-01-09 ENCOUNTER — LAB (OUTPATIENT)
Dept: LAB | Facility: HOSPITAL | Age: 84
End: 2019-01-09

## 2019-01-09 DIAGNOSIS — D50.0 IRON DEFICIENCY ANEMIA DUE TO CHRONIC BLOOD LOSS: ICD-10-CM

## 2019-01-09 LAB
ERYTHROCYTE [DISTWIDTH] IN BLOOD BY AUTOMATED COUNT: 14.8 % (ref 11.3–14.5)
HCT VFR BLD AUTO: 29.2 % (ref 34.5–44)
HGB BLD-MCNC: 9.5 G/DL (ref 11.5–15.5)
LYMPHOCYTES # BLD AUTO: 0.5 10*3/MM3 (ref 0.6–4.8)
LYMPHOCYTES NFR BLD AUTO: 14.6 % (ref 24–44)
MCH RBC QN AUTO: 32.2 PG (ref 27–31)
MCHC RBC AUTO-ENTMCNC: 32.6 G/DL (ref 32–36)
MCV RBC AUTO: 98.9 FL (ref 80–99)
MONOCYTES # BLD AUTO: 0.3 10*3/MM3 (ref 0–1)
MONOCYTES NFR BLD AUTO: 7.4 % (ref 0–12)
NEUTROPHILS # BLD AUTO: 2.9 10*3/MM3 (ref 1.5–8.3)
NEUTROPHILS NFR BLD AUTO: 78 % (ref 41–71)
PLATELET # BLD AUTO: 224 10*3/MM3 (ref 150–450)
PMV BLD AUTO: 6.6 FL (ref 6–12)
RBC # BLD AUTO: 2.96 10*6/MM3 (ref 3.89–5.14)
WBC NRBC COR # BLD: 3.7 10*3/MM3 (ref 3.5–10.8)

## 2019-01-09 PROCEDURE — 36415 COLL VENOUS BLD VENIPUNCTURE: CPT

## 2019-01-09 PROCEDURE — 85025 COMPLETE CBC W/AUTO DIFF WBC: CPT

## 2019-01-16 ENCOUNTER — LAB (OUTPATIENT)
Dept: LAB | Facility: HOSPITAL | Age: 84
End: 2019-01-16

## 2019-01-16 DIAGNOSIS — D50.0 IRON DEFICIENCY ANEMIA DUE TO CHRONIC BLOOD LOSS: ICD-10-CM

## 2019-01-16 LAB
ERYTHROCYTE [DISTWIDTH] IN BLOOD BY AUTOMATED COUNT: 14.2 % (ref 11.3–14.5)
HCT VFR BLD AUTO: 29.3 % (ref 34.5–44)
HGB BLD-MCNC: 9.4 G/DL (ref 11.5–15.5)
LYMPHOCYTES # BLD AUTO: 0.7 10*3/MM3 (ref 0.6–4.8)
LYMPHOCYTES NFR BLD AUTO: 17.4 % (ref 24–44)
MCH RBC QN AUTO: 31.9 PG (ref 27–31)
MCHC RBC AUTO-ENTMCNC: 32.2 G/DL (ref 32–36)
MCV RBC AUTO: 99.1 FL (ref 80–99)
MONOCYTES # BLD AUTO: 0.3 10*3/MM3 (ref 0–1)
MONOCYTES NFR BLD AUTO: 7.1 % (ref 0–12)
NEUTROPHILS # BLD AUTO: 3 10*3/MM3 (ref 1.5–8.3)
NEUTROPHILS NFR BLD AUTO: 75.5 % (ref 41–71)
PLATELET # BLD AUTO: 255 10*3/MM3 (ref 150–450)
PMV BLD AUTO: 6.7 FL (ref 6–12)
RBC # BLD AUTO: 2.96 10*6/MM3 (ref 3.89–5.14)
WBC NRBC COR # BLD: 4 10*3/MM3 (ref 3.5–10.8)

## 2019-01-16 PROCEDURE — 36415 COLL VENOUS BLD VENIPUNCTURE: CPT

## 2019-01-16 PROCEDURE — 85025 COMPLETE CBC W/AUTO DIFF WBC: CPT

## 2019-01-24 ENCOUNTER — HOSPITAL ENCOUNTER (OUTPATIENT)
Dept: ONCOLOGY | Facility: HOSPITAL | Age: 84
Setting detail: INFUSION SERIES
Discharge: HOME | End: 2019-01-24

## 2019-01-24 VITALS
DIASTOLIC BLOOD PRESSURE: 69 MMHG | BODY MASS INDEX: 22.68 KG/M2 | SYSTOLIC BLOOD PRESSURE: 154 MMHG | TEMPERATURE: 96.4 F | HEIGHT: 63 IN | RESPIRATION RATE: 20 BRPM | WEIGHT: 128 LBS | HEART RATE: 91 BPM

## 2019-01-24 DIAGNOSIS — Z78.9 MEDICATION INTOLERANCE: ICD-10-CM

## 2019-01-24 DIAGNOSIS — K90.9 MALABSORPTION IN THE ELDERLY: ICD-10-CM

## 2019-01-24 DIAGNOSIS — K31.819 GAVE (GASTRIC ANTRAL VASCULAR ECTASIA): ICD-10-CM

## 2019-01-24 DIAGNOSIS — D50.0 IRON DEFICIENCY ANEMIA DUE TO CHRONIC BLOOD LOSS: Primary | ICD-10-CM

## 2019-01-24 LAB
ERYTHROCYTE [DISTWIDTH] IN BLOOD BY AUTOMATED COUNT: 13.4 % (ref 11.3–14.5)
HCT VFR BLD AUTO: 26.9 % (ref 34.5–44)
HGB BLD-MCNC: 8.7 G/DL (ref 11.5–15.5)
LYMPHOCYTES # BLD AUTO: 0.5 10*3/MM3 (ref 0.6–4.8)
LYMPHOCYTES NFR BLD AUTO: 13.8 % (ref 24–44)
MCH RBC QN AUTO: 31.9 PG (ref 27–31)
MCHC RBC AUTO-ENTMCNC: 32.2 G/DL (ref 32–36)
MCV RBC AUTO: 98.9 FL (ref 80–99)
MONOCYTES # BLD AUTO: 0.2 10*3/MM3 (ref 0–1)
MONOCYTES NFR BLD AUTO: 5.4 % (ref 0–12)
NEUTROPHILS # BLD AUTO: 3.1 10*3/MM3 (ref 1.5–8.3)
NEUTROPHILS NFR BLD AUTO: 80.8 % (ref 41–71)
PLATELET # BLD AUTO: 216 10*3/MM3 (ref 150–450)
PMV BLD AUTO: 7 FL (ref 6–12)
RBC # BLD AUTO: 2.72 10*6/MM3 (ref 3.89–5.14)
WBC NRBC COR # BLD: 3.8 10*3/MM3 (ref 3.5–10.8)

## 2019-01-24 PROCEDURE — 96365 THER/PROPH/DIAG IV INF INIT: CPT

## 2019-01-24 PROCEDURE — 96374 THER/PROPH/DIAG INJ IV PUSH: CPT

## 2019-01-24 PROCEDURE — 25010000002 FERRIC CARBOXYMALTOSE 750 MG/15ML SOLUTION 15 ML VIAL: Performed by: INTERNAL MEDICINE

## 2019-01-24 PROCEDURE — 85025 COMPLETE CBC W/AUTO DIFF WBC: CPT | Performed by: INTERNAL MEDICINE

## 2019-01-24 RX ORDER — SODIUM CHLORIDE 9 MG/ML
250 INJECTION, SOLUTION INTRAVENOUS ONCE
Status: COMPLETED | OUTPATIENT
Start: 2019-01-24 | End: 2019-01-24

## 2019-01-24 RX ADMIN — SODIUM CHLORIDE 250 ML: 9 INJECTION, SOLUTION INTRAVENOUS at 13:58

## 2019-01-24 RX ADMIN — FERRIC CARBOXYMALTOSE INJECTION 750 MG: 50 INJECTION, SOLUTION INTRAVENOUS at 13:58

## 2019-01-31 ENCOUNTER — LAB (OUTPATIENT)
Dept: LAB | Facility: HOSPITAL | Age: 84
End: 2019-01-31

## 2019-01-31 DIAGNOSIS — D50.0 IRON DEFICIENCY ANEMIA DUE TO CHRONIC BLOOD LOSS: ICD-10-CM

## 2019-01-31 LAB
ERYTHROCYTE [DISTWIDTH] IN BLOOD BY AUTOMATED COUNT: 14.4 % (ref 11.3–14.5)
HCT VFR BLD AUTO: 29.5 % (ref 34.5–44)
HGB BLD-MCNC: 9.5 G/DL (ref 11.5–15.5)
LYMPHOCYTES # BLD AUTO: 0.7 10*3/MM3 (ref 0.6–4.8)
LYMPHOCYTES NFR BLD AUTO: 17.3 % (ref 24–44)
MCH RBC QN AUTO: 32.1 PG (ref 27–31)
MCHC RBC AUTO-ENTMCNC: 32.2 G/DL (ref 32–36)
MCV RBC AUTO: 99.7 FL (ref 80–99)
MONOCYTES # BLD AUTO: 0.2 10*3/MM3 (ref 0–1)
MONOCYTES NFR BLD AUTO: 6.2 % (ref 0–12)
NEUTROPHILS # BLD AUTO: 2.9 10*3/MM3 (ref 1.5–8.3)
NEUTROPHILS NFR BLD AUTO: 76.5 % (ref 41–71)
PLATELET # BLD AUTO: 261 10*3/MM3 (ref 150–450)
PMV BLD AUTO: 7.4 FL (ref 6–12)
RBC # BLD AUTO: 2.95 10*6/MM3 (ref 3.89–5.14)
WBC NRBC COR # BLD: 3.8 10*3/MM3 (ref 3.5–10.8)

## 2019-01-31 PROCEDURE — 85025 COMPLETE CBC W/AUTO DIFF WBC: CPT

## 2019-01-31 PROCEDURE — 36415 COLL VENOUS BLD VENIPUNCTURE: CPT

## 2019-02-01 RX ORDER — BUMETANIDE 0.5 MG/1
TABLET ORAL
Qty: 15 TABLET | Refills: 2 | Status: SHIPPED | OUTPATIENT
Start: 2019-02-01 | End: 2019-10-16 | Stop reason: SDUPTHER

## 2019-02-01 RX ORDER — POTASSIUM CHLORIDE 750 MG/1
TABLET, FILM COATED, EXTENDED RELEASE ORAL
Qty: 30 TABLET | Refills: 2 | Status: SHIPPED | OUTPATIENT
Start: 2019-02-01 | End: 2019-10-28 | Stop reason: SDUPTHER

## 2019-02-06 ENCOUNTER — LAB (OUTPATIENT)
Dept: LAB | Facility: HOSPITAL | Age: 84
End: 2019-02-06

## 2019-02-06 DIAGNOSIS — D50.0 IRON DEFICIENCY ANEMIA DUE TO CHRONIC BLOOD LOSS: ICD-10-CM

## 2019-02-06 LAB
ERYTHROCYTE [DISTWIDTH] IN BLOOD BY AUTOMATED COUNT: 14.8 % (ref 11.3–14.5)
HCT VFR BLD AUTO: 30.6 % (ref 34.5–44)
HGB BLD-MCNC: 9.9 G/DL (ref 11.5–15.5)
LYMPHOCYTES # BLD AUTO: 0.6 10*3/MM3 (ref 0.6–4.8)
LYMPHOCYTES NFR BLD AUTO: 16.2 % (ref 24–44)
MCH RBC QN AUTO: 32.3 PG (ref 27–31)
MCHC RBC AUTO-ENTMCNC: 32.4 G/DL (ref 32–36)
MCV RBC AUTO: 99.6 FL (ref 80–99)
MONOCYTES # BLD AUTO: 0.2 10*3/MM3 (ref 0–1)
MONOCYTES NFR BLD AUTO: 6 % (ref 0–12)
NEUTROPHILS # BLD AUTO: 2.6 10*3/MM3 (ref 1.5–8.3)
NEUTROPHILS NFR BLD AUTO: 77.8 % (ref 41–71)
PLATELET # BLD AUTO: 254 10*3/MM3 (ref 150–450)
PMV BLD AUTO: 7 FL (ref 6–12)
RBC # BLD AUTO: 3.07 10*6/MM3 (ref 3.89–5.14)
WBC NRBC COR # BLD: 3.4 10*3/MM3 (ref 3.5–10.8)

## 2019-02-06 PROCEDURE — 36415 COLL VENOUS BLD VENIPUNCTURE: CPT

## 2019-02-06 PROCEDURE — 85025 COMPLETE CBC W/AUTO DIFF WBC: CPT

## 2019-02-13 ENCOUNTER — LAB (OUTPATIENT)
Dept: LAB | Facility: HOSPITAL | Age: 84
End: 2019-02-13

## 2019-02-13 DIAGNOSIS — D50.0 IRON DEFICIENCY ANEMIA DUE TO CHRONIC BLOOD LOSS: ICD-10-CM

## 2019-02-13 LAB
ERYTHROCYTE [DISTWIDTH] IN BLOOD BY AUTOMATED COUNT: 15 % (ref 11.3–14.5)
HCT VFR BLD AUTO: 29.3 % (ref 34.5–44)
HGB BLD-MCNC: 9.3 G/DL (ref 11.5–15.5)
LYMPHOCYTES # BLD AUTO: 0.7 10*3/MM3 (ref 0.6–4.8)
LYMPHOCYTES NFR BLD AUTO: 13.2 % (ref 24–44)
MCH RBC QN AUTO: 31.4 PG (ref 27–31)
MCHC RBC AUTO-ENTMCNC: 31.8 G/DL (ref 32–36)
MCV RBC AUTO: 98.9 FL (ref 80–99)
MONOCYTES # BLD AUTO: 0.3 10*3/MM3 (ref 0–1)
MONOCYTES NFR BLD AUTO: 4.6 % (ref 0–12)
NEUTROPHILS # BLD AUTO: 4.6 10*3/MM3 (ref 1.5–8.3)
NEUTROPHILS NFR BLD AUTO: 82.2 % (ref 41–71)
PLATELET # BLD AUTO: 237 10*3/MM3 (ref 150–450)
PMV BLD AUTO: 6.9 FL (ref 6–12)
RBC # BLD AUTO: 2.96 10*6/MM3 (ref 3.89–5.14)
WBC NRBC COR # BLD: 5.6 10*3/MM3 (ref 3.5–10.8)

## 2019-02-13 PROCEDURE — 85025 COMPLETE CBC W/AUTO DIFF WBC: CPT

## 2019-02-13 PROCEDURE — 36415 COLL VENOUS BLD VENIPUNCTURE: CPT

## 2019-02-20 ENCOUNTER — LAB (OUTPATIENT)
Dept: LAB | Facility: HOSPITAL | Age: 84
End: 2019-02-20

## 2019-02-20 ENCOUNTER — OFFICE VISIT (OUTPATIENT)
Dept: ONCOLOGY | Facility: CLINIC | Age: 84
End: 2019-02-20

## 2019-02-20 ENCOUNTER — HOSPITAL ENCOUNTER (OUTPATIENT)
Dept: ONCOLOGY | Facility: HOSPITAL | Age: 84
Setting detail: INFUSION SERIES
Discharge: HOME | End: 2019-02-20

## 2019-02-20 VITALS
HEIGHT: 63 IN | DIASTOLIC BLOOD PRESSURE: 69 MMHG | HEART RATE: 89 BPM | SYSTOLIC BLOOD PRESSURE: 129 MMHG | TEMPERATURE: 97.4 F | OXYGEN SATURATION: 97 % | RESPIRATION RATE: 20 BRPM | WEIGHT: 133 LBS | BODY MASS INDEX: 23.57 KG/M2

## 2019-02-20 VITALS — SYSTOLIC BLOOD PRESSURE: 155 MMHG | DIASTOLIC BLOOD PRESSURE: 60 MMHG | HEART RATE: 74 BPM

## 2019-02-20 DIAGNOSIS — K90.9 MALABSORPTION IN THE ELDERLY: ICD-10-CM

## 2019-02-20 DIAGNOSIS — Z78.9 MEDICATION INTOLERANCE: ICD-10-CM

## 2019-02-20 DIAGNOSIS — D50.0 IRON DEFICIENCY ANEMIA DUE TO CHRONIC BLOOD LOSS: ICD-10-CM

## 2019-02-20 DIAGNOSIS — K31.819 GAVE (GASTRIC ANTRAL VASCULAR ECTASIA): Primary | ICD-10-CM

## 2019-02-20 DIAGNOSIS — K31.819 GAVE (GASTRIC ANTRAL VASCULAR ECTASIA): ICD-10-CM

## 2019-02-20 LAB
ERYTHROCYTE [DISTWIDTH] IN BLOOD BY AUTOMATED COUNT: 13.9 % (ref 11.3–14.5)
FERRITIN SERPL-MCNC: 251 NG/ML (ref 10–291)
HCT VFR BLD AUTO: 33 % (ref 34.5–44)
HGB BLD-MCNC: 10.7 G/DL (ref 11.5–15.5)
LYMPHOCYTES # BLD AUTO: 0.5 10*3/MM3 (ref 0.6–4.8)
LYMPHOCYTES NFR BLD AUTO: 11.9 % (ref 24–44)
MCH RBC QN AUTO: 31.8 PG (ref 27–31)
MCHC RBC AUTO-ENTMCNC: 32.5 G/DL (ref 32–36)
MCV RBC AUTO: 97.8 FL (ref 80–99)
MONOCYTES # BLD AUTO: 0.3 10*3/MM3 (ref 0–1)
MONOCYTES NFR BLD AUTO: 7.1 % (ref 0–12)
NEUTROPHILS # BLD AUTO: 3.5 10*3/MM3 (ref 1.5–8.3)
NEUTROPHILS NFR BLD AUTO: 81 % (ref 41–71)
PLATELET # BLD AUTO: 294 10*3/MM3 (ref 150–450)
PMV BLD AUTO: 7.1 FL (ref 6–12)
RBC # BLD AUTO: 3.37 10*6/MM3 (ref 3.89–5.14)
WBC NRBC COR # BLD: 4.3 10*3/MM3 (ref 3.5–10.8)

## 2019-02-20 PROCEDURE — 99214 OFFICE O/P EST MOD 30 MIN: CPT | Performed by: INTERNAL MEDICINE

## 2019-02-20 PROCEDURE — 25010000002 FERRIC CARBOXYMALTOSE 750 MG/15ML SOLUTION 15 ML VIAL: Performed by: INTERNAL MEDICINE

## 2019-02-20 PROCEDURE — 96365 THER/PROPH/DIAG IV INF INIT: CPT

## 2019-02-20 PROCEDURE — 36415 COLL VENOUS BLD VENIPUNCTURE: CPT

## 2019-02-20 PROCEDURE — 82728 ASSAY OF FERRITIN: CPT

## 2019-02-20 PROCEDURE — 85025 COMPLETE CBC W/AUTO DIFF WBC: CPT

## 2019-02-20 PROCEDURE — 96374 THER/PROPH/DIAG INJ IV PUSH: CPT

## 2019-02-20 RX ORDER — SODIUM CHLORIDE 9 MG/ML
250 INJECTION, SOLUTION INTRAVENOUS ONCE
Status: CANCELLED | OUTPATIENT
Start: 2019-04-17

## 2019-02-20 RX ORDER — SODIUM CHLORIDE 9 MG/ML
250 INJECTION, SOLUTION INTRAVENOUS ONCE
Status: CANCELLED | OUTPATIENT
Start: 2019-02-20

## 2019-02-20 RX ORDER — SODIUM CHLORIDE 9 MG/ML
250 INJECTION, SOLUTION INTRAVENOUS ONCE
Status: COMPLETED | OUTPATIENT
Start: 2019-02-20 | End: 2019-02-20

## 2019-02-20 RX ORDER — SODIUM CHLORIDE 9 MG/ML
250 INJECTION, SOLUTION INTRAVENOUS ONCE
Status: CANCELLED | OUTPATIENT
Start: 2019-03-20

## 2019-02-20 RX ADMIN — FERRIC CARBOXYMALTOSE INJECTION 750 MG: 50 INJECTION, SOLUTION INTRAVENOUS at 14:29

## 2019-02-20 RX ADMIN — SODIUM CHLORIDE 250 ML: 9 INJECTION, SOLUTION INTRAVENOUS at 14:27

## 2019-02-20 NOTE — PROGRESS NOTES
"PROBLEM LIST:  1. 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 Status post EGD colonoscopy  3. Status post Iv iron  infusion periodically  4. Bone Marrow biopsy  - normal - done by Dr. Reeves  5. 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  lady with normocytic anemia likely related to chronic blood loss.  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  Last blood transfusion on 8/31/18.    She has done remarkably well since then.  Receiving periodic iron infusions.  Denies any active bleeding.  Fatigue seems to be tolerating well.       Past medical history, social history and family history was reviewed and unchanged from prior visit.    Review of Systems:    Review of Systems   Constitutional: Positive for fatigue.   HENT: Negative.    Eyes: Negative.    Respiratory: Positive for shortness of breath.    Cardiovascular: Negative.    Gastrointestinal: Negative.    Endocrine: Negative.    Genitourinary: Negative.    Musculoskeletal: Negative.    Skin: Negative.    Allergic/Immunologic: Negative.    Neurological: Negative.    Hematological: Negative.    Psychiatric/Behavioral: Negative.       A comprehensive 14 point review of systems was performed and was negative except as mentioned.      Medications:  The current medication list was reviewed in the EMR    ALLERGIES:    Allergies   Allergen Reactions   • Codeine Sulfate Unknown (See Comments)     Pt took in the 70's-pt not sure of what happened   • Cozaar [Losartan] Unknown (See Comments)     Pt can not remember   • Crestor [Rosuvastatin] Unknown (See Comments)     Pt can not remember   • Dexlansoprazole Unknown (See Comments)     Pt can not remember   • Lipitor [Atorvastatin] Unknown (See Comments)     Pt can not remember   • Lisinopril Unknown (See Comments)     Pt can not remember   • Nexium [Esomeprazole Magnesium] Unknown (See " "Comments)     Pt can not remember   • Norvasc [Amlodipine] Unknown (See Comments)     Pt can not remember   • Sulfadiazine Unknown (See Comments)     Pt can not remember   • Toprol Xl [Metoprolol Tartrate] Unknown (See Comments)     Pt can not remember   • Zetia [Ezetimibe] Unknown (See Comments)     Pt can not remember   • Zocor [Simvastatin] Unknown (See Comments)     Pt can not remember   • Augmentin [Amoxicillin-Pot Clavulanate] GI Intolerance     nausea   • Celebrex [Celecoxib] GI Intolerance         Physical Exam    VITAL SIGNS:  /69 Comment: LUE  Pulse 89   Temp 97.4 °F (36.3 °C) (Temporal)   Resp 20   Ht 160 cm (63\")   Wt 60.3 kg (133 lb)   SpO2 97% Comment: RA  BMI 23.56 kg/m²      Performance Status: 1    General: well appearing, in no acute distress  HEENT: sclera anicteric, oropharynx clear, neck is supple  Lymphatics: no cervical, supraclavicular, or axillary adenopathy  Cardiovascular: regular rate and rhythm, no murmurs, rubs or gallops  Lungs: clear to auscultation bilaterally  Abdomen: soft, nontender, nondistended.  No palpable organomegaly  Extremities: no lower extremity edema  Skin: no rashes, lesions, bruising, or petechiae  Msk:  Shows no weakness of the large muscle groups  Psych: Mood is stable        RECENT LABS:    Lab Results   Component Value Date    WBC 4.30 02/20/2019    HGB 10.7 (L) 02/20/2019    HCT 33.0 (L) 02/20/2019    MCV 97.8 02/20/2019     02/20/2019     Lab Results   Component Value Date    FERRITIN 328.00 (H) 12/27/2018    FERRITIN 590.00 (H) 12/04/2018    FERRITIN 67.00 07/05/2018     Lab Results   Component Value Date    HGB 10.7 (L) 02/20/2019    HGB 9.3 (L) 02/13/2019    HGB 9.9 (L) 02/06/2019    HGB 9.5 (L) 01/31/2019    HGB 8.7 (L) 01/24/2019    HGB 9.4 (L) 01/16/2019    HGB 9.5 (L) 01/09/2019    HGB 9.4 (L) 01/02/2019    HGB 9.6 (L) 12/27/2018    HGB 10.3 (L) 12/19/2018       Assessment/Plan    1.normocytic anemia requiring periodic blood " transfusions secondary to bleeding AVMs and a Watermelon stomach.  Underwent argon treatment with Dr. Llamas.  Continue iron infusions, this has limited her need for transfusions. Will recheck her ferritin today.  I will see how she does over the 3 months      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 Answered any questions patient had with medication and plan.      Rahat Morris MD  [facility] Hematology and Oncology    2/20/2019

## 2019-02-27 ENCOUNTER — LAB (OUTPATIENT)
Dept: LAB | Facility: HOSPITAL | Age: 84
End: 2019-02-27

## 2019-02-27 ENCOUNTER — TELEPHONE (OUTPATIENT)
Dept: CARDIOLOGY | Facility: CLINIC | Age: 84
End: 2019-02-27

## 2019-02-27 DIAGNOSIS — D50.0 IRON DEFICIENCY ANEMIA DUE TO CHRONIC BLOOD LOSS: ICD-10-CM

## 2019-02-27 LAB
ERYTHROCYTE [DISTWIDTH] IN BLOOD BY AUTOMATED COUNT: 15.4 % (ref 11.3–14.5)
HCT VFR BLD AUTO: 31.1 % (ref 34.5–44)
HGB BLD-MCNC: 9.8 G/DL (ref 11.5–15.5)
LYMPHOCYTES # BLD AUTO: 0.7 10*3/MM3 (ref 0.6–4.8)
LYMPHOCYTES NFR BLD AUTO: 15.4 % (ref 24–44)
MCH RBC QN AUTO: 31.4 PG (ref 27–31)
MCHC RBC AUTO-ENTMCNC: 31.4 G/DL (ref 32–36)
MCV RBC AUTO: 100 FL (ref 80–99)
MONOCYTES # BLD AUTO: 0.3 10*3/MM3 (ref 0–1)
MONOCYTES NFR BLD AUTO: 6.1 % (ref 0–12)
NEUTROPHILS # BLD AUTO: 3.8 10*3/MM3 (ref 1.5–8.3)
NEUTROPHILS NFR BLD AUTO: 78.5 % (ref 41–71)
PLATELET # BLD AUTO: 269 10*3/MM3 (ref 150–450)
PMV BLD AUTO: 6.6 FL (ref 6–12)
RBC # BLD AUTO: 3.11 10*6/MM3 (ref 3.89–5.14)
WBC NRBC COR # BLD: 4.8 10*3/MM3 (ref 3.5–10.8)

## 2019-02-27 PROCEDURE — 36415 COLL VENOUS BLD VENIPUNCTURE: CPT

## 2019-02-27 PROCEDURE — 85025 COMPLETE CBC W/AUTO DIFF WBC: CPT

## 2019-02-28 NOTE — TELEPHONE ENCOUNTER
Left message to let pt know I did not receive her transmission yesterday. I ask her to send it today or call and I could reschedule her.

## 2019-03-01 ENCOUNTER — CLINICAL SUPPORT NO REQUIREMENTS (OUTPATIENT)
Dept: CARDIOLOGY | Facility: CLINIC | Age: 84
End: 2019-03-01

## 2019-03-01 DIAGNOSIS — I49.5 SSS (SICK SINUS SYNDROME) (HCC): ICD-10-CM

## 2019-03-01 DIAGNOSIS — I48.0 PAROXYSMAL ATRIAL FIBRILLATION (HCC): ICD-10-CM

## 2019-03-01 PROCEDURE — 93294 REM INTERROG EVL PM/LDLS PM: CPT | Performed by: INTERNAL MEDICINE

## 2019-03-01 PROCEDURE — 93296 REM INTERROG EVL PM/IDS: CPT | Performed by: INTERNAL MEDICINE

## 2019-03-13 ENCOUNTER — LAB (OUTPATIENT)
Dept: LAB | Facility: HOSPITAL | Age: 84
End: 2019-03-13

## 2019-03-13 DIAGNOSIS — Z78.9 MEDICATION INTOLERANCE: ICD-10-CM

## 2019-03-13 DIAGNOSIS — K31.819 GAVE (GASTRIC ANTRAL VASCULAR ECTASIA): ICD-10-CM

## 2019-03-13 DIAGNOSIS — K90.9 MALABSORPTION IN THE ELDERLY: ICD-10-CM

## 2019-03-13 DIAGNOSIS — D50.0 IRON DEFICIENCY ANEMIA DUE TO CHRONIC BLOOD LOSS: ICD-10-CM

## 2019-03-13 LAB
BASOPHILS # BLD AUTO: 0.03 10*3/MM3 (ref 0–0.2)
BASOPHILS NFR BLD AUTO: 0.5 % (ref 0–1)
DEPRECATED RDW RBC AUTO: 47.3 FL (ref 37–54)
EOSINOPHIL # BLD AUTO: 0.05 10*3/MM3 (ref 0–0.3)
EOSINOPHIL NFR BLD AUTO: 0.9 % (ref 0–3)
ERYTHROCYTE [DISTWIDTH] IN BLOOD BY AUTOMATED COUNT: 13 % (ref 11.3–14.5)
FERRITIN SERPL-MCNC: 228 NG/ML (ref 10–291)
HCT VFR BLD AUTO: 36.4 % (ref 34.5–44)
HGB BLD-MCNC: 11.6 G/DL (ref 11.5–15.5)
IMM GRANULOCYTES # BLD AUTO: 0.01 10*3/MM3 (ref 0–0.05)
IMM GRANULOCYTES NFR BLD AUTO: 0.2 % (ref 0–0.6)
LYMPHOCYTES # BLD AUTO: 0.45 10*3/MM3 (ref 0.6–4.8)
LYMPHOCYTES NFR BLD AUTO: 7.7 % (ref 24–44)
MCH RBC QN AUTO: 31.5 PG (ref 27–31)
MCHC RBC AUTO-ENTMCNC: 31.9 G/DL (ref 32–36)
MCV RBC AUTO: 98.9 FL (ref 80–99)
MONOCYTES # BLD AUTO: 0.45 10*3/MM3 (ref 0–1)
MONOCYTES NFR BLD AUTO: 7.7 % (ref 0–12)
NEUTROPHILS # BLD AUTO: 4.83 10*3/MM3 (ref 1.5–8.3)
NEUTROPHILS NFR BLD AUTO: 83 % (ref 41–71)
PLATELET # BLD AUTO: 250 10*3/MM3 (ref 150–450)
PMV BLD AUTO: 9.3 FL (ref 6–12)
RBC # BLD AUTO: 3.68 10*6/MM3 (ref 3.89–5.14)
WBC NRBC COR # BLD: 5.82 10*3/MM3 (ref 3.5–10.8)

## 2019-03-13 PROCEDURE — 82728 ASSAY OF FERRITIN: CPT

## 2019-03-13 PROCEDURE — 85025 COMPLETE CBC W/AUTO DIFF WBC: CPT

## 2019-03-13 PROCEDURE — 36415 COLL VENOUS BLD VENIPUNCTURE: CPT

## 2019-03-20 ENCOUNTER — HOSPITAL ENCOUNTER (OUTPATIENT)
Dept: ONCOLOGY | Facility: HOSPITAL | Age: 84
Setting detail: INFUSION SERIES
Discharge: HOME | End: 2019-03-20

## 2019-03-20 ENCOUNTER — APPOINTMENT (OUTPATIENT)
Dept: ONCOLOGY | Facility: HOSPITAL | Age: 84
End: 2019-03-20

## 2019-03-20 ENCOUNTER — OFFICE VISIT (OUTPATIENT)
Dept: CARDIOLOGY | Facility: CLINIC | Age: 84
End: 2019-03-20

## 2019-03-20 VITALS
HEIGHT: 63 IN | OXYGEN SATURATION: 97 % | WEIGHT: 129.6 LBS | DIASTOLIC BLOOD PRESSURE: 60 MMHG | SYSTOLIC BLOOD PRESSURE: 140 MMHG | BODY MASS INDEX: 22.96 KG/M2 | HEART RATE: 70 BPM

## 2019-03-20 VITALS
WEIGHT: 130 LBS | BODY MASS INDEX: 23.04 KG/M2 | SYSTOLIC BLOOD PRESSURE: 147 MMHG | DIASTOLIC BLOOD PRESSURE: 61 MMHG | TEMPERATURE: 97.4 F | RESPIRATION RATE: 16 BRPM | HEIGHT: 63 IN | HEART RATE: 71 BPM

## 2019-03-20 DIAGNOSIS — K90.9 MALABSORPTION IN THE ELDERLY: ICD-10-CM

## 2019-03-20 DIAGNOSIS — I48.0 PAROXYSMAL ATRIAL FIBRILLATION (HCC): Primary | ICD-10-CM

## 2019-03-20 DIAGNOSIS — I10 ESSENTIAL HYPERTENSION: ICD-10-CM

## 2019-03-20 DIAGNOSIS — D50.0 IRON DEFICIENCY ANEMIA DUE TO CHRONIC BLOOD LOSS: Primary | ICD-10-CM

## 2019-03-20 DIAGNOSIS — K31.819 GAVE (GASTRIC ANTRAL VASCULAR ECTASIA): ICD-10-CM

## 2019-03-20 DIAGNOSIS — Z78.9 MEDICATION INTOLERANCE: ICD-10-CM

## 2019-03-20 DIAGNOSIS — I49.5 SSS (SICK SINUS SYNDROME) (HCC): ICD-10-CM

## 2019-03-20 LAB
ERYTHROCYTE [DISTWIDTH] IN BLOOD BY AUTOMATED COUNT: 13.8 % (ref 11.3–14.5)
HCT VFR BLD AUTO: 33.4 % (ref 34.5–44)
HGB BLD-MCNC: 10.7 G/DL (ref 11.5–15.5)
LYMPHOCYTES # BLD AUTO: 0.6 10*3/MM3 (ref 0.6–4.8)
LYMPHOCYTES NFR BLD AUTO: 15 % (ref 24–44)
MCH RBC QN AUTO: 31.1 PG (ref 27–31)
MCHC RBC AUTO-ENTMCNC: 31.9 G/DL (ref 32–36)
MCV RBC AUTO: 97.3 FL (ref 80–99)
MONOCYTES # BLD AUTO: 0.1 10*3/MM3 (ref 0–1)
MONOCYTES NFR BLD AUTO: 3.1 % (ref 0–12)
NEUTROPHILS # BLD AUTO: 3.5 10*3/MM3 (ref 1.5–8.3)
NEUTROPHILS NFR BLD AUTO: 81.9 % (ref 41–71)
PLATELET # BLD AUTO: 218 10*3/MM3 (ref 150–450)
PMV BLD AUTO: 7.6 FL (ref 6–12)
RBC # BLD AUTO: 3.43 10*6/MM3 (ref 3.89–5.14)
WBC NRBC COR # BLD: 4.3 10*3/MM3 (ref 3.5–10.8)

## 2019-03-20 PROCEDURE — 85025 COMPLETE CBC W/AUTO DIFF WBC: CPT | Performed by: INTERNAL MEDICINE

## 2019-03-20 PROCEDURE — 99214 OFFICE O/P EST MOD 30 MIN: CPT | Performed by: INTERNAL MEDICINE

## 2019-03-20 PROCEDURE — 25010000002 FERRIC CARBOXYMALTOSE 750 MG/15ML SOLUTION 15 ML VIAL: Performed by: INTERNAL MEDICINE

## 2019-03-20 PROCEDURE — 96374 THER/PROPH/DIAG INJ IV PUSH: CPT

## 2019-03-20 PROCEDURE — 93280 PM DEVICE PROGR EVAL DUAL: CPT | Performed by: INTERNAL MEDICINE

## 2019-03-20 RX ADMIN — FERRIC CARBOXYMALTOSE INJECTION 750 MG: 50 INJECTION, SOLUTION INTRAVENOUS at 12:01

## 2019-03-20 NOTE — PROGRESS NOTES
[facility]  Office visit  Sheryl ROSAS Dalila  8/17/1931    There is no work phone number on file.    VISIT DATE:  09/17/2018    PCP: Vadim Diaz MD  [address] [place], [place] [id number]    CC:  Chief Complaint   Patient presents with   • Atrial Fibrillation   • Hypertension       Previous cardiac studies and procedures:  2012 diagnosed with symptomatic paroxysmal A. fib and tachybradycardia syndrome.  2012 Medtronic pacemaker  2012 cardiac catheterization: No flow limiting stenosis.  June 2018 echo  · Left ventricular systolic function is hyperdynamic (EF > 70).  · Left atrial cavity size is mildly dilated.  · Mean aortic valve gradient is 13 mmhg which is borderline mild aortic stenosis    ASSESSMENT:   Diagnosis Plan   1. Paroxysmal atrial fibrillation (CMS/HCC)     2. SSS (sick sinus syndrome) (CMS/HCC)     3. Essential hypertension       Device interrogation:  Medtronic dual-chamber  1.6% atrial pacing, sensed P wave 1.4 mV, threshold could not be obtained, chronically elevated threshold, impedance 274 ohms  100% ventricular pacing, no escape at 40, threshold 0.4 V at 0.4 ms, impedance 653 ohms  Estimated battery life 2.5 years.  Turned off rate response to limit atrial pacing.    PLAN:  High-grade AV block: Currently stable and asymptomatic.  Continue routine follow-up in device clinic.  Device dependent.  Known chronic high atrial threshold.     Paroxysmal atrial fibrillation: Chads Vas equal to 4. Not a candidate for chronic anticoagulation due to upper GI bleeding and recurrent issues with symptomatic anemia. Minimal burden of arrhythmia.     Hypertension: Goal less than 140/90 mmHg.   currently labile but with overall reasonable control.  Previously discontinued combination of amlodipine and olmesartan and due to worsening lower extremity edema.  Will consider thiazide diuretic or Aldactone in the future if we need better afterload reduction.     Venous insufficiency:  "Continue Bumex on an as-needed basis.       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   Received iron transfusion today. Denies chest pain, palpitations or dyspnea on exertion.    edema has been reasonably well-controlled and currently only rarely needing as needed Bumex.  Did not tolerate Lasix due to nausea.  Blood pressures have been predominantly running less than 150/90 mmHg.  No significant burden of atrial fibrillation on device interrogation today.    PHYSICAL EXAMINATION:  Vitals:    03/20/19 1358   BP: 140/60   BP Location: Right arm   Patient Position: Sitting   Pulse: 70   SpO2: 97%   Weight: 58.8 kg (129 lb 9.6 oz)   Height: 160 cm (63\")     General Appearance:    Alert, cooperative, no distress, appears stated age   Head:    Normocephalic, without obvious abnormality, atraumatic   Eyes:    conjunctiva/corneas clear   Nose:   Nares normal, septum midline, mucosa normal, no drainage   Throat:   Lips, teeth and gums normal   Neck:   Supple, symmetrical, trachea midline, no carotid    bruit or JVD   Lungs:     Clear to auscultation bilaterally, respirations unlabored   Chest Wall:    No tenderness or deformity    Heart:    Regular rate and rhythm, S1 and S2 normal, 1/6 early peaking systolic murmur right upper sternal border, rub   or gallop, normal carotid impulse bilaterally without bruit.   Abdomen:     Soft, non-tender   Extremities:   Extremities normal, atraumatic, no cyanosis or edema   Pulses:   2+ and symmetric all extremities   Skin:   Skin color, texture, turgor normal, no rashes or lesions       Diagnostic Data:  Procedures  No results found for: CHLPL, TRIG, HDL, LDLDIRECT  Lab Results   Component Value Date    GLUCOSE 77 08/22/2018    BUN 14 08/22/2018    CREATININE 1.01 08/22/2018    NA " 140 08/22/2018    K 4.6 08/22/2018     08/22/2018    CO2 26.0 08/22/2018     No results found for: HGBA1C  Lab Results   Component Value Date    WBC 4.30 03/20/2019    HGB 10.7 (L) 03/20/2019    HCT 33.4 (L) 03/20/2019     03/20/2019       Allergies  Allergies   Allergen Reactions   • Codeine Sulfate Unknown (See Comments)     Pt took in the 70's-pt not sure of what happened   • Cozaar [Losartan] Unknown (See Comments)     Pt can not remember   • Crestor [Rosuvastatin] Unknown (See Comments)     Pt can not remember   • Dexlansoprazole Unknown (See Comments)     Pt can not remember   • Lipitor [Atorvastatin] Unknown (See Comments)     Pt can not remember   • Lisinopril Unknown (See Comments)     Pt can not remember   • Nexium [Esomeprazole Magnesium] Unknown (See Comments)     Pt can not remember   • Norvasc [Amlodipine] Unknown (See Comments)     Pt can not remember   • Sulfadiazine Unknown (See Comments)     Pt can not remember   • Toprol Xl [Metoprolol Tartrate] Unknown (See Comments)     Pt can not remember   • Zetia [Ezetimibe] Unknown (See Comments)     Pt can not remember   • Zocor [Simvastatin] Unknown (See Comments)     Pt can not remember   • Augmentin [Amoxicillin-Pot Clavulanate] GI Intolerance     nausea   • Celebrex [Celecoxib] GI Intolerance       Current Medications    Current Outpatient Medications:   •  amitriptyline (ELAVIL) 25 MG tablet, Take 25 mg by mouth 2 (two) times a day., Disp: , Rfl:   •  bumetanide (BUMEX) 0.5 MG tablet, TAKE 1 TABLET BY MOUTH EVERY OTHER DAY AS NEEDED FOR SWELLING (Patient taking differently: Take 0.5 mg by mouth As Needed.), Disp: 15 tablet, Rfl: 2  •  cetirizine (ZyrTEC) 10 MG tablet, Take 10 mg by mouth daily., Disp: , Rfl:   •  fentaNYL (DURAGESIC) 75 MCG/HR patch, Place 1 patch on the skin as directed by provider Every 72 (Seventy-Two) Hours., Disp: , Rfl:   •  gabapentin (NEURONTIN) 600 MG tablet, Take 600 mg by mouth 3 (Three) Times a Day., Disp: ,  Rfl: 1  •  HYDROcodone-acetaminophen (NORCO)  MG per tablet, Take 1 tablet by mouth every 6 (six) hours as needed for moderate pain (4-6)., Disp: , Rfl:   •  oxybutynin XL (DITROPAN-XL) 10 MG 24 hr tablet, Take 10 mg by mouth As Needed., Disp: , Rfl:   •  pantoprazole (PROTONIX) 40 MG EC tablet, Take 40 mg by mouth daily., Disp: , Rfl:   •  potassium chloride (K-DUR) 10 MEQ CR tablet, TAKE 1 TABLET BY MOUTH ONCE DAILY, Disp: 30 tablet, Rfl: 2  •  promethazine (PHENERGAN) 25 MG tablet, Take 25 mg by mouth every 6 (six) hours as needed for nausea or vomiting., Disp: , Rfl:   •  VOLTAREN 1 % gel gel, Apply 4 g topically to the appropriate area as directed As Needed., Disp: , Rfl: 3  No current facility-administered medications for this visit.           ROS  Review of Systems   Cardiovascular: Negative for chest pain, dyspnea on exertion, irregular heartbeat and palpitations.   Respiratory: Negative for cough and snoring.        SOCIAL HX  Social History     Socioeconomic History   • Marital status:      Spouse name: Not on file   • Number of children: Not on file   • Years of education: Not on file   • Highest education level: Not on file   Tobacco Use   • Smoking status: Never Smoker   • Smokeless tobacco: Never Used   Substance and Sexual Activity   • Alcohol use: No   • Drug use: No   • Sexual activity: Defer   Social History Narrative    Caffeine: 1 serving per day. Pt lives at home alone.       FAMILY HX  Family History   Problem Relation Age of Onset   • No Known Problems Mother    • Diabetes Father    • Cancer Son              Andi Seals III, MD, FACC

## 2019-04-03 ENCOUNTER — LAB (OUTPATIENT)
Dept: LAB | Facility: HOSPITAL | Age: 84
End: 2019-04-03

## 2019-04-03 DIAGNOSIS — K31.819 GAVE (GASTRIC ANTRAL VASCULAR ECTASIA): ICD-10-CM

## 2019-04-03 DIAGNOSIS — Z78.9 MEDICATION INTOLERANCE: ICD-10-CM

## 2019-04-03 DIAGNOSIS — D50.0 IRON DEFICIENCY ANEMIA DUE TO CHRONIC BLOOD LOSS: ICD-10-CM

## 2019-04-03 DIAGNOSIS — K90.9 MALABSORPTION IN THE ELDERLY: ICD-10-CM

## 2019-04-03 LAB
ERYTHROCYTE [DISTWIDTH] IN BLOOD BY AUTOMATED COUNT: 13.5 % (ref 11.3–14.5)
HCT VFR BLD AUTO: 35 % (ref 34.5–44)
HGB BLD-MCNC: 11 G/DL (ref 11.5–15.5)
LYMPHOCYTES # BLD AUTO: 0.5 10*3/MM3 (ref 0.6–4.8)
LYMPHOCYTES NFR BLD AUTO: 9.9 % (ref 24–44)
MCH RBC QN AUTO: 30.1 PG (ref 27–31)
MCHC RBC AUTO-ENTMCNC: 31.4 G/DL (ref 32–36)
MCV RBC AUTO: 95.9 FL (ref 80–99)
MONOCYTES # BLD AUTO: 0.2 10*3/MM3 (ref 0–1)
MONOCYTES NFR BLD AUTO: 5 % (ref 0–12)
NEUTROPHILS # BLD AUTO: 3.9 10*3/MM3 (ref 1.5–8.3)
NEUTROPHILS NFR BLD AUTO: 85.1 % (ref 41–71)
PLATELET # BLD AUTO: 223 10*3/MM3 (ref 150–450)
PMV BLD AUTO: 7 FL (ref 6–12)
RBC # BLD AUTO: 3.65 10*6/MM3 (ref 3.89–5.14)
WBC NRBC COR # BLD: 4.6 10*3/MM3 (ref 3.5–10.8)

## 2019-04-03 PROCEDURE — 36415 COLL VENOUS BLD VENIPUNCTURE: CPT

## 2019-04-03 PROCEDURE — 85025 COMPLETE CBC W/AUTO DIFF WBC: CPT

## 2019-04-10 ENCOUNTER — LAB (OUTPATIENT)
Dept: LAB | Facility: HOSPITAL | Age: 84
End: 2019-04-10

## 2019-04-10 DIAGNOSIS — D50.0 IRON DEFICIENCY ANEMIA DUE TO CHRONIC BLOOD LOSS: ICD-10-CM

## 2019-04-10 LAB
ERYTHROCYTE [DISTWIDTH] IN BLOOD BY AUTOMATED COUNT: 13.5 % (ref 12.3–15.4)
HCT VFR BLD AUTO: 33 % (ref 34–46.6)
HGB BLD-MCNC: 11.1 G/DL (ref 12–15.9)
LYMPHOCYTES # BLD AUTO: 0.5 10*3/MM3 (ref 0.7–3.1)
LYMPHOCYTES NFR BLD AUTO: 17.7 % (ref 19.6–45.3)
MCH RBC QN AUTO: 31.8 PG (ref 26.6–33)
MCHC RBC AUTO-ENTMCNC: 33.5 G/DL (ref 31.5–35.7)
MCV RBC AUTO: 94.9 FL (ref 79–97)
MONOCYTES # BLD AUTO: 0.1 10*3/MM3 (ref 0.1–0.9)
MONOCYTES NFR BLD AUTO: 4.2 % (ref 5–12)
NEUTROPHILS # BLD AUTO: 2.3 10*3/MM3 (ref 1.4–7)
NEUTROPHILS NFR BLD AUTO: 78.1 % (ref 42.7–76)
PLATELET # BLD AUTO: 234 10*3/MM3 (ref 140–450)
PMV BLD AUTO: 7.1 FL (ref 6–12)
RBC # BLD AUTO: 3.48 10*6/MM3 (ref 3.77–5.28)
WBC NRBC COR # BLD: 2.9 10*3/MM3 (ref 3.4–10.8)

## 2019-04-10 PROCEDURE — 85025 COMPLETE CBC W/AUTO DIFF WBC: CPT

## 2019-04-10 PROCEDURE — 36415 COLL VENOUS BLD VENIPUNCTURE: CPT

## 2019-04-24 ENCOUNTER — LAB (OUTPATIENT)
Dept: LAB | Facility: HOSPITAL | Age: 84
End: 2019-04-24

## 2019-04-24 DIAGNOSIS — D50.0 IRON DEFICIENCY ANEMIA DUE TO CHRONIC BLOOD LOSS: ICD-10-CM

## 2019-04-24 LAB
ERYTHROCYTE [DISTWIDTH] IN BLOOD BY AUTOMATED COUNT: 13.5 % (ref 12.3–15.4)
HCT VFR BLD AUTO: 31.7 % (ref 34–46.6)
HGB BLD-MCNC: 10.5 G/DL (ref 12–15.9)
LYMPHOCYTES # BLD AUTO: 0.4 10*3/MM3 (ref 0.7–3.1)
LYMPHOCYTES NFR BLD AUTO: 11.7 % (ref 19.6–45.3)
MCH RBC QN AUTO: 31.6 PG (ref 26.6–33)
MCHC RBC AUTO-ENTMCNC: 33 G/DL (ref 31.5–35.7)
MCV RBC AUTO: 95.7 FL (ref 79–97)
MONOCYTES # BLD AUTO: 0.2 10*3/MM3 (ref 0.1–0.9)
MONOCYTES NFR BLD AUTO: 5.8 % (ref 5–12)
NEUTROPHILS # BLD AUTO: 3.1 10*3/MM3 (ref 1.7–7)
NEUTROPHILS NFR BLD AUTO: 82.5 % (ref 42.7–76)
PLATELET # BLD AUTO: 242 10*3/MM3 (ref 140–450)
PMV BLD AUTO: 7.2 FL (ref 6–12)
RBC # BLD AUTO: 3.31 10*6/MM3 (ref 3.77–5.28)
WBC NRBC COR # BLD: 3.7 10*3/MM3 (ref 3.4–10.8)

## 2019-04-24 PROCEDURE — 36415 COLL VENOUS BLD VENIPUNCTURE: CPT

## 2019-04-24 PROCEDURE — 85025 COMPLETE CBC W/AUTO DIFF WBC: CPT

## 2019-05-01 ENCOUNTER — LAB (OUTPATIENT)
Dept: LAB | Facility: HOSPITAL | Age: 84
End: 2019-05-01

## 2019-05-01 DIAGNOSIS — D50.0 IRON DEFICIENCY ANEMIA DUE TO CHRONIC BLOOD LOSS: ICD-10-CM

## 2019-05-01 LAB
ERYTHROCYTE [DISTWIDTH] IN BLOOD BY AUTOMATED COUNT: 13.7 % (ref 12.3–15.4)
HCT VFR BLD AUTO: 32.3 % (ref 34–46.6)
HGB BLD-MCNC: 10.4 G/DL (ref 12–15.9)
LYMPHOCYTES # BLD AUTO: 0.5 10*3/MM3 (ref 0.7–3.1)
LYMPHOCYTES NFR BLD AUTO: 15.3 % (ref 19.6–45.3)
MCH RBC QN AUTO: 30.8 PG (ref 26.6–33)
MCHC RBC AUTO-ENTMCNC: 32.4 G/DL (ref 31.5–35.7)
MCV RBC AUTO: 95.3 FL (ref 79–97)
MONOCYTES # BLD AUTO: 0.3 10*3/MM3 (ref 0.1–0.9)
MONOCYTES NFR BLD AUTO: 7.5 % (ref 5–12)
NEUTROPHILS # BLD AUTO: 2.7 10*3/MM3 (ref 1.7–7)
NEUTROPHILS NFR BLD AUTO: 77.2 % (ref 42.7–76)
PLATELET # BLD AUTO: 294 10*3/MM3 (ref 140–450)
PMV BLD AUTO: 6.7 FL (ref 6–12)
RBC # BLD AUTO: 3.39 10*6/MM3 (ref 3.77–5.28)
WBC NRBC COR # BLD: 3.5 10*3/MM3 (ref 3.4–10.8)

## 2019-05-01 PROCEDURE — 36415 COLL VENOUS BLD VENIPUNCTURE: CPT

## 2019-05-01 PROCEDURE — 85025 COMPLETE CBC W/AUTO DIFF WBC: CPT

## 2019-05-08 ENCOUNTER — LAB (OUTPATIENT)
Dept: LAB | Facility: HOSPITAL | Age: 84
End: 2019-05-08

## 2019-05-08 DIAGNOSIS — D50.0 IRON DEFICIENCY ANEMIA DUE TO CHRONIC BLOOD LOSS: ICD-10-CM

## 2019-05-08 LAB
ERYTHROCYTE [DISTWIDTH] IN BLOOD BY AUTOMATED COUNT: 14.3 % (ref 12.3–15.4)
HCT VFR BLD AUTO: 33.5 % (ref 34–46.6)
HGB BLD-MCNC: 11.1 G/DL (ref 12–15.9)
LYMPHOCYTES # BLD AUTO: 0.6 10*3/MM3 (ref 0.7–3.1)
LYMPHOCYTES NFR BLD AUTO: 15 % (ref 19.6–45.3)
MCH RBC QN AUTO: 31.7 PG (ref 26.6–33)
MCHC RBC AUTO-ENTMCNC: 33.2 G/DL (ref 31.5–35.7)
MCV RBC AUTO: 95.3 FL (ref 79–97)
MONOCYTES # BLD AUTO: 0.2 10*3/MM3 (ref 0.1–0.9)
MONOCYTES NFR BLD AUTO: 5.1 % (ref 5–12)
NEUTROPHILS # BLD AUTO: 3.1 10*3/MM3 (ref 1.7–7)
NEUTROPHILS NFR BLD AUTO: 79.9 % (ref 42.7–76)
PLATELET # BLD AUTO: 274 10*3/MM3 (ref 140–450)
PMV BLD AUTO: 6.9 FL (ref 6–12)
RBC # BLD AUTO: 3.52 10*6/MM3 (ref 3.77–5.28)
WBC NRBC COR # BLD: 3.9 10*3/MM3 (ref 3.4–10.8)

## 2019-05-08 PROCEDURE — 85025 COMPLETE CBC W/AUTO DIFF WBC: CPT

## 2019-05-08 PROCEDURE — 36415 COLL VENOUS BLD VENIPUNCTURE: CPT

## 2019-05-23 ENCOUNTER — LAB (OUTPATIENT)
Dept: LAB | Facility: HOSPITAL | Age: 84
End: 2019-05-23

## 2019-05-23 DIAGNOSIS — D50.0 IRON DEFICIENCY ANEMIA DUE TO CHRONIC BLOOD LOSS: ICD-10-CM

## 2019-05-23 LAB
ERYTHROCYTE [DISTWIDTH] IN BLOOD BY AUTOMATED COUNT: 13.4 % (ref 12.3–15.4)
HCT VFR BLD AUTO: 33 % (ref 34–46.6)
HGB BLD-MCNC: 10.7 G/DL (ref 12–15.9)
LYMPHOCYTES # BLD AUTO: 0.7 10*3/MM3 (ref 0.7–3.1)
LYMPHOCYTES NFR BLD AUTO: 15.3 % (ref 19.6–45.3)
MCH RBC QN AUTO: 30.8 PG (ref 26.6–33)
MCHC RBC AUTO-ENTMCNC: 32.5 G/DL (ref 31.5–35.7)
MCV RBC AUTO: 94.7 FL (ref 79–97)
MONOCYTES # BLD AUTO: 0.2 10*3/MM3 (ref 0.1–0.9)
MONOCYTES NFR BLD AUTO: 5.1 % (ref 5–12)
NEUTROPHILS # BLD AUTO: 3.7 10*3/MM3 (ref 1.7–7)
NEUTROPHILS NFR BLD AUTO: 79.6 % (ref 42.7–76)
PLATELET # BLD AUTO: 268 10*3/MM3 (ref 140–450)
PMV BLD AUTO: 6.9 FL (ref 6–12)
RBC # BLD AUTO: 3.48 10*6/MM3 (ref 3.77–5.28)
WBC NRBC COR # BLD: 4.6 10*3/MM3 (ref 3.4–10.8)

## 2019-05-23 PROCEDURE — 85025 COMPLETE CBC W/AUTO DIFF WBC: CPT

## 2019-05-23 PROCEDURE — 36415 COLL VENOUS BLD VENIPUNCTURE: CPT

## 2019-06-05 ENCOUNTER — LAB (OUTPATIENT)
Dept: LAB | Facility: HOSPITAL | Age: 84
End: 2019-06-05

## 2019-06-05 ENCOUNTER — CLINICAL SUPPORT NO REQUIREMENTS (OUTPATIENT)
Dept: CARDIOLOGY | Facility: CLINIC | Age: 84
End: 2019-06-05

## 2019-06-05 DIAGNOSIS — I48.0 PAROXYSMAL ATRIAL FIBRILLATION (HCC): Primary | ICD-10-CM

## 2019-06-05 DIAGNOSIS — D50.0 IRON DEFICIENCY ANEMIA DUE TO CHRONIC BLOOD LOSS: ICD-10-CM

## 2019-06-05 DIAGNOSIS — I49.5 SSS (SICK SINUS SYNDROME) (HCC): ICD-10-CM

## 2019-06-05 LAB
ERYTHROCYTE [DISTWIDTH] IN BLOOD BY AUTOMATED COUNT: 13.6 % (ref 12.3–15.4)
HCT VFR BLD AUTO: 33.2 % (ref 34–46.6)
HGB BLD-MCNC: 10.7 G/DL (ref 12–15.9)
LYMPHOCYTES # BLD AUTO: 0.6 10*3/MM3 (ref 0.7–3.1)
LYMPHOCYTES NFR BLD AUTO: 14.1 % (ref 19.6–45.3)
MCH RBC QN AUTO: 30.6 PG (ref 26.6–33)
MCHC RBC AUTO-ENTMCNC: 32.2 G/DL (ref 31.5–35.7)
MCV RBC AUTO: 94.8 FL (ref 79–97)
MONOCYTES # BLD AUTO: 0.2 10*3/MM3 (ref 0.1–0.9)
MONOCYTES NFR BLD AUTO: 3.8 % (ref 5–12)
NEUTROPHILS # BLD AUTO: 3.5 10*3/MM3 (ref 1.7–7)
NEUTROPHILS NFR BLD AUTO: 82.1 % (ref 42.7–76)
PLATELET # BLD AUTO: 250 10*3/MM3 (ref 140–450)
PMV BLD AUTO: 7.4 FL (ref 6–12)
RBC # BLD AUTO: 3.5 10*6/MM3 (ref 3.77–5.28)
WBC NRBC COR # BLD: 4.3 10*3/MM3 (ref 3.4–10.8)

## 2019-06-05 PROCEDURE — 93296 REM INTERROG EVL PM/IDS: CPT | Performed by: INTERNAL MEDICINE

## 2019-06-05 PROCEDURE — 36415 COLL VENOUS BLD VENIPUNCTURE: CPT

## 2019-06-05 PROCEDURE — 93294 REM INTERROG EVL PM/LDLS PM: CPT | Performed by: INTERNAL MEDICINE

## 2019-06-05 PROCEDURE — 85025 COMPLETE CBC W/AUTO DIFF WBC: CPT

## 2019-06-20 ENCOUNTER — LAB (OUTPATIENT)
Dept: LAB | Facility: HOSPITAL | Age: 84
End: 2019-06-20

## 2019-06-20 DIAGNOSIS — D50.0 IRON DEFICIENCY ANEMIA DUE TO CHRONIC BLOOD LOSS: ICD-10-CM

## 2019-06-20 LAB
ERYTHROCYTE [DISTWIDTH] IN BLOOD BY AUTOMATED COUNT: 14 % (ref 12.3–15.4)
HCT VFR BLD AUTO: 37.2 % (ref 34–46.6)
HGB BLD-MCNC: 11.8 G/DL (ref 12–15.9)
LYMPHOCYTES # BLD AUTO: 0.6 10*3/MM3 (ref 0.7–3.1)
LYMPHOCYTES NFR BLD AUTO: 15.8 % (ref 19.6–45.3)
MCH RBC QN AUTO: 30.3 PG (ref 26.6–33)
MCHC RBC AUTO-ENTMCNC: 31.7 G/DL (ref 31.5–35.7)
MCV RBC AUTO: 95.4 FL (ref 79–97)
MONOCYTES # BLD AUTO: 0.3 10*3/MM3 (ref 0.1–0.9)
MONOCYTES NFR BLD AUTO: 7.1 % (ref 5–12)
NEUTROPHILS # BLD AUTO: 3 10*3/MM3 (ref 1.7–7)
NEUTROPHILS NFR BLD AUTO: 77.1 % (ref 42.7–76)
PLATELET # BLD AUTO: 261 10*3/MM3 (ref 140–450)
PMV BLD AUTO: 7.5 FL (ref 6–12)
RBC # BLD AUTO: 3.9 10*6/MM3 (ref 3.77–5.28)
WBC NRBC COR # BLD: 3.9 10*3/MM3 (ref 3.4–10.8)

## 2019-06-20 PROCEDURE — 36415 COLL VENOUS BLD VENIPUNCTURE: CPT

## 2019-06-20 PROCEDURE — 85025 COMPLETE CBC W/AUTO DIFF WBC: CPT

## 2019-06-26 ENCOUNTER — OFFICE VISIT (OUTPATIENT)
Dept: ONCOLOGY | Facility: CLINIC | Age: 84
End: 2019-06-26

## 2019-06-26 VITALS
HEART RATE: 77 BPM | WEIGHT: 128 LBS | HEIGHT: 63 IN | OXYGEN SATURATION: 95 % | TEMPERATURE: 97.6 F | SYSTOLIC BLOOD PRESSURE: 176 MMHG | BODY MASS INDEX: 22.68 KG/M2 | RESPIRATION RATE: 18 BRPM | DIASTOLIC BLOOD PRESSURE: 92 MMHG

## 2019-06-26 DIAGNOSIS — K31.819 GAVE (GASTRIC ANTRAL VASCULAR ECTASIA): Primary | ICD-10-CM

## 2019-06-26 PROCEDURE — 99214 OFFICE O/P EST MOD 30 MIN: CPT | Performed by: INTERNAL MEDICINE

## 2019-06-26 NOTE — PROGRESS NOTES
"PROBLEM LIST:  1. 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 Status post EGD colonoscopy  3. Status post Iv iron  infusion periodically  4. Bone Marrow biopsy  - normal - done by Dr. Reeves  5. 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  lady with normocytic anemia likely related to chronic blood loss.  She is actually stabilized nicely.  Her blood counts back to normal.  She has not required any transfusion support.  Last blood transfusion on 8/31/18.    She has done remarkably well since then.  Denies any active bleeding.  Fatigue seems to be tolerating well.       Past medical history, social history and family history was reviewed and unchanged from prior visit.    Review of Systems:    Review of Systems   Constitutional: Positive for fatigue.   HENT: Negative.    Eyes: Negative.    Respiratory: Positive for shortness of breath.    Cardiovascular: Negative.    Gastrointestinal: Negative.    Endocrine: Negative.    Genitourinary: Negative.    Musculoskeletal: Negative.    Skin: Negative.    Allergic/Immunologic: Negative.    Neurological: Negative.    Hematological: Negative.    Psychiatric/Behavioral: Negative.       A comprehensive 14 point review of systems was performed and was negative except as mentioned.      Medications:  The current medication list was reviewed in the EMR    ALLERGIES:    Allergies   Allergen Reactions   • Codeine Sulfate Unknown (See Comments)     Pt took in the 70's-pt not sure of what happened   • Cozaar [Losartan] Unknown (See Comments)     Pt can not remember   • Crestor [Rosuvastatin] Unknown (See Comments)     Pt can not remember   • Dexlansoprazole Unknown (See Comments)     Pt can not remember   • Lipitor [Atorvastatin] Unknown (See Comments)     Pt can not remember   • Lisinopril Unknown (See Comments)     Pt can not remember   • Nexium [Esomeprazole Magnesium] " "Unknown (See Comments)     Pt can not remember   • Norvasc [Amlodipine] Unknown (See Comments)     Pt can not remember   • Sulfadiazine Unknown (See Comments)     Pt can not remember   • Toprol Xl [Metoprolol Tartrate] Unknown (See Comments)     Pt can not remember   • Zetia [Ezetimibe] Unknown (See Comments)     Pt can not remember   • Zocor [Simvastatin] Unknown (See Comments)     Pt can not remember   • Augmentin [Amoxicillin-Pot Clavulanate] GI Intolerance     nausea   • Celebrex [Celecoxib] GI Intolerance         Physical Exam    VITAL SIGNS:  /92 Comment: LUE  Pulse 77   Temp 97.6 °F (36.4 °C) (Temporal)   Resp 18   Ht 160 cm (63\")   Wt 58.1 kg (128 lb)   SpO2 95% Comment: RA  BMI 22.67 kg/m²      Performance Status: 1    General: well appearing, in no acute distress  HEENT: sclera anicteric, oropharynx clear, neck is supple  Lymphatics: no cervical, supraclavicular, or axillary adenopathy  Cardiovascular: regular rate and rhythm, no murmurs, rubs or gallops  Lungs: clear to auscultation bilaterally  Abdomen: soft, nontender, nondistended.  No palpable organomegaly  Extremities: no lower extremity edema  Skin: no rashes, lesions, bruising, or petechiae  Msk:  Shows no weakness of the large muscle groups  Psych: Mood is stable        RECENT LABS:    Lab Results   Component Value Date    WBC 3.90 06/20/2019    HGB 11.8 (L) 06/20/2019    HCT 37.2 06/20/2019    MCV 95.4 06/20/2019     06/20/2019     Lab Results   Component Value Date    FERRITIN 228.00 03/13/2019    FERRITIN 251.00 02/20/2019    FERRITIN 328.00 (H) 12/27/2018     Lab Results   Component Value Date    HGB 11.8 (L) 06/20/2019    HGB 10.7 (L) 06/05/2019    HGB 10.7 (L) 05/23/2019    HGB 11.1 (L) 05/08/2019    HGB 10.4 (L) 05/01/2019    HGB 10.5 (L) 04/24/2019    HGB 11.1 (L) 04/10/2019    HGB 11.0 (L) 04/03/2019    HGB 10.7 (L) 03/20/2019    HGB 11.6 03/13/2019       Assessment/Plan    1.normocytic anemia requiring periodic blood " transfusions secondary to bleeding AVMs and a Watermelon stomach.  Underwent argon treatment with Dr. Llamas.  I am going to hold her IV iron infusions.  We will see how she does over the next 3 months before we consider restarting it.    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 Answered any questions patient had with medication and plan.      Rahat Morris MD  [facility]cology    6/26/2019

## 2019-07-03 ENCOUNTER — LAB (OUTPATIENT)
Dept: LAB | Facility: HOSPITAL | Age: 84
End: 2019-07-03

## 2019-07-03 DIAGNOSIS — I10 ESSENTIAL HYPERTENSION: Primary | ICD-10-CM

## 2019-07-03 DIAGNOSIS — D50.0 IRON DEFICIENCY ANEMIA DUE TO CHRONIC BLOOD LOSS: ICD-10-CM

## 2019-07-03 LAB
ERYTHROCYTE [DISTWIDTH] IN BLOOD BY AUTOMATED COUNT: 12.6 % (ref 12.3–15.4)
HCT VFR BLD AUTO: 35 % (ref 34–46.6)
HGB BLD-MCNC: 11.7 G/DL (ref 12–15.9)
LYMPHOCYTES # BLD AUTO: 0.6 10*3/MM3 (ref 0.7–3.1)
LYMPHOCYTES NFR BLD AUTO: 18 % (ref 19.6–45.3)
MCH RBC QN AUTO: 30.7 PG (ref 26.6–33)
MCHC RBC AUTO-ENTMCNC: 33.5 G/DL (ref 31.5–35.7)
MCV RBC AUTO: 91.5 FL (ref 79–97)
MONOCYTES # BLD AUTO: 0.2 10*3/MM3 (ref 0.1–0.9)
MONOCYTES NFR BLD AUTO: 6.3 % (ref 5–12)
NEUTROPHILS # BLD AUTO: 2.4 10*3/MM3 (ref 1.7–7)
NEUTROPHILS NFR BLD AUTO: 75.7 % (ref 42.7–76)
PLATELET # BLD AUTO: 243 10*3/MM3 (ref 140–450)
PMV BLD AUTO: 7.6 FL (ref 6–12)
RBC # BLD AUTO: 3.82 10*6/MM3 (ref 3.77–5.28)
WBC NRBC COR # BLD: 3.2 10*3/MM3 (ref 3.4–10.8)

## 2019-07-03 PROCEDURE — 36415 COLL VENOUS BLD VENIPUNCTURE: CPT

## 2019-07-03 PROCEDURE — 85025 COMPLETE CBC W/AUTO DIFF WBC: CPT

## 2019-07-17 ENCOUNTER — LAB (OUTPATIENT)
Dept: LAB | Facility: HOSPITAL | Age: 84
End: 2019-07-17

## 2019-07-17 DIAGNOSIS — D50.0 IRON DEFICIENCY ANEMIA DUE TO CHRONIC BLOOD LOSS: Primary | ICD-10-CM

## 2019-07-17 DIAGNOSIS — D50.0 IRON DEFICIENCY ANEMIA DUE TO CHRONIC BLOOD LOSS: ICD-10-CM

## 2019-07-17 LAB
ERYTHROCYTE [DISTWIDTH] IN BLOOD BY AUTOMATED COUNT: 12.4 % (ref 12.3–15.4)
HCT VFR BLD AUTO: 35.4 % (ref 34–46.6)
HGB BLD-MCNC: 11.6 G/DL (ref 12–15.9)
LYMPHOCYTES # BLD AUTO: 0.5 10*3/MM3 (ref 0.7–3.1)
LYMPHOCYTES NFR BLD AUTO: 13.6 % (ref 19.6–45.3)
MCH RBC QN AUTO: 30 PG (ref 26.6–33)
MCHC RBC AUTO-ENTMCNC: 32.8 G/DL (ref 31.5–35.7)
MCV RBC AUTO: 91.4 FL (ref 79–97)
MONOCYTES # BLD AUTO: 0.1 10*3/MM3 (ref 0.1–0.9)
MONOCYTES NFR BLD AUTO: 3.3 % (ref 5–12)
NEUTROPHILS # BLD AUTO: 3.2 10*3/MM3 (ref 1.7–7)
NEUTROPHILS NFR BLD AUTO: 83.1 % (ref 42.7–76)
PLATELET # BLD AUTO: 240 10*3/MM3 (ref 140–450)
PMV BLD AUTO: 7.5 FL (ref 6–12)
RBC # BLD AUTO: 3.87 10*6/MM3 (ref 3.77–5.28)
WBC NRBC COR # BLD: 3.8 10*3/MM3 (ref 3.4–10.8)

## 2019-07-17 PROCEDURE — 85025 COMPLETE CBC W/AUTO DIFF WBC: CPT

## 2019-07-17 PROCEDURE — 36415 COLL VENOUS BLD VENIPUNCTURE: CPT

## 2019-07-22 PROBLEM — N18.30 CKD (CHRONIC KIDNEY DISEASE) STAGE 3, GFR 30-59 ML/MIN (HCC): Status: ACTIVE | Noted: 2019-07-22

## 2019-07-22 PROBLEM — K21.9 GERD (GASTROESOPHAGEAL REFLUX DISEASE): Status: ACTIVE | Noted: 2019-07-22

## 2019-07-29 DIAGNOSIS — I48.0 PAROXYSMAL ATRIAL FIBRILLATION (HCC): Primary | ICD-10-CM

## 2019-08-01 ENCOUNTER — LAB (OUTPATIENT)
Dept: LAB | Facility: HOSPITAL | Age: 84
End: 2019-08-01

## 2019-08-01 DIAGNOSIS — D50.0 IRON DEFICIENCY ANEMIA DUE TO CHRONIC BLOOD LOSS: ICD-10-CM

## 2019-08-01 LAB
ERYTHROCYTE [DISTWIDTH] IN BLOOD BY AUTOMATED COUNT: 13 % (ref 12.3–15.4)
HCT VFR BLD AUTO: 35.4 % (ref 34–46.6)
HGB BLD-MCNC: 11.9 G/DL (ref 12–15.9)
LYMPHOCYTES # BLD AUTO: 0.6 10*3/MM3 (ref 0.7–3.1)
LYMPHOCYTES NFR BLD AUTO: 12.9 % (ref 19.6–45.3)
MCH RBC QN AUTO: 30.2 PG (ref 26.6–33)
MCHC RBC AUTO-ENTMCNC: 33.5 G/DL (ref 31.5–35.7)
MCV RBC AUTO: 90.2 FL (ref 79–97)
MONOCYTES # BLD AUTO: 0.1 10*3/MM3 (ref 0.1–0.9)
MONOCYTES NFR BLD AUTO: 2.5 % (ref 5–12)
NEUTROPHILS # BLD AUTO: 3.7 10*3/MM3 (ref 1.7–7)
NEUTROPHILS NFR BLD AUTO: 84.6 % (ref 42.7–76)
PLATELET # BLD AUTO: 293 10*3/MM3 (ref 140–450)
PMV BLD AUTO: 7.2 FL (ref 6–12)
RBC # BLD AUTO: 3.92 10*6/MM3 (ref 3.77–5.28)
WBC NRBC COR # BLD: 4.4 10*3/MM3 (ref 3.4–10.8)

## 2019-08-01 PROCEDURE — 85025 COMPLETE CBC W/AUTO DIFF WBC: CPT

## 2019-08-01 PROCEDURE — 36415 COLL VENOUS BLD VENIPUNCTURE: CPT

## 2019-08-14 ENCOUNTER — LAB (OUTPATIENT)
Dept: LAB | Facility: HOSPITAL | Age: 84
End: 2019-08-14

## 2019-08-14 DIAGNOSIS — D50.0 IRON DEFICIENCY ANEMIA DUE TO CHRONIC BLOOD LOSS: ICD-10-CM

## 2019-08-14 LAB
ERYTHROCYTE [DISTWIDTH] IN BLOOD BY AUTOMATED COUNT: 12.7 % (ref 12.3–15.4)
HCT VFR BLD AUTO: 33 % (ref 34–46.6)
HGB BLD-MCNC: 10.7 G/DL (ref 12–15.9)
LYMPHOCYTES # BLD AUTO: 0.5 10*3/MM3 (ref 0.7–3.1)
LYMPHOCYTES NFR BLD AUTO: 10.7 % (ref 19.6–45.3)
MCH RBC QN AUTO: 29.4 PG (ref 26.6–33)
MCHC RBC AUTO-ENTMCNC: 32.3 G/DL (ref 31.5–35.7)
MCV RBC AUTO: 91 FL (ref 79–97)
MONOCYTES # BLD AUTO: 0.4 10*3/MM3 (ref 0.1–0.9)
MONOCYTES NFR BLD AUTO: 7.3 % (ref 5–12)
NEUTROPHILS # BLD AUTO: 4.2 10*3/MM3 (ref 1.7–7)
NEUTROPHILS NFR BLD AUTO: 82 % (ref 42.7–76)
PLATELET # BLD AUTO: 205 10*3/MM3 (ref 140–450)
PMV BLD AUTO: 6.6 FL (ref 6–12)
RBC # BLD AUTO: 3.63 10*6/MM3 (ref 3.77–5.28)
WBC NRBC COR # BLD: 5.1 10*3/MM3 (ref 3.4–10.8)

## 2019-08-14 PROCEDURE — 85025 COMPLETE CBC W/AUTO DIFF WBC: CPT

## 2019-08-14 PROCEDURE — 36415 COLL VENOUS BLD VENIPUNCTURE: CPT

## 2019-08-27 ENCOUNTER — LAB (OUTPATIENT)
Dept: LAB | Facility: HOSPITAL | Age: 84
End: 2019-08-27

## 2019-08-27 DIAGNOSIS — D50.0 IRON DEFICIENCY ANEMIA DUE TO CHRONIC BLOOD LOSS: ICD-10-CM

## 2019-08-27 LAB
ERYTHROCYTE [DISTWIDTH] IN BLOOD BY AUTOMATED COUNT: 13.2 % (ref 12.3–15.4)
HCT VFR BLD AUTO: 34.1 % (ref 34–46.6)
HGB BLD-MCNC: 11.1 G/DL (ref 12–15.9)
LYMPHOCYTES # BLD AUTO: 0.7 10*3/MM3 (ref 0.7–3.1)
LYMPHOCYTES NFR BLD AUTO: 20.5 % (ref 19.6–45.3)
MCH RBC QN AUTO: 29.3 PG (ref 26.6–33)
MCHC RBC AUTO-ENTMCNC: 32.5 G/DL (ref 31.5–35.7)
MCV RBC AUTO: 90.4 FL (ref 79–97)
MONOCYTES # BLD AUTO: 0.3 10*3/MM3 (ref 0.1–0.9)
MONOCYTES NFR BLD AUTO: 7.9 % (ref 5–12)
NEUTROPHILS # BLD AUTO: 2.3 10*3/MM3 (ref 1.7–7)
NEUTROPHILS NFR BLD AUTO: 71.6 % (ref 42.7–76)
PLATELET # BLD AUTO: 230 10*3/MM3 (ref 140–450)
PMV BLD AUTO: 6.4 FL (ref 6–12)
RBC # BLD AUTO: 3.78 10*6/MM3 (ref 3.77–5.28)
WBC NRBC COR # BLD: 3.2 10*3/MM3 (ref 3.4–10.8)

## 2019-08-27 PROCEDURE — 85025 COMPLETE CBC W/AUTO DIFF WBC: CPT

## 2019-08-27 PROCEDURE — 36415 COLL VENOUS BLD VENIPUNCTURE: CPT

## 2019-09-12 ENCOUNTER — LAB (OUTPATIENT)
Dept: LAB | Facility: HOSPITAL | Age: 84
End: 2019-09-12

## 2019-09-12 DIAGNOSIS — D50.0 IRON DEFICIENCY ANEMIA DUE TO CHRONIC BLOOD LOSS: ICD-10-CM

## 2019-09-12 LAB
ERYTHROCYTE [DISTWIDTH] IN BLOOD BY AUTOMATED COUNT: 13.6 % (ref 12.3–15.4)
HCT VFR BLD AUTO: 31.3 % (ref 34–46.6)
HGB BLD-MCNC: 10.2 G/DL (ref 12–15.9)
LYMPHOCYTES # BLD AUTO: 0.8 10*3/MM3 (ref 0.7–3.1)
LYMPHOCYTES NFR BLD AUTO: 18.5 % (ref 19.6–45.3)
MCH RBC QN AUTO: 29.3 PG (ref 26.6–33)
MCHC RBC AUTO-ENTMCNC: 32.4 G/DL (ref 31.5–35.7)
MCV RBC AUTO: 90.4 FL (ref 79–97)
MONOCYTES # BLD AUTO: 0.2 10*3/MM3 (ref 0.1–0.9)
MONOCYTES NFR BLD AUTO: 4.1 % (ref 5–12)
NEUTROPHILS # BLD AUTO: 3.3 10*3/MM3 (ref 1.7–7)
NEUTROPHILS NFR BLD AUTO: 77.4 % (ref 42.7–76)
PLATELET # BLD AUTO: 248 10*3/MM3 (ref 140–450)
PMV BLD AUTO: 6.8 FL (ref 6–12)
RBC # BLD AUTO: 3.47 10*6/MM3 (ref 3.77–5.28)
WBC NRBC COR # BLD: 4.3 10*3/MM3 (ref 3.4–10.8)

## 2019-09-12 PROCEDURE — 85025 COMPLETE CBC W/AUTO DIFF WBC: CPT

## 2019-09-12 PROCEDURE — 36415 COLL VENOUS BLD VENIPUNCTURE: CPT

## 2019-09-25 ENCOUNTER — LAB (OUTPATIENT)
Dept: LAB | Facility: HOSPITAL | Age: 84
End: 2019-09-25

## 2019-09-25 ENCOUNTER — OFFICE VISIT (OUTPATIENT)
Dept: CARDIOLOGY | Facility: CLINIC | Age: 84
End: 2019-09-25

## 2019-09-25 VITALS
OXYGEN SATURATION: 94 % | SYSTOLIC BLOOD PRESSURE: 142 MMHG | BODY MASS INDEX: 23.21 KG/M2 | HEART RATE: 82 BPM | DIASTOLIC BLOOD PRESSURE: 60 MMHG | WEIGHT: 131 LBS | HEIGHT: 63 IN

## 2019-09-25 DIAGNOSIS — D50.0 IRON DEFICIENCY ANEMIA DUE TO CHRONIC BLOOD LOSS: ICD-10-CM

## 2019-09-25 DIAGNOSIS — E78.2 MIXED HYPERLIPIDEMIA: ICD-10-CM

## 2019-09-25 DIAGNOSIS — I10 ESSENTIAL HYPERTENSION: ICD-10-CM

## 2019-09-25 DIAGNOSIS — I48.0 PAROXYSMAL ATRIAL FIBRILLATION (HCC): Primary | ICD-10-CM

## 2019-09-25 DIAGNOSIS — I49.5 SSS (SICK SINUS SYNDROME) (HCC): ICD-10-CM

## 2019-09-25 LAB
ERYTHROCYTE [DISTWIDTH] IN BLOOD BY AUTOMATED COUNT: 13.7 % (ref 12.3–15.4)
HCT VFR BLD AUTO: 27.9 % (ref 34–46.6)
HGB BLD-MCNC: 9 G/DL (ref 12–15.9)
LYMPHOCYTES # BLD AUTO: 0.7 10*3/MM3 (ref 0.7–3.1)
LYMPHOCYTES NFR BLD AUTO: 15.6 % (ref 19.6–45.3)
MCH RBC QN AUTO: 29.2 PG (ref 26.6–33)
MCHC RBC AUTO-ENTMCNC: 32.3 G/DL (ref 31.5–35.7)
MCV RBC AUTO: 90.5 FL (ref 79–97)
MONOCYTES # BLD AUTO: 0.4 10*3/MM3 (ref 0.1–0.9)
MONOCYTES NFR BLD AUTO: 9.1 % (ref 5–12)
NEUTROPHILS # BLD AUTO: 3.4 10*3/MM3 (ref 1.7–7)
NEUTROPHILS NFR BLD AUTO: 75.3 % (ref 42.7–76)
PLATELET # BLD AUTO: 279 10*3/MM3 (ref 140–450)
PMV BLD AUTO: 6.4 FL (ref 6–12)
RBC # BLD AUTO: 3.09 10*6/MM3 (ref 3.77–5.28)
WBC NRBC COR # BLD: 4.5 10*3/MM3 (ref 3.4–10.8)

## 2019-09-25 PROCEDURE — 93280 PM DEVICE PROGR EVAL DUAL: CPT | Performed by: INTERNAL MEDICINE

## 2019-09-25 PROCEDURE — 36415 COLL VENOUS BLD VENIPUNCTURE: CPT

## 2019-09-25 PROCEDURE — 99214 OFFICE O/P EST MOD 30 MIN: CPT | Performed by: INTERNAL MEDICINE

## 2019-09-25 PROCEDURE — 85025 COMPLETE CBC W/AUTO DIFF WBC: CPT

## 2019-09-25 NOTE — PROGRESS NOTES
[facility] at [facility]  Office visit  Sheryl ROSAS Dalila  8/17/1931    There is no work phone number on file.    VISIT DATE:  9/25/2019      PCP: Anamaria Boyer, APRN  [address] [facility] [address]  [facility] [id number]    CC:  Chief Complaint   Patient presents with   • PAF       Previous cardiac studies and procedures:  2012 diagnosed with symptomatic paroxysmal A. fib and tachybradycardia syndrome.  2012 Medtronic pacemaker  2012 cardiac catheterization: No flow limiting stenosis.  June 2018 echo  · Left ventricular systolic function is hyperdynamic (EF > 70).  · Left atrial cavity size is mildly dilated.  · Mean aortic valve gradient is 13 mmhg which is borderline mild aortic stenosis    ASSESSMENT:   Diagnosis Plan   1. Paroxysmal atrial fibrillation (CMS/HCC)     2. SSS (sick sinus syndrome) (CMS/HCC)     3. Essential hypertension     4. Mixed hyperlipidemia       Device interrogation:  Medtronic dual-chamber  0.4% atrial pacing, sensed P wave 1.4 mV, 4 V at 1 ms, chronically elevated threshold, impedance 284 ohms  100% ventricular pacing, no escape at 40, threshold 0.5 V at 0.4 ms, impedance 632 ohms  Estimated battery life 2 years.  No sustained arrhythmia.    PLAN:  High-grade AV block: Currently stable and asymptomatic.  Continue routine follow-up in device clinic.  Device dependent.  Known chronic high atrial threshold.     Paroxysmal atrial fibrillation: Chads Vas equal to 4. Not a candidate for chronic anticoagulation due to upper GI bleeding and recurrent issues with symptomatic anemia. Minimal burden of arrhythmia.     Hypertension: Goal less than 140/90 mmHg.   currently labile but with overall reasonable control.  Previously discontinued combination of amlodipine and olmesartan and due to worsening lower extremity edema.  Will consider thiazide diuretic or Aldactone in the future if we need better afterload reduction.     Venous insufficiency: Continue Bumex on an as-needed basis.   "Currently well controlled       Subjective  88-year-old female with a history of paroxysmal atrial fibrillation, tachybradycardia syndrome status post Medtronic dual-chamber pacemaker and gastric antral vascular ectasia with chronic anemia requiring intermittent blood transfusion.  Denies chest pain, palpitations or dyspnea on exertion.    Lower extremity edema is currently well controlled.  Did not tolerate Lasix due to nausea.  Currently not checking home blood pressures..  No significant burden of atrial fibrillation on device interrogation today.    PHYSICAL EXAMINATION:  Vitals:    09/25/19 1202   BP: 142/60   BP Location: Right arm   Patient Position: Sitting   Pulse: 82   SpO2: 94%   Weight: 59.4 kg (131 lb)   Height: 160 cm (63\")     General Appearance:    Alert, cooperative, no distress, appears stated age   Head:    Normocephalic, without obvious abnormality, atraumatic   Eyes:    conjunctiva/corneas clear   Nose:   Nares normal, septum midline, mucosa normal, no drainage   Throat:   Lips, teeth and gums normal   Neck:   Supple, symmetrical, trachea midline, no carotid    bruit or JVD   Lungs:     Clear to auscultation bilaterally, respirations unlabored   Chest Wall:    No tenderness or deformity    Heart:    Regular rate and rhythm, S1 and S2 normal, 1/6 early peaking systolic murmur right upper sternal border, rub   or gallop, normal carotid impulse bilaterally without bruit.   Abdomen:     Soft, non-tender   Extremities:   Extremities normal, atraumatic, no cyanosis or edema   Pulses:   2+ and symmetric all extremities   Skin:   Skin color, texture, turgor normal, no rashes or lesions       Diagnostic Data:  Procedures  No results found for: CHLPL, TRIG, HDL, LDLDIRECT  Lab Results   Component Value Date    GLUCOSE 77 08/22/2018    BUN 14 08/22/2018    CREATININE 1.01 08/22/2018     08/22/2018    K 4.6 08/22/2018     08/22/2018    CO2 26.0 08/22/2018     No results found for: HGBA1C  Lab " Results   Component Value Date    WBC 4.30 09/12/2019    HGB 10.2 (L) 09/12/2019    HCT 31.3 (L) 09/12/2019     09/12/2019       Allergies  Allergies   Allergen Reactions   • Codeine Sulfate Unknown (See Comments)     Pt took in the 70's-pt not sure of what happened   • Cozaar [Losartan] Unknown (See Comments)     Pt can not remember   • Crestor [Rosuvastatin] Unknown (See Comments)     Pt can not remember   • Dexlansoprazole Unknown (See Comments)     Pt can not remember   • Lipitor [Atorvastatin] Unknown (See Comments)     Pt can not remember   • Lisinopril Unknown (See Comments)     Pt can not remember   • Nexium [Esomeprazole Magnesium] Unknown (See Comments)     Pt can not remember   • Norvasc [Amlodipine] Unknown (See Comments)     Pt can not remember   • Sulfadiazine Unknown (See Comments)     Pt can not remember   • Toprol Xl [Metoprolol Tartrate] Unknown (See Comments)     Pt can not remember   • Zetia [Ezetimibe] Unknown (See Comments)     Pt can not remember   • Zocor [Simvastatin] Unknown (See Comments)     Pt can not remember   • Augmentin [Amoxicillin-Pot Clavulanate] GI Intolerance     nausea   • Celebrex [Celecoxib] GI Intolerance       Current Medications    Current Outpatient Medications:   •  amitriptyline (ELAVIL) 25 MG tablet, Take 25 mg by mouth., Disp: , Rfl:   •  bumetanide (BUMEX) 0.5 MG tablet, TAKE 1 TABLET BY MOUTH EVERY OTHER DAY AS NEEDED FOR SWELLING (Patient taking differently: Take 0.5 mg by mouth As Needed.), Disp: 15 tablet, Rfl: 2  •  cetirizine (ZyrTEC) 10 MG tablet, Take 10 mg by mouth daily., Disp: , Rfl:   •  fentaNYL (DURAGESIC) 75 MCG/HR patch, Place 1 patch on the skin as directed by provider Every 72 (Seventy-Two) Hours., Disp: , Rfl:   •  gabapentin (NEURONTIN) 600 MG tablet, Take 600 mg by mouth 3 (Three) Times a Day., Disp: , Rfl: 1  •  HYDROcodone-acetaminophen (NORCO)  MG per tablet, Take 1 tablet by mouth every 6 (six) hours as needed for moderate pain  (4-6)., Disp: , Rfl:   •  oxybutynin XL (DITROPAN-XL) 10 MG 24 hr tablet, Take 10 mg by mouth As Needed., Disp: , Rfl:   •  pantoprazole (PROTONIX) 40 MG EC tablet, Take 40 mg by mouth daily., Disp: , Rfl:   •  potassium chloride (K-DUR) 10 MEQ CR tablet, TAKE 1 TABLET BY MOUTH ONCE DAILY, Disp: 30 tablet, Rfl: 2  •  promethazine (PHENERGAN) 25 MG tablet, Take 25 mg by mouth every 6 (six) hours as needed for nausea or vomiting., Disp: , Rfl:   •  VOLTAREN 1 % gel gel, Apply 4 g topically to the appropriate area as directed As Needed., Disp: , Rfl: 3          ROS  Review of Systems   Cardiovascular: Negative for chest pain, dyspnea on exertion, irregular heartbeat and palpitations.   Respiratory: Negative for cough and snoring.        SOCIAL HX  Social History     Socioeconomic History   • Marital status:      Spouse name: Not on file   • Number of children: Not on file   • Years of education: Not on file   • Highest education level: Not on file   Tobacco Use   • Smoking status: Never Smoker   • Smokeless tobacco: Never Used   Substance and Sexual Activity   • Alcohol use: No   • Drug use: No   • Sexual activity: Defer   Social History Narrative    Caffeine: 1 serving per day. Pt lives at home alone.       FAMILY HX  Family History   Problem Relation Age of Onset   • No Known Problems Mother    • Diabetes Father    • Cancer Son              Andi Seals III, MD, FACC

## 2019-09-26 ENCOUNTER — OFFICE VISIT (OUTPATIENT)
Dept: ONCOLOGY | Facility: CLINIC | Age: 84
End: 2019-09-26

## 2019-09-26 VITALS
RESPIRATION RATE: 18 BRPM | BODY MASS INDEX: 23.21 KG/M2 | TEMPERATURE: 98 F | SYSTOLIC BLOOD PRESSURE: 156 MMHG | DIASTOLIC BLOOD PRESSURE: 66 MMHG | HEART RATE: 83 BPM | OXYGEN SATURATION: 95 % | WEIGHT: 131 LBS | HEIGHT: 63 IN

## 2019-09-26 DIAGNOSIS — K31.819 GAVE (GASTRIC ANTRAL VASCULAR ECTASIA): ICD-10-CM

## 2019-09-26 DIAGNOSIS — D50.0 IRON DEFICIENCY ANEMIA DUE TO CHRONIC BLOOD LOSS: ICD-10-CM

## 2019-09-26 DIAGNOSIS — K90.9 MALABSORPTION IN THE ELDERLY: Primary | ICD-10-CM

## 2019-09-26 DIAGNOSIS — Z78.9 MEDICATION INTOLERANCE: ICD-10-CM

## 2019-09-26 PROCEDURE — 99214 OFFICE O/P EST MOD 30 MIN: CPT | Performed by: INTERNAL MEDICINE

## 2019-09-26 RX ORDER — SODIUM CHLORIDE 9 MG/ML
250 INJECTION, SOLUTION INTRAVENOUS ONCE
Status: CANCELLED | OUTPATIENT
Start: 2019-10-02

## 2019-09-26 RX ORDER — SODIUM CHLORIDE 9 MG/ML
250 INJECTION, SOLUTION INTRAVENOUS ONCE
Status: CANCELLED | OUTPATIENT
Start: 2019-10-10

## 2019-09-26 NOTE — PROGRESS NOTES
"PROBLEM LIST:  1. 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 Status post EGD colonoscopy  3. Status post Iv iron  infusion periodically  4. Bone Marrow biopsy  - normal - done by Dr. Reeves  5. 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  lady with normocytic anemia likely related to chronic blood loss.  She is actually stabilized nicely.  Her blood counts back to normal.  She has not required any transfusion support.  Last blood transfusion on 8/31/18.    She has done remarkably well since then.  Denies any active bleeding.  Fatigue seems to be tolerating well.   However recently she is gotten more fatigued.  Her hemoglobin has also dropped.    Past medical history, social history and family history was reviewed and unchanged from prior visit.    Review of Systems:    Review of Systems   Constitutional: Positive for fatigue.   HENT: Negative.    Eyes: Negative.    Respiratory: Positive for shortness of breath.    Cardiovascular: Negative.    Gastrointestinal: Negative.    Endocrine: Negative.    Genitourinary: Negative.    Musculoskeletal: Negative.    Skin: Negative.    Allergic/Immunologic: Negative.    Neurological: Negative.    Hematological: Negative.    Psychiatric/Behavioral: Negative.       A comprehensive 14 point review of systems was performed and was negative except as mentioned.      Medications:  The current medication list was reviewed in the EMR    ALLERGIES:    Allergies   Allergen Reactions   • Codeine Sulfate Unknown (See Comments)     Pt took in the 70's-pt not sure of what happened   • Cozaar [Losartan] Unknown (See Comments)     Pt can not remember   • Crestor [Rosuvastatin] Unknown (See Comments)     Pt can not remember   • Dexlansoprazole Unknown (See Comments)     Pt can not remember   • Lipitor [Atorvastatin] Unknown (See Comments)     Pt can not remember   • Lisinopril " "Unknown (See Comments)     Pt can not remember   • Nexium [Esomeprazole Magnesium] Unknown (See Comments)     Pt can not remember   • Norvasc [Amlodipine] Unknown (See Comments)     Pt can not remember   • Sulfadiazine Unknown (See Comments)     Pt can not remember   • Toprol Xl [Metoprolol Tartrate] Unknown (See Comments)     Pt can not remember   • Zetia [Ezetimibe] Unknown (See Comments)     Pt can not remember   • Zocor [Simvastatin] Unknown (See Comments)     Pt can not remember   • Augmentin [Amoxicillin-Pot Clavulanate] GI Intolerance     nausea   • Celebrex [Celecoxib] GI Intolerance         Physical Exam    VITAL SIGNS:  /66 Comment: LUE  Pulse 83   Temp 98 °F (36.7 °C) (Temporal)   Resp 18   Ht 160 cm (63\")   Wt 59.4 kg (131 lb)   SpO2 95% Comment: RA  BMI 23.21 kg/m²      Performance Status: 1    General: well appearing, in no acute distress  HEENT: sclera anicteric, oropharynx clear, neck is supple  Lymphatics: no cervical, supraclavicular, or axillary adenopathy  Cardiovascular: regular rate and rhythm, no murmurs, rubs or gallops  Lungs: clear to auscultation bilaterally  Abdomen: soft, nontender, nondistended.  No palpable organomegaly  Extremities: no lower extremity edema  Skin: no rashes, lesions, bruising, or petechiae  Msk:  Shows no weakness of the large muscle groups  Psych: Mood is stable        RECENT LABS:    Lab Results   Component Value Date    WBC 4.50 09/25/2019    HGB 9.0 (L) 09/25/2019    HCT 27.9 (L) 09/25/2019    MCV 90.5 09/25/2019     09/25/2019     Lab Results   Component Value Date    FERRITIN 228.00 03/13/2019    FERRITIN 251.00 02/20/2019    FERRITIN 328.00 (H) 12/27/2018     Lab Results   Component Value Date    HGB 9.0 (L) 09/25/2019    HGB 10.2 (L) 09/12/2019    HGB 11.1 (L) 08/27/2019    HGB 10.7 (L) 08/14/2019    HGB 11.9 (L) 08/01/2019    HGB 11.6 (L) 07/17/2019    HGB 11.7 (L) 07/03/2019    HGB 11.8 (L) 06/20/2019    HGB 10.7 (L) 06/05/2019    HGB " 10.7 (L) 05/23/2019       Assessment/Plan    1. normocytic anemia requiring periodic blood transfusions secondary to bleeding AVMs and a Watermelon stomach.  Underwent argon treatment with Dr. Llamas.  Her hemoglobin is dropped couple of points since I last saw her.  I suspect she is starting to have issues with iron deficiency again.  Going to retreat her next week and the week after to avoid blood transfusions.    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 Answered any questions patient had with medication and plan.      Rahat Morris MD  [facility]    9/26/2019

## 2019-10-02 ENCOUNTER — HOSPITAL ENCOUNTER (OUTPATIENT)
Dept: ONCOLOGY | Facility: HOSPITAL | Age: 84
Setting detail: INFUSION SERIES
Discharge: HOME | End: 2019-10-02

## 2019-10-02 VITALS
TEMPERATURE: 98 F | HEIGHT: 63 IN | SYSTOLIC BLOOD PRESSURE: 155 MMHG | WEIGHT: 134 LBS | BODY MASS INDEX: 23.74 KG/M2 | DIASTOLIC BLOOD PRESSURE: 54 MMHG | HEART RATE: 79 BPM | RESPIRATION RATE: 18 BRPM

## 2019-10-02 DIAGNOSIS — K90.9 MALABSORPTION IN THE ELDERLY: ICD-10-CM

## 2019-10-02 DIAGNOSIS — Z78.9 MEDICATION INTOLERANCE: ICD-10-CM

## 2019-10-02 DIAGNOSIS — D50.0 IRON DEFICIENCY ANEMIA DUE TO CHRONIC BLOOD LOSS: Primary | ICD-10-CM

## 2019-10-02 DIAGNOSIS — K31.819 GAVE (GASTRIC ANTRAL VASCULAR ECTASIA): Primary | ICD-10-CM

## 2019-10-02 DIAGNOSIS — D50.0 IRON DEFICIENCY ANEMIA DUE TO CHRONIC BLOOD LOSS: ICD-10-CM

## 2019-10-02 LAB
ABO GROUP BLD: NORMAL
ANTI-E: NORMAL
ANTI-K: NORMAL
BLD GP AB SCN SERPL QL: POSITIVE
DAT IGG GEL: POSITIVE
ERYTHROCYTE [DISTWIDTH] IN BLOOD BY AUTOMATED COUNT: 13.9 % (ref 12.3–15.4)
FERRITIN SERPL-MCNC: 61.61 NG/ML (ref 13–150)
HCT VFR BLD AUTO: 24.1 % (ref 34–46.6)
HGB BLD-MCNC: 7.7 G/DL (ref 12–15.9)
LYMPHOCYTES # BLD AUTO: 0.7 10*3/MM3 (ref 0.7–3.1)
LYMPHOCYTES NFR BLD AUTO: 14.3 % (ref 19.6–45.3)
MCH RBC QN AUTO: 28.9 PG (ref 26.6–33)
MCHC RBC AUTO-ENTMCNC: 32 G/DL (ref 31.5–35.7)
MCV RBC AUTO: 90.1 FL (ref 79–97)
MONOCYTES # BLD AUTO: 0.4 10*3/MM3 (ref 0.1–0.9)
MONOCYTES NFR BLD AUTO: 8.8 % (ref 5–12)
NEUTROPHILS # BLD AUTO: 3.8 10*3/MM3 (ref 1.7–7)
NEUTROPHILS NFR BLD AUTO: 76.9 % (ref 42.7–76)
PLATELET # BLD AUTO: 262 10*3/MM3 (ref 140–450)
PMV BLD AUTO: 6.4 FL (ref 6–12)
RBC # BLD AUTO: 2.68 10*6/MM3 (ref 3.77–5.28)
RH BLD: NEGATIVE
T&S EXPIRATION DATE: NORMAL
WARM AUTOANTIBODY: NORMAL
WBC NRBC COR # BLD: 4.9 10*3/MM3 (ref 3.4–10.8)

## 2019-10-02 PROCEDURE — 86922 COMPATIBILITY TEST ANTIGLOB: CPT

## 2019-10-02 PROCEDURE — 86920 COMPATIBILITY TEST SPIN: CPT

## 2019-10-02 PROCEDURE — 86902 BLOOD TYPE ANTIGEN DONOR EA: CPT

## 2019-10-02 PROCEDURE — 82728 ASSAY OF FERRITIN: CPT | Performed by: INTERNAL MEDICINE

## 2019-10-02 PROCEDURE — 25010000002 FERRIC CARBOXYMALTOSE 750 MG/15ML SOLUTION 15 ML VIAL: Performed by: INTERNAL MEDICINE

## 2019-10-02 PROCEDURE — 86850 RBC ANTIBODY SCREEN: CPT | Performed by: INTERNAL MEDICINE

## 2019-10-02 PROCEDURE — 86880 COOMBS TEST DIRECT: CPT | Performed by: INTERNAL MEDICINE

## 2019-10-02 PROCEDURE — 86901 BLOOD TYPING SEROLOGIC RH(D): CPT | Performed by: INTERNAL MEDICINE

## 2019-10-02 PROCEDURE — 96365 THER/PROPH/DIAG IV INF INIT: CPT

## 2019-10-02 PROCEDURE — 85025 COMPLETE CBC W/AUTO DIFF WBC: CPT | Performed by: INTERNAL MEDICINE

## 2019-10-02 PROCEDURE — 86870 RBC ANTIBODY IDENTIFICATION: CPT | Performed by: INTERNAL MEDICINE

## 2019-10-02 PROCEDURE — 86900 BLOOD TYPING SEROLOGIC ABO: CPT | Performed by: INTERNAL MEDICINE

## 2019-10-02 RX ORDER — SODIUM CHLORIDE 9 MG/ML
250 INJECTION, SOLUTION INTRAVENOUS ONCE
Status: COMPLETED | OUTPATIENT
Start: 2019-10-02 | End: 2019-10-02

## 2019-10-02 RX ORDER — SODIUM CHLORIDE 9 MG/ML
250 INJECTION, SOLUTION INTRAVENOUS AS NEEDED
Status: CANCELLED | OUTPATIENT
Start: 2019-10-02

## 2019-10-02 RX ADMIN — SODIUM CHLORIDE 250 ML: 9 INJECTION, SOLUTION INTRAVENOUS at 14:33

## 2019-10-02 RX ADMIN — FERRIC CARBOXYMALTOSE INJECTION 750 MG: 50 INJECTION, SOLUTION INTRAVENOUS at 14:33

## 2019-10-03 ENCOUNTER — HOSPITAL ENCOUNTER (OUTPATIENT)
Dept: ONCOLOGY | Facility: HOSPITAL | Age: 84
Setting detail: INFUSION SERIES
Discharge: HOME | End: 2019-10-03

## 2019-10-03 VITALS
DIASTOLIC BLOOD PRESSURE: 40 MMHG | BODY MASS INDEX: 23.57 KG/M2 | HEIGHT: 63 IN | WEIGHT: 133 LBS | RESPIRATION RATE: 18 BRPM | TEMPERATURE: 98.4 F | HEART RATE: 70 BPM | SYSTOLIC BLOOD PRESSURE: 154 MMHG

## 2019-10-03 DIAGNOSIS — D50.0 IRON DEFICIENCY ANEMIA DUE TO CHRONIC BLOOD LOSS: ICD-10-CM

## 2019-10-03 PROCEDURE — 86900 BLOOD TYPING SEROLOGIC ABO: CPT

## 2019-10-03 PROCEDURE — P9016 RBC LEUKOCYTES REDUCED: HCPCS

## 2019-10-03 PROCEDURE — 36430 TRANSFUSION BLD/BLD COMPNT: CPT

## 2019-10-03 RX ORDER — SODIUM CHLORIDE 9 MG/ML
250 INJECTION, SOLUTION INTRAVENOUS AS NEEDED
Status: DISCONTINUED | OUTPATIENT
Start: 2019-10-03 | End: 2019-10-04 | Stop reason: HOSPADM

## 2019-10-04 LAB
ABO + RH BLD: NORMAL
BH BB BLOOD EXPIRATION DATE: NORMAL
BH BB BLOOD TYPE BARCODE: 9500
BH BB DISPENSE STATUS: NORMAL
BH BB PRODUCT CODE: NORMAL
BH BB UNIT NUMBER: NORMAL
CROSSMATCH INTERPRETATION: NORMAL
UNIT  ABO: NORMAL
UNIT  RH: NORMAL

## 2019-10-05 ENCOUNTER — HOSPITAL ENCOUNTER (EMERGENCY)
Facility: HOSPITAL | Age: 84
Discharge: HOME | End: 2019-10-05
Attending: EMERGENCY MEDICINE | Admitting: EMERGENCY MEDICINE

## 2019-10-05 ENCOUNTER — APPOINTMENT (OUTPATIENT)
Dept: GENERAL RADIOLOGY | Facility: HOSPITAL | Age: 84
End: 2019-10-05

## 2019-10-05 VITALS
RESPIRATION RATE: 18 BRPM | HEIGHT: 63 IN | HEART RATE: 72 BPM | WEIGHT: 133 LBS | TEMPERATURE: 97.6 F | SYSTOLIC BLOOD PRESSURE: 175 MMHG | BODY MASS INDEX: 23.57 KG/M2 | OXYGEN SATURATION: 99 % | DIASTOLIC BLOOD PRESSURE: 76 MMHG

## 2019-10-05 DIAGNOSIS — R06.02 SOB (SHORTNESS OF BREATH): ICD-10-CM

## 2019-10-05 DIAGNOSIS — I50.9 CONGESTIVE HEART FAILURE, UNSPECIFIED HF CHRONICITY, UNSPECIFIED HEART FAILURE TYPE (HCC): Primary | ICD-10-CM

## 2019-10-05 DIAGNOSIS — Z86.79 HISTORY OF ATRIAL FIBRILLATION: ICD-10-CM

## 2019-10-05 DIAGNOSIS — I48.0 PAROXYSMAL A-FIB (HCC): ICD-10-CM

## 2019-10-05 LAB
ALBUMIN SERPL-MCNC: 4 G/DL (ref 3.5–5.2)
ALBUMIN/GLOB SERPL: 1.2 G/DL
ALP SERPL-CCNC: 83 U/L (ref 39–117)
ALT SERPL W P-5'-P-CCNC: 9 U/L (ref 1–33)
ANION GAP SERPL CALCULATED.3IONS-SCNC: 11 MMOL/L (ref 5–15)
AST SERPL-CCNC: 25 U/L (ref 1–32)
BASOPHILS # BLD AUTO: 0.05 10*3/MM3 (ref 0–0.2)
BASOPHILS NFR BLD AUTO: 1.3 % (ref 0–1.5)
BILIRUB SERPL-MCNC: 0.4 MG/DL (ref 0.2–1.2)
BUN BLD-MCNC: 13 MG/DL (ref 8–23)
BUN/CREAT SERPL: 14.6 (ref 7–25)
CALCIUM SPEC-SCNC: 9.2 MG/DL (ref 8.6–10.5)
CHLORIDE SERPL-SCNC: 101 MMOL/L (ref 98–107)
CO2 SERPL-SCNC: 26 MMOL/L (ref 22–29)
CREAT BLD-MCNC: 0.89 MG/DL (ref 0.57–1)
DEPRECATED RDW RBC AUTO: 46.5 FL (ref 37–54)
EOSINOPHIL # BLD AUTO: 0.22 10*3/MM3 (ref 0–0.4)
EOSINOPHIL NFR BLD AUTO: 5.8 % (ref 0.3–6.2)
ERYTHROCYTE [DISTWIDTH] IN BLOOD BY AUTOMATED COUNT: 13.8 % (ref 12.3–15.4)
GFR SERPL CREATININE-BSD FRML MDRD: 60 ML/MIN/1.73
GLOBULIN UR ELPH-MCNC: 3.3 GM/DL
GLUCOSE BLD-MCNC: 123 MG/DL (ref 65–99)
HCT VFR BLD AUTO: 30.4 % (ref 34–46.6)
HGB BLD-MCNC: 9.3 G/DL (ref 12–15.9)
HOLD SPECIMEN: NORMAL
HOLD SPECIMEN: NORMAL
IMM GRANULOCYTES # BLD AUTO: 0.01 10*3/MM3 (ref 0–0.05)
IMM GRANULOCYTES NFR BLD AUTO: 0.3 % (ref 0–0.5)
LYMPHOCYTES # BLD AUTO: 0.41 10*3/MM3 (ref 0.7–3.1)
LYMPHOCYTES NFR BLD AUTO: 10.8 % (ref 19.6–45.3)
MCH RBC QN AUTO: 28.4 PG (ref 26.6–33)
MCHC RBC AUTO-ENTMCNC: 30.6 G/DL (ref 31.5–35.7)
MCV RBC AUTO: 93 FL (ref 79–97)
MONOCYTES # BLD AUTO: 0.29 10*3/MM3 (ref 0.1–0.9)
MONOCYTES NFR BLD AUTO: 7.6 % (ref 5–12)
NEUTROPHILS # BLD AUTO: 2.82 10*3/MM3 (ref 1.7–7)
NEUTROPHILS NFR BLD AUTO: 74.2 % (ref 42.7–76)
NRBC BLD AUTO-RTO: 0 /100 WBC (ref 0–0.2)
NT-PROBNP SERPL-MCNC: 1440 PG/ML (ref 5–1800)
PLATELET # BLD AUTO: 229 10*3/MM3 (ref 140–450)
PMV BLD AUTO: 9.4 FL (ref 6–12)
POTASSIUM BLD-SCNC: 4.3 MMOL/L (ref 3.5–5.2)
PROT SERPL-MCNC: 7.3 G/DL (ref 6–8.5)
RBC # BLD AUTO: 3.27 10*6/MM3 (ref 3.77–5.28)
SODIUM BLD-SCNC: 138 MMOL/L (ref 136–145)
TROPONIN T SERPL-MCNC: <0.01 NG/ML (ref 0–0.03)
WBC NRBC COR # BLD: 3.8 10*3/MM3 (ref 3.4–10.8)
WHOLE BLOOD HOLD SPECIMEN: NORMAL
WHOLE BLOOD HOLD SPECIMEN: NORMAL

## 2019-10-05 PROCEDURE — 80053 COMPREHEN METABOLIC PANEL: CPT | Performed by: EMERGENCY MEDICINE

## 2019-10-05 PROCEDURE — 93005 ELECTROCARDIOGRAM TRACING: CPT | Performed by: EMERGENCY MEDICINE

## 2019-10-05 PROCEDURE — 99284 EMERGENCY DEPT VISIT MOD MDM: CPT

## 2019-10-05 PROCEDURE — 71046 X-RAY EXAM CHEST 2 VIEWS: CPT

## 2019-10-05 PROCEDURE — 83880 ASSAY OF NATRIURETIC PEPTIDE: CPT | Performed by: EMERGENCY MEDICINE

## 2019-10-05 PROCEDURE — 85025 COMPLETE CBC W/AUTO DIFF WBC: CPT | Performed by: EMERGENCY MEDICINE

## 2019-10-05 PROCEDURE — 84484 ASSAY OF TROPONIN QUANT: CPT | Performed by: EMERGENCY MEDICINE

## 2019-10-05 PROCEDURE — 96374 THER/PROPH/DIAG INJ IV PUSH: CPT

## 2019-10-05 PROCEDURE — 93005 ELECTROCARDIOGRAM TRACING: CPT

## 2019-10-05 RX ORDER — BUMETANIDE 0.25 MG/ML
2 INJECTION INTRAMUSCULAR; INTRAVENOUS ONCE
Status: COMPLETED | OUTPATIENT
Start: 2019-10-05 | End: 2019-10-05

## 2019-10-05 RX ORDER — SODIUM CHLORIDE 0.9 % (FLUSH) 0.9 %
10 SYRINGE (ML) INJECTION AS NEEDED
Status: DISCONTINUED | OUTPATIENT
Start: 2019-10-05 | End: 2019-10-05 | Stop reason: HOSPADM

## 2019-10-05 RX ADMIN — BUMETANIDE 2 MG: 0.25 INJECTION INTRAMUSCULAR; INTRAVENOUS at 16:41

## 2019-10-05 RX ADMIN — SODIUM CHLORIDE, PRESERVATIVE FREE 10 ML: 5 INJECTION INTRAVENOUS at 16:42

## 2019-10-05 RX ADMIN — SODIUM CHLORIDE, PRESERVATIVE FREE 10 ML: 5 INJECTION INTRAVENOUS at 16:39

## 2019-10-05 RX ADMIN — SODIUM CHLORIDE, PRESERVATIVE FREE 10 ML: 5 INJECTION INTRAVENOUS at 13:27

## 2019-10-05 NOTE — ED PROVIDER NOTES
Subjective   Sheryl Conner is an 88 y.o. female who presents to the ED with complaints of shortness of breath. The patient reports that she has been experiencing shortness of breath for the past couple of days. She states that her symptoms worsen with exertion. She denies abdominal pain, nausea, vomiting, and melena. She has has a history of anemia and had a blood transfusion 2 days ago. She has a history of myasthenia gravis. She also has a pacemaker. There are no other acute complaints at this time.         History provided by:  Patient and relative  Shortness of Breath   Severity:  Moderate  Onset quality:  Gradual  Timing:  Constant  Associated symptoms: no abdominal pain and no vomiting        Review of Systems   Respiratory: Positive for shortness of breath.    Gastrointestinal: Negative for abdominal pain, nausea and vomiting.        No melena.    All other systems reviewed and are negative.      Past Medical History:   Diagnosis Date   • Anemia    • Arthritis    • Fibromyalgia    • GERD (gastroesophageal reflux disease)    • History of transfusion    • Hypertension    • Kidney stone    • MG (myasthenia gravis) (CMS/[facility])     history of   • Pacemaker        Allergies   Allergen Reactions   • Codeine Sulfate Unknown (See Comments)     Pt took in the 70's-pt not sure of what happened   • Cozaar [Losartan] Unknown (See Comments)     Pt can not remember   • Crestor [Rosuvastatin] Unknown (See Comments)     Pt can not remember   • Dexlansoprazole Unknown (See Comments)     Pt can not remember   • Lipitor [Atorvastatin] Unknown (See Comments)     Pt can not remember   • Lisinopril Unknown (See Comments)     Pt can not remember   • Nexium [Esomeprazole Magnesium] Unknown (See Comments)     Pt can not remember   • Norvasc [Amlodipine] Unknown (See Comments)     Pt can not remember   • Sulfadiazine Unknown (See Comments)     Pt can not remember   • Toprol Xl [Metoprolol Tartrate] Unknown (See Comments)     Pt can not  remember   • Zetia [Ezetimibe] Unknown (See Comments)     Pt can not remember   • Zocor [Simvastatin] Unknown (See Comments)     Pt can not remember   • Augmentin [Amoxicillin-Pot Clavulanate] GI Intolerance     nausea   • Celebrex [Celecoxib] GI Intolerance       Past Surgical History:   Procedure Laterality Date   • CARDIAC SURGERY     • COLONOSCOPY     • HEMORRHOIDECTOMY     • TUBAL ABDOMINAL LIGATION     • UPPER GASTROINTESTINAL ENDOSCOPY     • UPPER GASTROINTESTINAL ENDOSCOPY  05/29/2018       Family History   Problem Relation Age of Onset   • No Known Problems Mother    • Diabetes Father    • Cancer Son        Social History     Socioeconomic History   • Marital status:      Spouse name: Not on file   • Number of children: Not on file   • Years of education: Not on file   • Highest education level: Not on file   Tobacco Use   • Smoking status: Never Smoker   • Smokeless tobacco: Never Used   Substance and Sexual Activity   • Alcohol use: No   • Drug use: No   • Sexual activity: Defer   Social History Narrative    Caffeine: 1 serving per day. Pt lives at home alone.         Objective   Physical Exam   Constitutional: She is oriented to person, place, and time. She appears well-developed and well-nourished.   HENT:   Head: Normocephalic and atraumatic.   Eyes: Conjunctivae are normal. No scleral icterus.   Neck: Normal range of motion. Neck supple.   Cardiovascular: Normal rate, regular rhythm and normal heart sounds.   Pulmonary/Chest: Effort normal and breath sounds normal.   Abdominal: Soft. There is no tenderness.   Musculoskeletal: Normal range of motion.   Neurological: She is alert and oriented to person, place, and time.   Skin: Skin is warm and dry.   Psychiatric: She has a normal mood and affect. Her behavior is normal.   Nursing note and vitals reviewed.      Procedures         ED Course       Recent Results (from the past 24 hour(s))   Comprehensive Metabolic Panel    Collection Time: 10/05/19   1:29 PM   Result Value Ref Range    Glucose 123 (H) 65 - 99 mg/dL    BUN 13 8 - 23 mg/dL    Creatinine 0.89 0.57 - 1.00 mg/dL    Sodium 138 136 - 145 mmol/L    Potassium 4.3 3.5 - 5.2 mmol/L    Chloride 101 98 - 107 mmol/L    CO2 26.0 22.0 - 29.0 mmol/L    Calcium 9.2 8.6 - 10.5 mg/dL    Total Protein 7.3 6.0 - 8.5 g/dL    Albumin 4.00 3.50 - 5.20 g/dL    ALT (SGPT) 9 1 - 33 U/L    AST (SGOT) 25 1 - 32 U/L    Alkaline Phosphatase 83 39 - 117 U/L    Total Bilirubin 0.4 0.2 - 1.2 mg/dL    eGFR Non African Amer 60 (L) >60 mL/min/1.73    Globulin 3.3 gm/dL    A/G Ratio 1.2 g/dL    BUN/Creatinine Ratio 14.6 7.0 - 25.0    Anion Gap 11.0 5.0 - 15.0 mmol/L   BNP    Collection Time: 10/05/19  1:29 PM   Result Value Ref Range    proBNP 1,440.0 5.0-1,800.0 pg/mL   Troponin    Collection Time: 10/05/19  1:29 PM   Result Value Ref Range    Troponin T <0.010 0.000 - 0.030 ng/mL   Light Blue Top    Collection Time: 10/05/19  1:29 PM   Result Value Ref Range    Extra Tube hold for add-on    Green Top (Gel)    Collection Time: 10/05/19  1:29 PM   Result Value Ref Range    Extra Tube Hold for add-ons.    Lavender Top    Collection Time: 10/05/19  1:29 PM   Result Value Ref Range    Extra Tube hold for add-on    Gold Top - SST    Collection Time: 10/05/19  1:29 PM   Result Value Ref Range    Extra Tube Hold for add-ons.    CBC Auto Differential    Collection Time: 10/05/19  1:29 PM   Result Value Ref Range    WBC 3.80 3.40 - 10.80 10*3/mm3    RBC 3.27 (L) 3.77 - 5.28 10*6/mm3    Hemoglobin 9.3 (L) 12.0 - 15.9 g/dL    Hematocrit 30.4 (L) 34.0 - 46.6 %    MCV 93.0 79.0 - 97.0 fL    MCH 28.4 26.6 - 33.0 pg    MCHC 30.6 (L) 31.5 - 35.7 g/dL    RDW 13.8 12.3 - 15.4 %    RDW-SD 46.5 37.0 - 54.0 fl    MPV 9.4 6.0 - 12.0 fL    Platelets 229 140 - 450 10*3/mm3    Neutrophil % 74.2 42.7 - 76.0 %    Lymphocyte % 10.8 (L) 19.6 - 45.3 %    Monocyte % 7.6 5.0 - 12.0 %    Eosinophil % 5.8 0.3 - 6.2 %    Basophil % 1.3 0.0 - 1.5 %    Immature Grans  % 0.3 0.0 - 0.5 %    Neutrophils, Absolute 2.82 1.70 - 7.00 10*3/mm3    Lymphocytes, Absolute 0.41 (L) 0.70 - 3.10 10*3/mm3    Monocytes, Absolute 0.29 0.10 - 0.90 10*3/mm3    Eosinophils, Absolute 0.22 0.00 - 0.40 10*3/mm3    Basophils, Absolute 0.05 0.00 - 0.20 10*3/mm3    Immature Grans, Absolute 0.01 0.00 - 0.05 10*3/mm3    nRBC 0.0 0.0 - 0.2 /100 WBC     Note: In addition to lab results from this visit, the labs listed above may include labs taken at another facility or during a different encounter within the last 24 hours. Please correlate lab times with ED admission and discharge times for further clarification of the services performed during this visit.    XR Chest 2 View   Preliminary Result       Mild pulmonary vascular congestion with subjective hazy multifocal   airspace changes raising the concern for possible skull mild congestive   heart failure. Additional trace left pleural effusion noted, similar to   slightly improved from 08/10/2018.       Redeposition of post surgical changes to the mediastinum and heart with   recent demonstration of a discontinuous superior most sternal wire as   described above.       Thoracolumbar degenerative changes with exaggerated kyphotic curvature.                Vitals:    10/05/19 1404 10/05/19 1405 10/05/19 1600 10/05/19 1728   BP:   (!) 196/82 175/76   BP Location:       Patient Position:       Pulse: 79 77 75 72   Resp:  18 18 18   Temp:       TempSrc:       SpO2: (!) 89% 93% 98% 99%   Weight:       Height:         Medications   sodium chloride 0.9 % flush 10 mL (10 mL Intravenous Given 10/5/19 1642)   bumetanide (BUMEX) injection 2 mg (2 mg Intravenous Given 10/5/19 1641)     ECG/EMG Results (last 24 hours)     Procedure Component Value Units Date/Time    ECG 12 Lead [[id number]] Collected:  10/05/19 1305     Updated:  10/05/19 1306        ECG 12 Lead                           MDM  Number of Diagnoses or Management Options  Congestive heart failure, unspecified  HF chronicity, unspecified heart failure type (CMS/HCC): new and requires workup     Amount and/or Complexity of Data Reviewed  Clinical lab tests: reviewed and ordered  Tests in the radiology section of CPT®: reviewed and ordered  Tests in the medicine section of CPT®: reviewed and ordered  Decide to obtain previous medical records or to obtain history from someone other than the patient: yes  Discuss the patient with other providers: yes    Patient Progress  Patient progress: stable      Final diagnoses:   Congestive heart failure, unspecified HF chronicity, unspecified heart failure type (CMS/HCC)   Paroxysmal A-fib (CMS/HCC)   SOB (shortness of breath)   History of atrial fibrillation       Documentation assistance provided by christine Wood.  Information recorded by the christine was done at my direction and has been verified and validated by me.     Angelique Wood  10/05/19 3843       Andi Dick PA  10/06/19 [id number]

## 2019-10-10 ENCOUNTER — HOSPITAL ENCOUNTER (OUTPATIENT)
Dept: ONCOLOGY | Facility: HOSPITAL | Age: 84
Setting detail: INFUSION SERIES
Discharge: HOME | End: 2019-10-10

## 2019-10-10 VITALS
BODY MASS INDEX: 22.68 KG/M2 | HEART RATE: 71 BPM | DIASTOLIC BLOOD PRESSURE: 53 MMHG | TEMPERATURE: 97.5 F | HEIGHT: 63 IN | SYSTOLIC BLOOD PRESSURE: 126 MMHG | RESPIRATION RATE: 16 BRPM | WEIGHT: 128 LBS

## 2019-10-10 DIAGNOSIS — K31.819 GAVE (GASTRIC ANTRAL VASCULAR ECTASIA): Primary | ICD-10-CM

## 2019-10-10 DIAGNOSIS — D50.0 IRON DEFICIENCY ANEMIA DUE TO CHRONIC BLOOD LOSS: ICD-10-CM

## 2019-10-10 DIAGNOSIS — Z78.9 MEDICATION INTOLERANCE: ICD-10-CM

## 2019-10-10 DIAGNOSIS — K90.9 MALABSORPTION IN THE ELDERLY: ICD-10-CM

## 2019-10-10 PROCEDURE — 25010000002 FERRIC CARBOXYMALTOSE 750 MG/15ML SOLUTION 15 ML VIAL: Performed by: INTERNAL MEDICINE

## 2019-10-10 PROCEDURE — 96374 THER/PROPH/DIAG INJ IV PUSH: CPT

## 2019-10-10 RX ADMIN — FERRIC CARBOXYMALTOSE INJECTION 750 MG: 50 INJECTION, SOLUTION INTRAVENOUS at 14:21

## 2019-10-11 ENCOUNTER — LAB (OUTPATIENT)
Dept: LAB | Facility: HOSPITAL | Age: 84
End: 2019-10-11

## 2019-10-11 DIAGNOSIS — D50.0 IRON DEFICIENCY ANEMIA DUE TO CHRONIC BLOOD LOSS: ICD-10-CM

## 2019-10-11 LAB
ERYTHROCYTE [DISTWIDTH] IN BLOOD BY AUTOMATED COUNT: 17 % (ref 12.3–15.4)
HCT VFR BLD AUTO: 30.3 % (ref 34–46.6)
HGB BLD-MCNC: 9.8 G/DL (ref 12–15.9)
LYMPHOCYTES # BLD AUTO: 0.6 10*3/MM3 (ref 0.7–3.1)
LYMPHOCYTES NFR BLD AUTO: 15.7 % (ref 19.6–45.3)
MCH RBC QN AUTO: 30.1 PG (ref 26.6–33)
MCHC RBC AUTO-ENTMCNC: 32.3 G/DL (ref 31.5–35.7)
MCV RBC AUTO: 93 FL (ref 79–97)
MONOCYTES # BLD AUTO: 0.3 10*3/MM3 (ref 0.1–0.9)
MONOCYTES NFR BLD AUTO: 8.3 % (ref 5–12)
NEUTROPHILS # BLD AUTO: 3.1 10*3/MM3 (ref 1.7–7)
NEUTROPHILS NFR BLD AUTO: 76 % (ref 42.7–76)
PLATELET # BLD AUTO: 241 10*3/MM3 (ref 140–450)
PMV BLD AUTO: 6.7 FL (ref 6–12)
RBC # BLD AUTO: 3.26 10*6/MM3 (ref 3.77–5.28)
WBC NRBC COR # BLD: 4.1 10*3/MM3 (ref 3.4–10.8)

## 2019-10-11 PROCEDURE — 36415 COLL VENOUS BLD VENIPUNCTURE: CPT

## 2019-10-11 PROCEDURE — 85025 COMPLETE CBC W/AUTO DIFF WBC: CPT

## 2019-10-16 ENCOUNTER — OFFICE VISIT (OUTPATIENT)
Dept: CARDIOLOGY | Facility: HOSPITAL | Age: 84
End: 2019-10-16

## 2019-10-16 ENCOUNTER — LAB (OUTPATIENT)
Dept: LAB | Facility: HOSPITAL | Age: 84
End: 2019-10-16

## 2019-10-16 VITALS
TEMPERATURE: 97 F | OXYGEN SATURATION: 90 % | HEART RATE: 80 BPM | WEIGHT: 130 LBS | DIASTOLIC BLOOD PRESSURE: 72 MMHG | HEIGHT: 63 IN | SYSTOLIC BLOOD PRESSURE: 167 MMHG | RESPIRATION RATE: 16 BRPM | BODY MASS INDEX: 23.04 KG/M2

## 2019-10-16 DIAGNOSIS — I87.2 VENOUS INSUFFICIENCY: ICD-10-CM

## 2019-10-16 DIAGNOSIS — M19.90 ARTHRITIS: ICD-10-CM

## 2019-10-16 DIAGNOSIS — I50.9 ACUTE CONGESTIVE HEART FAILURE, UNSPECIFIED HEART FAILURE TYPE (HCC): ICD-10-CM

## 2019-10-16 DIAGNOSIS — I10 ESSENTIAL HYPERTENSION: ICD-10-CM

## 2019-10-16 DIAGNOSIS — I48.0 PAROXYSMAL ATRIAL FIBRILLATION (HCC): ICD-10-CM

## 2019-10-16 DIAGNOSIS — I50.9 ACUTE CONGESTIVE HEART FAILURE, UNSPECIFIED HEART FAILURE TYPE (HCC): Primary | ICD-10-CM

## 2019-10-16 LAB
ANION GAP SERPL CALCULATED.3IONS-SCNC: 10.6 MMOL/L (ref 5–15)
BUN BLD-MCNC: 14 MG/DL (ref 8–23)
BUN/CREAT SERPL: 14.3 (ref 7–25)
CALCIUM SPEC-SCNC: 9.1 MG/DL (ref 8.6–10.5)
CHLORIDE SERPL-SCNC: 99 MMOL/L (ref 98–107)
CO2 SERPL-SCNC: 27.4 MMOL/L (ref 22–29)
CREAT BLD-MCNC: 0.98 MG/DL (ref 0.57–1)
GFR SERPL CREATININE-BSD FRML MDRD: 54 ML/MIN/1.73
GLUCOSE BLD-MCNC: 93 MG/DL (ref 65–99)
POTASSIUM BLD-SCNC: 4.5 MMOL/L (ref 3.5–5.2)
SODIUM BLD-SCNC: 137 MMOL/L (ref 136–145)

## 2019-10-16 PROCEDURE — 80048 BASIC METABOLIC PNL TOTAL CA: CPT

## 2019-10-16 PROCEDURE — 99214 OFFICE O/P EST MOD 30 MIN: CPT | Performed by: NURSE PRACTITIONER

## 2019-10-16 PROCEDURE — 36415 COLL VENOUS BLD VENIPUNCTURE: CPT

## 2019-10-16 RX ORDER — BUMETANIDE 0.5 MG/1
TABLET ORAL
Qty: 30 TABLET | Refills: 2 | Status: SHIPPED | OUTPATIENT
Start: 2019-10-16 | End: 2020-11-18 | Stop reason: SDUPTHER

## 2019-10-16 NOTE — PROGRESS NOTES
[facility]  Heart and Valve Center      Encounter Date:10/16/2019     Sheryl Conner  [address] [place] [address]  [unfilled]    8/17/1931    Provider, No Known    Sheryl Conner is a 88 y.o. female.      Subjective:     Chief Complaint:  Congestive Heart Failure and Follow-up       HPI     88-year-old female with a history of paroxysmal atrial fibrillation, sick sinus syndrome status post pacemaker, hypertension, edema from venous insufficiency.  Will take Bumex as needed.  Currently followed by Dr. Seals with [facility].    Patient presented to [facility] ED on 10/5/2019 with complaints of shortness of breath, worse with exertion.  Greater than 1 week.  Denies associated abdominal pain, vomiting, nausea, diaphoresis, chest pain, pressure, radiating symptoms, melena, hematuria.  Reports a history of anemia receiving blood transfusion 1 week ago.    Pt reports she took Bumex 0.5 mg for 3 days and now is taking every other day.  Dyspnea is back to baseline.  Blood pressure elevated today.  Patient states her blood pressure is always elevated with whitecoat syndrome.  Will return to normal with rest.  Patient has follow-up with new primary care provider in 2 weeks.  Overall doing well.    Patient Active Problem List    Diagnosis Date Noted   • CKD (chronic kidney disease) stage 3, GFR 30-59 ml/min (CMS/[facility]) 07/22/2019   • GERD (gastroesophageal reflux disease) 07/22/2019   • Paroxysmal atrial fibrillation (CMS/[facility]) 07/30/2018     Note Last Updated: 8/16/2018     · 2012 diagnosed with symptomatic paroxysmal A. fib and tachybradycardia syndrome.  · 2012 [employer]tr[employer] pacemaker  · 2012 cardiac catheterization: No flow limiting stenosis.  · June 2018 echo:   hyperdynamic (EF > 70), LA mildly dilated.  Mean aortic valve gradient is 13 mmhg which is borderline mild aortic stenosis     • SSS (sick sinus syndrome) (CMS/[facility]) 07/30/2018   • Essential hypertension 07/30/2018   • Hypoxia 06/28/2018   • GAVE  (gastric antral vascular ectasia) 06/13/2018   • Idiopathic osteoarthritis 11/11/2016   • Right knee pain 08/30/2016   • Iron deficiency anemia due to chronic blood loss 06/10/2016   • Medication intolerance 06/10/2016   • Malabsorption in the elderly 06/10/2016   • Functional heart murmur 04/12/2016   • Hyperlipidemia 10/21/2015         Past Surgical History:   Procedure Laterality Date   • CARDIAC SURGERY     • COLONOSCOPY     • HEMORRHOIDECTOMY     • TUBAL ABDOMINAL LIGATION     • UPPER GASTROINTESTINAL ENDOSCOPY     • UPPER GASTROINTESTINAL ENDOSCOPY  05/29/2018       Allergies   Allergen Reactions   • Codeine Sulfate Unknown (See Comments)     Pt took in the 70's-pt not sure of what happened   • Cozaar [Losartan] Unknown (See Comments)     Pt can not remember   • Crestor [Rosuvastatin] Unknown (See Comments)     Pt can not remember   • Dexlansoprazole Unknown (See Comments)     Pt can not remember   • Lipitor [Atorvastatin] Unknown (See Comments)     Pt can not remember   • Lisinopril Unknown (See Comments)     Pt can not remember   • Nexium [Esomeprazole Magnesium] Unknown (See Comments)     Pt can not remember   • Norvasc [Amlodipine] Unknown (See Comments)     Pt can not remember   • Sulfadiazine Unknown (See Comments)     Pt can not remember   • Toprol Xl [Metoprolol Tartrate] Unknown (See Comments)     Pt can not remember   • Zetia [Ezetimibe] Unknown (See Comments)     Pt can not remember   • Zocor [Simvastatin] Unknown (See Comments)     Pt can not remember   • Augmentin [Amoxicillin-Pot Clavulanate] GI Intolerance     nausea   • Celebrex [Celecoxib] GI Intolerance         Current Outpatient Medications:   •  amitriptyline (ELAVIL) 25 MG tablet, Take 25 mg by mouth., Disp: , Rfl:   •  bumetanide (BUMEX) 0.5 MG tablet, TAKE 1 TABLET BY MOUTH EVERY OTHER DAY AS NEEDED FOR SWELLING, Disp: 30 tablet, Rfl: 2  •  cetirizine (ZyrTEC) 10 MG tablet, Take 10 mg by mouth daily., Disp: , Rfl:   •  fentaNYL  "(DURAGESIC) 75 MCG/HR patch, Place 1 patch on the skin as directed by provider Every 72 (Seventy-Two) Hours., Disp: , Rfl:   •  gabapentin (NEURONTIN) 600 MG tablet, Take 600 mg by mouth 3 (Three) Times a Day., Disp: , Rfl: 1  •  HYDROcodone-acetaminophen (NORCO)  MG per tablet, Take 1 tablet by mouth every 6 (six) hours as needed for moderate pain (4-6)., Disp: , Rfl:   •  pantoprazole (PROTONIX) 40 MG EC tablet, Take 40 mg by mouth 2 (Two) Times a Day., Disp: , Rfl:   •  potassium chloride (K-DUR) 10 MEQ CR tablet, TAKE 1 TABLET BY MOUTH ONCE DAILY, Disp: 30 tablet, Rfl: 2  •  promethazine (PHENERGAN) 25 MG tablet, Take 25 mg by mouth every 6 (six) hours as needed for nausea or vomiting., Disp: , Rfl:   •  VOLTAREN 1 % gel gel, Apply 4 g topically to the appropriate area as directed As Needed (ARTHRITIS PAIN)., Disp: 1 tube, Rfl: 0  •  oxybutynin XL (DITROPAN-XL) 10 MG 24 hr tablet, Take 10 mg by mouth As Needed., Disp: , Rfl:     The following portions of the patient's history were reviewed and updated today during office visit as appropriate: allergies, current medications, past family history, past medical history, past social history, past surgical history and problem list.    Review of Systems   Cardiovascular: Positive for dyspnea on exertion.   Musculoskeletal: Positive for arthritis.   All other systems reviewed and are negative.      Objective:     Vitals:    10/16/19 1049   BP: 167/72   BP Location: Right arm   Patient Position: Sitting   Cuff Size: Adult   Pulse: 80   Resp: 16   Temp: 97 °F (36.1 °C)   TempSrc: Temporal   SpO2: 90%   Weight: 59 kg (130 lb)   Height: 160 cm (62.99\")         Physical Exam   Constitutional: She is oriented to person, place, and time. She appears well-developed and well-nourished. No distress.   HENT:   Mouth/Throat: Oropharynx is clear and moist.   Eyes: No scleral icterus.   Neck: No hepatojugular reflux and no JVD present. Carotid bruit is not present. "   Cardiovascular: Normal rate, regular rhythm, normal heart sounds and intact distal pulses. Exam reveals no friction rub.   No murmur heard.  Pulmonary/Chest: Effort normal and breath sounds normal.   Abdominal: Soft. Bowel sounds are normal. She exhibits no distension. There is no tenderness.   Musculoskeletal: She exhibits no edema.   Neurological: She is alert and oriented to person, place, and time.   Skin: Skin is warm, dry and intact. No rash noted. No cyanosis or erythema. No pallor.   Psychiatric: She has a normal mood and affect. Her behavior is normal. Thought content normal.   Vitals reviewed.      Lab and Diagnostic Review:    Lab on 10/11/2019   Component Date Value Ref Range Status   • WBC 10/11/2019 4.10  3.40 - 10.80 10*3/mm3 Final   • RBC 10/11/2019 3.26* 3.77 - 5.28 10*6/mm3 Final   • Hemoglobin 10/11/2019 9.8* 12.0 - 15.9 g/dL Final   • Hematocrit 10/11/2019 30.3* 34.0 - 46.6 % Final   • RDW 10/11/2019 17.0* 12.3 - 15.4 % Final   • MCV 10/11/2019 93.0  79.0 - 97.0 fL Final   • MCH 10/11/2019 30.1  26.6 - 33.0 pg Final   • MCHC 10/11/2019 32.3  31.5 - 35.7 g/dL Final   • MPV 10/11/2019 6.7  6.0 - 12.0 fL Final   • Platelets 10/11/2019 241  140 - 450 10*3/mm3 Final   • Neutrophil % 10/11/2019 76.0  42.7 - 76.0 % Final   • Lymphocyte % 10/11/2019 15.7* 19.6 - 45.3 % Final   • Monocyte % 10/11/2019 8.3  5.0 - 12.0 % Final   • Neutrophils, Absolute 10/11/2019 3.10  1.70 - 7.00 10*3/mm3 Final   • Lymphocytes, Absolute 10/11/2019 0.60* 0.70 - 3.10 10*3/mm3 Final   • Monocytes, Absolute 10/11/2019 0.30  0.10 - 0.90 10*3/mm3 Final     Lab Results   Component Value Date    GLUCOSE 123 (H) 10/05/2019    CALCIUM 9.2 10/05/2019     10/05/2019    K 4.3 10/05/2019    CO2 26.0 10/05/2019     10/05/2019    BUN 13 10/05/2019    CREATININE 0.89 10/05/2019    EGFRIFNONA 60 (L) 10/05/2019    BCR 14.6 10/05/2019    ANIONGAP 11.0 10/05/2019       EKG 10/8/2019: Electronically ventricular paced, 85  bpm    Chest x-ray 10/5/2019: Mild pulmonary vascular congestion, trace left pleural effusion slightly improved from 8/10/2018    Echocardiogram 6/29/2018: EF greater than 70%, LA mildly dilated, mild aortic stenosis with mean gradient 13 mmHg    [employer] [facility] report reviewed 6/5/2019:  Battery Longevity: 1-3 yrs  AP <1 %   100 % DDD   Mode Switch: 0 %  Episodes: none  Assessment and Plan:         1. Acute congestive heart failure, unspecified heart failure type (CMS/HCC)  Normal EF 2018    - Basic Metabolic Panel; today    Continue bumex everyother day, may take extra for edema, dyspnea, or weight gain 3-5 lbs  - bumetanide (BUMEX) 0.5 MG tablet; TAKE 1 TABLET BY MOUTH EVERY OTHER DAY AS NEEDED FOR SWELLING  Dispense: 30 tablet; Refill: 2    Reviewed s/s HF worsening and when to [facility].     2. Paroxysmal atrial fibrillation (CMS/HCC)  No afib on last PPM check    No NOAC due to chronic anemia/GAVE    3. Essential hypertension  Elevated today  No current BP meds  ?white coat syndrome  Defused 24 hour BP monitor  Encouraged to keep home BP log and f/u with PCP in 2 weeks    4. Venous insufficiency  No edema    5. Arthritis  - VOLTAREN 1 % gel gel; Apply 4 g topically to the appropriate area as directed As Needed (ARTHRITIS PAIN).  Dispense: 1 tube; Refill: 0    F/u with Dr. Seals as scheduled. And [facility] as needed.     *Please note that portions of this note were completed with a voice recognition program. Efforts were made to edit the dictations, but occasionally words are mistranscribed.

## 2019-10-23 ENCOUNTER — TELEPHONE (OUTPATIENT)
Dept: CARDIOLOGY | Facility: CLINIC | Age: 84
End: 2019-10-23

## 2019-10-24 ENCOUNTER — LAB (OUTPATIENT)
Dept: LAB | Facility: HOSPITAL | Age: 84
End: 2019-10-24

## 2019-10-24 ENCOUNTER — CLINICAL SUPPORT NO REQUIREMENTS (OUTPATIENT)
Dept: CARDIOLOGY | Facility: CLINIC | Age: 84
End: 2019-10-24

## 2019-10-24 DIAGNOSIS — Z78.9 MEDICATION INTOLERANCE: ICD-10-CM

## 2019-10-24 DIAGNOSIS — D50.0 IRON DEFICIENCY ANEMIA DUE TO CHRONIC BLOOD LOSS: ICD-10-CM

## 2019-10-24 DIAGNOSIS — K90.9 MALABSORPTION IN THE ELDERLY: ICD-10-CM

## 2019-10-24 DIAGNOSIS — K31.819 GAVE (GASTRIC ANTRAL VASCULAR ECTASIA): ICD-10-CM

## 2019-10-24 DIAGNOSIS — I49.5 SSS (SICK SINUS SYNDROME) (HCC): ICD-10-CM

## 2019-10-24 LAB
ERYTHROCYTE [DISTWIDTH] IN BLOOD BY AUTOMATED COUNT: 16.7 % (ref 12.3–15.4)
FERRITIN SERPL-MCNC: 404.4 NG/ML (ref 13–150)
HCT VFR BLD AUTO: 33.4 % (ref 34–46.6)
HGB BLD-MCNC: 10.8 G/DL (ref 12–15.9)
LYMPHOCYTES # BLD AUTO: 0.7 10*3/MM3 (ref 0.7–3.1)
LYMPHOCYTES NFR BLD AUTO: 14 % (ref 19.6–45.3)
MCH RBC QN AUTO: 30.3 PG (ref 26.6–33)
MCHC RBC AUTO-ENTMCNC: 32.4 G/DL (ref 31.5–35.7)
MCV RBC AUTO: 93.5 FL (ref 79–97)
MONOCYTES # BLD AUTO: 0.2 10*3/MM3 (ref 0.1–0.9)
MONOCYTES NFR BLD AUTO: 3.9 % (ref 5–12)
NEUTROPHILS # BLD AUTO: 4.2 10*3/MM3 (ref 1.7–7)
NEUTROPHILS NFR BLD AUTO: 82.1 % (ref 42.7–76)
PLATELET # BLD AUTO: 243 10*3/MM3 (ref 140–450)
PMV BLD AUTO: 6.7 FL (ref 6–12)
RBC # BLD AUTO: 3.57 10*6/MM3 (ref 3.77–5.28)
WBC NRBC COR # BLD: 5.1 10*3/MM3 (ref 3.4–10.8)

## 2019-10-24 PROCEDURE — 36415 COLL VENOUS BLD VENIPUNCTURE: CPT

## 2019-10-24 PROCEDURE — 85025 COMPLETE CBC W/AUTO DIFF WBC: CPT

## 2019-10-24 PROCEDURE — 93294 REM INTERROG EVL PM/LDLS PM: CPT | Performed by: INTERNAL MEDICINE

## 2019-10-24 PROCEDURE — 93296 REM INTERROG EVL PM/IDS: CPT | Performed by: INTERNAL MEDICINE

## 2019-10-24 PROCEDURE — 82728 ASSAY OF FERRITIN: CPT

## 2019-10-28 ENCOUNTER — OFFICE VISIT (OUTPATIENT)
Dept: FAMILY MEDICINE CLINIC | Facility: CLINIC | Age: 84
End: 2019-10-28

## 2019-10-28 VITALS
HEART RATE: 78 BPM | TEMPERATURE: 97.3 F | HEIGHT: 63 IN | WEIGHT: 130.4 LBS | OXYGEN SATURATION: 91 % | BODY MASS INDEX: 23.11 KG/M2 | RESPIRATION RATE: 20 BRPM | DIASTOLIC BLOOD PRESSURE: 60 MMHG | SYSTOLIC BLOOD PRESSURE: 132 MMHG

## 2019-10-28 DIAGNOSIS — E78.2 MIXED HYPERLIPIDEMIA: ICD-10-CM

## 2019-10-28 DIAGNOSIS — I10 ESSENTIAL HYPERTENSION: Primary | ICD-10-CM

## 2019-10-28 DIAGNOSIS — I49.5 SSS (SICK SINUS SYNDROME) (HCC): ICD-10-CM

## 2019-10-28 DIAGNOSIS — Z23 NEED FOR INFLUENZA VACCINATION: ICD-10-CM

## 2019-10-28 DIAGNOSIS — M19.90 ARTHRITIS: ICD-10-CM

## 2019-10-28 DIAGNOSIS — G47.00 INSOMNIA, UNSPECIFIED TYPE: ICD-10-CM

## 2019-10-28 DIAGNOSIS — K31.819 GAVE (GASTRIC ANTRAL VASCULAR ECTASIA): ICD-10-CM

## 2019-10-28 DIAGNOSIS — D50.0 IRON DEFICIENCY ANEMIA DUE TO CHRONIC BLOOD LOSS: ICD-10-CM

## 2019-10-28 DIAGNOSIS — I48.0 PAROXYSMAL ATRIAL FIBRILLATION (HCC): ICD-10-CM

## 2019-10-28 DIAGNOSIS — M19.90 IDIOPATHIC OSTEOARTHRITIS: ICD-10-CM

## 2019-10-28 PROCEDURE — 99204 OFFICE O/P NEW MOD 45 MIN: CPT | Performed by: FAMILY MEDICINE

## 2019-10-28 RX ORDER — PANTOPRAZOLE SODIUM 40 MG/1
40 TABLET, DELAYED RELEASE ORAL 2 TIMES DAILY
Qty: 60 TABLET | Refills: 11 | Status: SHIPPED | OUTPATIENT
Start: 2019-10-28 | End: 2020-10-06

## 2019-10-28 RX ORDER — POTASSIUM CHLORIDE 750 MG/1
10 TABLET, FILM COATED, EXTENDED RELEASE ORAL DAILY
Qty: 30 TABLET | Refills: 11 | Status: SHIPPED | OUTPATIENT
Start: 2019-10-28 | End: 2020-10-06

## 2019-10-28 RX ORDER — PROMETHAZINE HYDROCHLORIDE 25 MG/1
25 TABLET ORAL EVERY 6 HOURS PRN
Qty: 30 TABLET | Refills: 5 | Status: SHIPPED | OUTPATIENT
Start: 2019-10-28 | End: 2021-11-04

## 2019-10-28 RX ORDER — AMITRIPTYLINE HYDROCHLORIDE 25 MG/1
25 TABLET, FILM COATED ORAL NIGHTLY
Qty: 30 TABLET | Refills: 11 | Status: SHIPPED | OUTPATIENT
Start: 2019-10-28 | End: 2020-03-13 | Stop reason: SDUPTHER

## 2019-10-28 NOTE — PROGRESS NOTES
Subjective   Sheryl Conner is a 88 y.o. female    Chief Complaint    Establish primary care  Desires flu vaccine  Osteoarthritis  Osteoporosis  Chronic blood loss anemia  Iron deficiency  Frequent urinary tract infection    History of Present Illness  Patient presents today to establish care and get a flu shot.  She has several medications she would like it refilled.  She sees multiple specialists with [facility].  Her primary care doctor has been Dr. Vadim Garcia at [facility] for many years.  He recently retired.  Patient has no acute issues at present time.  She has had some recent shortness of breath which apparently was felt to be congestive heart failure.  She responded to an increase in her diuretic for a few days.  Dr. Seals is her cardiologist.  She sees Dr. Hoff for monitoring blood counts and her transfusions.  Dr. Llamas is her gastroenterologist.  She was found to have G AVE syndrome which stands for gastric antral vascular ectasia.  She has done much better since this has been treated.  Has a remote history of myasthenia gravis that was treated by a physician in [place] using plasmapheresis and apparently she was cured of this malady.  She is accompanied on today's visit by her daughter.  Medical records and prescription list are reviewed.  She also attends a pain clinic for chronic pain associated with her osteoarthritis and osteoporosis where she receives fentanyl patch gabapentin and hydrocodone.    The following portions of the patient's history were reviewed and updated as appropriate: allergies, current medications, past social history and problem list    Review of Systems   Constitutional: Negative for chills, fatigue, fever and unexpected weight change.   HENT: Negative.    Respiratory: Negative for cough, chest tightness, shortness of breath and wheezing.    Cardiovascular: Negative for chest pain, palpitations and leg swelling.   Gastrointestinal: Negative for  abdominal pain, nausea and vomiting.   Endocrine: Negative for polydipsia, polyphagia and polyuria.   Genitourinary: Negative for dysuria, frequency and urgency.   Musculoskeletal: Positive for arthralgias, back pain, gait problem, myalgias and neck pain.   Skin: Negative for color change and rash.   Neurological: Negative for dizziness, syncope, weakness and headaches.   Hematological: Negative for adenopathy. Does not bruise/bleed easily.   Psychiatric/Behavioral: Positive for sleep disturbance. Negative for dysphoric mood. The patient is not nervous/anxious.        Objective     Vitals:    10/28/19 1317   BP: 132/60   Pulse: 78   Resp: 20   Temp: 97.3 °F (36.3 °C)   SpO2: 91%       Physical Exam   Constitutional: She is oriented to person, place, and time. She appears well-developed and well-nourished.   HENT:   Head: Normocephalic.   Mouth/Throat: Oropharynx is clear and moist.   Eyes: Conjunctivae are normal. Pupils are equal, round, and reactive to light.   Neck: Neck supple. No JVD present. No thyromegaly present.   Cardiovascular: Normal rate, regular rhythm, normal heart sounds, intact distal pulses and normal pulses.   No murmur heard.  Pulmonary/Chest: Effort normal and breath sounds normal. No respiratory distress.   Abdominal: Soft. Bowel sounds are normal. There is no hepatosplenomegaly. There is no tenderness.   Musculoskeletal: She exhibits no edema.        Lumbar back: She exhibits decreased range of motion, tenderness, bony tenderness and pain. She exhibits no swelling, no deformity and no spasm.   Lymphadenopathy:     She has no cervical adenopathy.   Neurological: She is alert and oriented to person, place, and time. She has normal reflexes.   Skin: Skin is warm and dry. No rash noted.   Psychiatric: She has a normal mood and affect. Her behavior is normal.   Nursing note and vitals reviewed.      Assessment/Plan   Problem List Items Addressed This Visit        Cardiovascular and Mediastinum     Paroxysmal atrial fibrillation (CMS/HCC)    SSS (sick sinus syndrome) (CMS/HCC)    Essential hypertension - Primary    Relevant Medications    potassium chloride (K-DUR) 10 MEQ CR tablet    Hyperlipidemia       Digestive    GAVE (gastric antral vascular ectasia)    Relevant Medications    pantoprazole (PROTONIX) 40 MG EC tablet       Musculoskeletal and Integument    Idiopathic osteoarthritis       Hematopoietic and Hemostatic    Iron deficiency anemia due to chronic blood loss      Other Visit Diagnoses     Need for influenza vaccination        Relevant Orders    [employer] High Dose 65YR+ (4206-4810) (Completed)    Arthritis        Relevant Medications    diclofenac (VOLTAREN) 1 % gel gel    Insomnia, unspecified type        Relevant Medications    amitriptyline (ELAVIL) 25 MG tablet        Problem #1 continue current medications per cardiology  Problem #2 pacemaker in place with appropriate follow-up testing  Problem #3 continue antihypertensive medications without changes  Problem #4 continue low-cholesterol diet  Problem #5 continue PPI and close GI follow-up  Problem #6 analgesics per pain management, NSAIDs contraindicated  Problem #7 continue close follow-up with hematology and iron replacement as indicated

## 2019-11-06 ENCOUNTER — LAB (OUTPATIENT)
Dept: LAB | Facility: HOSPITAL | Age: 84
End: 2019-11-06

## 2019-11-06 DIAGNOSIS — D50.0 IRON DEFICIENCY ANEMIA DUE TO CHRONIC BLOOD LOSS: ICD-10-CM

## 2019-11-06 LAB
ERYTHROCYTE [DISTWIDTH] IN BLOOD BY AUTOMATED COUNT: 16 % (ref 12.3–15.4)
HCT VFR BLD AUTO: 34.8 % (ref 34–46.6)
HGB BLD-MCNC: 10.8 G/DL (ref 12–15.9)
LYMPHOCYTES # BLD AUTO: 0.7 10*3/MM3 (ref 0.7–3.1)
LYMPHOCYTES NFR BLD AUTO: 17.7 % (ref 19.6–45.3)
MCH RBC QN AUTO: 30 PG (ref 26.6–33)
MCHC RBC AUTO-ENTMCNC: 31 G/DL (ref 31.5–35.7)
MCV RBC AUTO: 96.7 FL (ref 79–97)
MONOCYTES # BLD AUTO: 0.3 10*3/MM3 (ref 0.1–0.9)
MONOCYTES NFR BLD AUTO: 6.4 % (ref 5–12)
NEUTROPHILS # BLD AUTO: 3.1 10*3/MM3 (ref 1.7–7)
NEUTROPHILS NFR BLD AUTO: 75.9 % (ref 42.7–76)
PLATELET # BLD AUTO: 237 10*3/MM3 (ref 140–450)
PMV BLD AUTO: 6.7 FL (ref 6–12)
RBC # BLD AUTO: 3.6 10*6/MM3 (ref 3.77–5.28)
WBC NRBC COR # BLD: 4.1 10*3/MM3 (ref 3.4–10.8)

## 2019-11-06 PROCEDURE — 36415 COLL VENOUS BLD VENIPUNCTURE: CPT

## 2019-11-06 PROCEDURE — 85025 COMPLETE CBC W/AUTO DIFF WBC: CPT

## 2019-11-20 ENCOUNTER — LAB (OUTPATIENT)
Dept: LAB | Facility: HOSPITAL | Age: 84
End: 2019-11-20

## 2019-11-20 DIAGNOSIS — K31.819 GAVE (GASTRIC ANTRAL VASCULAR ECTASIA): ICD-10-CM

## 2019-11-20 DIAGNOSIS — Z78.9 MEDICATION INTOLERANCE: ICD-10-CM

## 2019-11-20 DIAGNOSIS — K90.9 MALABSORPTION IN THE ELDERLY: ICD-10-CM

## 2019-11-20 DIAGNOSIS — D50.0 IRON DEFICIENCY ANEMIA DUE TO CHRONIC BLOOD LOSS: ICD-10-CM

## 2019-11-20 LAB
ERYTHROCYTE [DISTWIDTH] IN BLOOD BY AUTOMATED COUNT: 15 % (ref 12.3–15.4)
FERRITIN SERPL-MCNC: 132.3 NG/ML (ref 13–150)
HCT VFR BLD AUTO: 34.8 % (ref 34–46.6)
HGB BLD-MCNC: 11 G/DL (ref 12–15.9)
LYMPHOCYTES # BLD AUTO: 0.6 10*3/MM3 (ref 0.7–3.1)
LYMPHOCYTES NFR BLD AUTO: 12.8 % (ref 19.6–45.3)
MCH RBC QN AUTO: 30.6 PG (ref 26.6–33)
MCHC RBC AUTO-ENTMCNC: 31.8 G/DL (ref 31.5–35.7)
MCV RBC AUTO: 96.3 FL (ref 79–97)
MONOCYTES # BLD AUTO: 0.3 10*3/MM3 (ref 0.1–0.9)
MONOCYTES NFR BLD AUTO: 6.1 % (ref 5–12)
NEUTROPHILS # BLD AUTO: 3.5 10*3/MM3 (ref 1.7–7)
NEUTROPHILS NFR BLD AUTO: 81.1 % (ref 42.7–76)
PLATELET # BLD AUTO: 218 10*3/MM3 (ref 140–450)
PMV BLD AUTO: 6.9 FL (ref 6–12)
RBC # BLD AUTO: 3.61 10*6/MM3 (ref 3.77–5.28)
WBC NRBC COR # BLD: 4.3 10*3/MM3 (ref 3.4–10.8)

## 2019-11-20 PROCEDURE — 36415 COLL VENOUS BLD VENIPUNCTURE: CPT

## 2019-11-20 PROCEDURE — 82728 ASSAY OF FERRITIN: CPT

## 2019-11-20 PROCEDURE — 85025 COMPLETE CBC W/AUTO DIFF WBC: CPT

## 2019-12-11 ENCOUNTER — OFFICE VISIT (OUTPATIENT)
Dept: ONCOLOGY | Facility: CLINIC | Age: 84
End: 2019-12-11

## 2019-12-11 ENCOUNTER — LAB (OUTPATIENT)
Dept: LAB | Facility: HOSPITAL | Age: 84
End: 2019-12-11

## 2019-12-11 VITALS
SYSTOLIC BLOOD PRESSURE: 176 MMHG | OXYGEN SATURATION: 93 % | HEART RATE: 76 BPM | HEIGHT: 63 IN | BODY MASS INDEX: 23.57 KG/M2 | TEMPERATURE: 97.7 F | RESPIRATION RATE: 20 BRPM | DIASTOLIC BLOOD PRESSURE: 79 MMHG | WEIGHT: 133 LBS

## 2019-12-11 DIAGNOSIS — K90.9 MALABSORPTION IN THE ELDERLY: ICD-10-CM

## 2019-12-11 DIAGNOSIS — Z78.9 MEDICATION INTOLERANCE: ICD-10-CM

## 2019-12-11 DIAGNOSIS — K31.819 GAVE (GASTRIC ANTRAL VASCULAR ECTASIA): ICD-10-CM

## 2019-12-11 DIAGNOSIS — D50.0 IRON DEFICIENCY ANEMIA DUE TO CHRONIC BLOOD LOSS: ICD-10-CM

## 2019-12-11 DIAGNOSIS — D50.0 IRON DEFICIENCY ANEMIA DUE TO CHRONIC BLOOD LOSS: Primary | ICD-10-CM

## 2019-12-11 LAB
ERYTHROCYTE [DISTWIDTH] IN BLOOD BY AUTOMATED COUNT: 14.9 % (ref 12.3–15.4)
FERRITIN SERPL-MCNC: 71.64 NG/ML (ref 13–150)
HCT VFR BLD AUTO: 32.2 % (ref 34–46.6)
HGB BLD-MCNC: 10.3 G/DL (ref 12–15.9)
LYMPHOCYTES # BLD AUTO: 0.6 10*3/MM3 (ref 0.7–3.1)
LYMPHOCYTES NFR BLD AUTO: 14.3 % (ref 19.6–45.3)
MCH RBC QN AUTO: 30.7 PG (ref 26.6–33)
MCHC RBC AUTO-ENTMCNC: 32 G/DL (ref 31.5–35.7)
MCV RBC AUTO: 95.7 FL (ref 79–97)
MONOCYTES # BLD AUTO: 0.3 10*3/MM3 (ref 0.1–0.9)
MONOCYTES NFR BLD AUTO: 7 % (ref 5–12)
NEUTROPHILS # BLD AUTO: 3.4 10*3/MM3 (ref 1.7–7)
NEUTROPHILS NFR BLD AUTO: 78.7 % (ref 42.7–76)
PLATELET # BLD AUTO: 232 10*3/MM3 (ref 140–450)
PMV BLD AUTO: 7.1 FL (ref 6–12)
RBC # BLD AUTO: 3.37 10*6/MM3 (ref 3.77–5.28)
WBC NRBC COR # BLD: 4.3 10*3/MM3 (ref 3.4–10.8)

## 2019-12-11 PROCEDURE — 85025 COMPLETE CBC W/AUTO DIFF WBC: CPT

## 2019-12-11 PROCEDURE — 36415 COLL VENOUS BLD VENIPUNCTURE: CPT

## 2019-12-11 PROCEDURE — 82728 ASSAY OF FERRITIN: CPT

## 2019-12-11 PROCEDURE — 99213 OFFICE O/P EST LOW 20 MIN: CPT | Performed by: NURSE PRACTITIONER

## 2019-12-11 NOTE — PROGRESS NOTES
"PROBLEM LIST:  1. 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 Status post EGD colonoscopy  3. Status post Iv iron  infusion periodically  4. Bone Marrow biopsy  - normal - done by Dr. Reeves  5. 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  lady with normocytic anemia likely related to chronic blood loss.  Patient doing fairly well.  No acute blood loss reported.  She does states she bleeds easily due to fragile skin.  Has not required IV iron since 10/2019.  No new complaints or issues.      Past medical history, social history and family history was reviewed and unchanged from prior visit.    Review of Systems:    Review of Systems   Constitutional: Positive for fatigue.   HENT: Negative.    Eyes: Negative.    Respiratory: Negative.    Cardiovascular: Negative.    Gastrointestinal: Negative.    Endocrine: Negative.    Genitourinary: Negative.    Musculoskeletal: Negative.    Skin: Negative.    Allergic/Immunologic: Negative.    Neurological: Negative.    Hematological: Bruises/bleeds easily.   Psychiatric/Behavioral: Negative.       A comprehensive 14 point review of systems was performed and was negative except as mentioned.      Medications:  The current medication list was reviewed in the EMR    ALLERGIES:    Allergies   Allergen Reactions   • Codeine Sulfate Unknown (See Comments)     Pt took in the 70's-pt not sure of what happened   • Cozaar [Losartan] Unknown (See Comments)     Pt can not remember   • Crestor [Rosuvastatin] Unknown (See Comments)     Pt can not remember   • Dexlansoprazole Unknown (See Comments)     Pt can not remember   • Lipitor [Atorvastatin] Unknown (See Comments)     Pt can not remember   • Lisinopril Unknown (See Comments)     Pt can not remember   • Nexium [Esomeprazole Magnesium] Unknown (See Comments)     Pt can not remember   • Norvasc [Amlodipine] Unknown (See " "Comments)     Pt can not remember   • Sulfadiazine Unknown (See Comments)     Pt can not remember   • Toprol Xl [Metoprolol Tartrate] Unknown (See Comments)     Pt can not remember   • Zetia [Ezetimibe] Unknown (See Comments)     Pt can not remember   • Zocor [Simvastatin] Unknown (See Comments)     Pt can not remember   • Augmentin [Amoxicillin-Pot Clavulanate] GI Intolerance     nausea   • Celebrex [Celecoxib] GI Intolerance         Physical Exam    VITAL SIGNS:  /79 Comment: LUE  Pulse 76   Temp 97.7 °F (36.5 °C) (Temporal)   Resp 20   Ht 160 cm (63\")   Wt 60.3 kg (133 lb)   SpO2 93% Comment: RA  BMI 23.56 kg/m²      Performance Status: 1    General: well appearing, in no acute distress  HEENT: sclera anicteric, oropharynx clear, neck is supple  Lymphatics: no cervical, supraclavicular, or axillary adenopathy  Cardiovascular: regular rate and rhythm, no murmurs, rubs or gallops  Lungs: clear to auscultation bilaterally  Abdomen: soft, nontender, nondistended.  No palpable organomegaly  Extremities: no lower extremity edema  Skin: no rashes, lesions, bruising, or petechiae  Msk:  Shows no weakness of the large muscle groups  Psych: Mood is stable      RECENT LABS:    Lab Results   Component Value Date    WBC 4.30 12/11/2019    HGB 10.3 (L) 12/11/2019    HCT 32.2 (L) 12/11/2019    MCV 95.7 12/11/2019     12/11/2019     Lab Results   Component Value Date    FERRITIN 132.30 11/20/2019    FERRITIN 404.40 (H) 10/24/2019    FERRITIN 61.61 10/02/2019     Lab Results   Component Value Date    HGB 10.3 (L) 12/11/2019    HGB 11.0 (L) 11/20/2019    HGB 10.8 (L) 11/06/2019    HGB 10.8 (L) 10/24/2019    HGB 9.8 (L) 10/11/2019    HGB 9.3 (L) 10/05/2019    HGB 7.7 (L) 10/02/2019    HGB 9.0 (L) 09/25/2019    HGB 10.2 (L) 09/12/2019    HGB 11.1 (L) 08/27/2019       Assessment/Plan    1. Normocytic anemia requiring periodic blood transfusions secondary to bleeding AVMs and a Watermelon stomach.  Underwent argon " treatment with Dr. Llamas 6/2018.  Hemoglobin stable today at 10.3 but ferritin down to 71.64 from 132.30 on 11/20/2091.  We will set patient up for IV iron infusion and follow up in 3 months with CBC.      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      I spent a total of 15 minutes in direct patient care, greater than 15 minutes (greater than 50%) were spent in coordination of care, and counseling the patient regarding Iron deficiency anemia due to GAVE . Answered any questions patient had with medication and plan.      Estephania Arroyo, APRALONDRA  [facility]emat[facility] Oncology    12/11/2019

## 2020-01-02 ENCOUNTER — LAB (OUTPATIENT)
Dept: LAB | Facility: HOSPITAL | Age: 85
End: 2020-01-02

## 2020-01-02 DIAGNOSIS — Z78.9 MEDICATION INTOLERANCE: ICD-10-CM

## 2020-01-02 DIAGNOSIS — D50.0 IRON DEFICIENCY ANEMIA DUE TO CHRONIC BLOOD LOSS: ICD-10-CM

## 2020-01-02 DIAGNOSIS — K90.9 MALABSORPTION IN THE ELDERLY: ICD-10-CM

## 2020-01-02 DIAGNOSIS — K31.819 GAVE (GASTRIC ANTRAL VASCULAR ECTASIA): ICD-10-CM

## 2020-01-02 LAB
ERYTHROCYTE [DISTWIDTH] IN BLOOD BY AUTOMATED COUNT: 13.1 % (ref 12.3–15.4)
FERRITIN SERPL-MCNC: 74.95 NG/ML (ref 13–150)
HCT VFR BLD AUTO: 33.1 % (ref 34–46.6)
HGB BLD-MCNC: 10.7 G/DL (ref 12–15.9)
LYMPHOCYTES # BLD AUTO: 0.8 10*3/MM3 (ref 0.7–3.1)
LYMPHOCYTES NFR BLD AUTO: 6.2 % (ref 19.6–45.3)
MCH RBC QN AUTO: 29.8 PG (ref 26.6–33)
MCHC RBC AUTO-ENTMCNC: 32.3 G/DL (ref 31.5–35.7)
MCV RBC AUTO: 92.3 FL (ref 79–97)
MONOCYTES # BLD AUTO: 0.4 10*3/MM3 (ref 0.1–0.9)
MONOCYTES NFR BLD AUTO: 3.1 % (ref 5–12)
NEUTROPHILS # BLD AUTO: 11.5 10*3/MM3 (ref 1.7–7)
NEUTROPHILS NFR BLD AUTO: 90.7 % (ref 42.7–76)
PLATELET # BLD AUTO: 370 10*3/MM3 (ref 140–450)
PMV BLD AUTO: 6.3 FL (ref 6–12)
RBC # BLD AUTO: 3.58 10*6/MM3 (ref 3.77–5.28)
WBC NRBC COR # BLD: 12.7 10*3/MM3 (ref 3.4–10.8)

## 2020-01-02 PROCEDURE — 82728 ASSAY OF FERRITIN: CPT

## 2020-01-02 PROCEDURE — 36415 COLL VENOUS BLD VENIPUNCTURE: CPT

## 2020-01-02 PROCEDURE — 85025 COMPLETE CBC W/AUTO DIFF WBC: CPT

## 2020-01-09 ENCOUNTER — OFFICE VISIT (OUTPATIENT)
Dept: FAMILY MEDICINE CLINIC | Facility: CLINIC | Age: 85
End: 2020-01-09

## 2020-01-09 VITALS
DIASTOLIC BLOOD PRESSURE: 64 MMHG | TEMPERATURE: 98.9 F | OXYGEN SATURATION: 95 % | WEIGHT: 138 LBS | HEART RATE: 83 BPM | BODY MASS INDEX: 24.45 KG/M2 | SYSTOLIC BLOOD PRESSURE: 150 MMHG | HEIGHT: 63 IN

## 2020-01-09 DIAGNOSIS — M15.9 PRIMARY OSTEOARTHRITIS INVOLVING MULTIPLE JOINTS: ICD-10-CM

## 2020-01-09 DIAGNOSIS — J40 BRONCHITIS: Primary | ICD-10-CM

## 2020-01-09 PROCEDURE — 99213 OFFICE O/P EST LOW 20 MIN: CPT | Performed by: FAMILY MEDICINE

## 2020-01-09 RX ORDER — FENTANYL 50 UG/H
1 PATCH TRANSDERMAL
COMMUNITY
Start: 2019-12-27

## 2020-01-09 RX ORDER — FENTANYL 12 UG/H
1 PATCH TRANSDERMAL
COMMUNITY
Start: 2019-12-27

## 2020-01-09 RX ORDER — FLUTICASONE PROPIONATE 50 MCG
2 SPRAY, SUSPENSION (ML) NASAL DAILY
COMMUNITY

## 2020-01-09 NOTE — PROGRESS NOTES
Subjective   Sheryl Conner is a 88 y.o. female    Chief Complaint    Recent bronchitis  Osteoarthritis    History of Present Illness  Patient presents for follow-up after recent treatment for acute bronchitis episode.  She was treated with antibiotics and oral prednisone.  She has gradually improved.  She does not have any current cough or wheezing.  She has chronic shortness of breath.  She is afebrile with a pulse ox of 95% on room air.  She is frustrated with her arthritis.  She is on opioid analgesics per pain treatment due to her arthritis.  She complains of significant pain in the knees hands and back.  She received her flu shot in October 2019.    The following portions of the patient's history were reviewed and updated as appropriate: allergies, current medications, past social history and problem list    Review of Systems   Constitutional: Negative for chills, fatigue and fever.   HENT: Negative.    Respiratory: Positive for cough, chest tightness and wheezing. Negative for shortness of breath.    Cardiovascular: Negative for chest pain.   Gastrointestinal: Negative.  Negative for nausea.   Genitourinary: Negative.    Musculoskeletal: Positive for arthralgias and myalgias.   Hematological: Negative.    Psychiatric/Behavioral: Negative.        Objective     Vitals:    01/09/20 1414   BP: 150/64   Pulse: 83   Temp: 98.9 °F (37.2 °C)   SpO2: 95%       Physical Exam   Constitutional: She is oriented to person, place, and time. She appears well-developed and well-nourished. No distress.   HENT:   Head: Normocephalic and atraumatic.   Nose: Nose normal.   Mouth/Throat: Oropharynx is clear and moist.   Neck: Neck supple. No JVD present.   Cardiovascular: Normal rate, regular rhythm and normal heart sounds.   No murmur heard.  Pulmonary/Chest: Effort normal. No stridor. No respiratory distress. She has wheezes.   Musculoskeletal: She exhibits tenderness and deformity. She exhibits no edema.   Lymphadenopathy:      She has no cervical adenopathy.   Neurological: She is alert and oriented to person, place, and time.   Skin: Skin is warm and dry. She is not diaphoretic.   Nursing note and vitals reviewed.      Assessment/Plan   Problem List Items Addressed This Visit     None      Visit Diagnoses     Bronchitis    -  Primary    Clinically resolved    Relevant Medications    fluticasone (FLONASE) 50 MCG/ACT nasal spray    Primary osteoarthritis involving multiple joints            Problem #2 recommend trial of over-the-counter Aspercreme with lidocaine.

## 2020-01-23 ENCOUNTER — LAB (OUTPATIENT)
Dept: LAB | Facility: HOSPITAL | Age: 85
End: 2020-01-23

## 2020-01-23 DIAGNOSIS — K31.819 GAVE (GASTRIC ANTRAL VASCULAR ECTASIA): ICD-10-CM

## 2020-01-23 DIAGNOSIS — Z78.9 MEDICATION INTOLERANCE: ICD-10-CM

## 2020-01-23 DIAGNOSIS — D50.0 IRON DEFICIENCY ANEMIA DUE TO CHRONIC BLOOD LOSS: ICD-10-CM

## 2020-01-23 DIAGNOSIS — K90.9 MALABSORPTION IN THE ELDERLY: ICD-10-CM

## 2020-01-23 LAB
ERYTHROCYTE [DISTWIDTH] IN BLOOD BY AUTOMATED COUNT: 14.2 % (ref 12.3–15.4)
FERRITIN SERPL-MCNC: 51.57 NG/ML (ref 13–150)
HCT VFR BLD AUTO: 30.2 % (ref 34–46.6)
HGB BLD-MCNC: 9.4 G/DL (ref 12–15.9)
LYMPHOCYTES # BLD AUTO: 0.8 10*3/MM3 (ref 0.7–3.1)
LYMPHOCYTES NFR BLD AUTO: 18.8 % (ref 19.6–45.3)
MCH RBC QN AUTO: 27.6 PG (ref 26.6–33)
MCHC RBC AUTO-ENTMCNC: 31 G/DL (ref 31.5–35.7)
MCV RBC AUTO: 88.9 FL (ref 79–97)
MONOCYTES # BLD AUTO: 0.5 10*3/MM3 (ref 0.1–0.9)
MONOCYTES NFR BLD AUTO: 10.4 % (ref 5–12)
NEUTROPHILS # BLD AUTO: 3.1 10*3/MM3 (ref 1.7–7)
NEUTROPHILS NFR BLD AUTO: 70.8 % (ref 42.7–76)
PLATELET # BLD AUTO: 373 10*3/MM3 (ref 140–450)
PMV BLD AUTO: 6.5 FL (ref 6–12)
RBC # BLD AUTO: 3.39 10*6/MM3 (ref 3.77–5.28)
WBC NRBC COR # BLD: 4.4 10*3/MM3 (ref 3.4–10.8)

## 2020-01-23 PROCEDURE — 36415 COLL VENOUS BLD VENIPUNCTURE: CPT

## 2020-01-23 PROCEDURE — 85025 COMPLETE CBC W/AUTO DIFF WBC: CPT

## 2020-01-23 PROCEDURE — 82728 ASSAY OF FERRITIN: CPT

## 2020-01-23 RX ORDER — SODIUM CHLORIDE 9 MG/ML
250 INJECTION, SOLUTION INTRAVENOUS ONCE
Status: CANCELLED | OUTPATIENT
Start: 2020-01-30

## 2020-01-23 RX ORDER — SODIUM CHLORIDE 9 MG/ML
250 INJECTION, SOLUTION INTRAVENOUS ONCE
Status: CANCELLED | OUTPATIENT
Start: 2020-02-07

## 2020-01-29 ENCOUNTER — CLINICAL SUPPORT NO REQUIREMENTS (OUTPATIENT)
Dept: CARDIOLOGY | Facility: CLINIC | Age: 85
End: 2020-01-29

## 2020-01-29 DIAGNOSIS — I48.0 PAROXYSMAL ATRIAL FIBRILLATION (HCC): ICD-10-CM

## 2020-01-29 PROCEDURE — 93294 REM INTERROG EVL PM/LDLS PM: CPT | Performed by: INTERNAL MEDICINE

## 2020-01-29 PROCEDURE — 93296 REM INTERROG EVL PM/IDS: CPT | Performed by: INTERNAL MEDICINE

## 2020-01-31 ENCOUNTER — HOSPITAL ENCOUNTER (OUTPATIENT)
Dept: ONCOLOGY | Facility: HOSPITAL | Age: 85
Setting detail: INFUSION SERIES
Discharge: HOME | End: 2020-01-31

## 2020-01-31 VITALS
HEART RATE: 83 BPM | BODY MASS INDEX: 23.39 KG/M2 | SYSTOLIC BLOOD PRESSURE: 175 MMHG | TEMPERATURE: 97.7 F | RESPIRATION RATE: 16 BRPM | DIASTOLIC BLOOD PRESSURE: 73 MMHG | HEIGHT: 63 IN | WEIGHT: 132 LBS

## 2020-01-31 DIAGNOSIS — Z78.9 MEDICATION INTOLERANCE: ICD-10-CM

## 2020-01-31 DIAGNOSIS — K90.9 MALABSORPTION IN THE ELDERLY: ICD-10-CM

## 2020-01-31 DIAGNOSIS — K31.819 GAVE (GASTRIC ANTRAL VASCULAR ECTASIA): Primary | ICD-10-CM

## 2020-01-31 DIAGNOSIS — D50.0 IRON DEFICIENCY ANEMIA DUE TO CHRONIC BLOOD LOSS: ICD-10-CM

## 2020-01-31 PROCEDURE — 96374 THER/PROPH/DIAG INJ IV PUSH: CPT

## 2020-01-31 PROCEDURE — 25010000002 FERRIC CARBOXYMALTOSE 750 MG/15ML SOLUTION 15 ML VIAL: Performed by: INTERNAL MEDICINE

## 2020-01-31 RX ORDER — SODIUM CHLORIDE 9 MG/ML
250 INJECTION, SOLUTION INTRAVENOUS ONCE
Status: COMPLETED | OUTPATIENT
Start: 2020-01-31 | End: 2020-01-31

## 2020-01-31 RX ADMIN — FERRIC CARBOXYMALTOSE INJECTION 750 MG: 50 INJECTION, SOLUTION INTRAVENOUS at 14:44

## 2020-01-31 RX ADMIN — SODIUM CHLORIDE 250 ML: 9 INJECTION, SOLUTION INTRAVENOUS at 14:43

## 2020-02-07 ENCOUNTER — HOSPITAL ENCOUNTER (OUTPATIENT)
Dept: ONCOLOGY | Facility: HOSPITAL | Age: 85
Setting detail: INFUSION SERIES
Discharge: HOME | End: 2020-02-07

## 2020-02-07 VITALS
HEART RATE: 73 BPM | BODY MASS INDEX: 22.86 KG/M2 | WEIGHT: 129 LBS | SYSTOLIC BLOOD PRESSURE: 127 MMHG | RESPIRATION RATE: 16 BRPM | DIASTOLIC BLOOD PRESSURE: 51 MMHG | HEIGHT: 63 IN | TEMPERATURE: 97.8 F

## 2020-02-07 DIAGNOSIS — K31.819 GAVE (GASTRIC ANTRAL VASCULAR ECTASIA): Primary | ICD-10-CM

## 2020-02-07 DIAGNOSIS — K90.9 MALABSORPTION IN THE ELDERLY: ICD-10-CM

## 2020-02-07 DIAGNOSIS — D50.0 IRON DEFICIENCY ANEMIA DUE TO CHRONIC BLOOD LOSS: ICD-10-CM

## 2020-02-07 DIAGNOSIS — Z78.9 MEDICATION INTOLERANCE: ICD-10-CM

## 2020-02-07 LAB
ERYTHROCYTE [DISTWIDTH] IN BLOOD BY AUTOMATED COUNT: 17.7 % (ref 12.3–15.4)
HCT VFR BLD AUTO: 31.1 % (ref 34–46.6)
HGB BLD-MCNC: 9.7 G/DL (ref 12–15.9)
LYMPHOCYTES # BLD AUTO: 1.1 10*3/MM3 (ref 0.7–3.1)
LYMPHOCYTES NFR BLD AUTO: 15.1 % (ref 19.6–45.3)
MCH RBC QN AUTO: 26.7 PG (ref 26.6–33)
MCHC RBC AUTO-ENTMCNC: 31.1 G/DL (ref 31.5–35.7)
MCV RBC AUTO: 85.9 FL (ref 79–97)
MONOCYTES # BLD AUTO: 0.4 10*3/MM3 (ref 0.1–0.9)
MONOCYTES NFR BLD AUTO: 6.3 % (ref 5–12)
NEUTROPHILS # BLD AUTO: 5.6 10*3/MM3 (ref 1.7–7)
NEUTROPHILS NFR BLD AUTO: 78.6 % (ref 42.7–76)
PLATELET # BLD AUTO: 278 10*3/MM3 (ref 140–450)
PMV BLD AUTO: 6.7 FL (ref 6–12)
RBC # BLD AUTO: 3.62 10*6/MM3 (ref 3.77–5.28)
WBC NRBC COR # BLD: 7.1 10*3/MM3 (ref 3.4–10.8)

## 2020-02-07 PROCEDURE — 96374 THER/PROPH/DIAG INJ IV PUSH: CPT

## 2020-02-07 PROCEDURE — 25010000002 FERRIC CARBOXYMALTOSE 750 MG/15ML SOLUTION 15 ML VIAL: Performed by: INTERNAL MEDICINE

## 2020-02-07 PROCEDURE — 85025 COMPLETE CBC W/AUTO DIFF WBC: CPT | Performed by: INTERNAL MEDICINE

## 2020-02-07 RX ORDER — SODIUM CHLORIDE 9 MG/ML
250 INJECTION, SOLUTION INTRAVENOUS ONCE
Status: COMPLETED | OUTPATIENT
Start: 2020-02-07 | End: 2020-02-07

## 2020-02-07 RX ADMIN — SODIUM CHLORIDE 250 ML: 9 INJECTION, SOLUTION INTRAVENOUS at 13:30

## 2020-02-07 RX ADMIN — FERRIC CARBOXYMALTOSE INJECTION 750 MG: 50 INJECTION, SOLUTION INTRAVENOUS at 13:40

## 2020-02-21 ENCOUNTER — LAB (OUTPATIENT)
Dept: LAB | Facility: HOSPITAL | Age: 85
End: 2020-02-21

## 2020-02-21 DIAGNOSIS — D50.0 IRON DEFICIENCY ANEMIA DUE TO CHRONIC BLOOD LOSS: ICD-10-CM

## 2020-02-21 DIAGNOSIS — K31.819 GAVE (GASTRIC ANTRAL VASCULAR ECTASIA): ICD-10-CM

## 2020-02-21 DIAGNOSIS — K90.9 MALABSORPTION IN THE ELDERLY: ICD-10-CM

## 2020-02-21 DIAGNOSIS — Z78.9 MEDICATION INTOLERANCE: ICD-10-CM

## 2020-02-21 LAB
ERYTHROCYTE [DISTWIDTH] IN BLOOD BY AUTOMATED COUNT: 18.3 % (ref 12.3–15.4)
FERRITIN SERPL-MCNC: 443 NG/ML (ref 13–150)
HCT VFR BLD AUTO: 39.2 % (ref 34–46.6)
HGB BLD-MCNC: 12.7 G/DL (ref 12–15.9)
LYMPHOCYTES # BLD AUTO: 0.6 10*3/MM3 (ref 0.7–3.1)
LYMPHOCYTES NFR BLD AUTO: 13.6 % (ref 19.6–45.3)
MCH RBC QN AUTO: 28.6 PG (ref 26.6–33)
MCHC RBC AUTO-ENTMCNC: 32.4 G/DL (ref 31.5–35.7)
MCV RBC AUTO: 88.4 FL (ref 79–97)
MONOCYTES # BLD AUTO: 0.1 10*3/MM3 (ref 0.1–0.9)
MONOCYTES NFR BLD AUTO: 3.4 % (ref 5–12)
NEUTROPHILS # BLD AUTO: 3.6 10*3/MM3 (ref 1.7–7)
NEUTROPHILS NFR BLD AUTO: 83 % (ref 42.7–76)
PLATELET # BLD AUTO: 201 10*3/MM3 (ref 140–450)
PMV BLD AUTO: 7 FL (ref 6–12)
RBC # BLD AUTO: 4.43 10*6/MM3 (ref 3.77–5.28)
WBC NRBC COR # BLD: 4.3 10*3/MM3 (ref 3.4–10.8)

## 2020-02-21 PROCEDURE — 82728 ASSAY OF FERRITIN: CPT

## 2020-02-21 PROCEDURE — 85025 COMPLETE CBC W/AUTO DIFF WBC: CPT

## 2020-02-21 PROCEDURE — 36415 COLL VENOUS BLD VENIPUNCTURE: CPT

## 2020-03-10 DIAGNOSIS — G47.00 INSOMNIA, UNSPECIFIED TYPE: ICD-10-CM

## 2020-03-10 RX ORDER — AMITRIPTYLINE HYDROCHLORIDE 25 MG/1
25 TABLET, FILM COATED ORAL NIGHTLY
Qty: 30 TABLET | Refills: 11 | Status: CANCELLED | OUTPATIENT
Start: 2020-03-10

## 2020-03-10 NOTE — TELEPHONE ENCOUNTER
Patient called to report that she takes the amitriptyline HCL 25 mg 2 times a day, one in the morning and one at night. She is almost out and will need a refill

## 2020-03-11 ENCOUNTER — OFFICE VISIT (OUTPATIENT)
Dept: ONCOLOGY | Facility: CLINIC | Age: 85
End: 2020-03-11

## 2020-03-11 ENCOUNTER — LAB (OUTPATIENT)
Dept: LAB | Facility: HOSPITAL | Age: 85
End: 2020-03-11

## 2020-03-11 VITALS
SYSTOLIC BLOOD PRESSURE: 152 MMHG | HEART RATE: 76 BPM | HEIGHT: 63 IN | OXYGEN SATURATION: 92 % | RESPIRATION RATE: 16 BRPM | TEMPERATURE: 97.7 F | WEIGHT: 129 LBS | DIASTOLIC BLOOD PRESSURE: 72 MMHG | BODY MASS INDEX: 22.86 KG/M2

## 2020-03-11 DIAGNOSIS — D50.0 IRON DEFICIENCY ANEMIA DUE TO CHRONIC BLOOD LOSS: ICD-10-CM

## 2020-03-11 DIAGNOSIS — D50.0 IRON DEFICIENCY ANEMIA DUE TO CHRONIC BLOOD LOSS: Primary | ICD-10-CM

## 2020-03-11 DIAGNOSIS — K31.819 GAVE (GASTRIC ANTRAL VASCULAR ECTASIA): Primary | ICD-10-CM

## 2020-03-11 LAB
ERYTHROCYTE [DISTWIDTH] IN BLOOD BY AUTOMATED COUNT: 16.4 % (ref 12.3–15.4)
FERRITIN SERPL-MCNC: 204.7 NG/ML (ref 13–150)
HCT VFR BLD AUTO: 38.1 % (ref 34–46.6)
HGB BLD-MCNC: 12.6 G/DL (ref 12–15.9)
LYMPHOCYTES # BLD AUTO: 0.8 10*3/MM3 (ref 0.7–3.1)
LYMPHOCYTES NFR BLD AUTO: 21.3 % (ref 19.6–45.3)
MCH RBC QN AUTO: 28.9 PG (ref 26.6–33)
MCHC RBC AUTO-ENTMCNC: 33.1 G/DL (ref 31.5–35.7)
MCV RBC AUTO: 87.4 FL (ref 79–97)
MONOCYTES # BLD AUTO: 0.2 10*3/MM3 (ref 0.1–0.9)
MONOCYTES NFR BLD AUTO: 5.4 % (ref 5–12)
NEUTROPHILS # BLD AUTO: 2.7 10*3/MM3 (ref 1.7–7)
NEUTROPHILS NFR BLD AUTO: 73.3 % (ref 42.7–76)
PLATELET # BLD AUTO: 186 10*3/MM3 (ref 140–450)
PMV BLD AUTO: 6.8 FL (ref 6–12)
RBC # BLD AUTO: 4.36 10*6/MM3 (ref 3.77–5.28)
WBC NRBC COR # BLD: 3.7 10*3/MM3 (ref 3.4–10.8)

## 2020-03-11 PROCEDURE — 82728 ASSAY OF FERRITIN: CPT

## 2020-03-11 PROCEDURE — 85025 COMPLETE CBC W/AUTO DIFF WBC: CPT

## 2020-03-11 PROCEDURE — 99213 OFFICE O/P EST LOW 20 MIN: CPT | Performed by: INTERNAL MEDICINE

## 2020-03-11 PROCEDURE — 36415 COLL VENOUS BLD VENIPUNCTURE: CPT

## 2020-03-11 NOTE — PROGRESS NOTES
"PROBLEM LIST:  1. 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 Status post EGD colonoscopy  3. Status post Iv iron  infusion periodically  4. Bone Marrow biopsy  - normal - done by Dr. Reeves  5. 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  lady with normocytic anemia likely related to chronic blood loss.  She is actually stabilized nicely.  Her blood counts back to normal.  She has not required any transfusion support.  Last blood transfusion on 8/31/18.    She has done remarkably well since then.  Denies any active bleeding.  Fatigue seems to be tolerating well.   Her hemoglobin has also dropped.    Past medical history, social history and family history was reviewed and unchanged from prior visit.    Review of Systems:    Review of Systems   Constitutional: Positive for fatigue.   HENT: Negative.    Eyes: Negative.    Respiratory: Positive for shortness of breath.    Cardiovascular: Negative.    Gastrointestinal: Negative.    Endocrine: Negative.    Genitourinary: Negative.    Musculoskeletal: Negative.    Skin: Negative.    Allergic/Immunologic: Negative.    Neurological: Negative.    Hematological: Negative.    Psychiatric/Behavioral: Negative.       A comprehensive 14 point review of systems was performed and was negative except as mentioned.      Medications:  The current medication list was reviewed in the EMR    ALLERGIES:    Allergies   Allergen Reactions   • Codeine Sulfate Unknown (See Comments)     Pt took in the 70's-pt not sure of what happened   • Cozaar [Losartan] Unknown (See Comments)     Pt can not remember   • Crestor [Rosuvastatin] Unknown (See Comments)     Pt can not remember   • Dexlansoprazole Unknown (See Comments)     Pt can not remember   • Lipitor [Atorvastatin] Unknown (See Comments)     Pt can not remember   • Lisinopril Unknown (See Comments)     Pt can not remember   • " "Nexium [Esomeprazole Magnesium] Unknown (See Comments)     Pt can not remember   • Norvasc [Amlodipine] Unknown (See Comments)     Pt can not remember   • Sulfadiazine Unknown (See Comments)     Pt can not remember   • Toprol Xl [Metoprolol Tartrate] Unknown (See Comments)     Pt can not remember   • Zetia [Ezetimibe] Unknown (See Comments)     Pt can not remember   • Zocor [Simvastatin] Unknown (See Comments)     Pt can not remember   • Augmentin [Amoxicillin-Pot Clavulanate] GI Intolerance     nausea   • Celebrex [Celecoxib] GI Intolerance         Physical Exam    VITAL SIGNS:  /72 Comment: LUE  Pulse 76   Temp 97.7 °F (36.5 °C) (Temporal)   Resp 16   Ht 160 cm (63\")   Wt 58.5 kg (129 lb)   SpO2 92% Comment: RA  BMI 22.85 kg/m²      Performance Status: 1    General: well appearing, in no acute distress  HEENT: sclera anicteric, oropharynx clear, neck is supple  Lymphatics: no cervical, supraclavicular, or axillary adenopathy  Cardiovascular: regular rate and rhythm, no murmurs, rubs or gallops  Lungs: clear to auscultation bilaterally  Abdomen: soft, nontender, nondistended.  No palpable organomegaly  Extremities: no lower extremity edema  Skin: no rashes, lesions, bruising, or petechiae  Msk:  Shows no weakness of the large muscle groups  Psych: Mood is stable        RECENT LABS:    Lab Results   Component Value Date    WBC 3.70 03/11/2020    HGB 12.6 03/11/2020    HCT 38.1 03/11/2020    MCV 87.4 03/11/2020     03/11/2020     Lab Results   Component Value Date    FERRITIN 443.00 (H) 02/21/2020    FERRITIN 51.57 01/23/2020    FERRITIN 74.95 01/02/2020     Lab Results   Component Value Date    HGB 12.6 03/11/2020    HGB 12.7 02/21/2020    HGB 9.7 (L) 02/07/2020    HGB 9.4 (L) 01/23/2020    HGB 10.7 (L) 01/02/2020    HGB 10.3 (L) 12/11/2019    HGB 11.0 (L) 11/20/2019    HGB 10.8 (L) 11/06/2019    HGB 10.8 (L) 10/24/2019    HGB 9.8 (L) 10/11/2019       Assessment/Plan    1. normocytic anemia " requiring periodic blood transfusions secondary to bleeding AVMs and a Watermelon stomach.  Underwent argon treatment with Dr. Llamas.  Her hemoglobin is normal today. 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      Rahat Morris MD  [facility]    3/11/2020

## 2020-03-13 ENCOUNTER — TELEPHONE (OUTPATIENT)
Dept: FAMILY MEDICINE CLINIC | Facility: CLINIC | Age: 85
End: 2020-03-13

## 2020-03-13 DIAGNOSIS — G47.00 INSOMNIA, UNSPECIFIED TYPE: ICD-10-CM

## 2020-03-13 RX ORDER — AMITRIPTYLINE HYDROCHLORIDE 25 MG/1
25 TABLET, FILM COATED ORAL NIGHTLY
Qty: 30 TABLET | Refills: 11 | Status: SHIPPED | OUTPATIENT
Start: 2020-03-13 | End: 2020-03-23 | Stop reason: SDUPTHER

## 2020-03-13 NOTE — TELEPHONE ENCOUNTER
Patient needs the amitripthyline 25 mg total of 60 tabs, 2 times per day called in today she only has 3 pills left.     [facility] [address]

## 2020-03-23 ENCOUNTER — TELEPHONE (OUTPATIENT)
Dept: FAMILY MEDICINE CLINIC | Facility: CLINIC | Age: 85
End: 2020-03-23

## 2020-03-23 DIAGNOSIS — G47.00 INSOMNIA, UNSPECIFIED TYPE: ICD-10-CM

## 2020-03-23 RX ORDER — AMITRIPTYLINE HYDROCHLORIDE 25 MG/1
25 TABLET, FILM COATED ORAL NIGHTLY
Qty: 30 TABLET | Refills: 11 | Status: SHIPPED | OUTPATIENT
Start: 2020-03-23 | End: 2021-04-07 | Stop reason: SDUPTHER

## 2020-03-23 RX ORDER — CIPROFLOXACIN 500 MG/1
500 TABLET, FILM COATED ORAL 2 TIMES DAILY
Qty: 20 TABLET | Refills: 0 | Status: SHIPPED | OUTPATIENT
Start: 2020-03-23 | End: 2021-04-07

## 2020-03-23 NOTE — TELEPHONE ENCOUNTER
PATIENT CALLED STATING SHE CANNOT COME INTO OFFICE BUT WOULD LIKE TO REQUEST A MEDICATION FOR UTI. PATIENT STATES SHES DRANK QUITE A LOT OF SODA AND WOULD LIKE SOMETHING CALLED IN FOR SOME RELEIF.   PATIENT IS HURTING   [facility]  [phone]

## 2020-03-25 ENCOUNTER — LAB (OUTPATIENT)
Dept: LAB | Facility: HOSPITAL | Age: 85
End: 2020-03-25

## 2020-03-25 DIAGNOSIS — K90.9 MALABSORPTION IN THE ELDERLY: ICD-10-CM

## 2020-03-25 DIAGNOSIS — Z78.9 MEDICATION INTOLERANCE: ICD-10-CM

## 2020-03-25 DIAGNOSIS — K31.819 GAVE (GASTRIC ANTRAL VASCULAR ECTASIA): ICD-10-CM

## 2020-03-25 DIAGNOSIS — D50.0 IRON DEFICIENCY ANEMIA DUE TO CHRONIC BLOOD LOSS: ICD-10-CM

## 2020-03-25 LAB
ERYTHROCYTE [DISTWIDTH] IN BLOOD BY AUTOMATED COUNT: 16.2 % (ref 12.3–15.4)
FERRITIN SERPL-MCNC: 154.2 NG/ML (ref 13–150)
HCT VFR BLD AUTO: 40.6 % (ref 34–46.6)
HGB BLD-MCNC: 13.1 G/DL (ref 12–15.9)
LYMPHOCYTES # BLD AUTO: 0.8 10*3/MM3 (ref 0.7–3.1)
LYMPHOCYTES NFR BLD AUTO: 17.3 % (ref 19.6–45.3)
MCH RBC QN AUTO: 28.2 PG (ref 26.6–33)
MCHC RBC AUTO-ENTMCNC: 32.2 G/DL (ref 31.5–35.7)
MCV RBC AUTO: 87.6 FL (ref 79–97)
MONOCYTES # BLD AUTO: 0.3 10*3/MM3 (ref 0.1–0.9)
MONOCYTES NFR BLD AUTO: 6.1 % (ref 5–12)
NEUTROPHILS # BLD AUTO: 3.6 10*3/MM3 (ref 1.7–7)
NEUTROPHILS NFR BLD AUTO: 76.6 % (ref 42.7–76)
PLATELET # BLD AUTO: 214 10*3/MM3 (ref 140–450)
PMV BLD AUTO: 7 FL (ref 6–12)
RBC # BLD AUTO: 4.63 10*6/MM3 (ref 3.77–5.28)
WBC NRBC COR # BLD: 4.7 10*3/MM3 (ref 3.4–10.8)

## 2020-03-25 PROCEDURE — 36415 COLL VENOUS BLD VENIPUNCTURE: CPT

## 2020-03-25 PROCEDURE — 82728 ASSAY OF FERRITIN: CPT

## 2020-03-25 PROCEDURE — 85025 COMPLETE CBC W/AUTO DIFF WBC: CPT

## 2020-04-15 ENCOUNTER — LAB (OUTPATIENT)
Dept: LAB | Facility: HOSPITAL | Age: 85
End: 2020-04-15

## 2020-04-15 DIAGNOSIS — D50.0 IRON DEFICIENCY ANEMIA DUE TO CHRONIC BLOOD LOSS: ICD-10-CM

## 2020-04-15 DIAGNOSIS — K90.9 MALABSORPTION IN THE ELDERLY: ICD-10-CM

## 2020-04-15 DIAGNOSIS — Z78.9 MEDICATION INTOLERANCE: ICD-10-CM

## 2020-04-15 DIAGNOSIS — K31.819 GAVE (GASTRIC ANTRAL VASCULAR ECTASIA): ICD-10-CM

## 2020-04-15 LAB
ERYTHROCYTE [DISTWIDTH] IN BLOOD BY AUTOMATED COUNT: 14.2 % (ref 12.3–15.4)
FERRITIN SERPL-MCNC: 100.5 NG/ML (ref 13–150)
HCT VFR BLD AUTO: 37.6 % (ref 34–46.6)
HGB BLD-MCNC: 12.3 G/DL (ref 12–15.9)
LYMPHOCYTES # BLD AUTO: 1 10*3/MM3 (ref 0.7–3.1)
LYMPHOCYTES NFR BLD AUTO: 20.8 % (ref 19.6–45.3)
MCH RBC QN AUTO: 28.2 PG (ref 26.6–33)
MCHC RBC AUTO-ENTMCNC: 32.6 G/DL (ref 31.5–35.7)
MCV RBC AUTO: 86.6 FL (ref 79–97)
MONOCYTES # BLD AUTO: 0.3 10*3/MM3 (ref 0.1–0.9)
MONOCYTES NFR BLD AUTO: 6.7 % (ref 5–12)
NEUTROPHILS # BLD AUTO: 3.3 10*3/MM3 (ref 1.7–7)
NEUTROPHILS NFR BLD AUTO: 72.5 % (ref 42.7–76)
PLATELET # BLD AUTO: 245 10*3/MM3 (ref 140–450)
PMV BLD AUTO: 6.9 FL (ref 6–12)
RBC # BLD AUTO: 4.34 10*6/MM3 (ref 3.77–5.28)
WBC NRBC COR # BLD: 4.6 10*3/MM3 (ref 3.4–10.8)

## 2020-04-15 PROCEDURE — 85025 COMPLETE CBC W/AUTO DIFF WBC: CPT

## 2020-04-15 PROCEDURE — 82728 ASSAY OF FERRITIN: CPT

## 2020-04-15 PROCEDURE — 36415 COLL VENOUS BLD VENIPUNCTURE: CPT

## 2020-04-23 ENCOUNTER — TELEPHONE (OUTPATIENT)
Dept: ONCOLOGY | Facility: CLINIC | Age: 85
End: 2020-04-23

## 2020-04-23 RX ORDER — CYCLOBENZAPRINE HCL 5 MG
5 TABLET ORAL 3 TIMES DAILY PRN
Qty: 20 TABLET | Refills: 0 | Status: SHIPPED | OUTPATIENT
Start: 2020-04-23 | End: 2020-05-08 | Stop reason: SDUPTHER

## 2020-04-23 NOTE — TELEPHONE ENCOUNTER
Returned call to patient after discuss with Dr. Hoff. Informing patient that Dr. Hoff wanted to send patient in a muscle relaxer for her neck pain. Informing patient if her pain didn't improve to call back to office in a few days to let us know and we will schedule her to see Dr. Hoff. Patient stating she understood.

## 2020-04-23 NOTE — TELEPHONE ENCOUNTER
The patient is having a lot of pain that is radiating from her back up to her neck. She wants to schedule an appointment for Dr. Hoff to look at it for her. I advised her to call her PCP for evaluation and she insisted on seeing Luis Eduardo. Please advise.

## 2020-04-30 ENCOUNTER — TELEPHONE (OUTPATIENT)
Dept: ONCOLOGY | Facility: CLINIC | Age: 85
End: 2020-04-30

## 2020-04-30 NOTE — TELEPHONE ENCOUNTER
----- Message from Silverio Chan sent at 4/30/2020  3:20 PM EDT -----  Regarding: MEDICINE NOT WORKING  Pt called the hub and stated that her medicine wasn't working for her neck pain.  Wants a call back.  Silverio francis

## 2020-04-30 NOTE — TELEPHONE ENCOUNTER
Called patient and discussed with her that Dr. Hoff left early. Informing her nurse will talk with him tomorrow to see if he wants her to have scans of neck. Patient stating she understood.

## 2020-04-30 NOTE — TELEPHONE ENCOUNTER
Patient was told to call and make appointment if medication did not work for neck pain.  She will need an appointment because it is not helping. She has been taking it since 4/23.    Please call [phone]

## 2020-05-01 DIAGNOSIS — M54.2 NECK PAIN, ACUTE: Primary | ICD-10-CM

## 2020-05-01 DIAGNOSIS — M54.9 CHRONIC NECK AND BACK PAIN: ICD-10-CM

## 2020-05-01 DIAGNOSIS — M54.2 CHRONIC NECK AND BACK PAIN: ICD-10-CM

## 2020-05-01 DIAGNOSIS — G89.29 CHRONIC NECK AND BACK PAIN: ICD-10-CM

## 2020-05-06 ENCOUNTER — HOSPITAL ENCOUNTER (OUTPATIENT)
Dept: CT IMAGING | Facility: HOSPITAL | Age: 85
Discharge: HOME | End: 2020-05-06
Admitting: INTERNAL MEDICINE

## 2020-05-06 DIAGNOSIS — G89.29 CHRONIC NECK AND BACK PAIN: ICD-10-CM

## 2020-05-06 DIAGNOSIS — M54.9 CHRONIC NECK AND BACK PAIN: ICD-10-CM

## 2020-05-06 DIAGNOSIS — M54.2 CHRONIC NECK AND BACK PAIN: ICD-10-CM

## 2020-05-06 PROCEDURE — 72128 CT CHEST SPINE W/O DYE: CPT

## 2020-05-06 PROCEDURE — 72125 CT NECK SPINE W/O DYE: CPT

## 2020-05-08 ENCOUNTER — OFFICE VISIT (OUTPATIENT)
Dept: ONCOLOGY | Facility: CLINIC | Age: 85
End: 2020-05-08

## 2020-05-08 ENCOUNTER — LAB (OUTPATIENT)
Dept: LAB | Facility: HOSPITAL | Age: 85
End: 2020-05-08

## 2020-05-08 VITALS
DIASTOLIC BLOOD PRESSURE: 77 MMHG | TEMPERATURE: 97.9 F | WEIGHT: 129.9 LBS | OXYGEN SATURATION: 93 % | BODY MASS INDEX: 23.01 KG/M2 | HEART RATE: 85 BPM | RESPIRATION RATE: 24 BRPM | SYSTOLIC BLOOD PRESSURE: 198 MMHG

## 2020-05-08 DIAGNOSIS — K90.9 MALABSORPTION IN THE ELDERLY: ICD-10-CM

## 2020-05-08 DIAGNOSIS — K31.819 GAVE (GASTRIC ANTRAL VASCULAR ECTASIA): ICD-10-CM

## 2020-05-08 DIAGNOSIS — D50.0 IRON DEFICIENCY ANEMIA DUE TO CHRONIC BLOOD LOSS: ICD-10-CM

## 2020-05-08 DIAGNOSIS — D50.0 IRON DEFICIENCY ANEMIA DUE TO CHRONIC BLOOD LOSS: Primary | ICD-10-CM

## 2020-05-08 DIAGNOSIS — M54.2 CERVICAL PAIN (NECK): ICD-10-CM

## 2020-05-08 DIAGNOSIS — Z78.9 MEDICATION INTOLERANCE: ICD-10-CM

## 2020-05-08 LAB
ERYTHROCYTE [DISTWIDTH] IN BLOOD BY AUTOMATED COUNT: 13.5 % (ref 12.3–15.4)
FERRITIN SERPL-MCNC: 67.47 NG/ML (ref 13–150)
HCT VFR BLD AUTO: 38.3 % (ref 34–46.6)
HGB BLD-MCNC: 11.8 G/DL (ref 12–15.9)
LYMPHOCYTES # BLD AUTO: 0.8 10*3/MM3 (ref 0.7–3.1)
LYMPHOCYTES NFR BLD AUTO: 20.4 % (ref 19.6–45.3)
MCH RBC QN AUTO: 26.9 PG (ref 26.6–33)
MCHC RBC AUTO-ENTMCNC: 30.7 G/DL (ref 31.5–35.7)
MCV RBC AUTO: 87.6 FL (ref 79–97)
MONOCYTES # BLD AUTO: 0.3 10*3/MM3 (ref 0.1–0.9)
MONOCYTES NFR BLD AUTO: 7 % (ref 5–12)
NEUTROPHILS # BLD AUTO: 2.8 10*3/MM3 (ref 1.7–7)
NEUTROPHILS NFR BLD AUTO: 72.6 % (ref 42.7–76)
PLATELET # BLD AUTO: 272 10*3/MM3 (ref 140–450)
PMV BLD AUTO: 6.7 FL (ref 6–12)
RBC # BLD AUTO: 4.37 10*6/MM3 (ref 3.77–5.28)
WBC NRBC COR # BLD: 3.9 10*3/MM3 (ref 3.4–10.8)

## 2020-05-08 PROCEDURE — 82728 ASSAY OF FERRITIN: CPT

## 2020-05-08 PROCEDURE — 36415 COLL VENOUS BLD VENIPUNCTURE: CPT

## 2020-05-08 PROCEDURE — 85025 COMPLETE CBC W/AUTO DIFF WBC: CPT

## 2020-05-08 PROCEDURE — 99214 OFFICE O/P EST MOD 30 MIN: CPT | Performed by: INTERNAL MEDICINE

## 2020-05-08 RX ORDER — CYCLOBENZAPRINE HCL 10 MG
10 TABLET ORAL 3 TIMES DAILY PRN
Qty: 20 TABLET | Refills: 0 | Status: SHIPPED | OUTPATIENT
Start: 2020-05-08 | End: 2020-10-05

## 2020-05-08 NOTE — PROGRESS NOTES
"PROBLEM LIST:  1. 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 Status post EGD colonoscopy  3. Status post Iv iron  infusion periodically  4. Bone Marrow biopsy  - normal - done by Dr. Reeves  5. 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  lady with normocytic anemia likely related to chronic blood loss.  She is actually stabilized nicely.  Her blood counts back to normal.  She has not required any transfusion support.  Last blood transfusion on 10/3/2019.     Denies any active bleeding.  Fatigue seems to be tolerating well.   Her hemoglobin has also dropped.  She continues to have neck tenderness.    Past medical history, social history and family history was reviewed and unchanged from prior visit.    Review of Systems:    Review of Systems   Constitutional: Positive for fatigue.   HENT: Negative.    Eyes: Negative.    Respiratory: Positive for shortness of breath.    Cardiovascular: Negative.    Gastrointestinal: Negative.    Endocrine: Negative.    Genitourinary: Negative.    Musculoskeletal: Positive for back pain and neck pain.   Skin: Negative.    Allergic/Immunologic: Negative.    Neurological: Negative.    Hematological: Negative.    Psychiatric/Behavioral: Negative.       A comprehensive 14 point review of systems was performed and was negative except as mentioned.      Medications:  The current medication list was reviewed in the EMR    ALLERGIES:    Allergies   Allergen Reactions   • Codeine Sulfate Unknown (See Comments)     Pt took in the 70's-pt not sure of what happened   • Cozaar [Losartan] Unknown (See Comments)     Pt can not remember   • Crestor [Rosuvastatin] Unknown (See Comments)     Pt can not remember   • Dexlansoprazole Unknown (See Comments)     Pt can not remember   • Lipitor [Atorvastatin] Unknown (See Comments)     Pt can not remember   • Lisinopril Unknown (See Comments) "     Pt can not remember   • Nexium [Esomeprazole Magnesium] Unknown (See Comments)     Pt can not remember   • Norvasc [Amlodipine] Unknown (See Comments)     Pt can not remember   • Sulfadiazine Unknown (See Comments)     Pt can not remember   • Toprol Xl [Metoprolol Tartrate] Unknown (See Comments)     Pt can not remember   • Zetia [Ezetimibe] Unknown (See Comments)     Pt can not remember   • Zocor [Simvastatin] Unknown (See Comments)     Pt can not remember   • Augmentin [Amoxicillin-Pot Clavulanate] GI Intolerance     nausea   • Celebrex [Celecoxib] GI Intolerance         Physical Exam    VITAL SIGNS:  BP (!) 198/77   Pulse 85   Temp 97.9 °F (36.6 °C) (Skin)   Resp 24   Wt 58.9 kg (129 lb 14.4 oz)   SpO2 93%   BMI 23.01 kg/m²      Performance Status: 1    General: well appearing, in no acute distress  HEENT: sclera anicteric, oropharynx clear, neck is supple  Lymphatics: no cervical, supraclavicular, or axillary adenopathy  Cardiovascular: regular rate and rhythm, no murmurs, rubs or gallops  Lungs: clear to auscultation bilaterally  Abdomen: soft, nontender, nondistended.  No palpable organomegaly  Extremities: no lower extremity edema  Skin: no rashes, lesions, bruising, or petechiae  Msk:  Shows no weakness of the large muscle groups  Psych: Mood is stable        RECENT LABS:    Lab Results   Component Value Date    WBC 3.90 05/08/2020    HGB 11.8 (L) 05/08/2020    HCT 38.3 05/08/2020    MCV 87.6 05/08/2020     05/08/2020     Lab Results   Component Value Date    FERRITIN 100.50 04/15/2020    FERRITIN 154.20 (H) 03/25/2020    FERRITIN 204.70 (H) 03/11/2020     Lab Results   Component Value Date    HGB 11.8 (L) 05/08/2020    HGB 12.3 04/15/2020    HGB 13.1 03/25/2020    HGB 12.6 03/11/2020    HGB 12.7 02/21/2020    HGB 9.7 (L) 02/07/2020    HGB 9.4 (L) 01/23/2020    HGB 10.7 (L) 01/02/2020    HGB 10.3 (L) 12/11/2019    HGB 11.0 (L) 11/20/2019     Ct Cervical Spine Without Contrast    Result Date:  5/6/2020  1. Advanced multilevel degenerative disc disease, with probably moderate canal stenosis at C3-4 and C4-5 due to prominent posterior vertebral osteophytes, and some posterior element hypertrophy. 2. Milder degenerative changes, and bony foraminal stenosis elsewhere as discussed above by disc level. No evidence of acute or healing trauma.       Ct Thoracic Spine Without Contrast    Result Date: 5/6/2020  1. Advanced multilevel degenerative disc disease, with probably moderate canal stenosis at C3-4 and C4-5 due to prominent posterior vertebral osteophytes, and some posterior element hypertrophy. 2. Milder degenerative changes, and bony foraminal stenosis elsewhere as discussed above by disc level. No evidence of acute or healing trauma.  CT SCAN OF THE THORACIC SPINE: There is exaggeration of the normal thoracic lordosis. No significant focal subluxation or evidence of vertebral compression deformity is seen. There is moderate multilevel degenerative disc disease. No destructive or blastic bony lesions are seen. Coronal images show a minimal dextroconvex scoliosis. Axial bone window images show no evidence of acute bony trauma. Soft tissue axial images of the thoracic spine show the usual limited detail of the canal for non-myelographic scans. The thoracic canal appears congenitally rather ample. No gross evidence of HNP or canal stenosis is appreciated down to the level of T9-T10 where there is a prominent posterior vertebral osteophyte but only borderline canal narrowing. Below this level, the canal appears unremarkable. No pathologic paraspinous mass or inflammatory change is seen. Review of the included portions of the lungs shows a mild patchy, widely distributed appearance of faint groundglass opacity, not well defined, more central than peripheral, and with no crazy paving pattern or lung consolidation. This is very similar to the appearance of the 10/05/2019 chest radiograph which showed mild  interstitial edema. Although technically indeterminate for Covid-19 pneumonia, there are no typical features of Covid-19, and findings are explainable on the basis of the patient's previously noted interstitial disease alone.  IMPRESSION: 1. No evidence of acute thoracic spine trauma, advanced degenerative disc disease, or gross evidence of thoracic spinal stenosis. 2. Mild and rather diffuse, faint groundglass opacity of the lungs as noted. Although technically indeterminate for Covid-19, the findings are consistent with the mild interstitial edema pattern seen on the 10/05/2019 exam. According to the CT technologist, the patient has no complaint of any current or recent respiratory symptoms.  D:  05/06/2020 E:  05/06/2020         Assessment/Plan    1. normocytic anemia requiring periodic blood transfusions secondary to bleeding AVMs and a Watermelon stomach.  Underwent argon treatment with Dr. Llamas.  Her hemoglobin is normal today but dropping. continue periodic iron infusions. Last infusion on 2/7/2020. Likely will need it this month.    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    3.  neck pain.  I did a CAT scan of her cervical neck which was negative for anything other than severe degenerative disc disease with some canal stenosis.  I am going to give her some muscle relaxant to see if it helps.      Rahat Morris MD  [facility]    5/8/2020

## 2020-05-26 ENCOUNTER — TELEPHONE (OUTPATIENT)
Dept: ONCOLOGY | Facility: CLINIC | Age: 85
End: 2020-05-26

## 2020-05-26 NOTE — TELEPHONE ENCOUNTER
PT WOULD LIKE DR SANCHEZ TO FORWARD A COPY OF HER CT SCAN DONE ON 5/20/20 AT [facility] TO HER PAIN CLINIC.    DR ARTIS DE SOUZA [address]  [place] [address]  - [phone]  - [phone]

## 2020-05-28 ENCOUNTER — LAB (OUTPATIENT)
Dept: LAB | Facility: HOSPITAL | Age: 85
End: 2020-05-28

## 2020-05-28 DIAGNOSIS — Z78.9 MEDICATION INTOLERANCE: ICD-10-CM

## 2020-05-28 DIAGNOSIS — K90.9 MALABSORPTION IN THE ELDERLY: ICD-10-CM

## 2020-05-28 DIAGNOSIS — D50.0 IRON DEFICIENCY ANEMIA DUE TO CHRONIC BLOOD LOSS: ICD-10-CM

## 2020-05-28 DIAGNOSIS — K31.819 GAVE (GASTRIC ANTRAL VASCULAR ECTASIA): ICD-10-CM

## 2020-05-28 LAB
ERYTHROCYTE [DISTWIDTH] IN BLOOD BY AUTOMATED COUNT: 13 % (ref 12.3–15.4)
FERRITIN SERPL-MCNC: 43.83 NG/ML (ref 13–150)
HCT VFR BLD AUTO: 32.4 % (ref 34–46.6)
HGB BLD-MCNC: 10.4 G/DL (ref 12–15.9)
LYMPHOCYTES # BLD AUTO: 0.5 10*3/MM3 (ref 0.7–3.1)
LYMPHOCYTES NFR BLD AUTO: 16.9 % (ref 19.6–45.3)
MCH RBC QN AUTO: 28.3 PG (ref 26.6–33)
MCHC RBC AUTO-ENTMCNC: 32.2 G/DL (ref 31.5–35.7)
MCV RBC AUTO: 87.8 FL (ref 79–97)
MONOCYTES # BLD AUTO: 0.1 10*3/MM3 (ref 0.1–0.9)
MONOCYTES NFR BLD AUTO: 4.4 % (ref 5–12)
NEUTROPHILS # BLD AUTO: 2.4 10*3/MM3 (ref 1.7–7)
NEUTROPHILS NFR BLD AUTO: 78.7 % (ref 42.7–76)
PLATELET # BLD AUTO: 213 10*3/MM3 (ref 140–450)
PMV BLD AUTO: 7.3 FL (ref 6–12)
RBC # BLD AUTO: 3.69 10*6/MM3 (ref 3.77–5.28)
WBC NRBC COR # BLD: 3 10*3/MM3 (ref 3.4–10.8)

## 2020-05-28 PROCEDURE — 36415 COLL VENOUS BLD VENIPUNCTURE: CPT

## 2020-05-28 PROCEDURE — 82728 ASSAY OF FERRITIN: CPT

## 2020-05-28 PROCEDURE — 85025 COMPLETE CBC W/AUTO DIFF WBC: CPT

## 2020-05-28 RX ORDER — SODIUM CHLORIDE 9 MG/ML
250 INJECTION, SOLUTION INTRAVENOUS ONCE
Status: CANCELLED | OUTPATIENT
Start: 2020-06-12

## 2020-05-28 RX ORDER — SODIUM CHLORIDE 9 MG/ML
250 INJECTION, SOLUTION INTRAVENOUS ONCE
Status: CANCELLED | OUTPATIENT
Start: 2020-06-04

## 2020-06-04 ENCOUNTER — HOSPITAL ENCOUNTER (OUTPATIENT)
Dept: ONCOLOGY | Facility: HOSPITAL | Age: 85
Setting detail: INFUSION SERIES
Discharge: HOME | End: 2020-06-04

## 2020-06-04 VITALS — SYSTOLIC BLOOD PRESSURE: 148 MMHG | HEART RATE: 77 BPM | DIASTOLIC BLOOD PRESSURE: 66 MMHG

## 2020-06-04 DIAGNOSIS — Z78.9 MEDICATION INTOLERANCE: ICD-10-CM

## 2020-06-04 DIAGNOSIS — D50.0 IRON DEFICIENCY ANEMIA DUE TO CHRONIC BLOOD LOSS: ICD-10-CM

## 2020-06-04 DIAGNOSIS — K31.819 GAVE (GASTRIC ANTRAL VASCULAR ECTASIA): Primary | ICD-10-CM

## 2020-06-04 DIAGNOSIS — K90.9 MALABSORPTION IN THE ELDERLY: ICD-10-CM

## 2020-06-04 PROCEDURE — 25010000002 FERRIC CARBOXYMALTOSE 750 MG/15ML SOLUTION 15 ML VIAL: Performed by: INTERNAL MEDICINE

## 2020-06-04 PROCEDURE — 96374 THER/PROPH/DIAG INJ IV PUSH: CPT

## 2020-06-04 RX ADMIN — FERRIC CARBOXYMALTOSE INJECTION 750 MG: 50 INJECTION, SOLUTION INTRAVENOUS at 14:07

## 2020-06-12 ENCOUNTER — HOSPITAL ENCOUNTER (OUTPATIENT)
Dept: ONCOLOGY | Facility: HOSPITAL | Age: 85
Setting detail: INFUSION SERIES
Discharge: HOME | End: 2020-06-12

## 2020-06-12 VITALS
HEIGHT: 63 IN | SYSTOLIC BLOOD PRESSURE: 157 MMHG | DIASTOLIC BLOOD PRESSURE: 54 MMHG | HEART RATE: 67 BPM | RESPIRATION RATE: 16 BRPM | BODY MASS INDEX: 22.5 KG/M2 | WEIGHT: 127 LBS | TEMPERATURE: 97.3 F

## 2020-06-12 DIAGNOSIS — D50.0 IRON DEFICIENCY ANEMIA DUE TO CHRONIC BLOOD LOSS: ICD-10-CM

## 2020-06-12 DIAGNOSIS — Z78.9 MEDICATION INTOLERANCE: ICD-10-CM

## 2020-06-12 DIAGNOSIS — K31.819 GAVE (GASTRIC ANTRAL VASCULAR ECTASIA): Primary | ICD-10-CM

## 2020-06-12 DIAGNOSIS — K90.9 MALABSORPTION IN THE ELDERLY: ICD-10-CM

## 2020-06-12 PROCEDURE — 25010000002 FERRIC CARBOXYMALTOSE 750 MG/15ML SOLUTION 15 ML VIAL: Performed by: INTERNAL MEDICINE

## 2020-06-12 PROCEDURE — 96374 THER/PROPH/DIAG INJ IV PUSH: CPT

## 2020-06-12 RX ORDER — SODIUM CHLORIDE 9 MG/ML
250 INJECTION, SOLUTION INTRAVENOUS ONCE
Status: COMPLETED | OUTPATIENT
Start: 2020-06-12 | End: 2020-06-12

## 2020-06-12 RX ADMIN — SODIUM CHLORIDE 250 ML: 9 INJECTION, SOLUTION INTRAVENOUS at 13:01

## 2020-06-12 RX ADMIN — FERRIC CARBOXYMALTOSE INJECTION 750 MG: 50 INJECTION, SOLUTION INTRAVENOUS at 13:05

## 2020-06-18 ENCOUNTER — LAB (OUTPATIENT)
Dept: LAB | Facility: HOSPITAL | Age: 85
End: 2020-06-18

## 2020-06-18 ENCOUNTER — TELEPHONE (OUTPATIENT)
Dept: ONCOLOGY | Facility: CLINIC | Age: 85
End: 2020-06-18

## 2020-06-18 DIAGNOSIS — D50.0 IRON DEFICIENCY ANEMIA DUE TO CHRONIC BLOOD LOSS: ICD-10-CM

## 2020-06-18 DIAGNOSIS — K90.9 MALABSORPTION IN THE ELDERLY: ICD-10-CM

## 2020-06-18 DIAGNOSIS — Z78.9 MEDICATION INTOLERANCE: ICD-10-CM

## 2020-06-18 DIAGNOSIS — M47.812 ARTHROPATHY OF CERVICAL SPINE: Primary | ICD-10-CM

## 2020-06-18 DIAGNOSIS — K31.819 GAVE (GASTRIC ANTRAL VASCULAR ECTASIA): ICD-10-CM

## 2020-06-18 LAB
ERYTHROCYTE [DISTWIDTH] IN BLOOD BY AUTOMATED COUNT: 16.5 % (ref 12.3–15.4)
FERRITIN SERPL-MCNC: 757.2 NG/ML (ref 13–150)
HCT VFR BLD AUTO: 36.3 % (ref 34–46.6)
HGB BLD-MCNC: 11.1 G/DL (ref 12–15.9)
LYMPHOCYTES # BLD AUTO: 0.7 10*3/MM3 (ref 0.7–3.1)
LYMPHOCYTES NFR BLD AUTO: 16 % (ref 19.6–45.3)
MCH RBC QN AUTO: 27.6 PG (ref 26.6–33)
MCHC RBC AUTO-ENTMCNC: 30.6 G/DL (ref 31.5–35.7)
MCV RBC AUTO: 90.3 FL (ref 79–97)
MONOCYTES # BLD AUTO: 0.2 10*3/MM3 (ref 0.1–0.9)
MONOCYTES NFR BLD AUTO: 4.6 % (ref 5–12)
NEUTROPHILS # BLD AUTO: 3.3 10*3/MM3 (ref 1.7–7)
NEUTROPHILS NFR BLD AUTO: 79.4 % (ref 42.7–76)
PLATELET # BLD AUTO: 235 10*3/MM3 (ref 140–450)
PMV BLD AUTO: 6.9 FL (ref 6–12)
RBC # BLD AUTO: 4.02 10*6/MM3 (ref 3.77–5.28)
WBC NRBC COR # BLD: 4.1 10*3/MM3 (ref 3.4–10.8)

## 2020-06-18 PROCEDURE — 85025 COMPLETE CBC W/AUTO DIFF WBC: CPT

## 2020-06-18 PROCEDURE — 36415 COLL VENOUS BLD VENIPUNCTURE: CPT

## 2020-06-18 PROCEDURE — 82728 ASSAY OF FERRITIN: CPT

## 2020-06-18 NOTE — TELEPHONE ENCOUNTER
PT WALKED UP TO THE DESK REQUESTING TO SPEAK TO DR. SANCHEZ AND NURSE   PT HAS PAIN IN THE BACK OF HER NECK WANTS TO SPEAK WITH THE NURSE ABOUT THE PAIN.  PT IS HEADED HOME NOW DOESN'T HAVE PHONE ON HER BUT GAVE HOME PHONE TO CALL  PLEASE CALL [phone] (O)

## 2020-06-25 ENCOUNTER — OFFICE VISIT (OUTPATIENT)
Dept: NEUROSURGERY | Facility: CLINIC | Age: 85
End: 2020-06-25

## 2020-06-25 VITALS
HEIGHT: 63 IN | SYSTOLIC BLOOD PRESSURE: 158 MMHG | WEIGHT: 126 LBS | DIASTOLIC BLOOD PRESSURE: 68 MMHG | TEMPERATURE: 97.5 F | BODY MASS INDEX: 22.32 KG/M2

## 2020-06-25 DIAGNOSIS — G89.4 CHRONIC PAIN SYNDROME: ICD-10-CM

## 2020-06-25 DIAGNOSIS — M19.90 IDIOPATHIC OSTEOARTHRITIS: ICD-10-CM

## 2020-06-25 DIAGNOSIS — M25.561 CHRONIC PAIN OF RIGHT KNEE: ICD-10-CM

## 2020-06-25 DIAGNOSIS — N18.30 CKD (CHRONIC KIDNEY DISEASE) STAGE 3, GFR 30-59 ML/MIN (HCC): ICD-10-CM

## 2020-06-25 DIAGNOSIS — M19.90 ARTHRITIS: Primary | ICD-10-CM

## 2020-06-25 DIAGNOSIS — G89.29 CHRONIC PAIN OF RIGHT KNEE: ICD-10-CM

## 2020-06-25 DIAGNOSIS — M54.2 CERVICAL PAIN (NECK): ICD-10-CM

## 2020-06-25 DIAGNOSIS — M79.7 FIBROMYALGIA: ICD-10-CM

## 2020-06-25 PROCEDURE — 99215 OFFICE O/P EST HI 40 MIN: CPT | Performed by: PHYSICIAN ASSISTANT

## 2020-07-06 PROBLEM — M19.90 ARTHRITIS: Status: ACTIVE | Noted: 2020-07-06

## 2020-07-06 PROBLEM — M79.7 FIBROMYALGIA: Status: ACTIVE | Noted: 2020-07-06

## 2020-07-06 PROBLEM — G89.4 CHRONIC PAIN SYNDROME: Status: ACTIVE | Noted: 2020-07-06

## 2020-07-10 ENCOUNTER — LAB (OUTPATIENT)
Dept: LAB | Facility: HOSPITAL | Age: 85
End: 2020-07-10

## 2020-07-10 DIAGNOSIS — D50.0 IRON DEFICIENCY ANEMIA DUE TO CHRONIC BLOOD LOSS: ICD-10-CM

## 2020-07-10 DIAGNOSIS — D50.0 IRON DEFICIENCY ANEMIA DUE TO CHRONIC BLOOD LOSS: Primary | ICD-10-CM

## 2020-07-10 LAB
ERYTHROCYTE [DISTWIDTH] IN BLOOD BY AUTOMATED COUNT: 15.4 % (ref 12.3–15.4)
FERRITIN SERPL-MCNC: 202.6 NG/ML (ref 13–150)
HCT VFR BLD AUTO: 38.6 % (ref 34–46.6)
HGB BLD-MCNC: 12.1 G/DL (ref 12–15.9)
LYMPHOCYTES # BLD AUTO: 0.6 10*3/MM3 (ref 0.7–3.1)
LYMPHOCYTES NFR BLD AUTO: 12.8 % (ref 19.6–45.3)
MCH RBC QN AUTO: 28.6 PG (ref 26.6–33)
MCHC RBC AUTO-ENTMCNC: 31.2 G/DL (ref 31.5–35.7)
MCV RBC AUTO: 91.8 FL (ref 79–97)
MONOCYTES # BLD AUTO: 0.3 10*3/MM3 (ref 0.1–0.9)
MONOCYTES NFR BLD AUTO: 6 % (ref 5–12)
NEUTROPHILS NFR BLD AUTO: 3.5 10*3/MM3 (ref 1.7–7)
NEUTROPHILS NFR BLD AUTO: 81.2 % (ref 42.7–76)
PLATELET # BLD AUTO: 207 10*3/MM3 (ref 140–450)
PMV BLD AUTO: 7.2 FL (ref 6–12)
RBC # BLD AUTO: 4.21 10*6/MM3 (ref 3.77–5.28)
WBC # BLD AUTO: 4.3 10*3/MM3 (ref 3.4–10.8)

## 2020-07-10 PROCEDURE — 82728 ASSAY OF FERRITIN: CPT

## 2020-07-10 PROCEDURE — 36415 COLL VENOUS BLD VENIPUNCTURE: CPT

## 2020-07-10 PROCEDURE — 85025 COMPLETE CBC W/AUTO DIFF WBC: CPT

## 2020-08-11 NOTE — PROGRESS NOTES
"Chief Complaint: \"Pain in my neck.\"      History of Present Illness:   Patient: Ms. Sheryl Conner, 88 y.o. female   Referring Physician: Shena Torrez PA-C   Reason for Referral: Consultation for chronic intractable neck pain.   Pain History: Patient reports a 3-year history of neck pain, which began after some incident a [employer]. Pain increased over the past 6 months.  She has a longstanding history of severe osteoarthritis.  Patient has been treated for a very long period of time with opioids.  Since she moved to [place], she has established care with Dr. RAGHAV Stafford and Dr. Latonia Bruno. Her most recent prescriptions include gabapentin 600 mg 4 times daily, fentanyl 12 mcg/h patches every 3 days, fentanyl 50 mcg patches every 3 days, and hydrocodone 10 mg/325 for breakthrough pain . CT scan of the cervical spine on 05/06/2020 revealed advanced multilevel degenerative disc disease, with moderate canal stenosis at C3-C4 and C4-C5 due to prominent posterior vertebral osteophytes, and some posterior element hypertrophy. Patient underwent neurosurgical consultation with Graciela Torrez PA-C on June 25, 2020 and was found not to be a surgical candidate. Pain has progressed in intensity over the past months.   Pain Description: Constant pain with intermittent exacerbation, described as aching, burning and throbbing sensation.   Radiation of Pain: The pain radiates into the occipital region and into the shoulder blades   Pain intensity today: 9/10   Average pain intensity last week: 8/10   Pain intensity ranges from: 4/10 to 10/10   Aggravating factors: Pain increases with extension and rotation of the cervical spine.   Alleviating factors: Pain decreases with analgesics.   Associated Symptoms:   Patient denies pain, numbness and weakness in the upper extremities.   Patient denies any new bladder or bowel problems.   Patient reports difficulties with her balance but denies recent falls.   Patient reports bilateral " cervicogenic and occipital headaches lasting hours.     Review of previous therapies and additional medical records:   Sheryl Conner has already failed the following measures, including:   Conservative Measures: Oral analgesics, topical analgesics and physical therapy   Interventional Measures: None   Surgical Measures: No history of previous cervical spine or shoulder surgery   Sheryl Conner underwent neurosurgical consultation with Graciela Torrez PA-C on 06/25/2020, and was found not to be a surgical candidate.   Sheryl Conner presents with significant comorbidities including fibromyalgia, GERD, hypertension, myasthenia gravis, pacemaker [employer] (Andi Miles MD), anemia   In terms of current analgesics, Sheryl Conner takes: Amitriptyline, Flexeril, diclofenac gel, gabapentin 600 mg 4 times daily, fentanyl 12 mcg/h patches every 3 days, fentanyl 50 mcg patches every 3 days, and hydrocodone 10 mg/325 for breakthrough pain   I have reviewed Karl Report #[id number] consistent with medication reconciliation.     Global Pain Scale 08-13  2020          Pain 20          Feelings 22          Clinical outcomes 20          Activities 22          GPS Total: 84            Review of Diagnostic Studies:    CT CERVICAL SPINE, CT THORACIC SPINE WO CONTRAST 05/06/2020: I have reviewed the images. Advanced degenerative disc disease at C3-C4, C4-C5, C6-C7. Grade 1 posterior subluxation of C3 on C4 and C4 on C5. The bony canal appears narrowed at C4-C5 due to a combination of posterior element hypertrophy and posterior vertebral osteophyte. Axial bone window images show the dens and ring of C1 to appear normally aligned. No vertebral body fracture or posterior element fracture is seen.   C2-C3: Borderline canal narrowing due to vertebral osteophyte.   C3-C4: Moderate canal stenosis due to prominent posterior  vertebral osteophyte, AP canal diameter of approximately 5 mm. Foramina appear markedly narrowed due to  bony hypertrophic change   C4-C5: Moderate canal stenosis due to central vertebral osteophyte with AP canal diameter approximately 5 mm. Foramina appear moderately narrowed bilaterally.   C5-C6: Canal appears lower limits of normal. Foramina appear grossly normal.   C6-C7: Disc protrusion with mild canal narrowing. Foramina appear fairly well maintained.   C7-T1: Canal and foramina appear normal.   CT SCAN OF THE THORACIC SPINE: There is exaggeration of the normal thoracic lordosis. No significant focal subluxation or evidence of vertebral compression deformity is seen. There is moderate multilevel degenerative disc disease. No destructive or blastic bony lesions are seen. Coronal images show a minimal dextroconvex scoliosis. Axial bone window images show no evidence of acute bony trauma. Soft tissue axial images of the thoracic spine show the usual limited detail of the canal for non-myelographic scans.  The thoracic canal appears congenitally rather ample. No gross evidence of HNP or canal stenosis is appreciated down to the level of T9-T10 where there is a prominent posterior vertebral osteophyte but only borderline canal narrowing. Below this level, the canal appears unremarkable. No pathologic paraspinous mass or inflammatory change is seen.     Review of Systems   HENT: Positive for sinus pressure.    Eyes: Positive for photophobia, pain and visual disturbance.   Respiratory: Positive for apnea.    Endocrine: Positive for cold intolerance.   Musculoskeletal: Positive for arthralgias, joint swelling, neck pain and neck stiffness.   Neurological: Positive for headaches.   All other systems reviewed and are negative.        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   • Right knee pain   • GAVE (gastric antral vascular ectasia)   • Hypoxia   • Paroxysmal atrial fibrillation (CMS/HCC)   • SSS (sick sinus  syndrome) (CMS/[facility])   • Essential hypertension   • Functional heart murmur   • Idiopathic osteoarthritis   • Hyperlipidemia   • CKD (chronic kidney disease) stage 3, GFR 30-59 ml/min (CMS/[facility])   • GERD (gastroesophageal reflux disease)   • Cervical pain (neck)   • Fibromyalgia   • Arthritis   • Chronic pain syndrome   • Pacemaker   • Cervical spondylosis without myelopathy   • DDD (degenerative disc disease), cervical   • Cervical spinal stenosis       Past Medical History:   Diagnosis Date   • Anemia    • Arthritis    • Fibromyalgia    • GERD (gastroesophageal reflux disease)    • History of transfusion    • Hypertension    • Kidney stone    • MG (myasthenia gravis) (CMS/[facility])     history of   • Pacemaker          Past Surgical History:   Procedure Laterality Date   • CARDIAC SURGERY     • COLONOSCOPY     • HEMORRHOIDECTOMY     • TUBAL ABDOMINAL LIGATION     • UPPER GASTROINTESTINAL ENDOSCOPY     • UPPER GASTROINTESTINAL ENDOSCOPY  05/29/2018         Family History   Problem Relation Age of Onset   • No Known Problems Mother    • Diabetes Father    • Cancer Son          Social History     Socioeconomic History   • Marital status:      Spouse name: Not on file   • Number of children: Not on file   • Years of education: Not on file   • Highest education level: Not on file   Tobacco Use   • Smoking status: Never Smoker   • Smokeless tobacco: Never Used   Substance and Sexual Activity   • Alcohol use: No   • Drug use: No   • Sexual activity: Defer   Social History Narrative    Caffeine: 1 serving per day. Pt lives at home alone.           Current Outpatient Medications:   •  amitriptyline (ELAVIL) 25 MG tablet, Take 1 tablet by mouth Every Night., Disp: 30 tablet, Rfl: 11  •  bumetanide (BUMEX) 0.5 MG tablet, TAKE 1 TABLET BY MOUTH EVERY OTHER DAY AS NEEDED FOR SWELLING, Disp: 30 tablet, Rfl: 2  •  cetirizine (ZyrTEC) 10 MG tablet, Take 10 mg by mouth daily., Disp: , Rfl:   •  diclofenac (Voltaren) 1 % gel gel,  Apply 4 g topically to the appropriate area as directed 4 (Four) Times a Day., Disp: 4 tube, Rfl: 11  •  fentaNYL (DURAGESIC) 12 MCG/HR, APPLY 1 PATCH TOPICALLY TO SKIN Q 72 H, Disp: , Rfl:   •  fentaNYL (DURAGESIC) 50 MCG/HR patch, APPLY 1 PATCH TOPICALLY Q 72 H, Disp: , Rfl:   •  fluticasone (FLONASE) 50 MCG/ACT nasal spray, fluticasone propionate 50 mcg/actuation nasal spray,suspension  2 sprays each nostril daily, Disp: , Rfl:   •  gabapentin (NEURONTIN) 600 MG tablet, Take 600 mg by mouth 3 (Three) Times a Day., Disp: , Rfl: 1  •  HYDROcodone-acetaminophen (NORCO)  MG per tablet, Take 1 tablet by mouth every 6 (six) hours as needed for moderate pain (4-6)., Disp: , Rfl:   •  pantoprazole (PROTONIX) 40 MG EC tablet, Take 1 tablet by mouth 2 (Two) Times a Day., Disp: 60 tablet, Rfl: 11  •  potassium chloride (K-DUR) 10 MEQ CR tablet, Take 1 tablet by mouth Daily., Disp: 30 tablet, Rfl: 11  •  promethazine (PHENERGAN) 25 MG tablet, Take 1 tablet by mouth Every 6 (Six) Hours As Needed for Nausea or Vomiting., Disp: 30 tablet, Rfl: 5  •  ciprofloxacin (CIPRO) 500 MG tablet, Take 1 tablet by mouth 2 (Two) Times a Day., Disp: 20 tablet, Rfl: 0  •  cyclobenzaprine (FLEXERIL) 10 MG tablet, Take 1 tablet by mouth 3 (Three) Times a Day As Needed for Muscle Spasms., Disp: 20 tablet, Rfl: 0  •  fentaNYL (DURAGESIC) 75 MCG/HR patch, Place 1 patch on the skin as directed by provider Every 72 (Seventy-Two) Hours., Disp: , Rfl:   •  oxybutynin XL (DITROPAN-XL) 10 MG 24 hr tablet, Take 10 mg by mouth As Needed., Disp: , Rfl:       Allergies   Allergen Reactions   • Codeine Sulfate Unknown (See Comments)     Pt took in the 70's-pt not sure of what happened   • Cozaar [Losartan] Unknown (See Comments)     Pt can not remember   • Crestor [Rosuvastatin] Unknown (See Comments)     Pt can not remember   • Dexlansoprazole Unknown (See Comments)     Pt can not remember   • Lipitor [Atorvastatin] Unknown (See Comments)     Pt can  "not remember   • Lisinopril Unknown (See Comments)     Pt can not remember   • Nexium [Esomeprazole Magnesium] Unknown (See Comments)     Pt can not remember   • Norvasc [Amlodipine] Unknown (See Comments)     Pt can not remember   • Sulfadiazine Unknown (See Comments)     Pt can not remember   • Toprol Xl [Metoprolol Tartrate] Unknown (See Comments)     Pt can not remember   • Zetia [Ezetimibe] Unknown (See Comments)     Pt can not remember   • Zocor [Simvastatin] Unknown (See Comments)     Pt can not remember   • Augmentin [Amoxicillin-Pot Clavulanate] GI Intolerance     nausea   • Celebrex [Celecoxib] GI Intolerance         /60   Pulse 80   Temp 98.4 °F (36.9 °C)   Resp 16   Ht 160 cm (63\")   Wt 57.6 kg (127 lb)   SpO2 98%   BMI 22.50 kg/m²       Physical Exam:  Constitutional: Patient is oriented to person, place, and time.   Patient appears well-developed and well-nourished.   HEENT: Head: Normocephalic and atraumatic.   Eyes: Conjunctivae and lids are normal.   Pupils: Equal, round, reactive to light.   Neck: Trachea normal. Neck supple. No JVD present.   Lymphatic: No cervical adenopathy  Pulmonary Respiratory effort: No increased work of breathing or signs of respiratory distress. Auscultation of lungs: Clear to auscultation.   Cardiovascular Auscultation of heart: Normal rate and rhythm, normal S1 and S2, no murmurs.   Musculoskeletal   Gait and station: Gait evaluation demonstrated a normal gait   Cervical spine: Passive and active range of motion are limited secondary to pain. Extension, flexion, lateral flexion, rotation of the cervical spine increased and reproduced pain. Cervical facet joint loading maneuvers are positive.  Muscles: Presence of active trigger points at the levator scapulae, trapezius and rhomboids   Shoulders: there is some decrease range of motion of the glenohumeral joints but without pain. Rotator cuff strength is 5/5.   Neurological:   Patient is alert and oriented to " person, place, and time.   Speech: speech is normal.   Cortical function: Normal mental status.   Cranial nerves: Cranial nerves 2-12 intact.   Reflex Scores:  Right brachioradialis: 2+  Left brachioradialis: 2+  Right biceps: 2+  Left biceps: 2+  Right triceps: 2+  Left triceps: 2+  Right patellar: 1+  Left patellar: 1+  Right Achilles: 1+  Left Achilles: 1+  Motor strength: 5/5  Motor Tone: normal tone.   Involuntary movements: none.   Superficial/Primitive Reflexes: primitive reflexes were absent.   Right Marcelo: absent  Left Marcelo: absent  Right ankle clonus: absent  Left ankle clonus: absent   Babinsky: absent  Spurling sign is negative. Neck tornado test is negative. Lhermitte sign is negative. Negative long tract signs.   Sensation: No sensory loss. Sensory exam: intact to light touch, intact pain and temperature sensation, intact vibration sensation and normal proprioception.   Coordination: Normal finger to nose and heel to shin. Normal balance and negative Romberg's sign   Skin and subcutaneous tissue: Skin is warm and intact. No rash noted. No cyanosis.   Psychiatric: Judgment and insight: Normal. Orientation to person, place and time: Normal. Recent and remote memory: Intact. Mood and affect: Normal.     ASSESSMENT:   1. Cervical spondylosis without myelopathy    2. DDD (degenerative disc disease), cervical    3. Cervical spinal stenosis    4. Fibromyalgia    5. SSS (sick sinus syndrome) (CMS/HCC)    6. Paroxysmal atrial fibrillation (CMS/HCC)    7. Pacemaker        PLAN/MEDICAL DECISION MAKING:  Patient presents with a longstanding history of unrelenting chronic posterior cervicalgia refractory to conservative measures.  Patient presents with multiple and significant comorbidities.  Patient has failed to obtain pain relief with conservative measures, as referenced Under HPI.  Patient underwent neurosurgical consultation and was found not to be a surgical candidate. I have reviewed all available  patient's medical records as well as previous therapies as referenced above. I had a lengthy conversation with Ms. Sheryl Conner regarding her chronic pain condition and potential therapeutic options including risks, benefits, alternative therapies, to name a few. Therefore, I have proposed the following plan:  1. Pharmacological measures: Reviewed. Discussed. Patient takes amitriptyline, Flexeril, diclofenac gel, gabapentin 600 mg 4 times daily, fentanyl 12 mcg/h patches every 3 days, fentanyl 50 mcg patches every 3 days, and hydrocodone 10 mg/325 for breakthrough pain   2. Interventional pain management measures: Patient will be scheduled for diagnostic bilateral cervical medial branch blocks at C4, C5, C6; for bilateral cervical facet joints at C4-C5, C5-C6, to clarify the origin of chronic refractory mechanical/axial cervicalgia. If patient experiences more than 80% pain relief along with significant improvement in the range of motion of the cervical spine, then, patient will be scheduled for a second set of diagnostic bilateral cervical medial branch blocks, to then, proceed with bilateral cervical medial branch rhizotomies.  Otherwise, we could consider diagnostic and therapeutic bilateral greater occipital nerve blocks by hydrodissection technique under ultrasound and fluoroscopic guidance, cervical epidural steroid injection, regenerative therapies, to name a few.  3. Long-term rehabilitation efforts:  A. The patient does not have a history of falls. I did complete a risk assessment for falls  B. Patient will start a comprehensive physical therapy program at [facility] for core strengthening, gait and balance training, neurodynamics, myofascial release, cupping and dry needling once pain is under control  C. Contrast therapy: Apply ice-packs for 15-20 minutes, followed by heating pads for 15-20 minutes to affected area   4. The patient and her family have been instructed to contact my  office with any questions or difficulties. The patient understands the plan and agrees to proceed accordingly.        Patient Care Team:  Manan Garcia MD as PCP - General (Family Medicine)     No orders of the defined types were placed in this encounter.        Future Appointments   Date Time Provider Department Center   8/14/2020  1:45 PM Rahat Morris MD [facility]   8/19/2020  3:15 PM Andi Seals III, MD [facility] None         Kelvin Small MD     EMR Dragon/Transcription disclaimer:  Much of this encounter note is an electronic transcription of spoken language to printed text. Electronic transcription of spoken language may permit erroneous, or at times, nonsensical words or phrases to be inadvertently transcribed. Although I have reviewed the note for such errors, some may still exist.

## 2020-08-13 ENCOUNTER — OFFICE VISIT (OUTPATIENT)
Dept: PAIN MEDICINE | Facility: CLINIC | Age: 85
End: 2020-08-13

## 2020-08-13 VITALS
HEART RATE: 80 BPM | SYSTOLIC BLOOD PRESSURE: 140 MMHG | HEIGHT: 63 IN | BODY MASS INDEX: 22.5 KG/M2 | WEIGHT: 127 LBS | RESPIRATION RATE: 16 BRPM | TEMPERATURE: 98.4 F | OXYGEN SATURATION: 98 % | DIASTOLIC BLOOD PRESSURE: 60 MMHG

## 2020-08-13 DIAGNOSIS — Z95.0 PACEMAKER: ICD-10-CM

## 2020-08-13 DIAGNOSIS — M79.7 FIBROMYALGIA: ICD-10-CM

## 2020-08-13 DIAGNOSIS — I49.5 SSS (SICK SINUS SYNDROME) (HCC): ICD-10-CM

## 2020-08-13 DIAGNOSIS — M48.02 CERVICAL SPINAL STENOSIS: ICD-10-CM

## 2020-08-13 DIAGNOSIS — I48.0 PAROXYSMAL ATRIAL FIBRILLATION (HCC): ICD-10-CM

## 2020-08-13 DIAGNOSIS — M50.30 DDD (DEGENERATIVE DISC DISEASE), CERVICAL: ICD-10-CM

## 2020-08-13 DIAGNOSIS — M47.812 CERVICAL SPONDYLOSIS WITHOUT MYELOPATHY: ICD-10-CM

## 2020-08-13 DIAGNOSIS — M47.812 CERVICAL SPONDYLOSIS WITHOUT MYELOPATHY: Primary | ICD-10-CM

## 2020-08-13 PROCEDURE — 99204 OFFICE O/P NEW MOD 45 MIN: CPT | Performed by: ANESTHESIOLOGY

## 2020-08-14 ENCOUNTER — OFFICE VISIT (OUTPATIENT)
Dept: ONCOLOGY | Facility: CLINIC | Age: 85
End: 2020-08-14

## 2020-08-14 ENCOUNTER — LAB (OUTPATIENT)
Dept: LAB | Facility: HOSPITAL | Age: 85
End: 2020-08-14

## 2020-08-14 VITALS
HEART RATE: 77 BPM | TEMPERATURE: 97.7 F | DIASTOLIC BLOOD PRESSURE: 88 MMHG | BODY MASS INDEX: 22.15 KG/M2 | HEIGHT: 63 IN | OXYGEN SATURATION: 91 % | SYSTOLIC BLOOD PRESSURE: 213 MMHG | WEIGHT: 125 LBS | RESPIRATION RATE: 18 BRPM

## 2020-08-14 DIAGNOSIS — D50.0 IRON DEFICIENCY ANEMIA DUE TO CHRONIC BLOOD LOSS: ICD-10-CM

## 2020-08-14 DIAGNOSIS — D50.0 IRON DEFICIENCY ANEMIA DUE TO CHRONIC BLOOD LOSS: Primary | ICD-10-CM

## 2020-08-14 DIAGNOSIS — K31.819 GAVE (GASTRIC ANTRAL VASCULAR ECTASIA): Primary | ICD-10-CM

## 2020-08-14 LAB
ERYTHROCYTE [DISTWIDTH] IN BLOOD BY AUTOMATED COUNT: 13.2 % (ref 12.3–15.4)
FERRITIN SERPL-MCNC: 88.97 NG/ML (ref 13–150)
HCT VFR BLD AUTO: 35.5 % (ref 34–46.6)
HGB BLD-MCNC: 11.3 G/DL (ref 12–15.9)
LYMPHOCYTES # BLD AUTO: 0.6 10*3/MM3 (ref 0.7–3.1)
LYMPHOCYTES NFR BLD AUTO: 16.6 % (ref 19.6–45.3)
MCH RBC QN AUTO: 28.4 PG (ref 26.6–33)
MCHC RBC AUTO-ENTMCNC: 31.9 G/DL (ref 31.5–35.7)
MCV RBC AUTO: 88.9 FL (ref 79–97)
MONOCYTES # BLD AUTO: 0.3 10*3/MM3 (ref 0.1–0.9)
MONOCYTES NFR BLD AUTO: 7.8 % (ref 5–12)
NEUTROPHILS NFR BLD AUTO: 2.9 10*3/MM3 (ref 1.7–7)
NEUTROPHILS NFR BLD AUTO: 75.6 % (ref 42.7–76)
PLATELET # BLD AUTO: 260 10*3/MM3 (ref 140–450)
PMV BLD AUTO: 6.4 FL (ref 6–12)
RBC # BLD AUTO: 3.99 10*6/MM3 (ref 3.77–5.28)
WBC # BLD AUTO: 3.9 10*3/MM3 (ref 3.4–10.8)

## 2020-08-14 PROCEDURE — 99213 OFFICE O/P EST LOW 20 MIN: CPT | Performed by: INTERNAL MEDICINE

## 2020-08-14 PROCEDURE — 36415 COLL VENOUS BLD VENIPUNCTURE: CPT

## 2020-08-14 PROCEDURE — 82728 ASSAY OF FERRITIN: CPT

## 2020-08-14 PROCEDURE — 85025 COMPLETE CBC W/AUTO DIFF WBC: CPT

## 2020-08-14 NOTE — PROGRESS NOTES
"PROBLEM LIST:  1. 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 Status post EGD colonoscopy  3. Status post Iv iron  infusion periodically  4. Bone Marrow biopsy  - normal - done by Dr. Reeves  5. 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  lady with normocytic anemia related to chronic blood loss.  She requires periodic iron infusions.  She has not required any transfusion support.  Last blood transfusion on 10/3/2019.     Denies any active bleeding.  Fatigue seems to be tolerating well.      Past medical history, social history and family history was reviewed and unchanged from prior visit.    Review of Systems:    Review of Systems   Constitutional: Positive for fatigue.   HENT: Negative.    Eyes: Negative.    Respiratory: Positive for shortness of breath.    Cardiovascular: Negative.    Gastrointestinal: Negative.    Endocrine: Negative.    Genitourinary: Negative.    Musculoskeletal: Positive for back pain and neck pain.   Skin: Negative.    Allergic/Immunologic: Negative.    Neurological: Negative.    Hematological: Negative.    Psychiatric/Behavioral: Negative.       A comprehensive 14 point review of systems was performed and was negative except as mentioned.      Medications:  The current medication list was reviewed in the EMR    ALLERGIES:    Allergies   Allergen Reactions   • Codeine Sulfate Unknown (See Comments)     Pt took in the 70's-pt not sure of what happened   • Cozaar [Losartan] Unknown (See Comments)     Pt can not remember   • Crestor [Rosuvastatin] Unknown (See Comments)     Pt can not remember   • Dexlansoprazole Unknown (See Comments)     Pt can not remember   • Lipitor [Atorvastatin] Unknown (See Comments)     Pt can not remember   • Lisinopril Unknown (See Comments)     Pt can not remember   • Nexium [Esomeprazole Magnesium] Unknown (See Comments)     Pt can not remember   • " "Norvasc [Amlodipine] Unknown (See Comments)     Pt can not remember   • Sulfadiazine Unknown (See Comments)     Pt can not remember   • Toprol Xl [Metoprolol Tartrate] Unknown (See Comments)     Pt can not remember   • Zetia [Ezetimibe] Unknown (See Comments)     Pt can not remember   • Zocor [Simvastatin] Unknown (See Comments)     Pt can not remember   • Augmentin [Amoxicillin-Pot Clavulanate] GI Intolerance     nausea   • Celebrex [Celecoxib] GI Intolerance         Physical Exam    VITAL SIGNS:  Temp 97.7 °F (36.5 °C) (Temporal)   Resp 18   Ht 160 cm (63\")   Wt 56.7 kg (125 lb)   BMI 22.14 kg/m²      Performance Status: 1    General: well appearing, in no acute distress  HEENT: sclera anicteric, oropharynx clear, neck is supple  Lymphatics: no cervical, supraclavicular, or axillary adenopathy  Cardiovascular: regular rate and rhythm, no murmurs, rubs or gallops  Lungs: clear to auscultation bilaterally  Abdomen: soft, nontender, nondistended.  No palpable organomegaly  Extremities: no lower extremity edema  Skin: no rashes, lesions, bruising, or petechiae  Msk:  Shows no weakness of the large muscle groups  Psych: Mood is stable        RECENT LABS:    Lab Results   Component Value Date    WBC 3.90 08/14/2020    HGB 11.3 (L) 08/14/2020    HCT 35.5 08/14/2020    MCV 88.9 08/14/2020     08/14/2020     Lab Results   Component Value Date    FERRITIN 202.60 (H) 07/10/2020    FERRITIN 757.20 (H) 06/18/2020    FERRITIN 43.83 05/28/2020     Lab Results   Component Value Date    HGB 11.3 (L) 08/14/2020    HGB 12.1 07/10/2020    HGB 11.1 (L) 06/18/2020    HGB 10.4 (L) 05/28/2020    HGB 11.8 (L) 05/08/2020    HGB 12.3 04/15/2020    HGB 13.1 03/25/2020    HGB 12.6 03/11/2020    HGB 12.7 02/21/2020    HGB 9.7 (L) 02/07/2020     Ct Cervical Spine Without Contrast    Result Date: 5/6/2020  1. Advanced multilevel degenerative disc disease, with probably moderate canal stenosis at C3-4 and C4-5 due to prominent " posterior vertebral osteophytes, and some posterior element hypertrophy. 2. Milder degenerative changes, and bony foraminal stenosis elsewhere as discussed above by disc level. No evidence of acute or healing trauma.       Ct Thoracic Spine Without Contrast    Result Date: 5/6/2020  1. Advanced multilevel degenerative disc disease, with probably moderate canal stenosis at C3-4 and C4-5 due to prominent posterior vertebral osteophytes, and some posterior element hypertrophy. 2. Milder degenerative changes, and bony foraminal stenosis elsewhere as discussed above by disc level. No evidence of acute or healing trauma.  CT SCAN OF THE THORACIC SPINE: There is exaggeration of the normal thoracic lordosis. No significant focal subluxation or evidence of vertebral compression deformity is seen. There is moderate multilevel degenerative disc disease. No destructive or blastic bony lesions are seen. Coronal images show a minimal dextroconvex scoliosis. Axial bone window images show no evidence of acute bony trauma. Soft tissue axial images of the thoracic spine show the usual limited detail of the canal for non-myelographic scans. The thoracic canal appears congenitally rather ample. No gross evidence of HNP or canal stenosis is appreciated down to the level of T9-T10 where there is a prominent posterior vertebral osteophyte but only borderline canal narrowing. Below this level, the canal appears unremarkable. No pathologic paraspinous mass or inflammatory change is seen. Review of the included portions of the lungs shows a mild patchy, widely distributed appearance of faint groundglass opacity, not well defined, more central than peripheral, and with no crazy paving pattern or lung consolidation. This is very similar to the appearance of the 10/05/2019 chest radiograph which showed mild interstitial edema. Although technically indeterminate for Covid-19 pneumonia, there are no typical features of Covid-19, and findings are  explainable on the basis of the patient's previously noted interstitial disease alone.  IMPRESSION: 1. No evidence of acute thoracic spine trauma, advanced degenerative disc disease, or gross evidence of thoracic spinal stenosis. 2. Mild and rather diffuse, faint groundglass opacity of the lungs as noted. Although technically indeterminate for Covid-19, the findings are consistent with the mild interstitial edema pattern seen on the 10/05/2019 exam. According to the CT technologist, the patient has no complaint of any current or recent respiratory symptoms.  D:  05/06/2020 E:  05/06/2020         Assessment/Plan    1. normocytic anemia requiring periodic blood transfusions secondary to bleeding AVMs and a Watermelon stomach.  Underwent argon treatment with Dr. Llamas.  Her hemoglobin is normal today but dropping. continue periodic iron infusions. Last infusion on 2/7/2020.  Waiting on her ferritin to return before make that decision.    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          Rahat Morris MD  [facility]    8/14/2020

## 2020-08-17 ENCOUNTER — TRANSCRIBE ORDERS (OUTPATIENT)
Dept: PAIN MEDICINE | Facility: CLINIC | Age: 85
End: 2020-08-17

## 2020-08-17 DIAGNOSIS — K31.819 GAVE (GASTRIC ANTRAL VASCULAR ECTASIA): ICD-10-CM

## 2020-08-17 DIAGNOSIS — D50.0 IRON DEFICIENCY ANEMIA DUE TO CHRONIC BLOOD LOSS: ICD-10-CM

## 2020-08-17 DIAGNOSIS — K90.9 MALABSORPTION IN THE ELDERLY: ICD-10-CM

## 2020-08-17 DIAGNOSIS — Z78.9 MEDICATION INTOLERANCE: ICD-10-CM

## 2020-08-17 RX ORDER — SODIUM CHLORIDE 9 MG/ML
250 INJECTION, SOLUTION INTRAVENOUS ONCE
Status: CANCELLED | OUTPATIENT
Start: 2020-08-26

## 2020-08-17 RX ORDER — SODIUM CHLORIDE 9 MG/ML
250 INJECTION, SOLUTION INTRAVENOUS ONCE
Status: CANCELLED | OUTPATIENT
Start: 2020-09-03

## 2020-08-19 ENCOUNTER — TELEPHONE (OUTPATIENT)
Dept: CARDIOLOGY | Facility: CLINIC | Age: 85
End: 2020-08-19

## 2020-08-19 DIAGNOSIS — M47.812 CERVICAL SPONDYLOSIS WITHOUT MYELOPATHY: Primary | ICD-10-CM

## 2020-08-21 ENCOUNTER — APPOINTMENT (OUTPATIENT)
Dept: PREADMISSION TESTING | Facility: HOSPITAL | Age: 85
End: 2020-08-21

## 2020-08-21 PROCEDURE — C9803 HOPD COVID-19 SPEC COLLECT: HCPCS

## 2020-08-21 PROCEDURE — U0004 COV-19 TEST NON-CDC HGH THRU: HCPCS

## 2020-08-21 PROCEDURE — U0002 COVID-19 LAB TEST NON-CDC: HCPCS

## 2020-08-22 LAB
REF LAB TEST METHOD: NORMAL
SARS-COV-2 RNA RESP QL NAA+PROBE: NOT DETECTED

## 2020-08-24 ENCOUNTER — OUTSIDE FACILITY SERVICE (OUTPATIENT)
Dept: PAIN MEDICINE | Facility: CLINIC | Age: 85
End: 2020-08-24

## 2020-08-24 PROCEDURE — 64490 INJ PARAVERT F JNT C/T 1 LEV: CPT | Performed by: ANESTHESIOLOGY

## 2020-08-24 PROCEDURE — 64491 INJ PARAVERT F JNT C/T 2 LEV: CPT | Performed by: ANESTHESIOLOGY

## 2020-08-24 PROCEDURE — 99152 MOD SED SAME PHYS/QHP 5/>YRS: CPT | Performed by: ANESTHESIOLOGY

## 2020-08-25 ENCOUNTER — TELEPHONE (OUTPATIENT)
Dept: PAIN MEDICINE | Facility: CLINIC | Age: 85
End: 2020-08-25

## 2020-08-25 NOTE — TELEPHONE ENCOUNTER
diagnostic bilateral cervical medial branch blocks at C4, C5, C6; for bilateral cervical facet joints at C4-C5, C5-C6 08/24/2020    Attempted to contact patients daughter. No answer, no options for voicemail.     When scheduled contact patient, place appointment card in outgoing mail.

## 2020-08-27 DIAGNOSIS — M47.812 CERVICAL SPONDYLOSIS WITHOUT MYELOPATHY: Primary | ICD-10-CM

## 2020-09-01 ENCOUNTER — HOSPITAL ENCOUNTER (OUTPATIENT)
Dept: ONCOLOGY | Facility: HOSPITAL | Age: 85
Setting detail: INFUSION SERIES
Discharge: HOME | End: 2020-09-01

## 2020-09-01 VITALS
BODY MASS INDEX: 22.5 KG/M2 | RESPIRATION RATE: 20 BRPM | HEIGHT: 63 IN | TEMPERATURE: 96.8 F | HEART RATE: 80 BPM | WEIGHT: 127 LBS | SYSTOLIC BLOOD PRESSURE: 145 MMHG | DIASTOLIC BLOOD PRESSURE: 49 MMHG

## 2020-09-01 DIAGNOSIS — D50.0 IRON DEFICIENCY ANEMIA DUE TO CHRONIC BLOOD LOSS: ICD-10-CM

## 2020-09-01 DIAGNOSIS — K31.819 GAVE (GASTRIC ANTRAL VASCULAR ECTASIA): Primary | ICD-10-CM

## 2020-09-01 DIAGNOSIS — Z78.9 MEDICATION INTOLERANCE: ICD-10-CM

## 2020-09-01 DIAGNOSIS — K90.9 MALABSORPTION IN THE ELDERLY: ICD-10-CM

## 2020-09-01 PROCEDURE — 96374 THER/PROPH/DIAG INJ IV PUSH: CPT

## 2020-09-01 PROCEDURE — 25010000002 FERRIC CARBOXYMALTOSE 750 MG/15ML SOLUTION 15 ML VIAL: Performed by: INTERNAL MEDICINE

## 2020-09-01 RX ORDER — SODIUM CHLORIDE 9 MG/ML
250 INJECTION, SOLUTION INTRAVENOUS ONCE
Status: COMPLETED | OUTPATIENT
Start: 2020-09-01 | End: 2020-09-01

## 2020-09-01 RX ADMIN — FERRIC CARBOXYMALTOSE INJECTION 750 MG: 50 INJECTION, SOLUTION INTRAVENOUS at 13:45

## 2020-09-01 RX ADMIN — SODIUM CHLORIDE 250 ML: 9 INJECTION, SOLUTION INTRAVENOUS at 13:45

## 2020-09-08 ENCOUNTER — HOSPITAL ENCOUNTER (OUTPATIENT)
Dept: ONCOLOGY | Facility: HOSPITAL | Age: 85
Setting detail: INFUSION SERIES
Discharge: HOME | End: 2020-09-08

## 2020-09-08 VITALS
SYSTOLIC BLOOD PRESSURE: 134 MMHG | HEART RATE: 67 BPM | RESPIRATION RATE: 20 BRPM | DIASTOLIC BLOOD PRESSURE: 57 MMHG | BODY MASS INDEX: 21.97 KG/M2 | WEIGHT: 124 LBS | HEIGHT: 63 IN | TEMPERATURE: 97 F

## 2020-09-08 DIAGNOSIS — D50.0 IRON DEFICIENCY ANEMIA DUE TO CHRONIC BLOOD LOSS: ICD-10-CM

## 2020-09-08 DIAGNOSIS — Z78.9 MEDICATION INTOLERANCE: ICD-10-CM

## 2020-09-08 DIAGNOSIS — K31.819 GAVE (GASTRIC ANTRAL VASCULAR ECTASIA): Primary | ICD-10-CM

## 2020-09-08 DIAGNOSIS — K90.9 MALABSORPTION IN THE ELDERLY: ICD-10-CM

## 2020-09-08 PROCEDURE — 25010000002 FERRIC CARBOXYMALTOSE 750 MG/15ML SOLUTION 15 ML VIAL: Performed by: INTERNAL MEDICINE

## 2020-09-08 PROCEDURE — 96374 THER/PROPH/DIAG INJ IV PUSH: CPT

## 2020-09-08 RX ORDER — SODIUM CHLORIDE 9 MG/ML
250 INJECTION, SOLUTION INTRAVENOUS ONCE
Status: COMPLETED | OUTPATIENT
Start: 2020-09-08 | End: 2020-09-08

## 2020-09-08 RX ADMIN — SODIUM CHLORIDE 250 ML: 9 INJECTION, SOLUTION INTRAVENOUS at 14:31

## 2020-09-08 RX ADMIN — FERRIC CARBOXYMALTOSE INJECTION 750 MG: 50 INJECTION, SOLUTION INTRAVENOUS at 14:31

## 2020-09-12 NOTE — PROGRESS NOTES
"This consultation was provided with the use of interactive audio and video telecommunications system that permits real time communication with the patient, as patient is unable to attend an in-office appointment due to the COVID-19 crisis. Consent for assessment and treatment was provided by the patient. You have chosen to receive care through a telephone visit today. Do you consent to use a telephone visit for your medical care today? Yes     Chief Complaint: \"Pain in my neck.\"      History of Present Illness:  Ms. Sheryl Conner, 89 y.o. female originally referred by Shena Torrez PA-C in consultation for chronic intractable neck pain.   Pain History: Patient reports a 3-year history of neck pain, which began after some incident a [employer]. Pain increased over the past months.  Patient has a longstanding history of severe osteoarthritis. Patient has been receiving opioid therapy for this for a very long period of time. Since she moved to [place], she established care with Dr. RAGHAV Stafford and Dr. Latonia Bruno. Her most recent prescriptions include gabapentin 600 mg 4 times daily, fentanyl 12 mcg/h patches every 3 days, fentanyl 50 mcg patches every 3 days, and hydrocodone 10 mg/325 for breakthrough pain (08/26/2020). CT scan of the cervical spine on 05/06/2020 revealed advanced multilevel degenerative disc disease, with moderate canal stenosis at C3-C4 and C4-C5 due to prominent posterior vertebral osteophytes, and some posterior element hypertrophy. Patient underwent neurosurgical consultation with Graciela Torrez PA-C on June 25, 2020 and was found not to be a surgical candidate. Pain has progressed in intensity over the past months.  Patient was referred by neurosurgery in consultation for interventional pain management measures.  Patient was last seen on August 24, 2020 when she underwent the first set of diagnostic bilateral cervical medial branch blocks at C4, C5, C6; for bilateral cervical facet joints at " C4-C5, C5-C6, from which she reported 90% pain relief (pain level went down from 9/10 to 0-1/10) and functional improvement that lasted for 12-18 hours.  Patient denies new symptoms or any significant changes in her medical history.  Pain Description: Constant pain with intermittent exacerbation, described as aching, burning and throbbing sensation.   Radiation of Pain: The pain radiates into the occipital region and into the shoulder blades   Pain intensity today: 8/10   Average pain intensity last week: 8/10   Pain intensity ranges from: 7/10 to 10/10   Aggravating factors: Pain increases with extension and rotation of the cervical spine.   Alleviating factors: Pain decreases with analgesics.   Associated Symptoms:   Patient denies pain, numbness and weakness in the upper extremities.   Patient denies any new bladder or bowel problems.   Patient reports difficulties with her balance but denies recent falls.   Patient reports bilateral cervicogenic and occipital headaches lasting hours.     Review of previous therapies and additional medical records:   Sheryl Conner has already failed the following measures, including:   Conservative Measures: Oral analgesics, topical analgesics and physical therapy   Interventional Measures: 08/24/2020: First set of diagnostic bilateral cervical medial branch blocks  Surgical Measures: No history of previous cervical spine or shoulder surgery   Sheryl Conner underwent neurosurgical consultation with Graciela Torerz PA-C on 06/25/2020, and was found not to be a surgical candidate.   Sheryl Conner presents with significant comorbidities including fibromyalgia, GERD, hypertension, myasthenia gravis, pacemaker [employer] (Andi Miles MD), anemia   In terms of current analgesics, Sheryl Conner takes: Amitriptyline, Flexeril, diclofenac gel, gabapentin 600 mg 4 times daily, fentanyl 12 mcg/h patches every 3 days, fentanyl 50 mcg patches every 3 days, and hydrocodone 10  mg/325 for breakthrough pain   I have reviewed Karl Report # [id number] consistent with medication reconciliation.     Global Pain Scale 08-13 2020          Pain 20          Feelings 22          Clinical outcomes 20          Activities 22          GPS Total: 84            Diagnostic Studies:    CT CERVICAL SPINE, CT THORACIC SPINE WO CONTRAST 05/06/2020: Advanced degenerative disc disease at C3-C4, C4-C5, C6-C7. Grade 1 posterior subluxation of C3 on C4 and C4 on C5. The bony canal appears narrowed at C4-C5 due to a combination of posterior element hypertrophy and posterior vertebral osteophyte.   C2-C3: Borderline canal narrowing due to vertebral osteophyte.   C3-C4: Moderate canal stenosis due to prominent posterior  vertebral osteophyte, AP canal diameter of approximately 5 mm. Foramina appear markedly narrowed due to bony hypertrophic change   C4-C5: Moderate canal stenosis due to central vertebral osteophyte with AP canal diameter approximately 5 mm. Foramina appear moderately narrowed bilaterally.   C5-C6: Canal appears lower limits of normal. Foramina appear grossly normal.   C6-C7: Disc protrusion with mild canal narrowing. Foramina appear fairly well maintained.   C7-T1: Canal and foramina appear normal.   CT SCAN OF THE THORACIC SPINE: There is exaggeration of the normal thoracic lordosis. No significant focal subluxation or evidence of vertebral compression deformity is seen. There is moderate multilevel degenerative disc disease. No destructive or blastic bony lesions are seen. Coronal images show a minimal dextroconvex scoliosis. Axial bone window images show no evidence of acute bony trauma. Soft tissue axial images of the thoracic spine show the usual limited detail of the canal for non-myelographic scans.  The thoracic canal appears congenitally rather ample. No gross evidence of HNP or canal stenosis is appreciated down to the level of T9-T10 where there is a prominent posterior vertebral  osteophyte but only borderline canal narrowing. Below this level, the canal appears unremarkable.     Review of Systems   HENT: Positive for sinus pressure.    Eyes: Positive for photophobia, pain and visual disturbance.   Respiratory: Positive for apnea.    Endocrine: Positive for cold intolerance.   Musculoskeletal: Positive for arthralgias, joint swelling, neck pain and neck stiffness.   Neurological: Positive for headaches.   All other systems reviewed and are negative.        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   • Right knee pain   • GAVE (gastric antral vascular ectasia)   • Hypoxia   • Paroxysmal atrial fibrillation (CMS/[facility])   • SSS (sick sinus syndrome) (CMS/[facility])   • Essential hypertension   • Functional heart murmur   • Idiopathic osteoarthritis   • Hyperlipidemia   • CKD (chronic kidney disease) stage 3, GFR 30-59 ml/min (CMS/[facility])   • GERD (gastroesophageal reflux disease)   • Cervical pain (neck)   • Fibromyalgia   • Arthritis   • Chronic pain syndrome   • Pacemaker   • Cervical spondylosis without myelopathy   • DDD (degenerative disc disease), cervical   • Cervical spinal stenosis       Past Medical History:   Diagnosis Date   • Anemia    • Arthritis    • Fibromyalgia    • GERD (gastroesophageal reflux disease)    • History of transfusion    • Hypertension    • Kidney stone    • MG (myasthenia gravis) (CMS/[facility])     history of   • Pacemaker          Past Surgical History:   Procedure Laterality Date   • CARDIAC SURGERY     • COLONOSCOPY     • HEMORRHOIDECTOMY     • TUBAL ABDOMINAL LIGATION     • UPPER GASTROINTESTINAL ENDOSCOPY     • UPPER GASTROINTESTINAL ENDOSCOPY  05/29/2018         Family History   Problem Relation Age of Onset   • No Known Problems Mother    • Diabetes Father    • Cancer Son          Social History     Socioeconomic History   • Marital status:      Spouse  name: Not on file   • Number of children: Not on file   • Years of education: Not on file   • Highest education level: Not on file   Tobacco Use   • Smoking status: Never Smoker   • Smokeless tobacco: Never Used   Substance and Sexual Activity   • Alcohol use: No   • Drug use: No   • Sexual activity: Defer   Social History Narrative    Caffeine: 1 serving per day. Pt lives at home alone.           Current Outpatient Medications:   •  amitriptyline (ELAVIL) 25 MG tablet, Take 1 tablet by mouth Every Night., Disp: 30 tablet, Rfl: 11  •  bumetanide (BUMEX) 0.5 MG tablet, TAKE 1 TABLET BY MOUTH EVERY OTHER DAY AS NEEDED FOR SWELLING, Disp: 30 tablet, Rfl: 2  •  cetirizine (ZyrTEC) 10 MG tablet, Take 10 mg by mouth daily., Disp: , Rfl:   •  ciprofloxacin (CIPRO) 500 MG tablet, Take 1 tablet by mouth 2 (Two) Times a Day., Disp: 20 tablet, Rfl: 0  •  cyclobenzaprine (FLEXERIL) 10 MG tablet, Take 1 tablet by mouth 3 (Three) Times a Day As Needed for Muscle Spasms., Disp: 20 tablet, Rfl: 0  •  diclofenac (Voltaren) 1 % gel gel, Apply 4 g topically to the appropriate area as directed 4 (Four) Times a Day., Disp: 4 tube, Rfl: 11  •  fentaNYL (DURAGESIC) 12 MCG/HR, APPLY 1 PATCH TOPICALLY TO SKIN Q 72 H, Disp: , Rfl:   •  fentaNYL (DURAGESIC) 50 MCG/HR patch, APPLY 1 PATCH TOPICALLY Q 72 H, Disp: , Rfl:   •  fentaNYL (DURAGESIC) 75 MCG/HR patch, Place 1 patch on the skin as directed by provider Every 72 (Seventy-Two) Hours., Disp: , Rfl:   •  fluticasone (FLONASE) 50 MCG/ACT nasal spray, fluticasone propionate 50 mcg/actuation nasal spray,suspension  2 sprays each nostril daily, Disp: , Rfl:   •  gabapentin (NEURONTIN) 600 MG tablet, Take 600 mg by mouth 3 (Three) Times a Day., Disp: , Rfl: 1  •  HYDROcodone-acetaminophen (NORCO)  MG per tablet, Take 1 tablet by mouth every 6 (six) hours as needed for moderate pain (4-6)., Disp: , Rfl:   •  oxybutynin XL (DITROPAN-XL) 10 MG 24 hr tablet, Take 10 mg by mouth As Needed.,  Disp: , Rfl:   •  pantoprazole (PROTONIX) 40 MG EC tablet, Take 1 tablet by mouth 2 (Two) Times a Day., Disp: 60 tablet, Rfl: 11  •  potassium chloride (K-DUR) 10 MEQ CR tablet, Take 1 tablet by mouth Daily., Disp: 30 tablet, Rfl: 11  •  promethazine (PHENERGAN) 25 MG tablet, Take 1 tablet by mouth Every 6 (Six) Hours As Needed for Nausea or Vomiting., Disp: 30 tablet, Rfl: 5      Allergies   Allergen Reactions   • Codeine Sulfate Unknown (See Comments)     Pt took in the 70's-pt not sure of what happened   • Cozaar [Losartan] Unknown (See Comments)     Pt can not remember   • Crestor [Rosuvastatin] Unknown (See Comments)     Pt can not remember   • Dexlansoprazole Unknown (See Comments)     Pt can not remember   • Lipitor [Atorvastatin] Unknown (See Comments)     Pt can not remember   • Lisinopril Unknown (See Comments)     Pt can not remember   • Nexium [Esomeprazole Magnesium] Unknown (See Comments)     Pt can not remember   • Norvasc [Amlodipine] Unknown (See Comments)     Pt can not remember   • Sulfadiazine Unknown (See Comments)     Pt can not remember   • Toprol Xl [Metoprolol Tartrate] Unknown (See Comments)     Pt can not remember   • Zetia [Ezetimibe] Unknown (See Comments)     Pt can not remember   • Zocor [Simvastatin] Unknown (See Comments)     Pt can not remember   • Augmentin [Amoxicillin-Pot Clavulanate] GI Intolerance     nausea   • Celebrex [Celecoxib] GI Intolerance         There were no vitals taken for this visit.      Physical Exam:  Due to the constraints of the telemedicine format, no physical exam was performed     Constitutional: Patient is oriented to person, place, and time.   Patient appears well-developed and well-nourished.   HEENT: Head: Normocephalic and atraumatic.   Eyes: Conjunctivae and lids are normal.   Pupils: Equal, round, reactive to light.   Neck: Trachea normal. Neck supple. No JVD present.   Lymphatic: No cervical adenopathy  Pulmonary Respiratory effort: No increased  work of breathing or signs of respiratory distress. Auscultation of lungs: Clear to auscultation.   Cardiovascular Auscultation of heart: Normal rate and rhythm, normal S1 and S2, no murmurs.   Musculoskeletal   Gait and station: Gait evaluation demonstrated a normal gait   Cervical spine: Passive and active range of motion are limited secondary to pain. Extension, flexion, lateral flexion, rotation of the cervical spine increased and reproduced pain. Cervical facet joint loading maneuvers are positive.  Muscles: Presence of active trigger points at the levator scapulae, trapezius and rhomboids   Shoulders: there is some decrease range of motion of the glenohumeral joints but without pain. Rotator cuff strength is 5/5.   Neurological:   Patient is alert and oriented to person, place, and time.   Speech: speech is normal.   Cortical function: Normal mental status.   Cranial nerves: Cranial nerves 2-12 intact.   Reflex Scores:  Right brachioradialis: 2+  Left brachioradialis: 2+  Right biceps: 2+  Left biceps: 2+  Right triceps: 2+  Left triceps: 2+  Right patellar: 1+  Left patellar: 1+  Right Achilles: 1+  Left Achilles: 1+  Motor strength: 5/5  Motor Tone: normal tone.   Involuntary movements: none.   Superficial/Primitive Reflexes: primitive reflexes were absent.   Right Marcelo: absent  Left Marcelo: absent  Right ankle clonus: absent  Left ankle clonus: absent   Babinsky: absent  Spurling sign is negative. Neck tornado test is negative. Lhermitte sign is negative. Negative long tract signs.   Sensation: No sensory loss. Sensory exam: intact to light touch, intact pain and temperature sensation, intact vibration sensation and normal proprioception.   Coordination: Normal finger to nose and heel to shin. Normal balance and negative Romberg's sign   Skin and subcutaneous tissue: Skin is warm and intact. No rash noted. No cyanosis.   Psychiatric: Judgment and insight: Normal. Orientation to person, place and time:  Normal. Recent and remote memory: Intact. Mood and affect: Normal.     ASSESSMENT:   1. Cervical spondylosis without myelopathy    2. DDD (degenerative disc disease), cervical    3. Cervical spinal stenosis    4. Fibromyalgia    5. SSS (sick sinus syndrome) (CMS/HCC)    6. Paroxysmal atrial fibrillation (CMS/HCC)    7. Pacemaker       PLAN/MEDICAL DECISION MAKING:  Patient presents with a longstanding history of chronic unrelenting posterior cervicalgia refractory to conservative measures. Patient presents with significant comorbidities. Patient has previously failed to obtain pain relief with conservative measures, as referenced under HPI. Patient underwent neurosurgical consultation with Graciela Torrez PA-C and was found not to be a surgical candidate. I have reviewed all available patient's medical records as well as previous therapies as referenced above. I had a lengthy conversation with Ms. Sheryl ROSAS Dalila regarding her chronic pain condition and potential therapeutic options including risks, benefits, alternative therapies, to name a few. Therefore, I have proposed the following plan:  1. Interventional pain management measures: Patient will be scheduled for a second set of diagnostic bilateral cervical medial branch blocks at C4, C5, C6; for bilateral cervical facet joints at C4-C5, C5-C6, to confirm the origin of her chronic refractory cervicalgia. If patient experiences more than 80% pain relief along with significant improvement in the range of motion of the cervical spine, then, patient will be scheduled for bilateral cervical medial branch rhizotomies.  Otherwise, we could consider diagnostic and therapeutic bilateral greater occipital nerve blocks by hydrodissection technique under ultrasound and fluoroscopic guidance, cervical epidural steroid injections, regenerative therapies, to name a few.  2. Pharmacological measures: Reviewed. Discussed. Patient takes Flexeril,  amitriptyline, diclofenac gel,  gabapentin 600 mg 4 times daily, fentanyl 12 mcg/h patches every 3 days, fentanyl 50 mcg patches every 3 days, and hydrocodone 10 mg/325 for breakthrough pain   3. Long-term rehabilitation efforts:  A. The patient does not have a history of falls. I did complete a risk assessment for falls  B. Patient will start a comprehensive physical therapy program at [facility] for core strengthening, gait and balance training, neurodynamics, myofascial release, cupping and dry needling once pain is under control  C. Contrast therapy: Apply ice-packs for 15-20 minutes, followed by heating pads for 15-20 minutes to affected area   4. The patient has been instructed to contact my office with any questions or difficulties. The patient understands the plan and agrees to proceed accordingly.    Total time of discussion was 6 minutes (12:42-12:48)     Patient Care Team:  Manan Garcia MD as PCP - General (Family Medicine)     No orders of the defined types were placed in this encounter.        Future Appointments   Date Time Provider Department Center   9/17/2020 12:00 PM Kelvin Small MD [facility]   9/21/2020  9:30 AM C19 [facility]   9/23/2020 10:30 AM Kelvin Small MD [facility]   9/30/2020 11:30 AM Andi Seals III, MD [facility] None   12/11/2020  2:30 PM Rahat Morris MD [facility]         Kelvin ZAVALA. MD Geovanny     EMR Dragon/Transcription disclaimer:  Much of this encounter note is an electronic transcription of spoken language to printed text. Electronic transcription of spoken language may permit erroneous, or at times, nonsensical words or phrases to be inadvertently transcribed. Although I have reviewed the note for such errors, some may still exist.

## 2020-09-17 ENCOUNTER — OFFICE VISIT (OUTPATIENT)
Dept: PAIN MEDICINE | Facility: CLINIC | Age: 85
End: 2020-09-17

## 2020-09-17 DIAGNOSIS — M79.7 FIBROMYALGIA: ICD-10-CM

## 2020-09-17 DIAGNOSIS — I48.0 PAROXYSMAL ATRIAL FIBRILLATION (HCC): ICD-10-CM

## 2020-09-17 DIAGNOSIS — M50.30 DDD (DEGENERATIVE DISC DISEASE), CERVICAL: ICD-10-CM

## 2020-09-17 DIAGNOSIS — Z95.0 PACEMAKER: ICD-10-CM

## 2020-09-17 DIAGNOSIS — M47.812 CERVICAL SPONDYLOSIS WITHOUT MYELOPATHY: ICD-10-CM

## 2020-09-17 DIAGNOSIS — I49.5 SSS (SICK SINUS SYNDROME) (HCC): ICD-10-CM

## 2020-09-17 DIAGNOSIS — M48.02 CERVICAL SPINAL STENOSIS: ICD-10-CM

## 2020-09-17 PROCEDURE — 99441 PR PHYS/QHP TELEPHONE EVALUATION 5-10 MIN: CPT | Performed by: ANESTHESIOLOGY

## 2020-09-21 ENCOUNTER — APPOINTMENT (OUTPATIENT)
Dept: PREADMISSION TESTING | Facility: HOSPITAL | Age: 85
End: 2020-09-21

## 2020-09-21 PROCEDURE — U0004 COV-19 TEST NON-CDC HGH THRU: HCPCS

## 2020-09-21 PROCEDURE — C9803 HOPD COVID-19 SPEC COLLECT: HCPCS

## 2020-09-22 LAB — SARS-COV-2 RNA NOSE QL NAA+PROBE: NOT DETECTED

## 2020-09-23 ENCOUNTER — OUTSIDE FACILITY SERVICE (OUTPATIENT)
Dept: PAIN MEDICINE | Facility: CLINIC | Age: 85
End: 2020-09-23

## 2020-09-23 PROCEDURE — 64490 INJ PARAVERT F JNT C/T 1 LEV: CPT | Performed by: ANESTHESIOLOGY

## 2020-09-23 PROCEDURE — 99152 MOD SED SAME PHYS/QHP 5/>YRS: CPT | Performed by: ANESTHESIOLOGY

## 2020-09-23 PROCEDURE — 64491 INJ PARAVERT F JNT C/T 2 LEV: CPT | Performed by: ANESTHESIOLOGY

## 2020-09-24 ENCOUNTER — TELEPHONE (OUTPATIENT)
Dept: PAIN MEDICINE | Facility: CLINIC | Age: 85
End: 2020-09-24

## 2020-09-24 NOTE — TELEPHONE ENCOUNTER
2nd set cervical MBBs 09/23/2020.    Spoke with patient she reports she is doing well.   We will contact patient when RFTC is scheduled.

## 2020-09-28 DIAGNOSIS — M47.812 CERVICAL SPONDYLOSIS WITHOUT MYELOPATHY: Primary | ICD-10-CM

## 2020-09-29 ENCOUNTER — TELEPHONE (OUTPATIENT)
Dept: PAIN MEDICINE | Facility: CLINIC | Age: 85
End: 2020-09-29

## 2020-09-29 NOTE — TELEPHONE ENCOUNTER
Attempted to contact patient to inform her of upcoming COVID test and procedure.   She did not answer, but I left a detailed message and placed information packet in the outgoing mail.

## 2020-10-01 ENCOUNTER — LAB (OUTPATIENT)
Dept: LAB | Facility: HOSPITAL | Age: 85
End: 2020-10-01

## 2020-10-01 DIAGNOSIS — D50.0 IRON DEFICIENCY ANEMIA DUE TO CHRONIC BLOOD LOSS: ICD-10-CM

## 2020-10-01 DIAGNOSIS — K31.819 GAVE (GASTRIC ANTRAL VASCULAR ECTASIA): ICD-10-CM

## 2020-10-01 LAB
ERYTHROCYTE [DISTWIDTH] IN BLOOD BY AUTOMATED COUNT: 14.8 % (ref 12.3–15.4)
FERRITIN SERPL-MCNC: 305.7 NG/ML (ref 13–150)
HCT VFR BLD AUTO: 30.3 % (ref 34–46.6)
HGB BLD-MCNC: 10.8 G/DL (ref 12–15.9)
MCH RBC QN AUTO: 33 PG (ref 26.6–33)
MCHC RBC AUTO-ENTMCNC: 35.6 G/DL (ref 31.5–35.7)
MCV RBC AUTO: 92.7 FL (ref 79–97)
PLATELET # BLD AUTO: 242 10*3/MM3 (ref 140–450)
PMV BLD AUTO: 6.3 FL (ref 6–12)
RBC # BLD AUTO: 3.27 10*6/MM3 (ref 3.77–5.28)
WBC # BLD AUTO: 5.1 10*3/MM3 (ref 3.4–10.8)

## 2020-10-01 PROCEDURE — 36415 COLL VENOUS BLD VENIPUNCTURE: CPT

## 2020-10-01 PROCEDURE — 82728 ASSAY OF FERRITIN: CPT

## 2020-10-01 PROCEDURE — 85027 COMPLETE CBC AUTOMATED: CPT

## 2020-10-05 ENCOUNTER — OFFICE VISIT (OUTPATIENT)
Dept: CARDIOLOGY | Facility: CLINIC | Age: 85
End: 2020-10-05

## 2020-10-05 ENCOUNTER — APPOINTMENT (OUTPATIENT)
Dept: PREADMISSION TESTING | Facility: HOSPITAL | Age: 85
End: 2020-10-05

## 2020-10-05 VITALS
DIASTOLIC BLOOD PRESSURE: 76 MMHG | SYSTOLIC BLOOD PRESSURE: 170 MMHG | HEART RATE: 88 BPM | BODY MASS INDEX: 19.05 KG/M2 | WEIGHT: 128.6 LBS | OXYGEN SATURATION: 96 % | HEIGHT: 69 IN

## 2020-10-05 DIAGNOSIS — I49.5 SSS (SICK SINUS SYNDROME) (HCC): ICD-10-CM

## 2020-10-05 DIAGNOSIS — R01.0 FUNCTIONAL HEART MURMUR: ICD-10-CM

## 2020-10-05 DIAGNOSIS — Z95.0 PACEMAKER: ICD-10-CM

## 2020-10-05 DIAGNOSIS — E78.2 MIXED HYPERLIPIDEMIA: ICD-10-CM

## 2020-10-05 DIAGNOSIS — R06.09 DYSPNEA ON EXERTION: ICD-10-CM

## 2020-10-05 DIAGNOSIS — I48.0 PAROXYSMAL ATRIAL FIBRILLATION (HCC): Primary | ICD-10-CM

## 2020-10-05 DIAGNOSIS — I10 ESSENTIAL HYPERTENSION: ICD-10-CM

## 2020-10-05 LAB — SARS-COV-2 RNA RESP QL NAA+PROBE: NOT DETECTED

## 2020-10-05 PROCEDURE — U0004 COV-19 TEST NON-CDC HGH THRU: HCPCS

## 2020-10-05 PROCEDURE — C9803 HOPD COVID-19 SPEC COLLECT: HCPCS

## 2020-10-05 PROCEDURE — 99214 OFFICE O/P EST MOD 30 MIN: CPT | Performed by: INTERNAL MEDICINE

## 2020-10-05 PROCEDURE — 93280 PM DEVICE PROGR EVAL DUAL: CPT | Performed by: INTERNAL MEDICINE

## 2020-10-05 NOTE — PROGRESS NOTES
[facility] at [facility]  Office visit  Sheryl ROSAS Dalila  8/17/1931    There is no work phone number on file.    VISIT DATE:  10/5/2020      PCP: Manan Garcia MD  [address]   [facility] [address]    CC:  Chief Complaint   Patient presents with   • Atrial Fibrillation     f/u       Previous cardiac studies and procedures:  2012 diagnosed with symptomatic paroxysmal A. fib and tachybradycardia syndrome.  2012 Medtronic pacemaker  2012 cardiac catheterization: No flow limiting stenosis.  June 2018 echo  · Left ventricular systolic function is hyperdynamic (EF > 70).  · Left atrial cavity size is mildly dilated.  · Mean aortic valve gradient is 13 mmhg which is borderline mild aortic stenosis    ASSESSMENT:   Diagnosis Plan   1. Paroxysmal atrial fibrillation (CMS/HCC)     2. SSS (sick sinus syndrome) (CMS/HCC)     3. Essential hypertension     4. Functional heart murmur     5. Mixed hyperlipidemia     6. Pacemaker       Device interrogation:  Medtronic dual-chamber  0.5% atrial pacing, sensed P wave 1.4 mV, 5 V at 1 ms, chronically elevated threshold, impedance 284 ohms  100% ventricular pacing, no escape at 40, threshold 0.5 V at 0.4 ms, impedance 633 ohms  Estimated battery life 13 months  Less than 6 an episode of nonsustained VT in February 2020.    PLAN:  High-grade AV block: Currently stable and asymptomatic.  Continue routine follow-up in device clinic.  Device dependent.  Known chronic high atrial threshold.     Paroxysmal atrial fibrillation: Chads Vas equal to 4. Not a candidate for chronic anticoagulation due to upper GI bleeding and recurrent issues with symptomatic anemia. Minimal burden of arrhythmia.     Hypertension: Goal less than 140/90 mmHg.   currently labile but with overall reasonable control, evidence of whitecoat hypertension.  Previously discontinued combination of amlodipine and olmesartan and due to worsening lower extremity edema.  Will consider  "thiazide diuretic or Aldactone in the future if we need better afterload reduction.     Venous insufficiency: Continue Bumex on an as-needed basis.  Currently well controlled     Dyspnea on exertion: Likely multifactorial.  Transthoracic echo pending to reassess myocardial structure and function in the setting of chronic RV apical pacing.    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  Denies chest pain, palpitations.    Lower extremity edema is currently well controlled.  Did not tolerate Lasix due to nausea.  Home blood pressures running less than 140/90 mmHg.  No significant burden of atrial fibrillation on device interrogation today.  Is complaining of increased dyspnea on exertion and a class II pattern over the past several months.    PHYSICAL EXAMINATION:  Vitals:    10/05/20 1145   Weight: 58.3 kg (128 lb 9.6 oz)   Height: 175.3 cm (69\")     General Appearance:    Alert, cooperative, no distress, appears stated age   Head:    Normocephalic, without obvious abnormality, atraumatic   Eyes:    conjunctiva/corneas clear   Nose:   Nares normal, septum midline, mucosa normal, no drainage   Throat:   Lips, teeth and gums normal   Neck:   Supple, symmetrical, trachea midline, no carotid    bruit or JVD   Lungs:     Clear to auscultation bilaterally, respirations unlabored   Chest Wall:    No tenderness or deformity    Heart:    Regular rate and rhythm, S1 and S2 normal, 1/6 early peaking systolic murmur right upper sternal border, rub   or gallop, normal carotid impulse bilaterally without bruit.   Abdomen:     Soft, non-tender   Extremities:   Extremities normal, atraumatic, no cyanosis or edema   Pulses:   2+ and symmetric all extremities   Skin:   Skin color, texture, turgor normal, no rashes or lesions "       Diagnostic Data:  Procedures  No results found for: CHLPL, TRIG, HDL, LDLDIRECT  Lab Results   Component Value Date    GLUCOSE 93 10/16/2019    BUN 14 10/16/2019    CREATININE 0.98 10/16/2019     10/16/2019    K 4.5 10/16/2019    CL 99 10/16/2019    CO2 27.4 10/16/2019     No results found for: HGBA1C  Lab Results   Component Value Date    WBC 5.10 10/01/2020    HGB 10.8 (L) 10/01/2020    HCT 30.3 (L) 10/01/2020     10/01/2020       Allergies  Allergies   Allergen Reactions   • Codeine Sulfate Unknown (See Comments)     Pt took in the 70's-pt not sure of what happened   • Cozaar [Losartan] Unknown (See Comments)     Pt can not remember   • Crestor [Rosuvastatin] Unknown (See Comments)     Pt can not remember   • Dexlansoprazole Unknown (See Comments)     Pt can not remember   • Lipitor [Atorvastatin] Unknown (See Comments)     Pt can not remember   • Lisinopril Unknown (See Comments)     Pt can not remember   • Nexium [Esomeprazole Magnesium] Unknown (See Comments)     Pt can not remember   • Norvasc [Amlodipine] Unknown (See Comments)     Pt can not remember   • Sulfadiazine Unknown (See Comments)     Pt can not remember   • Toprol Xl [Metoprolol Tartrate] Unknown (See Comments)     Pt can not remember   • Zetia [Ezetimibe] Unknown (See Comments)     Pt can not remember   • Zocor [Simvastatin] Unknown (See Comments)     Pt can not remember   • Augmentin [Amoxicillin-Pot Clavulanate] GI Intolerance     nausea   • Celebrex [Celecoxib] GI Intolerance       Current Medications    Current Outpatient Medications:   •  amitriptyline (ELAVIL) 25 MG tablet, Take 1 tablet by mouth Every Night., Disp: 30 tablet, Rfl: 11  •  bumetanide (BUMEX) 0.5 MG tablet, TAKE 1 TABLET BY MOUTH EVERY OTHER DAY AS NEEDED FOR SWELLING, Disp: 30 tablet, Rfl: 2  •  cetirizine (ZyrTEC) 10 MG tablet, Take 10 mg by mouth daily., Disp: , Rfl:   •  ciprofloxacin (CIPRO) 500 MG tablet, Take 1 tablet by mouth 2 (Two) Times a Day.,  Disp: 20 tablet, Rfl: 0  •  cyclobenzaprine (FLEXERIL) 10 MG tablet, Take 1 tablet by mouth 3 (Three) Times a Day As Needed for Muscle Spasms., Disp: 20 tablet, Rfl: 0  •  diclofenac (Voltaren) 1 % gel gel, Apply 4 g topically to the appropriate area as directed 4 (Four) Times a Day., Disp: 4 tube, Rfl: 11  •  fentaNYL (DURAGESIC) 12 MCG/HR, APPLY 1 PATCH TOPICALLY TO SKIN Q 72 H, Disp: , Rfl:   •  fentaNYL (DURAGESIC) 50 MCG/HR patch, APPLY 1 PATCH TOPICALLY Q 72 H, Disp: , Rfl:   •  fentaNYL (DURAGESIC) 75 MCG/HR patch, Place 1 patch on the skin as directed by provider Every 72 (Seventy-Two) Hours., Disp: , Rfl:   •  fluticasone (FLONASE) 50 MCG/ACT nasal spray, fluticasone propionate 50 mcg/actuation nasal spray,suspension  2 sprays each nostril daily, Disp: , Rfl:   •  gabapentin (NEURONTIN) 600 MG tablet, Take 600 mg by mouth 3 (Three) Times a Day., Disp: , Rfl: 1  •  HYDROcodone-acetaminophen (NORCO)  MG per tablet, Take 1 tablet by mouth every 6 (six) hours as needed for moderate pain (4-6)., Disp: , Rfl:   •  oxybutynin XL (DITROPAN-XL) 10 MG 24 hr tablet, Take 10 mg by mouth As Needed., Disp: , Rfl:   •  pantoprazole (PROTONIX) 40 MG EC tablet, Take 1 tablet by mouth 2 (Two) Times a Day., Disp: 60 tablet, Rfl: 11  •  potassium chloride (K-DUR) 10 MEQ CR tablet, Take 1 tablet by mouth Daily., Disp: 30 tablet, Rfl: 11  •  promethazine (PHENERGAN) 25 MG tablet, Take 1 tablet by mouth Every 6 (Six) Hours As Needed for Nausea or Vomiting., Disp: 30 tablet, Rfl: 5          ROS  Review of Systems   Cardiovascular: Negative for chest pain, dyspnea on exertion, irregular heartbeat and palpitations.   Respiratory: Negative for cough and snoring.        SOCIAL HX  Social History     Socioeconomic History   • Marital status:      Spouse name: Not on file   • Number of children: Not on file   • Years of education: Not on file   • Highest education level: Not on file   Tobacco Use   • Smoking status: Never  Smoker   • Smokeless tobacco: Never Used   Substance and Sexual Activity   • Alcohol use: No   • Drug use: No   • Sexual activity: Defer   Social History Narrative    Caffeine: 1 serving per day. Pt lives at home alone.       FAMILY HX  Family History   Problem Relation Age of Onset   • No Known Problems Mother    • Diabetes Father    • Cancer Son              Andi Seals III, MD, FACC

## 2020-10-06 DIAGNOSIS — I10 ESSENTIAL HYPERTENSION: ICD-10-CM

## 2020-10-06 DIAGNOSIS — K31.819 GAVE (GASTRIC ANTRAL VASCULAR ECTASIA): ICD-10-CM

## 2020-10-06 RX ORDER — POTASSIUM CHLORIDE 750 MG/1
TABLET, FILM COATED, EXTENDED RELEASE ORAL
Qty: 30 TABLET | Refills: 11 | Status: SHIPPED | OUTPATIENT
Start: 2020-10-06 | End: 2021-09-22

## 2020-10-06 RX ORDER — PANTOPRAZOLE SODIUM 40 MG/1
TABLET, DELAYED RELEASE ORAL
Qty: 60 TABLET | Refills: 11 | Status: SHIPPED | OUTPATIENT
Start: 2020-10-06 | End: 2021-09-22

## 2020-10-07 ENCOUNTER — OUTSIDE FACILITY SERVICE (OUTPATIENT)
Dept: PAIN MEDICINE | Facility: CLINIC | Age: 85
End: 2020-10-07

## 2020-10-07 PROCEDURE — 64633 DESTROY CERV/THOR FACET JNT: CPT | Performed by: ANESTHESIOLOGY

## 2020-10-07 PROCEDURE — 99152 MOD SED SAME PHYS/QHP 5/>YRS: CPT | Performed by: ANESTHESIOLOGY

## 2020-10-07 PROCEDURE — 64634 DESTROY C/TH FACET JNT ADDL: CPT | Performed by: ANESTHESIOLOGY

## 2020-10-08 ENCOUNTER — TELEPHONE (OUTPATIENT)
Dept: PAIN MEDICINE | Facility: CLINIC | Age: 85
End: 2020-10-08

## 2020-10-08 NOTE — TELEPHONE ENCOUNTER
Left voicemail for pt to call with any update from procedure.and 30 week follow up, also to be mailed.

## 2020-10-28 ENCOUNTER — CLINICAL SUPPORT (OUTPATIENT)
Dept: FAMILY MEDICINE CLINIC | Facility: CLINIC | Age: 85
End: 2020-10-28

## 2020-10-28 DIAGNOSIS — Z23 NEED FOR INFLUENZA VACCINATION: ICD-10-CM

## 2020-11-03 ENCOUNTER — TELEPHONE (OUTPATIENT)
Dept: PAIN MEDICINE | Facility: CLINIC | Age: 85
End: 2020-11-03

## 2020-11-03 NOTE — TELEPHONE ENCOUNTER
Patient called c/o pain after procedure not improved dr hogan was notified.  Attempted to contact patient again today to pass on Dr Hogan's message. No answer at this time and no means of leaving a message.

## 2020-11-04 ENCOUNTER — APPOINTMENT (OUTPATIENT)
Dept: CARDIOLOGY | Facility: HOSPITAL | Age: 85
End: 2020-11-04

## 2020-11-10 ENCOUNTER — LAB (OUTPATIENT)
Dept: LAB | Facility: HOSPITAL | Age: 85
End: 2020-11-10

## 2020-11-10 DIAGNOSIS — D50.0 IRON DEFICIENCY ANEMIA DUE TO CHRONIC BLOOD LOSS: ICD-10-CM

## 2020-11-10 DIAGNOSIS — K31.819 GAVE (GASTRIC ANTRAL VASCULAR ECTASIA): ICD-10-CM

## 2020-11-10 LAB
ERYTHROCYTE [DISTWIDTH] IN BLOOD BY AUTOMATED COUNT: 13.5 % (ref 12.3–15.4)
FERRITIN SERPL-MCNC: 65.06 NG/ML (ref 13–150)
HCT VFR BLD AUTO: 31.2 % (ref 34–46.6)
HGB BLD-MCNC: 10 G/DL (ref 12–15.9)
MCH RBC QN AUTO: 29.8 PG (ref 26.6–33)
MCHC RBC AUTO-ENTMCNC: 32.1 G/DL (ref 31.5–35.7)
MCV RBC AUTO: 92.9 FL (ref 79–97)
PLATELET # BLD AUTO: 299 10*3/MM3 (ref 140–450)
PMV BLD AUTO: 6.3 FL (ref 6–12)
RBC # BLD AUTO: 3.36 10*6/MM3 (ref 3.77–5.28)
WBC # BLD AUTO: 5 10*3/MM3 (ref 3.4–10.8)

## 2020-11-10 PROCEDURE — 82728 ASSAY OF FERRITIN: CPT

## 2020-11-10 PROCEDURE — 36415 COLL VENOUS BLD VENIPUNCTURE: CPT

## 2020-11-10 PROCEDURE — 85027 COMPLETE CBC AUTOMATED: CPT

## 2020-11-11 ENCOUNTER — TELEPHONE (OUTPATIENT)
Dept: ONCOLOGY | Facility: CLINIC | Age: 85
End: 2020-11-11

## 2020-11-11 NOTE — TELEPHONE ENCOUNTER
CALLED PATIENT TO LET HER KNOW THE DATES OF HER IV IRON. GAVE PATIENT INFORMATION AND CALL BACK NUMBER IF SHE HAD ANY QUESTIONS

## 2020-11-16 DIAGNOSIS — K90.9 MALABSORPTION IN THE ELDERLY: ICD-10-CM

## 2020-11-16 DIAGNOSIS — D50.0 IRON DEFICIENCY ANEMIA DUE TO CHRONIC BLOOD LOSS: ICD-10-CM

## 2020-11-16 DIAGNOSIS — K31.819 GAVE (GASTRIC ANTRAL VASCULAR ECTASIA): Primary | ICD-10-CM

## 2020-11-16 DIAGNOSIS — Z78.9 MEDICATION INTOLERANCE: ICD-10-CM

## 2020-11-16 RX ORDER — SODIUM CHLORIDE 9 MG/ML
250 INJECTION, SOLUTION INTRAVENOUS ONCE
Status: CANCELLED | OUTPATIENT
Start: 2020-11-24

## 2020-11-16 RX ORDER — SODIUM CHLORIDE 9 MG/ML
250 INJECTION, SOLUTION INTRAVENOUS ONCE
Status: CANCELLED | OUTPATIENT
Start: 2020-11-17

## 2020-11-17 ENCOUNTER — HOSPITAL ENCOUNTER (OUTPATIENT)
Dept: ONCOLOGY | Facility: HOSPITAL | Age: 85
Setting detail: INFUSION SERIES
Discharge: HOME | End: 2020-11-17

## 2020-11-17 VITALS
DIASTOLIC BLOOD PRESSURE: 39 MMHG | TEMPERATURE: 97 F | SYSTOLIC BLOOD PRESSURE: 114 MMHG | RESPIRATION RATE: 16 BRPM | BODY MASS INDEX: 22.32 KG/M2 | HEIGHT: 63 IN | HEART RATE: 71 BPM | WEIGHT: 126 LBS

## 2020-11-17 DIAGNOSIS — D50.0 IRON DEFICIENCY ANEMIA DUE TO CHRONIC BLOOD LOSS: ICD-10-CM

## 2020-11-17 DIAGNOSIS — K90.9 MALABSORPTION IN THE ELDERLY: ICD-10-CM

## 2020-11-17 DIAGNOSIS — K31.819 GAVE (GASTRIC ANTRAL VASCULAR ECTASIA): Primary | ICD-10-CM

## 2020-11-17 DIAGNOSIS — Z78.9 MEDICATION INTOLERANCE: ICD-10-CM

## 2020-11-17 PROCEDURE — 96374 THER/PROPH/DIAG INJ IV PUSH: CPT

## 2020-11-17 PROCEDURE — 25010000002 FERRIC CARBOXYMALTOSE 750 MG/15ML SOLUTION 15 ML VIAL: Performed by: INTERNAL MEDICINE

## 2020-11-17 RX ORDER — SODIUM CHLORIDE 9 MG/ML
250 INJECTION, SOLUTION INTRAVENOUS ONCE
Status: COMPLETED | OUTPATIENT
Start: 2020-11-17 | End: 2020-11-17

## 2020-11-17 RX ADMIN — FERRIC CARBOXYMALTOSE INJECTION 750 MG: 50 INJECTION, SOLUTION INTRAVENOUS at 13:31

## 2020-11-17 RX ADMIN — SODIUM CHLORIDE 250 ML: 9 INJECTION, SOLUTION INTRAVENOUS at 13:30

## 2020-11-18 ENCOUNTER — TELEPHONE (OUTPATIENT)
Dept: CARDIOLOGY | Facility: CLINIC | Age: 85
End: 2020-11-18

## 2020-11-18 DIAGNOSIS — I50.9 ACUTE CONGESTIVE HEART FAILURE, UNSPECIFIED HEART FAILURE TYPE (HCC): ICD-10-CM

## 2020-11-18 NOTE — TELEPHONE ENCOUNTER
Pt returned our call and the monitor she is using is old and we ordered her a new one. I instructed her to plug the new monitor up and let it charge for 24 hours and then call us for assistance in sending first transmission.

## 2020-11-19 RX ORDER — BUMETANIDE 0.5 MG/1
TABLET ORAL
Qty: 90 TABLET | Refills: 2 | Status: SHIPPED | OUTPATIENT
Start: 2020-11-19 | End: 2022-01-01 | Stop reason: SDUPTHER

## 2020-11-24 ENCOUNTER — HOSPITAL ENCOUNTER (OUTPATIENT)
Dept: ONCOLOGY | Facility: HOSPITAL | Age: 85
Setting detail: INFUSION SERIES
Discharge: HOME | End: 2020-11-24

## 2020-11-24 VITALS
BODY MASS INDEX: 21.97 KG/M2 | HEIGHT: 63 IN | SYSTOLIC BLOOD PRESSURE: 144 MMHG | DIASTOLIC BLOOD PRESSURE: 44 MMHG | TEMPERATURE: 97.6 F | RESPIRATION RATE: 18 BRPM | HEART RATE: 69 BPM | WEIGHT: 124 LBS

## 2020-11-24 DIAGNOSIS — K90.9 MALABSORPTION IN THE ELDERLY: ICD-10-CM

## 2020-11-24 DIAGNOSIS — D50.0 IRON DEFICIENCY ANEMIA DUE TO CHRONIC BLOOD LOSS: ICD-10-CM

## 2020-11-24 DIAGNOSIS — K31.819 GAVE (GASTRIC ANTRAL VASCULAR ECTASIA): Primary | ICD-10-CM

## 2020-11-24 DIAGNOSIS — Z78.9 MEDICATION INTOLERANCE: ICD-10-CM

## 2020-11-24 LAB
ERYTHROCYTE [DISTWIDTH] IN BLOOD BY AUTOMATED COUNT: 14.8 % (ref 12.3–15.4)
FERRITIN SERPL-MCNC: 435.2 NG/ML (ref 13–150)
HCT VFR BLD AUTO: 29.2 % (ref 34–46.6)
HGB BLD-MCNC: 9.4 G/DL (ref 12–15.9)
MCH RBC QN AUTO: 29.8 PG (ref 26.6–33)
MCHC RBC AUTO-ENTMCNC: 32.3 G/DL (ref 31.5–35.7)
MCV RBC AUTO: 92.2 FL (ref 79–97)
PLATELET # BLD AUTO: 302 10*3/MM3 (ref 140–450)
PMV BLD AUTO: 6.7 FL (ref 6–12)
RBC # BLD AUTO: 3.16 10*6/MM3 (ref 3.77–5.28)
WBC # BLD AUTO: 5.6 10*3/MM3 (ref 3.4–10.8)

## 2020-11-24 PROCEDURE — 85027 COMPLETE CBC AUTOMATED: CPT | Performed by: INTERNAL MEDICINE

## 2020-11-24 PROCEDURE — 25010000002 FERRIC CARBOXYMALTOSE 750 MG/15ML SOLUTION 15 ML VIAL: Performed by: INTERNAL MEDICINE

## 2020-11-24 PROCEDURE — 82728 ASSAY OF FERRITIN: CPT | Performed by: INTERNAL MEDICINE

## 2020-11-24 PROCEDURE — 96374 THER/PROPH/DIAG INJ IV PUSH: CPT

## 2020-11-24 PROCEDURE — 96375 TX/PRO/DX INJ NEW DRUG ADDON: CPT

## 2020-11-24 RX ORDER — SODIUM CHLORIDE 9 MG/ML
250 INJECTION, SOLUTION INTRAVENOUS ONCE
Status: COMPLETED | OUTPATIENT
Start: 2020-11-24 | End: 2020-11-24

## 2020-11-24 RX ADMIN — FERRIC CARBOXYMALTOSE INJECTION 750 MG: 50 INJECTION, SOLUTION INTRAVENOUS at 13:45

## 2020-11-24 RX ADMIN — SODIUM CHLORIDE 250 ML: 9 INJECTION, SOLUTION INTRAVENOUS at 13:26

## 2020-12-03 ENCOUNTER — HOSPITAL ENCOUNTER (OUTPATIENT)
Dept: CARDIOLOGY | Facility: HOSPITAL | Age: 85
Discharge: HOME | End: 2020-12-03
Admitting: INTERNAL MEDICINE

## 2020-12-03 VITALS — HEIGHT: 63 IN | BODY MASS INDEX: 21.97 KG/M2 | WEIGHT: 124 LBS

## 2020-12-03 DIAGNOSIS — R06.09 DYSPNEA ON EXERTION: ICD-10-CM

## 2020-12-03 PROCEDURE — 93306 TTE W/DOPPLER COMPLETE: CPT | Performed by: INTERNAL MEDICINE

## 2020-12-03 PROCEDURE — 93306 TTE W/DOPPLER COMPLETE: CPT

## 2020-12-07 ENCOUNTER — TELEPHONE (OUTPATIENT)
Dept: CARDIOLOGY | Facility: CLINIC | Age: 85
End: 2020-12-07

## 2020-12-07 DIAGNOSIS — I50.9 ACUTE CONGESTIVE HEART FAILURE, UNSPECIFIED HEART FAILURE TYPE (HCC): ICD-10-CM

## 2020-12-07 DIAGNOSIS — R06.09 DYSPNEA ON EXERTION: Primary | ICD-10-CM

## 2020-12-07 LAB
ASCENDING AORTA: 3.4 CM
AV VENA CONTRACTA: 0.4 CM
BH CV ECHO MEAS - AI DEC SLOPE: 302.3 CM/SEC^2
BH CV ECHO MEAS - AI MAX PG: 66.8 MMHG
BH CV ECHO MEAS - AI MAX VEL: 408.7 CM/SEC
BH CV ECHO MEAS - AI P1/2T: 395.9 MSEC
BH CV ECHO MEAS - AO MAX PG (FULL): 9.9 MMHG
BH CV ECHO MEAS - AO MAX PG: 18 MMHG
BH CV ECHO MEAS - AO MEAN PG (FULL): 4.7 MMHG
BH CV ECHO MEAS - AO MEAN PG: 8.7 MMHG
BH CV ECHO MEAS - AO ROOT AREA (BSA CORRECTED): 1.9
BH CV ECHO MEAS - AO ROOT AREA: 7.1 CM^2
BH CV ECHO MEAS - AO ROOT DIAM: 3 CM
BH CV ECHO MEAS - AO V2 MAX: 234 CM/SEC
BH CV ECHO MEAS - AO V2 MEAN: 136.3 CM/SEC
BH CV ECHO MEAS - AO V2 VTI: 41.7 CM
BH CV ECHO MEAS - ASC AORTA: 3.4 CM
BH CV ECHO MEAS - AVA(I,A): 2.5 CM^2
BH CV ECHO MEAS - AVA(I,D): 2.5 CM^2
BH CV ECHO MEAS - AVA(V,A): 2.1 CM^2
BH CV ECHO MEAS - AVA(V,D): 2.1 CM^2
BH CV ECHO MEAS - BSA(HAYCOCK): 1.6 M^2
BH CV ECHO MEAS - BSA: 1.6 M^2
BH CV ECHO MEAS - BZI_BMI: 22 KILOGRAMS/M^2
BH CV ECHO MEAS - BZI_METRIC_HEIGHT: 160 CM
BH CV ECHO MEAS - BZI_METRIC_WEIGHT: 56.2 KG
BH CV ECHO MEAS - EDV(CUBED): 45.5 ML
BH CV ECHO MEAS - EDV(MOD-SP2): 62 ML
BH CV ECHO MEAS - EDV(MOD-SP4): 54 ML
BH CV ECHO MEAS - EDV(TEICH): 53.3 ML
BH CV ECHO MEAS - EF(CUBED): 76 %
BH CV ECHO MEAS - EF(MOD-BP): 62 %
BH CV ECHO MEAS - EF(MOD-SP2): 67.7 %
BH CV ECHO MEAS - EF(MOD-SP4): 50 %
BH CV ECHO MEAS - EF(TEICH): 68.9 %
BH CV ECHO MEAS - ESV(CUBED): 10.9 ML
BH CV ECHO MEAS - ESV(MOD-SP2): 20 ML
BH CV ECHO MEAS - ESV(MOD-SP4): 27 ML
BH CV ECHO MEAS - ESV(TEICH): 16.6 ML
BH CV ECHO MEAS - FS: 37.8 %
BH CV ECHO MEAS - IVS/LVPW: 1.1
BH CV ECHO MEAS - IVSD: 1.3 CM
BH CV ECHO MEAS - LA DIMENSION: 4 CM
BH CV ECHO MEAS - LA/AO: 1.3
BH CV ECHO MEAS - LAD MAJOR: 6.4 CM
BH CV ECHO MEAS - LAT PEAK E' VEL: 10.5 CM/SEC
BH CV ECHO MEAS - LATERAL E/E' RATIO: 6.4
BH CV ECHO MEAS - LV DIASTOLIC VOL/BSA (35-75): 34.2 ML/M^2
BH CV ECHO MEAS - LV IVRT: 0.09 SEC
BH CV ECHO MEAS - LV MASS(C)D: 149.7 GRAMS
BH CV ECHO MEAS - LV MASS(C)DI: 94.9 GRAMS/M^2
BH CV ECHO MEAS - LV MAX PG: 8.1 MMHG
BH CV ECHO MEAS - LV MEAN PG: 4 MMHG
BH CV ECHO MEAS - LV SYSTOLIC VOL/BSA (12-30): 17.1 ML/M^2
BH CV ECHO MEAS - LV V1 MAX: 142 CM/SEC
BH CV ECHO MEAS - LV V1 MEAN: 94.6 CM/SEC
BH CV ECHO MEAS - LV V1 VTI: 30 CM
BH CV ECHO MEAS - LVIDD: 3.6 CM
BH CV ECHO MEAS - LVIDS: 2.2 CM
BH CV ECHO MEAS - LVLD AP2: 7.8 CM
BH CV ECHO MEAS - LVLD AP4: 7.3 CM
BH CV ECHO MEAS - LVLS AP2: 7.2 CM
BH CV ECHO MEAS - LVLS AP4: 7.1 CM
BH CV ECHO MEAS - LVOT AREA (M): 3.5 CM^2
BH CV ECHO MEAS - LVOT AREA: 3.5 CM^2
BH CV ECHO MEAS - LVOT DIAM: 2.1 CM
BH CV ECHO MEAS - LVPWD: 1.2 CM
BH CV ECHO MEAS - MED PEAK E' VEL: 6.4 CM/SEC
BH CV ECHO MEAS - MEDIAL E/E' RATIO: 10.6
BH CV ECHO MEAS - MV A MAX VEL: 78.7 CM/SEC
BH CV ECHO MEAS - MV DEC SLOPE: 487 CM/SEC^2
BH CV ECHO MEAS - MV DEC TIME: 0.25 SEC
BH CV ECHO MEAS - MV E MAX VEL: 67.3 CM/SEC
BH CV ECHO MEAS - MV E/A: 0.86
BH CV ECHO MEAS - MV MAX PG: 5.3 MMHG
BH CV ECHO MEAS - MV MEAN PG: 2 MMHG
BH CV ECHO MEAS - MV P1/2T MAX VEL: 118 CM/SEC
BH CV ECHO MEAS - MV P1/2T: 71 MSEC
BH CV ECHO MEAS - MV V2 MAX: 115 CM/SEC
BH CV ECHO MEAS - MV V2 MEAN: 71.2 CM/SEC
BH CV ECHO MEAS - MV V2 VTI: 29.5 CM
BH CV ECHO MEAS - MVA P1/2T LCG: 1.9 CM^2
BH CV ECHO MEAS - MVA(P1/2T): 3.1 CM^2
BH CV ECHO MEAS - MVA(VTI): 3.5 CM^2
BH CV ECHO MEAS - PA ACC SLOPE: 818 CM/SEC^2
BH CV ECHO MEAS - PA ACC TIME: 0.1 SEC
BH CV ECHO MEAS - PA MAX PG: 4.9 MMHG
BH CV ECHO MEAS - PA PR(ACCEL): 34.9 MMHG
BH CV ECHO MEAS - PA V2 MAX: 111 CM/SEC
BH CV ECHO MEAS - RAP SYSTOLE: 3 MMHG
BH CV ECHO MEAS - RVSP: 36.2 MMHG
BH CV ECHO MEAS - SI(AO): 186.8 ML/M^2
BH CV ECHO MEAS - SI(CUBED): 21.9 ML/M^2
BH CV ECHO MEAS - SI(LVOT): 65.8 ML/M^2
BH CV ECHO MEAS - SI(MOD-SP2): 26.6 ML/M^2
BH CV ECHO MEAS - SI(MOD-SP4): 17.1 ML/M^2
BH CV ECHO MEAS - SI(TEICH): 23.3 ML/M^2
BH CV ECHO MEAS - SV(AO): 294.8 ML
BH CV ECHO MEAS - SV(CUBED): 34.6 ML
BH CV ECHO MEAS - SV(LVOT): 103.9 ML
BH CV ECHO MEAS - SV(MOD-SP2): 42 ML
BH CV ECHO MEAS - SV(MOD-SP4): 27 ML
BH CV ECHO MEAS - SV(TEICH): 36.8 ML
BH CV ECHO MEAS - TAPSE (>1.6): 2.1 CM
BH CV ECHO MEAS - TR MAX PG: 33.2 MMHG
BH CV ECHO MEAS - TR MAX VEL: 288 CM/SEC
BH CV ECHO MEASUREMENTS AVERAGE E/E' RATIO: 7.96
BH CV VAS BP RIGHT ARM: NORMAL MMHG
BH CV XLRA - RV BASE: 4.5 CM
BH CV XLRA - RV LENGTH: 6.5 CM
BH CV XLRA - RV MID: 3.4 CM
BH CV XLRA - TDI S': 12.5 CM/SEC
IVRT: 92 MSEC
LEFT ATRIUM VOLUME INDEX: 55.8 ML/M^2
LEFT ATRIUM VOLUME: 88 ML
MV VENA CONTRACTA: 0.4 CM

## 2020-12-07 NOTE — TELEPHONE ENCOUNTER
Notified of message above from . Verbalized understanding.No fever/chills or night sweats reported. To get blood cultures this Wednesday. Message sent to  for f/u appt and echo in 4 weeks.

## 2020-12-11 ENCOUNTER — OFFICE VISIT (OUTPATIENT)
Dept: ONCOLOGY | Facility: CLINIC | Age: 85
End: 2020-12-11

## 2020-12-11 ENCOUNTER — LAB (OUTPATIENT)
Dept: LAB | Facility: HOSPITAL | Age: 85
End: 2020-12-11

## 2020-12-11 VITALS
HEART RATE: 96 BPM | OXYGEN SATURATION: 92 % | DIASTOLIC BLOOD PRESSURE: 73 MMHG | RESPIRATION RATE: 20 BRPM | TEMPERATURE: 97.1 F | WEIGHT: 124.6 LBS | BODY MASS INDEX: 22.07 KG/M2 | SYSTOLIC BLOOD PRESSURE: 196 MMHG

## 2020-12-11 DIAGNOSIS — I50.9 ACUTE CONGESTIVE HEART FAILURE, UNSPECIFIED HEART FAILURE TYPE (HCC): ICD-10-CM

## 2020-12-11 DIAGNOSIS — D50.0 IRON DEFICIENCY ANEMIA DUE TO CHRONIC BLOOD LOSS: ICD-10-CM

## 2020-12-11 DIAGNOSIS — R06.09 DYSPNEA ON EXERTION: ICD-10-CM

## 2020-12-11 DIAGNOSIS — D50.0 IRON DEFICIENCY ANEMIA DUE TO CHRONIC BLOOD LOSS: Primary | ICD-10-CM

## 2020-12-11 LAB
ERYTHROCYTE [DISTWIDTH] IN BLOOD BY AUTOMATED COUNT: 16.4 % (ref 12.3–15.4)
FERRITIN SERPL-MCNC: 397.5 NG/ML (ref 13–150)
HCT VFR BLD AUTO: 33.7 % (ref 34–46.6)
HGB BLD-MCNC: 10.5 G/DL (ref 12–15.9)
LYMPHOCYTES # BLD AUTO: 0.7 10*3/MM3 (ref 0.7–3.1)
LYMPHOCYTES NFR BLD AUTO: 14.1 % (ref 19.6–45.3)
MCH RBC QN AUTO: 29.9 PG (ref 26.6–33)
MCHC RBC AUTO-ENTMCNC: 31.1 G/DL (ref 31.5–35.7)
MCV RBC AUTO: 96.1 FL (ref 79–97)
MONOCYTES # BLD AUTO: 0.1 10*3/MM3 (ref 0.1–0.9)
MONOCYTES NFR BLD AUTO: 1.9 % (ref 5–12)
NEUTROPHILS NFR BLD AUTO: 3.9 10*3/MM3 (ref 1.7–7)
NEUTROPHILS NFR BLD AUTO: 84 % (ref 42.7–76)
PLATELET # BLD AUTO: 253 10*3/MM3 (ref 140–450)
PMV BLD AUTO: 6.8 FL (ref 6–12)
RBC # BLD AUTO: 3.51 10*6/MM3 (ref 3.77–5.28)
WBC # BLD AUTO: 4.7 10*3/MM3 (ref 3.4–10.8)

## 2020-12-11 PROCEDURE — 85025 COMPLETE CBC W/AUTO DIFF WBC: CPT

## 2020-12-11 PROCEDURE — 87040 BLOOD CULTURE FOR BACTERIA: CPT

## 2020-12-11 PROCEDURE — 82728 ASSAY OF FERRITIN: CPT

## 2020-12-11 PROCEDURE — 99214 OFFICE O/P EST MOD 30 MIN: CPT | Performed by: INTERNAL MEDICINE

## 2020-12-11 PROCEDURE — 36415 COLL VENOUS BLD VENIPUNCTURE: CPT

## 2020-12-11 NOTE — PROGRESS NOTES
"PROBLEM LIST:  1. 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 Status post EGD colonoscopy  3. Status post Iv iron  infusion periodically  4. Bone Marrow biopsy  - normal - done by Dr. Reeves  5. 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  lady with normocytic anemia related to chronic blood loss.  She requires periodic iron infusions.  She has not required any transfusion support.  Last blood transfusion on 10/3/2019.     Denies any active bleeding.  Fatigue seems to be tolerating well.  Her biggest concern is her neck pain.  Which is related to significant DJD.    Past medical history, social history and family history was reviewed and unchanged from prior visit.    Review of Systems:    Review of Systems   Constitutional: Positive for fatigue.   HENT: Negative.    Eyes: Negative.    Respiratory: Positive for shortness of breath.    Cardiovascular: Negative.    Gastrointestinal: Negative.    Endocrine: Negative.    Genitourinary: Negative.    Musculoskeletal: Positive for back pain and neck pain.   Skin: Negative.    Allergic/Immunologic: Negative.    Neurological: Negative.    Hematological: Negative.    Psychiatric/Behavioral: Negative.       A comprehensive 14 point review of systems was performed and was negative except as mentioned.      Medications:  The current medication list was reviewed in the EMR    ALLERGIES:    Allergies   Allergen Reactions   • Codeine Sulfate Unknown (See Comments)     Pt took in the 70's-pt not sure of what happened   • Cozaar [Losartan] Unknown (See Comments)     Pt can not remember   • Crestor [Rosuvastatin] Unknown (See Comments)     Pt can not remember   • Dexlansoprazole Unknown (See Comments)     Pt can not remember   • Lipitor [Atorvastatin] Unknown (See Comments)     Pt can not remember   • Lisinopril Unknown (See Comments)     Pt can not remember   • Nexium " [Esomeprazole Magnesium] Unknown (See Comments)     Pt can not remember   • Norvasc [Amlodipine] Unknown (See Comments)     Pt can not remember   • Sulfadiazine Unknown (See Comments)     Pt can not remember   • Toprol Xl [Metoprolol Tartrate] Unknown (See Comments)     Pt can not remember   • Zetia [Ezetimibe] Unknown (See Comments)     Pt can not remember   • Zocor [Simvastatin] Unknown (See Comments)     Pt can not remember   • Augmentin [Amoxicillin-Pot Clavulanate] GI Intolerance     nausea   • Celebrex [Celecoxib] GI Intolerance         Physical Exam    VITAL SIGNS:  BP (!) 196/73   Pulse 96   Temp 97.1 °F (36.2 °C) (Skin)   Resp 20   Wt 56.5 kg (124 lb 9.6 oz)   SpO2 92%   BMI 22.07 kg/m²      Performance Status: 1    General: well appearing, in no acute distress  HEENT: sclera anicteric, oropharynx clear, neck is supple  Lymphatics: no cervical, supraclavicular, or axillary adenopathy  Cardiovascular: regular rate and rhythm, no murmurs, rubs or gallops  Lungs: clear to auscultation bilaterally  Abdomen: soft, nontender, nondistended.  No palpable organomegaly  Extremities: no lower extremity edema  Skin: no rashes, lesions, bruising, or petechiae  Msk:  Shows no weakness of the large muscle groups  Psych: Mood is stable        RECENT LABS:    Lab Results   Component Value Date    WBC 4.70 12/11/2020    HGB 10.5 (L) 12/11/2020    HCT 33.7 (L) 12/11/2020    MCV 96.1 12/11/2020     12/11/2020     Lab Results   Component Value Date    FERRITIN 435.20 (H) 11/24/2020    FERRITIN 65.06 11/10/2020    FERRITIN 305.70 (H) 10/01/2020     Lab Results   Component Value Date    HGB 10.5 (L) 12/11/2020    HGB 9.4 (L) 11/24/2020    HGB 10.0 (L) 11/10/2020    HGB 10.8 (L) 10/01/2020    HGB 11.3 (L) 08/14/2020    HGB 12.1 07/10/2020    HGB 11.1 (L) 06/18/2020    HGB 10.4 (L) 05/28/2020    HGB 11.8 (L) 05/08/2020    HGB 12.3 04/15/2020     Ct Cervical Spine Without Contrast    Result Date: 5/6/2020  1. Advanced  multilevel degenerative disc disease, with probably moderate canal stenosis at C3-4 and C4-5 due to prominent posterior vertebral osteophytes, and some posterior element hypertrophy. 2. Milder degenerative changes, and bony foraminal stenosis elsewhere as discussed above by disc level. No evidence of acute or healing trauma.       Ct Thoracic Spine Without Contrast    Result Date: 5/6/2020  1. Advanced multilevel degenerative disc disease, with probably moderate canal stenosis at C3-4 and C4-5 due to prominent posterior vertebral osteophytes, and some posterior element hypertrophy. 2. Milder degenerative changes, and bony foraminal stenosis elsewhere as discussed above by disc level. No evidence of acute or healing trauma.  CT SCAN OF THE THORACIC SPINE: There is exaggeration of the normal thoracic lordosis. No significant focal subluxation or evidence of vertebral compression deformity is seen. There is moderate multilevel degenerative disc disease. No destructive or blastic bony lesions are seen. Coronal images show a minimal dextroconvex scoliosis. Axial bone window images show no evidence of acute bony trauma. Soft tissue axial images of the thoracic spine show the usual limited detail of the canal for non-myelographic scans. The thoracic canal appears congenitally rather ample. No gross evidence of HNP or canal stenosis is appreciated down to the level of T9-T10 where there is a prominent posterior vertebral osteophyte but only borderline canal narrowing. Below this level, the canal appears unremarkable. No pathologic paraspinous mass or inflammatory change is seen. Review of the included portions of the lungs shows a mild patchy, widely distributed appearance of faint groundglass opacity, not well defined, more central than peripheral, and with no crazy paving pattern or lung consolidation. This is very similar to the appearance of the 10/05/2019 chest radiograph which showed mild interstitial edema. Although  technically indeterminate for Covid-19 pneumonia, there are no typical features of Covid-19, and findings are explainable on the basis of the patient's previously noted interstitial disease alone.  IMPRESSION: 1. No evidence of acute thoracic spine trauma, advanced degenerative disc disease, or gross evidence of thoracic spinal stenosis. 2. Mild and rather diffuse, faint groundglass opacity of the lungs as noted. Although technically indeterminate for Covid-19, the findings are consistent with the mild interstitial edema pattern seen on the 10/05/2019 exam. According to the CT technologist, the patient has no complaint of any current or recent respiratory symptoms.  D:  05/06/2020 E:  05/06/2020         Assessment/Plan    1. normocytic anemia requiring periodic blood transfusions secondary to bleeding AVMs and a Watermelon stomach.  Underwent argon treatment with Dr. Llamas.  Her hemoglobin is better today. continue periodic iron infusions. Last infusion on 11/24.       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    3.  Severe neck pain related to DJD        Rahat Morris MD  [facility]    12/11/2020

## 2020-12-16 LAB — BACTERIA SPEC AEROBE CULT: NORMAL

## 2021-01-01 ENCOUNTER — LAB (OUTPATIENT)
Dept: LAB | Facility: HOSPITAL | Age: 86
End: 2021-01-01

## 2021-01-01 ENCOUNTER — OFFICE VISIT (OUTPATIENT)
Dept: ONCOLOGY | Facility: CLINIC | Age: 86
End: 2021-01-01

## 2021-01-01 ENCOUNTER — OFFICE VISIT (OUTPATIENT)
Dept: CARDIOLOGY | Facility: CLINIC | Age: 86
End: 2021-01-01

## 2021-01-01 VITALS
WEIGHT: 124.5 LBS | DIASTOLIC BLOOD PRESSURE: 93 MMHG | HEIGHT: 63 IN | TEMPERATURE: 97.3 F | RESPIRATION RATE: 16 BRPM | BODY MASS INDEX: 22.06 KG/M2 | SYSTOLIC BLOOD PRESSURE: 212 MMHG | OXYGEN SATURATION: 95 % | HEART RATE: 82 BPM

## 2021-01-01 VITALS
OXYGEN SATURATION: 94 % | WEIGHT: 125 LBS | SYSTOLIC BLOOD PRESSURE: 138 MMHG | HEART RATE: 76 BPM | BODY MASS INDEX: 22.15 KG/M2 | DIASTOLIC BLOOD PRESSURE: 68 MMHG | HEIGHT: 63 IN

## 2021-01-01 DIAGNOSIS — I49.5 SSS (SICK SINUS SYNDROME) (HCC): Primary | ICD-10-CM

## 2021-01-01 DIAGNOSIS — Z78.9 MEDICATION INTOLERANCE: ICD-10-CM

## 2021-01-01 DIAGNOSIS — K31.819 GAVE (GASTRIC ANTRAL VASCULAR ECTASIA): ICD-10-CM

## 2021-01-01 DIAGNOSIS — D50.0 IRON DEFICIENCY ANEMIA DUE TO CHRONIC BLOOD LOSS: Primary | ICD-10-CM

## 2021-01-01 DIAGNOSIS — E78.2 MIXED HYPERLIPIDEMIA: ICD-10-CM

## 2021-01-01 DIAGNOSIS — I48.0 PAROXYSMAL ATRIAL FIBRILLATION (HCC): ICD-10-CM

## 2021-01-01 DIAGNOSIS — K90.9 MALABSORPTION IN THE ELDERLY: ICD-10-CM

## 2021-01-01 DIAGNOSIS — D50.0 IRON DEFICIENCY ANEMIA DUE TO CHRONIC BLOOD LOSS: ICD-10-CM

## 2021-01-01 DIAGNOSIS — C44.92 CANCER OF SKIN, SQUAMOUS CELL: Primary | ICD-10-CM

## 2021-01-01 DIAGNOSIS — I10 ESSENTIAL HYPERTENSION: ICD-10-CM

## 2021-01-01 LAB
ERYTHROCYTE [DISTWIDTH] IN BLOOD BY AUTOMATED COUNT: 12.7 % (ref 12.3–15.4)
ERYTHROCYTE [DISTWIDTH] IN BLOOD BY AUTOMATED COUNT: 12.9 % (ref 12.3–15.4)
FERRITIN SERPL-MCNC: 62.84 NG/ML (ref 13–150)
FERRITIN SERPL-MCNC: 80.9 NG/ML (ref 13–150)
HCT VFR BLD AUTO: 37 % (ref 34–46.6)
HCT VFR BLD AUTO: 38.9 % (ref 34–46.6)
HGB BLD-MCNC: 11.9 G/DL (ref 12–15.9)
HGB BLD-MCNC: 12.6 G/DL (ref 12–15.9)
LYMPHOCYTES # BLD AUTO: 0.6 10*3/MM3 (ref 0.7–3.1)
LYMPHOCYTES # BLD AUTO: 0.7 10*3/MM3 (ref 0.7–3.1)
LYMPHOCYTES NFR BLD AUTO: 15.2 % (ref 19.6–45.3)
LYMPHOCYTES NFR BLD AUTO: 17.9 % (ref 19.6–45.3)
MCH RBC QN AUTO: 27.9 PG (ref 26.6–33)
MCH RBC QN AUTO: 28.5 PG (ref 26.6–33)
MCHC RBC AUTO-ENTMCNC: 32.2 G/DL (ref 31.5–35.7)
MCHC RBC AUTO-ENTMCNC: 32.3 G/DL (ref 31.5–35.7)
MCV RBC AUTO: 86.8 FL (ref 79–97)
MCV RBC AUTO: 88 FL (ref 79–97)
MONOCYTES # BLD AUTO: 0.2 10*3/MM3 (ref 0.1–0.9)
MONOCYTES # BLD AUTO: 0.3 10*3/MM3 (ref 0.1–0.9)
MONOCYTES NFR BLD AUTO: 4.1 % (ref 5–12)
MONOCYTES NFR BLD AUTO: 7.8 % (ref 5–12)
NEUTROPHILS NFR BLD AUTO: 3 10*3/MM3 (ref 1.7–7)
NEUTROPHILS NFR BLD AUTO: 3.2 10*3/MM3 (ref 1.7–7)
NEUTROPHILS NFR BLD AUTO: 74.3 % (ref 42.7–76)
NEUTROPHILS NFR BLD AUTO: 80.7 % (ref 42.7–76)
PLATELET # BLD AUTO: 197 10*3/MM3 (ref 140–450)
PLATELET # BLD AUTO: 217 10*3/MM3 (ref 140–450)
PMV BLD AUTO: 6.5 FL (ref 6–12)
PMV BLD AUTO: 7.2 FL (ref 6–12)
RBC # BLD AUTO: 4.27 10*6/MM3 (ref 3.77–5.28)
RBC # BLD AUTO: 4.42 10*6/MM3 (ref 3.77–5.28)
WBC NRBC COR # BLD: 4 10*3/MM3 (ref 3.4–10.8)
WBC NRBC COR # BLD: 4 10*3/MM3 (ref 3.4–10.8)

## 2021-01-01 PROCEDURE — 99214 OFFICE O/P EST MOD 30 MIN: CPT | Performed by: INTERNAL MEDICINE

## 2021-01-01 PROCEDURE — 82728 ASSAY OF FERRITIN: CPT

## 2021-01-01 PROCEDURE — 85025 COMPLETE CBC W/AUTO DIFF WBC: CPT | Performed by: INTERNAL MEDICINE

## 2021-01-01 PROCEDURE — 93280 PM DEVICE PROGR EVAL DUAL: CPT | Performed by: INTERNAL MEDICINE

## 2021-01-01 PROCEDURE — 36415 COLL VENOUS BLD VENIPUNCTURE: CPT

## 2021-01-01 PROCEDURE — 82728 ASSAY OF FERRITIN: CPT | Performed by: INTERNAL MEDICINE

## 2021-01-01 PROCEDURE — 36415 COLL VENOUS BLD VENIPUNCTURE: CPT | Performed by: INTERNAL MEDICINE

## 2021-01-01 PROCEDURE — 85025 COMPLETE CBC W/AUTO DIFF WBC: CPT

## 2021-01-01 RX ORDER — SODIUM CHLORIDE 9 MG/ML
250 INJECTION, SOLUTION INTRAVENOUS ONCE
Status: CANCELLED | OUTPATIENT
Start: 2022-01-01

## 2021-01-13 ENCOUNTER — LAB (OUTPATIENT)
Dept: LAB | Facility: HOSPITAL | Age: 86
End: 2021-01-13

## 2021-01-13 ENCOUNTER — HOSPITAL ENCOUNTER (OUTPATIENT)
Dept: CARDIOLOGY | Facility: HOSPITAL | Age: 86
Discharge: HOME | End: 2021-01-13

## 2021-01-13 ENCOUNTER — OFFICE VISIT (OUTPATIENT)
Dept: CARDIOLOGY | Facility: CLINIC | Age: 86
End: 2021-01-13

## 2021-01-13 VITALS — BODY MASS INDEX: 21.97 KG/M2 | HEIGHT: 63 IN | WEIGHT: 124 LBS

## 2021-01-13 VITALS
BODY MASS INDEX: 22.47 KG/M2 | OXYGEN SATURATION: 92 % | WEIGHT: 126.8 LBS | SYSTOLIC BLOOD PRESSURE: 150 MMHG | HEIGHT: 63 IN | HEART RATE: 83 BPM | DIASTOLIC BLOOD PRESSURE: 58 MMHG

## 2021-01-13 DIAGNOSIS — K31.819 GAVE (GASTRIC ANTRAL VASCULAR ECTASIA): ICD-10-CM

## 2021-01-13 DIAGNOSIS — I10 ESSENTIAL HYPERTENSION: ICD-10-CM

## 2021-01-13 DIAGNOSIS — I48.0 PAROXYSMAL ATRIAL FIBRILLATION (HCC): Primary | ICD-10-CM

## 2021-01-13 DIAGNOSIS — R06.09 DYSPNEA ON EXERTION: ICD-10-CM

## 2021-01-13 DIAGNOSIS — I49.5 SSS (SICK SINUS SYNDROME) (HCC): ICD-10-CM

## 2021-01-13 DIAGNOSIS — D50.0 IRON DEFICIENCY ANEMIA DUE TO CHRONIC BLOOD LOSS: ICD-10-CM

## 2021-01-13 DIAGNOSIS — I50.9 ACUTE CONGESTIVE HEART FAILURE, UNSPECIFIED HEART FAILURE TYPE (HCC): ICD-10-CM

## 2021-01-13 LAB
ASCENDING AORTA: 3.2 CM
BH CV ECHO MEAS - AI DEC SLOPE: 126.9 CM/SEC^2
BH CV ECHO MEAS - AI MAX PG: 58.8 MMHG
BH CV ECHO MEAS - AI MAX VEL: 382 CM/SEC
BH CV ECHO MEAS - AI P1/2T: 881.5 MSEC
BH CV ECHO MEAS - AO MAX PG (FULL): 3.3 MMHG
BH CV ECHO MEAS - AO MAX PG: 10.3 MMHG
BH CV ECHO MEAS - AO MEAN PG (FULL): 2.7 MMHG
BH CV ECHO MEAS - AO MEAN PG: 6.3 MMHG
BH CV ECHO MEAS - AO ROOT AREA (BSA CORRECTED): 2
BH CV ECHO MEAS - AO ROOT AREA: 7.5 CM^2
BH CV ECHO MEAS - AO ROOT DIAM: 3.1 CM
BH CV ECHO MEAS - AO V2 MAX: 160.5 CM/SEC
BH CV ECHO MEAS - AO V2 MEAN: 121 CM/SEC
BH CV ECHO MEAS - AO V2 VTI: 38.8 CM
BH CV ECHO MEAS - ASC AORTA: 3.2 CM
BH CV ECHO MEAS - AVA(I,A): 2.6 CM^2
BH CV ECHO MEAS - AVA(I,D): 2.6 CM^2
BH CV ECHO MEAS - AVA(V,A): 2.7 CM^2
BH CV ECHO MEAS - AVA(V,D): 2.7 CM^2
BH CV ECHO MEAS - BSA(HAYCOCK): 1.6 M^2
BH CV ECHO MEAS - BSA: 1.6 M^2
BH CV ECHO MEAS - BZI_BMI: 22 KILOGRAMS/M^2
BH CV ECHO MEAS - BZI_METRIC_HEIGHT: 160 CM
BH CV ECHO MEAS - BZI_METRIC_WEIGHT: 56.2 KG
BH CV ECHO MEAS - EDV(CUBED): 53.8 ML
BH CV ECHO MEAS - EDV(MOD-SP2): 106 ML
BH CV ECHO MEAS - EDV(MOD-SP4): 108 ML
BH CV ECHO MEAS - EDV(TEICH): 61 ML
BH CV ECHO MEAS - EF(CUBED): 75.5 %
BH CV ECHO MEAS - EF(MOD-BP): 63 %
BH CV ECHO MEAS - EF(MOD-SP2): 55.7 %
BH CV ECHO MEAS - EF(MOD-SP4): 68.5 %
BH CV ECHO MEAS - EF(TEICH): 68.2 %
BH CV ECHO MEAS - ESV(CUBED): 13.2 ML
BH CV ECHO MEAS - ESV(MOD-SP2): 47 ML
BH CV ECHO MEAS - ESV(MOD-SP4): 34 ML
BH CV ECHO MEAS - ESV(TEICH): 19.4 ML
BH CV ECHO MEAS - FS: 37.4 %
BH CV ECHO MEAS - IVS/LVPW: 1.2
BH CV ECHO MEAS - IVSD: 1.3 CM
BH CV ECHO MEAS - LA DIMENSION: 4.6 CM
BH CV ECHO MEAS - LA/AO: 1.5
BH CV ECHO MEAS - LAD MAJOR: 6.2 CM
BH CV ECHO MEAS - LAT PEAK E' VEL: 9.5 CM/SEC
BH CV ECHO MEAS - LATERAL E/E' RATIO: 8.4
BH CV ECHO MEAS - LV DIASTOLIC VOL/BSA (35-75): 68.4 ML/M^2
BH CV ECHO MEAS - LV IVRT: 0.08 SEC
BH CV ECHO MEAS - LV MASS(C)D: 163 GRAMS
BH CV ECHO MEAS - LV MASS(C)DI: 103.3 GRAMS/M^2
BH CV ECHO MEAS - LV MAX PG: 7 MMHG
BH CV ECHO MEAS - LV MEAN PG: 3.6 MMHG
BH CV ECHO MEAS - LV SYSTOLIC VOL/BSA (12-30): 21.5 ML/M^2
BH CV ECHO MEAS - LV V1 MAX: 132 CM/SEC
BH CV ECHO MEAS - LV V1 MEAN: 85.1 CM/SEC
BH CV ECHO MEAS - LV V1 VTI: 30.6 CM
BH CV ECHO MEAS - LVIDD: 3.8 CM
BH CV ECHO MEAS - LVIDS: 2.4 CM
BH CV ECHO MEAS - LVLD AP2: 8.2 CM
BH CV ECHO MEAS - LVLD AP4: 7.8 CM
BH CV ECHO MEAS - LVLS AP2: 6.6 CM
BH CV ECHO MEAS - LVLS AP4: 6.5 CM
BH CV ECHO MEAS - LVOT AREA (M): 3.1 CM^2
BH CV ECHO MEAS - LVOT AREA: 3.3 CM^2
BH CV ECHO MEAS - LVOT DIAM: 2 CM
BH CV ECHO MEAS - LVPWD: 1.2 CM
BH CV ECHO MEAS - MED PEAK E' VEL: 5.8 CM/SEC
BH CV ECHO MEAS - MEDIAL E/E' RATIO: 13.8
BH CV ECHO MEAS - MV A MAX VEL: 74.9 CM/SEC
BH CV ECHO MEAS - MV DEC SLOPE: 320.2 CM/SEC^2
BH CV ECHO MEAS - MV DEC TIME: 0.27 SEC
BH CV ECHO MEAS - MV E MAX VEL: 81.8 CM/SEC
BH CV ECHO MEAS - MV E/A: 1.1
BH CV ECHO MEAS - MV P1/2T MAX VEL: 95.4 CM/SEC
BH CV ECHO MEAS - MV P1/2T: 87.3 MSEC
BH CV ECHO MEAS - MVA P1/2T LCG: 2.3 CM^2
BH CV ECHO MEAS - MVA(P1/2T): 2.5 CM^2
BH CV ECHO MEAS - PA ACC SLOPE: 365.1 CM/SEC^2
BH CV ECHO MEAS - PA ACC TIME: 0.16 SEC
BH CV ECHO MEAS - PA MAX PG: 2.4 MMHG
BH CV ECHO MEAS - PA PR(ACCEL): 7.7 MMHG
BH CV ECHO MEAS - PA V2 MAX: 76.7 CM/SEC
BH CV ECHO MEAS - RAP SYSTOLE: 8 MMHG
BH CV ECHO MEAS - RVSP: 43 MMHG
BH CV ECHO MEAS - SI(AO): 183.1 ML/M^2
BH CV ECHO MEAS - SI(CUBED): 25.7 ML/M^2
BH CV ECHO MEAS - SI(LVOT): 63.5 ML/M^2
BH CV ECHO MEAS - SI(MOD-SP2): 37.4 ML/M^2
BH CV ECHO MEAS - SI(MOD-SP4): 46.9 ML/M^2
BH CV ECHO MEAS - SI(TEICH): 26.4 ML/M^2
BH CV ECHO MEAS - SV(AO): 288.9 ML
BH CV ECHO MEAS - SV(CUBED): 40.6 ML
BH CV ECHO MEAS - SV(LVOT): 100.2 ML
BH CV ECHO MEAS - SV(MOD-SP2): 59 ML
BH CV ECHO MEAS - SV(MOD-SP4): 74 ML
BH CV ECHO MEAS - SV(TEICH): 41.6 ML
BH CV ECHO MEAS - TAPSE (>1.6): 2 CM
BH CV ECHO MEAS - TR MAX PG: 35 MMHG
BH CV ECHO MEAS - TR MAX VEL: 296 CM/SEC
BH CV ECHO MEASUREMENTS AVERAGE E/E' RATIO: 10.69
BH CV VAS BP LEFT ARM: NORMAL MMHG
BH CV XLRA - RV BASE: 4.3 CM
BH CV XLRA - RV LENGTH: 5.9 CM
BH CV XLRA - RV MID: 2.2 CM
BH CV XLRA - TDI S': 12.5 CM/SEC
ERYTHROCYTE [DISTWIDTH] IN BLOOD BY AUTOMATED COUNT: 13.8 % (ref 12.3–15.4)
FERRITIN SERPL-MCNC: 73.5 NG/ML (ref 13–150)
HCT VFR BLD AUTO: 31.6 % (ref 34–46.6)
HGB BLD-MCNC: 9.9 G/DL (ref 12–15.9)
LEFT ATRIUM VOLUME INDEX: 50.1 ML/M^2
LEFT ATRIUM VOLUME: 79 ML
MCH RBC QN AUTO: 29 PG (ref 26.6–33)
MCHC RBC AUTO-ENTMCNC: 31.5 G/DL (ref 31.5–35.7)
MCV RBC AUTO: 92.2 FL (ref 79–97)
PLATELET # BLD AUTO: 289 10*3/MM3 (ref 140–450)
PMV BLD AUTO: 6.1 FL (ref 6–12)
RBC # BLD AUTO: 3.42 10*6/MM3 (ref 3.77–5.28)
WBC # BLD AUTO: 4.3 10*3/MM3 (ref 3.4–10.8)

## 2021-01-13 PROCEDURE — 82728 ASSAY OF FERRITIN: CPT

## 2021-01-13 PROCEDURE — 85027 COMPLETE CBC AUTOMATED: CPT

## 2021-01-13 PROCEDURE — 36415 COLL VENOUS BLD VENIPUNCTURE: CPT

## 2021-01-13 PROCEDURE — 93306 TTE W/DOPPLER COMPLETE: CPT

## 2021-01-13 PROCEDURE — 93306 TTE W/DOPPLER COMPLETE: CPT | Performed by: INTERNAL MEDICINE

## 2021-01-13 PROCEDURE — 25010000002 SULFUR HEXAFLUORIDE MICROSPH 60.7-25 MG RECONSTITUTED SUSPENSION: Performed by: INTERNAL MEDICINE

## 2021-01-13 PROCEDURE — 99214 OFFICE O/P EST MOD 30 MIN: CPT | Performed by: INTERNAL MEDICINE

## 2021-01-13 RX ADMIN — SULFUR HEXAFLUORIDE 2 ML: KIT at 12:31

## 2021-01-13 NOTE — PROGRESS NOTES
[facility] at [facility]  Office visit  Sheryl ROSAS Dalila  8/17/1931    There is no work phone number on file.    VISIT DATE:  1/13/2021      PCP: Manan Garcia MD  [address] [facility]   [place] [address]    CC:  Chief Complaint   Patient presents with   • Atrial Fibrillation     f/u       Previous cardiac studies and procedures:  2012 diagnosed with symptomatic paroxysmal A. fib and tachybradycardia syndrome.  2012 Medtronic pacemaker  2012 cardiac catheterization: No flow limiting stenosis.  June 2018 echo  · Left ventricular systolic function is hyperdynamic (EF > 70).  · Left atrial cavity size is mildly dilated.  · Mean aortic valve gradient is 13 mmhg which is borderline mild aortic stenosis    January 2021 transthoracic echo  · Left ventricular ejection fraction appears to be 61 - 65%. Left ventricular systolic function is normal.  · Left ventricular diastolic function is consistent with (grade II w/high LAP) pseudonormalization.  · Left ventricular wall thickness is consistent with concentric hypertrophy.  · Left atrial cavity is moderate to severely dilated  · Estimated right ventricular systolic pressure from tricuspid regurgitation is mildly elevated (35-45 mmHg). Calculated right ventricular systolic pressure from tricuspid regurgitation is 43 mmHg.  · An electronic lead is present in the right atrium. 1.30cm x 0.70cm echodense, globular structure noted on electronic lead in right atrium. Unchanged from previous evaluation, echocardiographically most consistent with chronic/organized/sclerotic thrombus.    ASSESSMENT:   Diagnosis Plan   1. Paroxysmal atrial fibrillation (CMS/HCC)     2. SSS (sick sinus syndrome) (CMS/HCC)     3. Essential hypertension       Device interrogation:  Medtronic dual-chamber    PLAN:  High-grade AV block: Currently stable and asymptomatic.  Continue routine follow-up in device clinic.  Device dependent.  Known chronic high atrial  "threshold.     Paroxysmal atrial fibrillation: Chads Vas equal to 4. Not a candidate for chronic anticoagulation due to upper GI bleeding and recurrent issues with symptomatic anemia. Minimal burden of arrhythmia.     Hypertension: Goal less than 140/90 mmHg.   currently labile but with overall reasonable control, evidence of whitecoat hypertension.  Previously discontinued combination of amlodipine and olmesartan and due to worsening lower extremity edema.  Will consider thiazide diuretic or Aldactone in the future if we need better afterload reduction.     Venous insufficiency: Continue Bumex on an as-needed basis.  Currently well controlled     Dyspnea on exertion: Likely multifactorial, some element of diastolic congestive heart failure is contributing.  Currently euvolemic and compensated.  Continue current medical therapy.    Right atrial pacemaker lead mass: Stable on follow-up echo, consistent with chronic/organized/sclerotic thrombus.  No further serial surveillance recommended.    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  Denies chest pain, palpitations.    Lower extremity edema is currently well controlled.  Did not tolerate Lasix due to nausea.  Home blood pressures running less than 140/90 mmHg.  Stable shortness of breath in a class II pattern.    PHYSICAL EXAMINATION:  Vitals:    01/13/21 1335   BP: 150/58   BP Location: Left arm   Patient Position: Sitting   Pulse: 83   SpO2: 92%   Weight: 57.5 kg (126 lb 12.8 oz)   Height: 160 cm (63\")     General Appearance:    Alert, cooperative, no distress, appears stated age   Head:    Normocephalic, without obvious abnormality, atraumatic   Eyes:    conjunctiva/corneas clear   Nose:   Nares normal, septum midline, mucosa normal, no drainage   Throat:   " Lips, teeth and gums normal   Neck:   Supple, symmetrical, trachea midline, no carotid    bruit or JVD   Lungs:     Clear to auscultation bilaterally, respirations unlabored   Chest Wall:    No tenderness or deformity    Heart:    Regular rate and rhythm, S1 and S2 normal, 1/6 early peaking systolic murmur right upper sternal border, rub   or gallop, normal carotid impulse bilaterally without bruit.   Abdomen:     Soft, non-tender   Extremities:   Extremities normal, atraumatic, no cyanosis or edema   Pulses:   2+ and symmetric all extremities   Skin:   Skin color, texture, turgor normal, no rashes or lesions       Diagnostic Data:  Procedures  No results found for: CHLPL, TRIG, HDL, LDLDIRECT  Lab Results   Component Value Date    GLUCOSE 93 10/16/2019    BUN 14 10/16/2019    CREATININE 0.98 10/16/2019     10/16/2019    K 4.5 10/16/2019    CL 99 10/16/2019    CO2 27.4 10/16/2019     No results found for: HGBA1C  Lab Results   Component Value Date    WBC 4.70 12/11/2020    HGB 10.5 (L) 12/11/2020    HCT 33.7 (L) 12/11/2020     12/11/2020       Allergies  Allergies   Allergen Reactions   • Codeine Sulfate Unknown (See Comments)     Pt took in the 70's-pt not sure of what happened   • Cozaar [Losartan] Unknown (See Comments)     Pt can not remember   • Crestor [Rosuvastatin] Unknown (See Comments)     Pt can not remember   • Dexlansoprazole Unknown (See Comments)     Pt can not remember   • Lipitor [Atorvastatin] Unknown (See Comments)     Pt can not remember   • Lisinopril Unknown (See Comments)     Pt can not remember   • Nexium [Esomeprazole Magnesium] Unknown (See Comments)     Pt can not remember   • Norvasc [Amlodipine] Unknown (See Comments)     Pt can not remember   • Sulfadiazine Unknown (See Comments)     Pt can not remember   • Toprol Xl [Metoprolol Tartrate] Unknown (See Comments)     Pt can not remember   • Zetia [Ezetimibe] Unknown (See Comments)     Pt can not remember   • Zocor  [Simvastatin] Unknown (See Comments)     Pt can not remember   • Augmentin [Amoxicillin-Pot Clavulanate] GI Intolerance     nausea   • Celebrex [Celecoxib] GI Intolerance       Current Medications    Current Outpatient Medications:   •  amitriptyline (ELAVIL) 25 MG tablet, Take 1 tablet by mouth Every Night., Disp: 30 tablet, Rfl: 11  •  bumetanide (BUMEX) 0.5 MG tablet, TAKE 1 TABLET BY MOUTH EVERY OTHER DAY AS NEEDED FOR SWELLING, Disp: 90 tablet, Rfl: 2  •  cetirizine (ZyrTEC) 10 MG tablet, Take 10 mg by mouth daily., Disp: , Rfl:   •  ciprofloxacin (CIPRO) 500 MG tablet, Take 1 tablet by mouth 2 (Two) Times a Day., Disp: 20 tablet, Rfl: 0  •  diclofenac (Voltaren) 1 % gel gel, Apply 4 g topically to the appropriate area as directed 4 (Four) Times a Day., Disp: 4 tube, Rfl: 11  •  fentaNYL (DURAGESIC) 12 MCG/HR, APPLY 1 PATCH TOPICALLY TO SKIN Q 72 H, Disp: , Rfl:   •  fentaNYL (DURAGESIC) 50 MCG/HR patch, APPLY 1 PATCH TOPICALLY Q 72 H, Disp: , Rfl:   •  fluticasone (FLONASE) 50 MCG/ACT nasal spray, fluticasone propionate 50 mcg/actuation nasal spray,suspension  2 sprays each nostril daily, Disp: , Rfl:   •  gabapentin (NEURONTIN) 600 MG tablet, Take 600 mg by mouth 3 (Three) Times a Day., Disp: , Rfl: 1  •  HYDROcodone-acetaminophen (NORCO)  MG per tablet, Take 1 tablet by mouth every 6 (six) hours as needed for moderate pain (4-6)., Disp: , Rfl:   •  oxybutynin XL (DITROPAN-XL) 10 MG 24 hr tablet, Take 10 mg by mouth As Needed., Disp: , Rfl:   •  pantoprazole (PROTONIX) 40 MG EC tablet, TAKE 1 TABLET BY MOUTH TWICE DAILY, Disp: 60 tablet, Rfl: 11  •  potassium chloride 10 MEQ CR tablet, TAKE 1 TABLET BY MOUTH ONCE DAILY, Disp: 30 tablet, Rfl: 11  •  promethazine (PHENERGAN) 25 MG tablet, Take 1 tablet by mouth Every 6 (Six) Hours As Needed for Nausea or Vomiting., Disp: 30 tablet, Rfl: 5  No current facility-administered medications for this visit.           ROS  Review of Systems   Cardiovascular:  Negative for chest pain, dyspnea on exertion, irregular heartbeat and palpitations.   Respiratory: Negative for cough and snoring.        SOCIAL HX  Social History     Socioeconomic History   • Marital status:      Spouse name: Not on file   • Number of children: Not on file   • Years of education: Not on file   • Highest education level: Not on file   Tobacco Use   • Smoking status: Never Smoker   • Smokeless tobacco: Never Used   Substance and Sexual Activity   • Alcohol use: No   • Drug use: No   • Sexual activity: Defer   Social History Narrative    Caffeine: 1 serving per day. Pt lives at home alone.       FAMILY HX  Family History   Problem Relation Age of Onset   • No Known Problems Mother    • Diabetes Father    • Cancer Son              Andi Seals III, MD, FACC

## 2021-02-23 ENCOUNTER — TELEPHONE (OUTPATIENT)
Dept: ONCOLOGY | Facility: CLINIC | Age: 86
End: 2021-02-23

## 2021-02-23 NOTE — TELEPHONE ENCOUNTER
Patient would like to know if Dr. Hoff recommends that she get a COVID-19 vaccination.    [phone]

## 2021-03-01 ENCOUNTER — TELEPHONE (OUTPATIENT)
Dept: ONCOLOGY | Facility: CLINIC | Age: 86
End: 2021-03-01

## 2021-03-01 ENCOUNTER — LAB (OUTPATIENT)
Dept: LAB | Facility: HOSPITAL | Age: 86
End: 2021-03-01

## 2021-03-01 DIAGNOSIS — D50.0 IRON DEFICIENCY ANEMIA DUE TO CHRONIC BLOOD LOSS: ICD-10-CM

## 2021-03-01 DIAGNOSIS — K90.9 MALABSORPTION IN THE ELDERLY: ICD-10-CM

## 2021-03-01 DIAGNOSIS — K31.819 GAVE (GASTRIC ANTRAL VASCULAR ECTASIA): Primary | ICD-10-CM

## 2021-03-01 DIAGNOSIS — K31.819 GAVE (GASTRIC ANTRAL VASCULAR ECTASIA): ICD-10-CM

## 2021-03-01 DIAGNOSIS — Z78.9 MEDICATION INTOLERANCE: ICD-10-CM

## 2021-03-01 LAB
ERYTHROCYTE [DISTWIDTH] IN BLOOD BY AUTOMATED COUNT: 14.5 % (ref 12.3–15.4)
FERRITIN SERPL-MCNC: 33.55 NG/ML (ref 13–150)
HCT VFR BLD AUTO: 28 % (ref 34–46.6)
HGB BLD-MCNC: 8.9 G/DL (ref 12–15.9)
MCH RBC QN AUTO: 26.6 PG (ref 26.6–33)
MCHC RBC AUTO-ENTMCNC: 31.9 G/DL (ref 31.5–35.7)
MCV RBC AUTO: 83.6 FL (ref 79–97)
PLATELET # BLD AUTO: 283 10*3/MM3 (ref 140–450)
PMV BLD AUTO: 6.5 FL (ref 6–12)
RBC # BLD AUTO: 3.35 10*6/MM3 (ref 3.77–5.28)
WBC # BLD AUTO: 4.7 10*3/MM3 (ref 3.4–10.8)

## 2021-03-01 PROCEDURE — 82728 ASSAY OF FERRITIN: CPT

## 2021-03-01 PROCEDURE — 36415 COLL VENOUS BLD VENIPUNCTURE: CPT

## 2021-03-01 PROCEDURE — 85027 COMPLETE CBC AUTOMATED: CPT

## 2021-03-01 RX ORDER — SODIUM CHLORIDE 9 MG/ML
250 INJECTION, SOLUTION INTRAVENOUS ONCE
Status: CANCELLED | OUTPATIENT
Start: 2021-03-08

## 2021-03-01 RX ORDER — SODIUM CHLORIDE 9 MG/ML
250 INJECTION, SOLUTION INTRAVENOUS ONCE
Status: CANCELLED | OUTPATIENT
Start: 2021-03-15

## 2021-03-01 NOTE — TELEPHONE ENCOUNTER
----- Message from Bassem Kahn sent at 3/1/2021  3:04 PM EST -----  I have her on for 3/8 @230 and 3/15 @ 230  ----- Message -----  From: Liberty Ramírez RN  Sent: 3/1/2021   2:53 PM EST  To: Bassem Kahn        Patient needs another iron infusion. When you call her with appointment. Just tell her she needs some more iron. But I can call her if you want me to. Just give me appointment time.    Gregory

## 2021-03-01 NOTE — TELEPHONE ENCOUNTER
Called patient and informed her that Dr. Hoff is wanting to do another Iron infusion. Also informing her of infusion appointments. Patient stating she understood.

## 2021-03-02 ENCOUNTER — IMMUNIZATION (OUTPATIENT)
Dept: VACCINE CLINIC | Facility: HOSPITAL | Age: 86
End: 2021-03-02

## 2021-03-02 PROCEDURE — 0001A: CPT | Performed by: INTERNAL MEDICINE

## 2021-03-02 PROCEDURE — 91300 HC SARSCOV02 VAC 30MCG/0.3ML IM: CPT | Performed by: INTERNAL MEDICINE

## 2021-03-08 ENCOUNTER — HOSPITAL ENCOUNTER (OUTPATIENT)
Dept: ONCOLOGY | Facility: HOSPITAL | Age: 86
Setting detail: INFUSION SERIES
Discharge: HOME | End: 2021-03-08

## 2021-03-08 VITALS
HEART RATE: 80 BPM | HEIGHT: 62 IN | DIASTOLIC BLOOD PRESSURE: 51 MMHG | TEMPERATURE: 97.6 F | SYSTOLIC BLOOD PRESSURE: 168 MMHG | BODY MASS INDEX: 23.74 KG/M2 | RESPIRATION RATE: 20 BRPM | WEIGHT: 129 LBS

## 2021-03-08 DIAGNOSIS — K90.9 MALABSORPTION IN THE ELDERLY: ICD-10-CM

## 2021-03-08 DIAGNOSIS — K31.819 GAVE (GASTRIC ANTRAL VASCULAR ECTASIA): Primary | ICD-10-CM

## 2021-03-08 DIAGNOSIS — D50.0 IRON DEFICIENCY ANEMIA DUE TO CHRONIC BLOOD LOSS: ICD-10-CM

## 2021-03-08 DIAGNOSIS — Z78.9 MEDICATION INTOLERANCE: ICD-10-CM

## 2021-03-08 PROCEDURE — 96365 THER/PROPH/DIAG IV INF INIT: CPT

## 2021-03-08 PROCEDURE — 25010000002 FERRIC CARBOXYMALTOSE 750 MG/15ML SOLUTION 15 ML VIAL: Performed by: INTERNAL MEDICINE

## 2021-03-08 PROCEDURE — 96375 TX/PRO/DX INJ NEW DRUG ADDON: CPT

## 2021-03-08 RX ORDER — SODIUM CHLORIDE 9 MG/ML
250 INJECTION, SOLUTION INTRAVENOUS ONCE
Status: COMPLETED | OUTPATIENT
Start: 2021-03-08 | End: 2021-03-08

## 2021-03-08 RX ADMIN — SODIUM CHLORIDE 250 ML: 9 INJECTION, SOLUTION INTRAVENOUS at 14:56

## 2021-03-08 RX ADMIN — FERRIC CARBOXYMALTOSE INJECTION 750 MG: 50 INJECTION, SOLUTION INTRAVENOUS at 14:56

## 2021-03-15 ENCOUNTER — HOSPITAL ENCOUNTER (OUTPATIENT)
Dept: ONCOLOGY | Facility: HOSPITAL | Age: 86
Setting detail: INFUSION SERIES
End: 2021-03-15

## 2021-03-16 ENCOUNTER — HOSPITAL ENCOUNTER (OUTPATIENT)
Dept: ONCOLOGY | Facility: HOSPITAL | Age: 86
Setting detail: INFUSION SERIES
Discharge: HOME | End: 2021-03-16

## 2021-03-16 VITALS
TEMPERATURE: 97.1 F | BODY MASS INDEX: 22.68 KG/M2 | HEART RATE: 68 BPM | SYSTOLIC BLOOD PRESSURE: 141 MMHG | DIASTOLIC BLOOD PRESSURE: 46 MMHG | WEIGHT: 128 LBS | HEIGHT: 63 IN | RESPIRATION RATE: 20 BRPM

## 2021-03-16 DIAGNOSIS — Z78.9 MEDICATION INTOLERANCE: ICD-10-CM

## 2021-03-16 DIAGNOSIS — K90.9 MALABSORPTION IN THE ELDERLY: ICD-10-CM

## 2021-03-16 DIAGNOSIS — K31.819 GAVE (GASTRIC ANTRAL VASCULAR ECTASIA): Primary | ICD-10-CM

## 2021-03-16 DIAGNOSIS — D50.0 IRON DEFICIENCY ANEMIA DUE TO CHRONIC BLOOD LOSS: ICD-10-CM

## 2021-03-16 PROCEDURE — 96374 THER/PROPH/DIAG INJ IV PUSH: CPT

## 2021-03-16 PROCEDURE — 25010000002 FERRIC CARBOXYMALTOSE 750 MG/15ML SOLUTION 15 ML VIAL: Performed by: INTERNAL MEDICINE

## 2021-03-16 RX ORDER — SODIUM CHLORIDE 9 MG/ML
250 INJECTION, SOLUTION INTRAVENOUS ONCE
Status: COMPLETED | OUTPATIENT
Start: 2021-03-16 | End: 2021-03-16

## 2021-03-16 RX ADMIN — FERRIC CARBOXYMALTOSE INJECTION 750 MG: 50 INJECTION, SOLUTION INTRAVENOUS at 13:27

## 2021-03-16 RX ADMIN — SODIUM CHLORIDE 250 ML: 9 INJECTION, SOLUTION INTRAVENOUS at 13:27

## 2021-03-23 ENCOUNTER — IMMUNIZATION (OUTPATIENT)
Dept: VACCINE CLINIC | Facility: HOSPITAL | Age: 86
End: 2021-03-23

## 2021-03-23 PROCEDURE — 0002A: CPT | Performed by: INTERNAL MEDICINE

## 2021-03-23 PROCEDURE — 91300 HC SARSCOV02 VAC 30MCG/0.3ML IM: CPT | Performed by: INTERNAL MEDICINE

## 2021-03-26 DIAGNOSIS — G47.00 INSOMNIA, UNSPECIFIED TYPE: ICD-10-CM

## 2021-03-26 RX ORDER — AMITRIPTYLINE HYDROCHLORIDE 25 MG/1
TABLET, FILM COATED ORAL
Qty: 30 TABLET | Refills: 11 | OUTPATIENT
Start: 2021-03-26

## 2021-03-29 DIAGNOSIS — G47.00 INSOMNIA, UNSPECIFIED TYPE: ICD-10-CM

## 2021-03-30 RX ORDER — AMITRIPTYLINE HYDROCHLORIDE 25 MG/1
TABLET, FILM COATED ORAL
Qty: 30 TABLET | Refills: 11 | OUTPATIENT
Start: 2021-03-30

## 2021-04-03 DIAGNOSIS — G47.00 INSOMNIA, UNSPECIFIED TYPE: ICD-10-CM

## 2021-04-05 ENCOUNTER — TELEPHONE (OUTPATIENT)
Dept: FAMILY MEDICINE CLINIC | Facility: CLINIC | Age: 86
End: 2021-04-05

## 2021-04-06 RX ORDER — AMITRIPTYLINE HYDROCHLORIDE 25 MG/1
TABLET, FILM COATED ORAL
Qty: 30 TABLET | Refills: 11 | OUTPATIENT
Start: 2021-04-06

## 2021-04-07 ENCOUNTER — LAB (OUTPATIENT)
Dept: LAB | Facility: HOSPITAL | Age: 86
End: 2021-04-07

## 2021-04-07 ENCOUNTER — OFFICE VISIT (OUTPATIENT)
Dept: FAMILY MEDICINE CLINIC | Facility: CLINIC | Age: 86
End: 2021-04-07

## 2021-04-07 VITALS
DIASTOLIC BLOOD PRESSURE: 96 MMHG | SYSTOLIC BLOOD PRESSURE: 158 MMHG | BODY MASS INDEX: 23.34 KG/M2 | WEIGHT: 126.8 LBS | HEART RATE: 69 BPM | RESPIRATION RATE: 16 BRPM | OXYGEN SATURATION: 99 % | HEIGHT: 62 IN

## 2021-04-07 DIAGNOSIS — F41.9 ANXIETY AND DEPRESSION: ICD-10-CM

## 2021-04-07 DIAGNOSIS — N32.81 OVERACTIVE BLADDER: Primary | ICD-10-CM

## 2021-04-07 DIAGNOSIS — K31.819 GAVE (GASTRIC ANTRAL VASCULAR ECTASIA): ICD-10-CM

## 2021-04-07 DIAGNOSIS — D50.0 IRON DEFICIENCY ANEMIA DUE TO CHRONIC BLOOD LOSS: ICD-10-CM

## 2021-04-07 DIAGNOSIS — F32.A ANXIETY AND DEPRESSION: ICD-10-CM

## 2021-04-07 LAB
ERYTHROCYTE [DISTWIDTH] IN BLOOD BY AUTOMATED COUNT: 18.6 % (ref 12.3–15.4)
FERRITIN SERPL-MCNC: 264.5 NG/ML (ref 13–150)
HCT VFR BLD AUTO: 36.1 % (ref 34–46.6)
HGB BLD-MCNC: 12.2 G/DL (ref 12–15.9)
MCH RBC QN AUTO: 29 PG (ref 26.6–33)
MCHC RBC AUTO-ENTMCNC: 33.7 G/DL (ref 31.5–35.7)
MCV RBC AUTO: 86.1 FL (ref 79–97)
PLATELET # BLD AUTO: 199 10*3/MM3 (ref 140–450)
PMV BLD AUTO: 6.7 FL (ref 6–12)
RBC # BLD AUTO: 4.2 10*6/MM3 (ref 3.77–5.28)
WBC # BLD AUTO: 4 10*3/MM3 (ref 3.4–10.8)

## 2021-04-07 PROCEDURE — 82728 ASSAY OF FERRITIN: CPT

## 2021-04-07 PROCEDURE — 99213 OFFICE O/P EST LOW 20 MIN: CPT | Performed by: FAMILY MEDICINE

## 2021-04-07 PROCEDURE — 85027 COMPLETE CBC AUTOMATED: CPT

## 2021-04-07 PROCEDURE — 36415 COLL VENOUS BLD VENIPUNCTURE: CPT

## 2021-04-07 RX ORDER — AMITRIPTYLINE HYDROCHLORIDE 25 MG/1
TABLET, FILM COATED ORAL
Qty: 60 TABLET | Refills: 11 | Status: SHIPPED | OUTPATIENT
Start: 2021-04-07 | End: 2022-01-01

## 2021-04-07 RX ORDER — OXYBUTYNIN CHLORIDE 10 MG/1
10 TABLET, EXTENDED RELEASE ORAL DAILY
Qty: 30 TABLET | Refills: 11 | Status: SHIPPED | OUTPATIENT
Start: 2021-04-07 | End: 2021-04-19

## 2021-04-07 NOTE — PROGRESS NOTES
Zeke Conner is a 89 y.o. female    Chief Complaint    Follow-up  Refill of oxybutynin    History of Present Illness  The patient presents needing a refill for amitriptyline and oxybutynin. She has felt pretty good except for her arthritis and would like to take the amitriptyline twice a day if possible.     She has a past history of a stomach bleed which was repaired in the past, and she has not had any more issues with that.    The following portions of the patient's history were reviewed and updated as appropriate: allergies, current medications, past social history and problem list    Review of Systems   Constitutional: Negative for appetite change, fatigue and unexpected weight change.   Respiratory: Negative for cough, chest tightness and shortness of breath.    Cardiovascular: Negative for chest pain, palpitations and leg swelling.   Gastrointestinal: Negative for abdominal pain, diarrhea and nausea.   Skin: Negative for color change and rash.   Neurological: Negative for dizziness, tremors, syncope, weakness, light-headedness and headaches.   Psychiatric/Behavioral: Positive for dysphoric mood and sleep disturbance. Negative for agitation, behavioral problems, confusion, decreased concentration and suicidal ideas. The patient is nervous/anxious.        Objective     Vitals:    04/07/21 1355   BP: 158/96   Pulse: 69   Resp: 16   SpO2: 99%       Physical Exam  Vitals and nursing note reviewed.   Constitutional:       Appearance: She is well-developed.   Neck:      Vascular: No JVD.   Cardiovascular:      Rate and Rhythm: Normal rate and regular rhythm.      Pulses: Normal pulses.      Heart sounds: Normal heart sounds. No murmur heard.     Pulmonary:      Effort: Pulmonary effort is normal. No respiratory distress.      Breath sounds: Normal breath sounds.   Abdominal:      General: Bowel sounds are normal.      Palpations: Abdomen is soft.      Tenderness: There is no abdominal tenderness.    Skin:     General: Skin is warm and dry.         Assessment/Plan   Problems Addressed this Visit     None      Visit Diagnoses     Overactive bladder    -  Primary    Relevant Medications    oxybutynin XL (DITROPAN-XL) 10 MG 24 hr tablet    Anxiety and depression        Relevant Medications    amitriptyline (ELAVIL) 25 MG tablet      Diagnoses       Codes Comments    Overactive bladder    -  Primary ICD-10-CM: N32.81  ICD-9-CM: 596.51     Anxiety and depression     ICD-10-CM: F41.9, F32.9  ICD-9-CM: 300.00, 311                Scribed for MARICARMEN Garcia MD by Luanne Live.  04/07/21   15:00 EDT    I have personally performed the services described in this document as scribed by the above individual, and it is both accurate and complete.  MARICARMEN Garcia MD  4/7/2021  16:33 EDT

## 2021-04-09 ENCOUNTER — OFFICE VISIT (OUTPATIENT)
Dept: ONCOLOGY | Facility: CLINIC | Age: 86
End: 2021-04-09

## 2021-04-09 VITALS
HEART RATE: 75 BPM | DIASTOLIC BLOOD PRESSURE: 73 MMHG | WEIGHT: 126.7 LBS | TEMPERATURE: 98.2 F | BODY MASS INDEX: 23.32 KG/M2 | SYSTOLIC BLOOD PRESSURE: 180 MMHG | HEIGHT: 62 IN | OXYGEN SATURATION: 92 % | RESPIRATION RATE: 16 BRPM

## 2021-04-09 DIAGNOSIS — K31.819 GAVE (GASTRIC ANTRAL VASCULAR ECTASIA): Primary | ICD-10-CM

## 2021-04-09 DIAGNOSIS — Z78.9 MEDICATION INTOLERANCE: ICD-10-CM

## 2021-04-09 DIAGNOSIS — K90.9 MALABSORPTION IN THE ELDERLY: ICD-10-CM

## 2021-04-09 DIAGNOSIS — D50.0 IRON DEFICIENCY ANEMIA DUE TO CHRONIC BLOOD LOSS: ICD-10-CM

## 2021-04-09 PROCEDURE — 99213 OFFICE O/P EST LOW 20 MIN: CPT | Performed by: INTERNAL MEDICINE

## 2021-04-09 NOTE — PROGRESS NOTES
"PROBLEM LIST:  1. 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 Status post EGD colonoscopy  3. Status post Iv iron  infusion periodically  4. Bone Marrow biopsy  - normal - done by Dr. Reeves  5. 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  lady with normocytic anemia related to chronic blood loss.  She requires periodic iron infusions.  She has not required any transfusion support.  Last blood transfusion on 10/3/2019.     Denies any active bleeding.  Fatigue seems to be tolerating well.  Her biggest concern is her neck pain.  Which is related to significant DJD.    Past medical history, social history and family history was reviewed and unchanged from prior visit.    Review of Systems:    Review of Systems   Constitutional: Positive for fatigue.   HENT: Negative.    Eyes: Negative.    Respiratory: Positive for shortness of breath.    Cardiovascular: Negative.    Gastrointestinal: Negative.    Endocrine: Negative.    Genitourinary: Negative.    Musculoskeletal: Positive for back pain and neck pain.   Skin: Negative.    Allergic/Immunologic: Negative.    Neurological: Negative.    Hematological: Negative.    Psychiatric/Behavioral: Negative.       A comprehensive 14 point review of systems was performed and was negative except as mentioned.      Medications:  The current medication list was reviewed in the EMR    ALLERGIES:    Allergies   Allergen Reactions   • Codeine Sulfate Unknown (See Comments)     Pt took in the 70's-pt not sure of what happened   • Cozaar [Losartan] Unknown (See Comments)     Pt can not remember   • Crestor [Rosuvastatin] Unknown (See Comments)     Pt can not remember   • Dexlansoprazole Unknown (See Comments)     Pt can not remember   • Lipitor [Atorvastatin] Unknown (See Comments)     Pt can not remember   • Lisinopril Unknown (See Comments)     Pt can not remember   • Nexium " "[Esomeprazole Magnesium] Unknown (See Comments)     Pt can not remember   • Norvasc [Amlodipine] Unknown (See Comments)     Pt can not remember   • Sulfadiazine Unknown (See Comments)     Pt can not remember   • Toprol Xl [Metoprolol Tartrate] Unknown (See Comments)     Pt can not remember   • Zetia [Ezetimibe] Unknown (See Comments)     Pt can not remember   • Zocor [Simvastatin] Unknown (See Comments)     Pt can not remember   • Augmentin [Amoxicillin-Pot Clavulanate] GI Intolerance     nausea   • Celebrex [Celecoxib] GI Intolerance         Physical Exam    VITAL SIGNS:  /73   Pulse 75   Temp 98.2 °F (36.8 °C) (Temporal)   Resp 16   Ht 157.5 cm (62\")   Wt 57.5 kg (126 lb 11.2 oz)   SpO2 92%   BMI 23.17 kg/m²      Performance Status: 1    General: well appearing, in no acute distress  HEENT: sclera anicteric, oropharynx clear, neck is supple  Lymphatics: no cervical, supraclavicular, or axillary adenopathy  Cardiovascular: regular rate and rhythm, no murmurs, rubs or gallops  Lungs: clear to auscultation bilaterally  Abdomen: soft, nontender, nondistended.  No palpable organomegaly  Extremities: no lower extremity edema  Skin: no rashes, lesions, bruising, or petechiae  Msk:  Shows no weakness of the large muscle groups  Psych: Mood is stable        RECENT LABS:    Lab Results   Component Value Date    WBC 4.00 04/07/2021    HGB 12.2 04/07/2021    HCT 36.1 04/07/2021    MCV 86.1 04/07/2021     04/07/2021     Lab Results   Component Value Date    FERRITIN 264.50 (H) 04/07/2021    FERRITIN 33.55 03/01/2021    FERRITIN 73.50 01/13/2021     Lab Results   Component Value Date    HGB 12.2 04/07/2021    HGB 8.9 (L) 03/01/2021    HGB 9.9 (L) 01/13/2021    HGB 10.5 (L) 12/11/2020    HGB 9.4 (L) 11/24/2020    HGB 10.0 (L) 11/10/2020    HGB 10.8 (L) 10/01/2020    HGB 11.3 (L) 08/14/2020    HGB 12.1 07/10/2020    HGB 11.1 (L) 06/18/2020     Ct Cervical Spine Without Contrast    Result Date: 5/6/2020  1. " Advanced multilevel degenerative disc disease, with probably moderate canal stenosis at C3-4 and C4-5 due to prominent posterior vertebral osteophytes, and some posterior element hypertrophy. 2. Milder degenerative changes, and bony foraminal stenosis elsewhere as discussed above by disc level. No evidence of acute or healing trauma.       Ct Thoracic Spine Without Contrast    Result Date: 5/6/2020  1. Advanced multilevel degenerative disc disease, with probably moderate canal stenosis at C3-4 and C4-5 due to prominent posterior vertebral osteophytes, and some posterior element hypertrophy. 2. Milder degenerative changes, and bony foraminal stenosis elsewhere as discussed above by disc level. No evidence of acute or healing trauma.  CT SCAN OF THE THORACIC SPINE: There is exaggeration of the normal thoracic lordosis. No significant focal subluxation or evidence of vertebral compression deformity is seen. There is moderate multilevel degenerative disc disease. No destructive or blastic bony lesions are seen. Coronal images show a minimal dextroconvex scoliosis. Axial bone window images show no evidence of acute bony trauma. Soft tissue axial images of the thoracic spine show the usual limited detail of the canal for non-myelographic scans. The thoracic canal appears congenitally rather ample. No gross evidence of HNP or canal stenosis is appreciated down to the level of T9-T10 where there is a prominent posterior vertebral osteophyte but only borderline canal narrowing. Below this level, the canal appears unremarkable. No pathologic paraspinous mass or inflammatory change is seen. Review of the included portions of the lungs shows a mild patchy, widely distributed appearance of faint groundglass opacity, not well defined, more central than peripheral, and with no crazy paving pattern or lung consolidation. This is very similar to the appearance of the 10/05/2019 chest radiograph which showed mild interstitial edema.  Although technically indeterminate for Covid-19 pneumonia, there are no typical features of Covid-19, and findings are explainable on the basis of the patient's previously noted interstitial disease alone.  IMPRESSION: 1. No evidence of acute thoracic spine trauma, advanced degenerative disc disease, or gross evidence of thoracic spinal stenosis. 2. Mild and rather diffuse, faint groundglass opacity of the lungs as noted. Although technically indeterminate for Covid-19, the findings are consistent with the mild interstitial edema pattern seen on the 10/05/2019 exam. According to the CT technologist, the patient has no complaint of any current or recent respiratory symptoms.  D:  05/06/2020 E:  05/06/2020         Assessment/Plan    1. normocytic anemia requiring periodic blood transfusions secondary to bleeding AVMs and a Watermelon stomach.  Underwent argon treatment with Dr. Llamas.  Her hemoglobin is better today. continue periodic iron infusions. 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    3.  Severe neck pain related to DJD        Rhaat Morris MD  [facility]    4/9/2021

## 2021-04-15 ENCOUNTER — TELEPHONE (OUTPATIENT)
Dept: FAMILY MEDICINE CLINIC | Facility: CLINIC | Age: 86
End: 2021-04-15

## 2021-04-15 NOTE — TELEPHONE ENCOUNTER
I could send in but Cipro is an antibiotic for infection and oxybutynin is for a leaky bladder. Totally different problems. I do not think information given by pharmacy is accurate.

## 2021-04-15 NOTE — TELEPHONE ENCOUNTER
Caller: Sheryl Conner    Relationship: Self    Best call back number: [phone]    What medication are you requesting: CIPROFLOXACIN HCL 500MG    What are your current symptoms: BLADDER INFECTION    How long have you been experiencing symptoms: A WHILE    Have you had these symptoms before:    [x] Yes  [] No    Have you been treated for these symptoms before:   [x] Yes  [] No    If a prescription is needed, what is your preferred pharmacy and phone number: [facility] - [address] [phone] [facility] [phone]      Additional notes:  PATIENT STATED THE oxybutynin XL (DITROPAN-XL) 10 MG 24 hr tablet MAKES HER DROWSY. SHE SPOKE WITH THE PHARMACY AND THEY TOLD HER TO SWITCH HER MEDICATION TO CIPROFLOXACIN HCL 500MG.

## 2021-04-16 RX ORDER — CIPROFLOXACIN 500 MG/1
500 TABLET, FILM COATED ORAL 2 TIMES DAILY
Qty: 20 TABLET | Refills: 0 | Status: SHIPPED | OUTPATIENT
Start: 2021-04-16 | End: 2021-04-26

## 2021-04-16 NOTE — TELEPHONE ENCOUNTER
I spoke with pt and she said she knows it's different issues and knows Cipro is not a long time cure but would still like to take it. She said she's struggle with UTI issues all her life and has gotten relief from short term Cipro in the past.

## 2021-04-19 ENCOUNTER — OFFICE VISIT (OUTPATIENT)
Dept: CARDIOLOGY | Facility: CLINIC | Age: 86
End: 2021-04-19

## 2021-04-19 VITALS
OXYGEN SATURATION: 97 % | BODY MASS INDEX: 22.89 KG/M2 | HEIGHT: 63 IN | WEIGHT: 129.2 LBS | HEART RATE: 82 BPM | DIASTOLIC BLOOD PRESSURE: 70 MMHG | SYSTOLIC BLOOD PRESSURE: 160 MMHG

## 2021-04-19 DIAGNOSIS — E78.2 MIXED HYPERLIPIDEMIA: ICD-10-CM

## 2021-04-19 DIAGNOSIS — I49.5 SSS (SICK SINUS SYNDROME) (HCC): ICD-10-CM

## 2021-04-19 DIAGNOSIS — I10 ESSENTIAL HYPERTENSION: ICD-10-CM

## 2021-04-19 DIAGNOSIS — I48.0 PAROXYSMAL ATRIAL FIBRILLATION (HCC): Primary | ICD-10-CM

## 2021-04-19 PROCEDURE — 99214 OFFICE O/P EST MOD 30 MIN: CPT | Performed by: INTERNAL MEDICINE

## 2021-04-19 PROCEDURE — 93280 PM DEVICE PROGR EVAL DUAL: CPT | Performed by: INTERNAL MEDICINE

## 2021-04-19 NOTE — PROGRESS NOTES
[facility]  Office visit  Sheryl ROSAS Dalila  8/17/1931    There is no work phone number on file.    VISIT DATE:  4/19/2021      PCP: Manan Garcia MD  [address]   Formerly [facility] [id number]    CC:  Chief Complaint   Patient presents with   • Atrial Fibrillation   • Shortness of Breath       Previous cardiac studies and procedures:  2012 diagnosed with symptomatic paroxysmal A. fib and tachybradycardia syndrome.  2012 Medtronic pacemaker  2012 cardiac catheterization: No flow limiting stenosis.  June 2018 echo  · Left ventricular systolic function is hyperdynamic (EF > 70).  · Left atrial cavity size is mildly dilated.  · Mean aortic valve gradient is 13 mmhg which is borderline mild aortic stenosis    January 2021 transthoracic echo  · Left ventricular ejection fraction appears to be 61 - 65%. Left ventricular systolic function is normal.  · Left ventricular diastolic function is consistent with (grade II w/high LAP) pseudonormalization.  · Left ventricular wall thickness is consistent with concentric hypertrophy.  · Left atrial cavity is moderate to severely dilated  · Estimated right ventricular systolic pressure from tricuspid regurgitation is mildly elevated (35-45 mmHg). Calculated right ventricular systolic pressure from tricuspid regurgitation is 43 mmHg.  · An electronic lead is present in the right atrium. 1.30cm x 0.70cm echodense, globular structure noted on electronic lead in right atrium. Unchanged from previous evaluation, echocardiographically most consistent with chronic/organized/sclerotic thrombus.    ASSESSMENT:   Diagnosis Plan   1. Paroxysmal atrial fibrillation (CMS/HCC)     2. SSS (sick sinus syndrome) (CMS/HCC)     3. Mixed hyperlipidemia     4. Essential hypertension       Device interrogation:  Medtronic dual-chamber  1% atrial pacing, sensed P wave 1.4 mV, impedance 291 ohms  High percent jugular pacing, no underlying at 40, threshold 0.5  V at 0.4 ms, impedance 649 ohms  10 months estimated JAYA.  1 episode of A. fib lasting 30 seconds.    PLAN:  High-grade AV block: Currently stable and asymptomatic.  Continue routine follow-up in device clinic.  Device dependent.  Known chronic high atrial threshold, may consider revision at time of generator change..     Paroxysmal atrial fibrillation: Chads Vas equal to 4. Not a candidate for chronic anticoagulation due to upper GI bleeding and recurrent issues with symptomatic anemia. Minimal burden of arrhythmia.     Hypertension: Goal less than 140/90 mmHg.   currently labile but with overall reasonable control, evidence of whitecoat hypertension.  Previously discontinued combination of amlodipine and olmesartan and due to worsening lower extremity edema.  Will consider thiazide diuretic or Aldactone in the future if we need better afterload reduction.     Venous insufficiency: Continue Bumex on an as-needed basis.  Currently well controlled     Dyspnea on exertion: Likely multifactorial, some element of diastolic congestive heart failure is contributing.  Currently euvolemic and compensated.  Continue current medical therapy.    Right atrial pacemaker lead mass: Stable on follow-up echo, consistent with chronic/organized/sclerotic thrombus.  No further serial surveillance recommended.    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  Denies chest pain, palpitations.    Lower extremity edema is currently well controlled.  Did not tolerate Lasix due to nausea.  Currently not tracking home blood pressures.  Stable shortness of breath in a class II pattern.  Mainly complaining of discomfort related to osteoarthritis.    PHYSICAL EXAMINATION:  Vitals:    04/19/21 1349   BP: 160/70   BP Location: Right arm   Patient  "Position: Sitting   Pulse: 82   SpO2: 97%   Weight: 58.6 kg (129 lb 3.2 oz)   Height: 160 cm (63\")     General Appearance:    Alert, cooperative, no distress, appears stated age   Head:    Normocephalic, without obvious abnormality, atraumatic   Eyes:    conjunctiva/corneas clear   Nose:   Nares normal, septum midline, mucosa normal, no drainage   Throat:   Lips, teeth and gums normal   Neck:   Supple, symmetrical, trachea midline, no carotid    bruit or JVD   Lungs:     Clear to auscultation bilaterally, respirations unlabored   Chest Wall:    No tenderness or deformity    Heart:    Regular rate and rhythm, S1 and S2 normal, 1/6 early peaking systolic murmur right upper sternal border, rub   or gallop, normal carotid impulse bilaterally without bruit.   Abdomen:     Soft, non-tender   Extremities:   Extremities normal, atraumatic, no cyanosis or edema   Pulses:   2+ and symmetric all extremities   Skin:   Skin color, texture, turgor normal, no rashes or lesions       Diagnostic Data:  Procedures  No results found for: CHLPL, TRIG, HDL, LDLDIRECT  Lab Results   Component Value Date    GLUCOSE 93 10/16/2019    BUN 14 10/16/2019    CREATININE 0.98 10/16/2019     10/16/2019    K 4.5 10/16/2019    CL 99 10/16/2019    CO2 27.4 10/16/2019     No results found for: HGBA1C  Lab Results   Component Value Date    WBC 4.00 04/07/2021    HGB 12.2 04/07/2021    HCT 36.1 04/07/2021     04/07/2021       Allergies  Allergies   Allergen Reactions   • Codeine Sulfate Unknown (See Comments)     Pt took in the 70's-pt not sure of what happened   • Cozaar [Losartan] Unknown (See Comments)     Pt can not remember   • Crestor [Rosuvastatin] Unknown (See Comments)     Pt can not remember   • Dexlansoprazole Unknown (See Comments)     Pt can not remember   • Lipitor [Atorvastatin] Unknown (See Comments)     Pt can not remember   • Lisinopril Unknown (See Comments)     Pt can not remember   • Nexium [Esomeprazole Magnesium] " Unknown (See Comments)     Pt can not remember   • Norvasc [Amlodipine] Unknown (See Comments)     Pt can not remember   • Sulfadiazine Unknown (See Comments)     Pt can not remember   • Toprol Xl [Metoprolol Tartrate] Unknown (See Comments)     Pt can not remember   • Zetia [Ezetimibe] Unknown (See Comments)     Pt can not remember   • Zocor [Simvastatin] Unknown (See Comments)     Pt can not remember   • Augmentin [Amoxicillin-Pot Clavulanate] GI Intolerance     nausea   • Celebrex [Celecoxib] GI Intolerance       Current Medications    Current Outpatient Medications:   •  amitriptyline (ELAVIL) 25 MG tablet, 1 tablet each morning and 1 tablet at bedtime, Disp: 60 tablet, Rfl: 11  •  bumetanide (BUMEX) 0.5 MG tablet, TAKE 1 TABLET BY MOUTH EVERY OTHER DAY AS NEEDED FOR SWELLING, Disp: 90 tablet, Rfl: 2  •  cetirizine (ZyrTEC) 10 MG tablet, Take 10 mg by mouth daily., Disp: , Rfl:   •  ciprofloxacin (CIPRO) 500 MG tablet, Take 1 tablet by mouth 2 (Two) Times a Day for 10 days., Disp: 20 tablet, Rfl: 0  •  diclofenac (Voltaren) 1 % gel gel, Apply 4 g topically to the appropriate area as directed 4 (Four) Times a Day., Disp: 4 tube, Rfl: 11  •  fentaNYL (DURAGESIC) 12 MCG/HR, APPLY 1 PATCH TOPICALLY TO SKIN Q 72 H, Disp: , Rfl:   •  fentaNYL (DURAGESIC) 50 MCG/HR patch, APPLY 1 PATCH TOPICALLY Q 72 H, Disp: , Rfl:   •  fluticasone (FLONASE) 50 MCG/ACT nasal spray, fluticasone propionate 50 mcg/actuation nasal spray,suspension  2 sprays each nostril daily, Disp: , Rfl:   •  gabapentin (NEURONTIN) 600 MG tablet, Take 600 mg by mouth 3 (Three) Times a Day., Disp: , Rfl: 1  •  HYDROcodone-acetaminophen (NORCO)  MG per tablet, Take 1 tablet by mouth every 6 (six) hours as needed for moderate pain (4-6)., Disp: , Rfl:   •  pantoprazole (PROTONIX) 40 MG EC tablet, TAKE 1 TABLET BY MOUTH TWICE DAILY, Disp: 60 tablet, Rfl: 11  •  potassium chloride 10 MEQ CR tablet, TAKE 1 TABLET BY MOUTH ONCE DAILY, Disp: 30 tablet,  Rfl: 11  •  promethazine (PHENERGAN) 25 MG tablet, Take 1 tablet by mouth Every 6 (Six) Hours As Needed for Nausea or Vomiting., Disp: 30 tablet, Rfl: 5          ROS  Review of Systems   Cardiovascular: Negative for chest pain, dyspnea on exertion, irregular heartbeat and palpitations.   Respiratory: Negative for cough and snoring.        SOCIAL HX  Social History     Socioeconomic History   • Marital status:      Spouse name: Not on file   • Number of children: Not on file   • Years of education: Not on file   • Highest education level: Not on file   Tobacco Use   • Smoking status: Never Smoker   • Smokeless tobacco: Never Used   Substance and Sexual Activity   • Alcohol use: No   • Drug use: No   • Sexual activity: Defer       FAMILY HX  Family History   Problem Relation Age of Onset   • No Known Problems Mother    • Diabetes Father    • Cancer Son              Andi Seals III, MD, FACC

## 2021-05-24 ENCOUNTER — LAB (OUTPATIENT)
Dept: LAB | Facility: HOSPITAL | Age: 86
End: 2021-05-24

## 2021-05-24 DIAGNOSIS — D50.0 IRON DEFICIENCY ANEMIA DUE TO CHRONIC BLOOD LOSS: ICD-10-CM

## 2021-05-24 DIAGNOSIS — K31.819 GAVE (GASTRIC ANTRAL VASCULAR ECTASIA): ICD-10-CM

## 2021-05-24 LAB
ERYTHROCYTE [DISTWIDTH] IN BLOOD BY AUTOMATED COUNT: 15.1 % (ref 12.3–15.4)
FERRITIN SERPL-MCNC: 106.3 NG/ML (ref 13–150)
HCT VFR BLD AUTO: 34.3 % (ref 34–46.6)
HGB BLD-MCNC: 11.5 G/DL (ref 12–15.9)
MCH RBC QN AUTO: 28.5 PG (ref 26.6–33)
MCHC RBC AUTO-ENTMCNC: 33.4 G/DL (ref 31.5–35.7)
MCV RBC AUTO: 85.4 FL (ref 79–97)
PLATELET # BLD AUTO: 196 10*3/MM3 (ref 140–450)
PMV BLD AUTO: 7 FL (ref 6–12)
RBC # BLD AUTO: 4.02 10*6/MM3 (ref 3.77–5.28)
WBC # BLD AUTO: 4.8 10*3/MM3 (ref 3.4–10.8)

## 2021-05-24 PROCEDURE — 82728 ASSAY OF FERRITIN: CPT

## 2021-05-24 PROCEDURE — 85027 COMPLETE CBC AUTOMATED: CPT

## 2021-05-24 PROCEDURE — 36415 COLL VENOUS BLD VENIPUNCTURE: CPT

## 2021-05-26 ENCOUNTER — TELEPHONE (OUTPATIENT)
Dept: ONCOLOGY | Facility: CLINIC | Age: 86
End: 2021-05-26

## 2021-05-26 ENCOUNTER — TELEPHONE (OUTPATIENT)
Dept: CARDIOLOGY | Facility: CLINIC | Age: 86
End: 2021-05-26

## 2021-05-26 NOTE — TELEPHONE ENCOUNTER
Called Mrs Conner to remind her it is time to send in her scheduled pacemaker reading.  She will do so later today.

## 2021-06-05 PROCEDURE — 93294 REM INTERROG EVL PM/LDLS PM: CPT | Performed by: INTERNAL MEDICINE

## 2021-06-05 PROCEDURE — 93296 REM INTERROG EVL PM/IDS: CPT | Performed by: INTERNAL MEDICINE

## 2021-07-12 ENCOUNTER — LAB (OUTPATIENT)
Dept: LAB | Facility: HOSPITAL | Age: 86
End: 2021-07-12

## 2021-07-12 DIAGNOSIS — K31.819 GAVE (GASTRIC ANTRAL VASCULAR ECTASIA): ICD-10-CM

## 2021-07-12 DIAGNOSIS — D50.0 IRON DEFICIENCY ANEMIA DUE TO CHRONIC BLOOD LOSS: ICD-10-CM

## 2021-07-12 LAB
ERYTHROCYTE [DISTWIDTH] IN BLOOD BY AUTOMATED COUNT: 12.3 % (ref 12.3–15.4)
FERRITIN SERPL-MCNC: 54.93 NG/ML (ref 13–150)
HCT VFR BLD AUTO: 35.9 % (ref 34–46.6)
HGB BLD-MCNC: 11.8 G/DL (ref 12–15.9)
MCH RBC QN AUTO: 28.2 PG (ref 26.6–33)
MCHC RBC AUTO-ENTMCNC: 32.9 G/DL (ref 31.5–35.7)
MCV RBC AUTO: 85.6 FL (ref 79–97)
PLATELET # BLD AUTO: 231 10*3/MM3 (ref 140–450)
PMV BLD AUTO: 6.7 FL (ref 6–12)
RBC # BLD AUTO: 4.19 10*6/MM3 (ref 3.77–5.28)
WBC # BLD AUTO: 4.3 10*3/MM3 (ref 3.4–10.8)

## 2021-07-12 PROCEDURE — 85027 COMPLETE CBC AUTOMATED: CPT

## 2021-07-12 PROCEDURE — 36415 COLL VENOUS BLD VENIPUNCTURE: CPT

## 2021-07-12 PROCEDURE — 82728 ASSAY OF FERRITIN: CPT

## 2021-07-13 ENCOUNTER — TELEPHONE (OUTPATIENT)
Dept: ONCOLOGY | Facility: CLINIC | Age: 86
End: 2021-07-13

## 2021-07-13 DIAGNOSIS — K31.819 GAVE (GASTRIC ANTRAL VASCULAR ECTASIA): Primary | ICD-10-CM

## 2021-07-13 DIAGNOSIS — K90.9 MALABSORPTION IN THE ELDERLY: ICD-10-CM

## 2021-07-13 DIAGNOSIS — D50.0 IRON DEFICIENCY ANEMIA DUE TO CHRONIC BLOOD LOSS: ICD-10-CM

## 2021-07-13 DIAGNOSIS — Z78.9 MEDICATION INTOLERANCE: ICD-10-CM

## 2021-07-13 RX ORDER — SODIUM CHLORIDE 9 MG/ML
250 INJECTION, SOLUTION INTRAVENOUS ONCE
Status: CANCELLED | OUTPATIENT
Start: 2021-08-08

## 2021-07-13 RX ORDER — SODIUM CHLORIDE 9 MG/ML
250 INJECTION, SOLUTION INTRAVENOUS ONCE
Status: CANCELLED | OUTPATIENT
Start: 2021-08-03

## 2021-07-13 NOTE — TELEPHONE ENCOUNTER
Called patient per Dr. Hoff. Leaving vm that patient needs iron infusion and the  will be calling with appointment next week for Iron infusion.

## 2021-07-14 ENCOUNTER — OFFICE VISIT (OUTPATIENT)
Dept: ONCOLOGY | Facility: CLINIC | Age: 86
End: 2021-07-14

## 2021-07-14 VITALS
BODY MASS INDEX: 22.61 KG/M2 | OXYGEN SATURATION: 92 % | TEMPERATURE: 96.5 F | RESPIRATION RATE: 16 BRPM | HEIGHT: 63 IN | WEIGHT: 127.6 LBS | SYSTOLIC BLOOD PRESSURE: 186 MMHG | HEART RATE: 79 BPM | DIASTOLIC BLOOD PRESSURE: 71 MMHG

## 2021-07-14 DIAGNOSIS — D50.0 IRON DEFICIENCY ANEMIA DUE TO CHRONIC BLOOD LOSS: ICD-10-CM

## 2021-07-14 DIAGNOSIS — K31.819 GAVE (GASTRIC ANTRAL VASCULAR ECTASIA): Primary | ICD-10-CM

## 2021-07-14 PROCEDURE — 99214 OFFICE O/P EST MOD 30 MIN: CPT | Performed by: INTERNAL MEDICINE

## 2021-07-14 NOTE — PROGRESS NOTES
"PROBLEM LIST:  1. 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 Status post EGD colonoscopy  3. Status post Iv iron  infusion periodically  4. Bone Marrow biopsy  - normal - done by Dr. Reeves  5. 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  lady with normocytic anemia related to chronic blood loss.  She requires periodic iron infusions.  She has not required any transfusion support.  Last blood transfusion on 10/3/2019.     Denies any active bleeding.  Fatigue seems to be tolerating well.  Her biggest concern is her neck pain.  Which is related to significant DJD.    Past medical history, social history and family history was reviewed and unchanged from prior visit.    Review of Systems:    Review of Systems   Constitutional: Positive for fatigue.   HENT: Negative.    Eyes: Negative.    Respiratory: Positive for shortness of breath.    Cardiovascular: Negative.    Gastrointestinal: Negative.    Endocrine: Negative.    Genitourinary: Negative.    Musculoskeletal: Positive for back pain and neck pain.   Skin: Negative.    Allergic/Immunologic: Negative.    Neurological: Negative.    Hematological: Negative.    Psychiatric/Behavioral: Negative.       A comprehensive 14 point review of systems was performed and was negative except as mentioned.      Medications:  The current medication list was reviewed in the EMR    ALLERGIES:    Allergies   Allergen Reactions   • Codeine Sulfate Unknown (See Comments)     Pt took in the 70's-pt not sure of what happened   • Cozaar [Losartan] Unknown (See Comments)     Pt can not remember   • Crestor [Rosuvastatin] Unknown (See Comments)     Pt can not remember   • Dexlansoprazole Unknown (See Comments)     Pt can not remember   • Lipitor [Atorvastatin] Unknown (See Comments)     Pt can not remember   • Lisinopril Unknown (See Comments)     Pt can not remember   • Nexium " "[Esomeprazole Magnesium] Unknown (See Comments)     Pt can not remember   • Norvasc [Amlodipine] Unknown (See Comments)     Pt can not remember   • Sulfadiazine Unknown (See Comments)     Pt can not remember   • Toprol Xl [Metoprolol Tartrate] Unknown (See Comments)     Pt can not remember   • Zetia [Ezetimibe] Unknown (See Comments)     Pt can not remember   • Zocor [Simvastatin] Unknown (See Comments)     Pt can not remember   • Augmentin [Amoxicillin-Pot Clavulanate] GI Intolerance     nausea   • Celebrex [Celecoxib] GI Intolerance         Physical Exam    VITAL SIGNS:  BP (!) 186/71   Pulse 79   Temp 96.5 °F (35.8 °C) (Temporal)   Resp 16   Ht 160 cm (63\")   Wt 57.9 kg (127 lb 9.6 oz)   SpO2 92%   BMI 22.60 kg/m²      Performance Status: 1    General: well appearing, in no acute distress  HEENT: sclera anicteric, oropharynx clear, neck is supple  Lymphatics: no cervical, supraclavicular, or axillary adenopathy  Cardiovascular: regular rate and rhythm, no murmurs, rubs or gallops  Lungs: clear to auscultation bilaterally  Abdomen: soft, nontender, nondistended.  No palpable organomegaly  Extremities: no lower extremity edema  Skin: no rashes, lesions, bruising, or petechiae  Msk:  Shows no weakness of the large muscle groups  Psych: Mood is stable        RECENT LABS:    Lab Results   Component Value Date    WBC 4.30 07/12/2021    HGB 11.8 (L) 07/12/2021    HCT 35.9 07/12/2021    MCV 85.6 07/12/2021     07/12/2021     Lab Results   Component Value Date    FERRITIN 54.93 07/12/2021    FERRITIN 106.30 05/24/2021    FERRITIN 264.50 (H) 04/07/2021     Lab Results   Component Value Date    HGB 11.8 (L) 07/12/2021    HGB 11.5 (L) 05/24/2021    HGB 12.2 04/07/2021    HGB 8.9 (L) 03/01/2021    HGB 9.9 (L) 01/13/2021    HGB 10.5 (L) 12/11/2020    HGB 9.4 (L) 11/24/2020    HGB 10.0 (L) 11/10/2020    HGB 10.8 (L) 10/01/2020    HGB 11.3 (L) 08/14/2020     Ct Cervical Spine Without Contrast    Result Date: " 5/6/2020  1. Advanced multilevel degenerative disc disease, with probably moderate canal stenosis at C3-4 and C4-5 due to prominent posterior vertebral osteophytes, and some posterior element hypertrophy. 2. Milder degenerative changes, and bony foraminal stenosis elsewhere as discussed above by disc level. No evidence of acute or healing trauma.       Ct Thoracic Spine Without Contrast    Result Date: 5/6/2020  1. Advanced multilevel degenerative disc disease, with probably moderate canal stenosis at C3-4 and C4-5 due to prominent posterior vertebral osteophytes, and some posterior element hypertrophy. 2. Milder degenerative changes, and bony foraminal stenosis elsewhere as discussed above by disc level. No evidence of acute or healing trauma.  CT SCAN OF THE THORACIC SPINE: There is exaggeration of the normal thoracic lordosis. No significant focal subluxation or evidence of vertebral compression deformity is seen. There is moderate multilevel degenerative disc disease. No destructive or blastic bony lesions are seen. Coronal images show a minimal dextroconvex scoliosis. Axial bone window images show no evidence of acute bony trauma. Soft tissue axial images of the thoracic spine show the usual limited detail of the canal for non-myelographic scans. The thoracic canal appears congenitally rather ample. No gross evidence of HNP or canal stenosis is appreciated down to the level of T9-T10 where there is a prominent posterior vertebral osteophyte but only borderline canal narrowing. Below this level, the canal appears unremarkable. No pathologic paraspinous mass or inflammatory change is seen. Review of the included portions of the lungs shows a mild patchy, widely distributed appearance of faint groundglass opacity, not well defined, more central than peripheral, and with no crazy paving pattern or lung consolidation. This is very similar to the appearance of the 10/05/2019 chest radiograph which showed mild  interstitial edema. Although technically indeterminate for Covid-19 pneumonia, there are no typical features of Covid-19, and findings are explainable on the basis of the patient's previously noted interstitial disease alone.  IMPRESSION: 1. No evidence of acute thoracic spine trauma, advanced degenerative disc disease, or gross evidence of thoracic spinal stenosis. 2. Mild and rather diffuse, faint groundglass opacity of the lungs as noted. Although technically indeterminate for Covid-19, the findings are consistent with the mild interstitial edema pattern seen on the 10/05/2019 exam. According to the CT technologist, the patient has no complaint of any current or recent respiratory symptoms.  D:  05/06/2020 E:  05/06/2020         Assessment/Plan    1. normocytic anemia requiring periodic blood transfusions secondary to bleeding AVMs and a Watermelon stomach.  Underwent argon treatment with Dr. Llamas.  Her hemoglobin is starting to drop and her ferritin has dropped considerably.  We will plan to infuse her with iron next week.. continue periodic iron infusions.      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    3.  Severe neck pain related to DJD        Rahat Morris MD  [facility]    7/14/2021

## 2021-07-23 ENCOUNTER — APPOINTMENT (OUTPATIENT)
Dept: CT IMAGING | Facility: HOSPITAL | Age: 86
End: 2021-07-23

## 2021-07-23 ENCOUNTER — APPOINTMENT (OUTPATIENT)
Dept: GENERAL RADIOLOGY | Facility: HOSPITAL | Age: 86
End: 2021-07-23

## 2021-07-23 ENCOUNTER — HOSPITAL ENCOUNTER (INPATIENT)
Facility: HOSPITAL | Age: 86
LOS: 2 days | Discharge: HOME | End: 2021-07-26
Attending: EMERGENCY MEDICINE | Admitting: FAMILY MEDICINE

## 2021-07-23 DIAGNOSIS — R40.0 SOMNOLENCE: ICD-10-CM

## 2021-07-23 DIAGNOSIS — R09.02 HYPOXIA: Primary | ICD-10-CM

## 2021-07-23 LAB
ALBUMIN SERPL-MCNC: 4.2 G/DL (ref 3.5–5.2)
ALBUMIN/GLOB SERPL: 1.4 G/DL
ALP SERPL-CCNC: 67 U/L (ref 39–117)
ALT SERPL W P-5'-P-CCNC: 6 U/L (ref 1–33)
ANION GAP SERPL CALCULATED.3IONS-SCNC: 12 MMOL/L (ref 5–15)
AST SERPL-CCNC: 20 U/L (ref 1–32)
BACTERIA UR QL AUTO: ABNORMAL /HPF
BASE EXCESS BLDA CALC-SCNC: 3 MMOL/L (ref -5–5)
BASOPHILS # BLD AUTO: 0.02 10*3/MM3 (ref 0–0.2)
BASOPHILS NFR BLD AUTO: 0.3 % (ref 0–1.5)
BILIRUB SERPL-MCNC: 0.9 MG/DL (ref 0–1.2)
BILIRUB UR QL STRIP: NEGATIVE
BUN SERPL-MCNC: 15 MG/DL (ref 8–23)
BUN/CREAT SERPL: 15.3 (ref 7–25)
CA-I BLDA-SCNC: 1.21 MMOL/L (ref 1.2–1.32)
CALCIUM SPEC-SCNC: 9.2 MG/DL (ref 8.6–10.5)
CHLORIDE SERPL-SCNC: 104 MMOL/L (ref 98–107)
CLARITY UR: CLEAR
CO2 BLDA-SCNC: 30 MMOL/L (ref 24–29)
CO2 SERPL-SCNC: 26 MMOL/L (ref 22–29)
COLOR UR: YELLOW
CREAT BLDA-MCNC: 1 MG/DL (ref 0.6–1.3)
CREAT SERPL-MCNC: 0.98 MG/DL (ref 0.57–1)
DEPRECATED RDW RBC AUTO: 41.1 FL (ref 37–54)
EOSINOPHIL # BLD AUTO: 0.08 10*3/MM3 (ref 0–0.4)
EOSINOPHIL NFR BLD AUTO: 1.1 % (ref 0.3–6.2)
ERYTHROCYTE [DISTWIDTH] IN BLOOD BY AUTOMATED COUNT: 12.3 % (ref 12.3–15.4)
FLUAV RNA RESP QL NAA+PROBE: NOT DETECTED
FLUBV RNA RESP QL NAA+PROBE: NOT DETECTED
GFR SERPL CREATININE-BSD FRML MDRD: 53 ML/MIN/1.73
GLOBULIN UR ELPH-MCNC: 3.1 GM/DL
GLUCOSE BLDC GLUCOMTR-MCNC: 102 MG/DL (ref 70–130)
GLUCOSE SERPL-MCNC: 102 MG/DL (ref 65–99)
GLUCOSE UR STRIP-MCNC: NEGATIVE MG/DL
HCO3 BLDA-SCNC: 28.6 MMOL/L (ref 22–26)
HCT VFR BLD AUTO: 37.8 % (ref 34–46.6)
HCT VFR BLDA CALC: 36 % (ref 38–51)
HGB BLD-MCNC: 11.9 G/DL (ref 12–15.9)
HGB BLDA-MCNC: 12.2 G/DL (ref 12–17)
HGB UR QL STRIP.AUTO: NEGATIVE
HOLD SPECIMEN: NORMAL
HYALINE CASTS UR QL AUTO: ABNORMAL /LPF
IMM GRANULOCYTES # BLD AUTO: 0.02 10*3/MM3 (ref 0–0.05)
IMM GRANULOCYTES NFR BLD AUTO: 0.3 % (ref 0–0.5)
KETONES UR QL STRIP: NEGATIVE
LEUKOCYTE ESTERASE UR QL STRIP.AUTO: ABNORMAL
LYMPHOCYTES # BLD AUTO: 0.81 10*3/MM3 (ref 0.7–3.1)
LYMPHOCYTES NFR BLD AUTO: 11.5 % (ref 19.6–45.3)
MAGNESIUM SERPL-MCNC: 2.1 MG/DL (ref 1.6–2.4)
MCH RBC QN AUTO: 28.7 PG (ref 26.6–33)
MCHC RBC AUTO-ENTMCNC: 31.5 G/DL (ref 31.5–35.7)
MCV RBC AUTO: 91.1 FL (ref 79–97)
MONOCYTES # BLD AUTO: 0.39 10*3/MM3 (ref 0.1–0.9)
MONOCYTES NFR BLD AUTO: 5.5 % (ref 5–12)
NEUTROPHILS NFR BLD AUTO: 5.74 10*3/MM3 (ref 1.7–7)
NEUTROPHILS NFR BLD AUTO: 81.3 % (ref 42.7–76)
NITRITE UR QL STRIP: NEGATIVE
NRBC BLD AUTO-RTO: 0 /100 WBC (ref 0–0.2)
PCO2 BLDA: 48.8 MM HG (ref 35–45)
PH BLDA: 7.38 PH UNITS (ref 7.35–7.6)
PH UR STRIP.AUTO: 6 [PH] (ref 5–8)
PLATELET # BLD AUTO: 203 10*3/MM3 (ref 140–450)
PMV BLD AUTO: 9.6 FL (ref 6–12)
PO2 BLDA: 19 MMHG (ref 80–105)
POTASSIUM BLDA-SCNC: 4.4 MMOL/L (ref 3.5–4.9)
POTASSIUM SERPL-SCNC: 4.2 MMOL/L (ref 3.5–5.2)
PROT SERPL-MCNC: 7.3 G/DL (ref 6–8.5)
PROT UR QL STRIP: ABNORMAL
RBC # BLD AUTO: 4.15 10*6/MM3 (ref 3.77–5.28)
RBC # UR: ABNORMAL /HPF
REF LAB TEST METHOD: ABNORMAL
SAO2 % BLDA: 28 % (ref 95–98)
SARS-COV-2 RNA RESP QL NAA+PROBE: NOT DETECTED
SODIUM BLD-SCNC: 141 MMOL/L (ref 138–146)
SODIUM SERPL-SCNC: 142 MMOL/L (ref 136–145)
SP GR UR STRIP: 1.02 (ref 1–1.03)
SQUAMOUS #/AREA URNS HPF: ABNORMAL /HPF
TROPONIN T SERPL-MCNC: <0.01 NG/ML (ref 0–0.03)
UROBILINOGEN UR QL STRIP: ABNORMAL
WBC # BLD AUTO: 7.06 10*3/MM3 (ref 3.4–10.8)
WBC UR QL AUTO: ABNORMAL /HPF
WHOLE BLOOD HOLD SPECIMEN: NORMAL
WHOLE BLOOD HOLD SPECIMEN: NORMAL

## 2021-07-23 PROCEDURE — 99285 EMERGENCY DEPT VISIT HI MDM: CPT

## 2021-07-23 PROCEDURE — 93005 ELECTROCARDIOGRAM TRACING: CPT

## 2021-07-23 PROCEDURE — 83880 ASSAY OF NATRIURETIC PEPTIDE: CPT | Performed by: EMERGENCY MEDICINE

## 2021-07-23 PROCEDURE — 82330 ASSAY OF CALCIUM: CPT

## 2021-07-23 PROCEDURE — 70450 CT HEAD/BRAIN W/O DYE: CPT

## 2021-07-23 PROCEDURE — 82803 BLOOD GASES ANY COMBINATION: CPT

## 2021-07-23 PROCEDURE — 82565 ASSAY OF CREATININE: CPT

## 2021-07-23 PROCEDURE — 85014 HEMATOCRIT: CPT

## 2021-07-23 PROCEDURE — 83735 ASSAY OF MAGNESIUM: CPT | Performed by: EMERGENCY MEDICINE

## 2021-07-23 PROCEDURE — 81001 URINALYSIS AUTO W/SCOPE: CPT | Performed by: EMERGENCY MEDICINE

## 2021-07-23 PROCEDURE — 82947 ASSAY GLUCOSE BLOOD QUANT: CPT

## 2021-07-23 PROCEDURE — 71045 X-RAY EXAM CHEST 1 VIEW: CPT

## 2021-07-23 PROCEDURE — 85025 COMPLETE CBC W/AUTO DIFF WBC: CPT

## 2021-07-23 PROCEDURE — 80053 COMPREHEN METABOLIC PANEL: CPT | Performed by: EMERGENCY MEDICINE

## 2021-07-23 PROCEDURE — 84295 ASSAY OF SERUM SODIUM: CPT

## 2021-07-23 PROCEDURE — 93005 ELECTROCARDIOGRAM TRACING: CPT | Performed by: EMERGENCY MEDICINE

## 2021-07-23 PROCEDURE — 84132 ASSAY OF SERUM POTASSIUM: CPT

## 2021-07-23 PROCEDURE — 87636 SARSCOV2 & INF A&B AMP PRB: CPT | Performed by: EMERGENCY MEDICINE

## 2021-07-23 PROCEDURE — 84484 ASSAY OF TROPONIN QUANT: CPT | Performed by: EMERGENCY MEDICINE

## 2021-07-23 PROCEDURE — 87086 URINE CULTURE/COLONY COUNT: CPT | Performed by: EMERGENCY MEDICINE

## 2021-07-23 RX ORDER — SODIUM CHLORIDE 0.9 % (FLUSH) 0.9 %
10 SYRINGE (ML) INJECTION AS NEEDED
Status: DISCONTINUED | OUTPATIENT
Start: 2021-07-23 | End: 2021-07-26 | Stop reason: HOSPADM

## 2021-07-23 RX ORDER — FUROSEMIDE 40 MG/1
40 TABLET ORAL ONCE
Status: DISCONTINUED | OUTPATIENT
Start: 2021-07-23 | End: 2021-07-23

## 2021-07-23 RX ORDER — FUROSEMIDE 10 MG/ML
40 INJECTION INTRAMUSCULAR; INTRAVENOUS ONCE
Status: COMPLETED | OUTPATIENT
Start: 2021-07-23 | End: 2021-07-24

## 2021-07-23 NOTE — ED PROVIDER NOTES
Subjective   89-year-old female brought in by daughter for evaluation of altered no status.  The patient reportedly began to appear more confused starting yesterday.  She went to sleep normally last night but did not wake until approximately 3 PM.  This was not outside of the patient's typical course.  Upon initial evaluation by the triage nurse there was concern that she had right-sided deficits.  Care for the patient was made a stroke alert.  On my evaluation the CT scanner however she had no focal deficits.  She had no focal complaints at this given time.  She denies recent chest pain or abdominal pain.  No reported nausea or vomiting or change in oral intake.  No reported change in bowel or urinary function clued no dysuria, frequency, urgency.  She denies any upper respiratory congestion, sore throat, rhinorrhea, cough, shortness of breath, fever, body aches, chills, change in sense of taste or smell.  No other acute complaints at this time.          Review of Systems   Constitutional: Positive for fatigue. Negative for chills and fever.   HENT: Negative for congestion, ear pain, postnasal drip, sinus pressure and sore throat.    Eyes: Negative for pain, redness and visual disturbance.   Respiratory: Negative for cough, chest tightness and shortness of breath.    Cardiovascular: Negative for chest pain, palpitations and leg swelling.   Gastrointestinal: Negative for abdominal pain, anal bleeding, blood in stool, diarrhea, nausea and vomiting.   Endocrine: Negative for polydipsia and polyuria.   Genitourinary: Negative for difficulty urinating, dysuria, frequency and urgency.   Musculoskeletal: Negative for arthralgias, back pain and neck pain.   Skin: Negative for pallor and rash.   Allergic/Immunologic: Negative for environmental allergies and immunocompromised state.   Neurological: Negative for dizziness, weakness and headaches.   Hematological: Negative for adenopathy.   Psychiatric/Behavioral: Positive for  confusion and decreased concentration. Negative for self-injury and suicidal ideas. The patient is not nervous/anxious.    All other systems reviewed and are negative.      Past Medical History:   Diagnosis Date   • Anemia    • Arthritis    • Fibromyalgia    • GERD (gastroesophageal reflux disease)    • History of transfusion    • Hypertension    • Kidney stone    • MG (myasthenia gravis) (CMS/[facility])     history of   • Pacemaker        Allergies   Allergen Reactions   • Codeine Sulfate Unknown (See Comments)     Pt took in the 70's-pt not sure of what happened   • Cozaar [Losartan] Unknown (See Comments)     Pt can not remember   • Crestor [Rosuvastatin] Unknown (See Comments)     Pt can not remember   • Dexlansoprazole Unknown (See Comments)     Pt can not remember   • Lipitor [Atorvastatin] Unknown (See Comments)     Pt can not remember   • Lisinopril Unknown (See Comments)     Pt can not remember   • Nexium [Esomeprazole Magnesium] Unknown (See Comments)     Pt can not remember   • Norvasc [Amlodipine] Unknown (See Comments)     Pt can not remember   • Sulfadiazine Unknown (See Comments)     Pt can not remember   • Toprol Xl [Metoprolol Tartrate] Unknown (See Comments)     Pt can not remember   • Zetia [Ezetimibe] Unknown (See Comments)     Pt can not remember   • Zocor [Simvastatin] Unknown (See Comments)     Pt can not remember   • Augmentin [Amoxicillin-Pot Clavulanate] GI Intolerance     nausea   • Celebrex [Celecoxib] GI Intolerance       Past Surgical History:   Procedure Laterality Date   • CARDIAC SURGERY     • COLONOSCOPY     • HEMORRHOIDECTOMY     • TUBAL ABDOMINAL LIGATION     • UPPER GASTROINTESTINAL ENDOSCOPY     • UPPER GASTROINTESTINAL ENDOSCOPY  05/29/2018       Family History   Problem Relation Age of Onset   • No Known Problems Mother    • Diabetes Father    • Cancer Son        Social History     Socioeconomic History   • Marital status:      Spouse name: Not on file   • Number of children:  Not on file   • Years of education: Not on file   • Highest education level: Not on file   Tobacco Use   • Smoking status: Never Smoker   • Smokeless tobacco: Never Used   Substance and Sexual Activity   • Alcohol use: No   • Drug use: No   • Sexual activity: Defer           Objective   Physical Exam  Vitals and nursing note reviewed.   Constitutional:       General: She is not in acute distress.     Appearance: Normal appearance. She is well-developed. She is not toxic-appearing or diaphoretic.   HENT:      Head: Normocephalic and atraumatic.      Right Ear: External ear normal.      Left Ear: External ear normal.      Nose: Nose normal.   Eyes:      General: Lids are normal.      Pupils: Pupils are equal, round, and reactive to light.   Neck:      Trachea: No tracheal deviation.   Cardiovascular:      Rate and Rhythm: Regular rhythm. Tachycardia present.      Pulses: No decreased pulses.      Heart sounds: Normal heart sounds. No murmur heard.   No friction rub. No gallop.    Pulmonary:      Effort: Pulmonary effort is normal. No respiratory distress.      Breath sounds: Normal breath sounds. No decreased breath sounds, wheezing, rhonchi or rales.   Abdominal:      General: Bowel sounds are normal.      Palpations: Abdomen is soft.      Tenderness: There is no abdominal tenderness. There is no guarding or rebound.   Musculoskeletal:         General: No deformity. Normal range of motion.      Cervical back: Normal range of motion and neck supple.   Lymphadenopathy:      Cervical: No cervical adenopathy.   Skin:     General: Skin is warm and dry.      Findings: No rash.   Neurological:      Mental Status: She is alert and oriented to person, place, and time.      Cranial Nerves: No cranial nerve deficit.      Sensory: No sensory deficit.   Psychiatric:         Speech: Speech normal.         Behavior: Behavior normal.         Thought Content: Thought content normal.         Judgment: Judgment normal.          Procedures           ED Course                                           MDM  Number of Diagnoses or Management Options  Hypoxia: new and requires workup  Somnolence: new and requires workup  Diagnosis management comments: The patient presents for evaluation of somnolence and altered mental status.  The patient reportedly slept for 17 hours.    Upon my evaluation in the ER she is awake and alert and able answer questions appropriate.  No focal deficits on exam to support stroke.  CT scan of the head shows no acute abnormalities.    Urine is clean with no sign of infection, chest x-ray clear, and Covid and  influenza test are negative.    On repeat evaluation the patient's oxygen saturation were identified to be 89%.  She does not have a history of oxygen dependence or underlying lung disease.    She initially was very insistent on going home, but eventually agreed to come in the hospital.  She has crackles on auscultation of lungs and I am concerned that she may have some mild fluid buildup secondary to CHF.  BNP has been ordered.  40 mg of IV Lasix will be ordered.    I discussed the patient with the hospitalist, Dr. Joshua, will admit.       Amount and/or Complexity of Data Reviewed  Clinical lab tests: ordered and reviewed  Tests in the radiology section of CPT®: ordered and reviewed  Obtain history from someone other than the patient: yes  Review and summarize past medical records: yes  Discuss the patient with other providers: yes  Independent visualization of images, tracings, or specimens: yes        Final diagnoses:   Hypoxia   Somnolence       ED Disposition  ED Disposition     ED Disposition Condition Comment    Decision to Admit            Manan Garcia MD  [address]  [place] [address]  [phone]    In 2 days           Medication List      No changes were made to your prescriptions during this visit.          Harriett Isaac MD  07/24/21 0000

## 2021-07-24 ENCOUNTER — APPOINTMENT (OUTPATIENT)
Dept: CARDIOLOGY | Facility: HOSPITAL | Age: 86
End: 2021-07-24

## 2021-07-24 PROBLEM — R41.82 AMS (ALTERED MENTAL STATUS): Status: ACTIVE | Noted: 2021-07-24

## 2021-07-24 PROBLEM — I50.9 ACUTE CONGESTIVE HEART FAILURE (HCC): Status: ACTIVE | Noted: 2021-07-24

## 2021-07-24 LAB
ANION GAP SERPL CALCULATED.3IONS-SCNC: 15 MMOL/L (ref 5–15)
BH CV ECHO MEAS - AO ROOT AREA (BSA CORRECTED): 1.9
BH CV ECHO MEAS - AO ROOT AREA: 7.3 CM^2
BH CV ECHO MEAS - AO ROOT DIAM: 3.1 CM
BH CV ECHO MEAS - BSA(HAYCOCK): 1.6 M^2
BH CV ECHO MEAS - BSA: 1.6 M^2
BH CV ECHO MEAS - BZI_BMI: 22.5 KILOGRAMS/M^2
BH CV ECHO MEAS - BZI_METRIC_HEIGHT: 160 CM
BH CV ECHO MEAS - BZI_METRIC_WEIGHT: 57.6 KG
BH CV ECHO MEAS - EDV(CUBED): 69.8 ML
BH CV ECHO MEAS - EDV(TEICH): 74.9 ML
BH CV ECHO MEAS - EF(CUBED): 63.4 %
BH CV ECHO MEAS - EF(MOD-BP): 60 %
BH CV ECHO MEAS - EF(TEICH): 55.4 %
BH CV ECHO MEAS - ESV(CUBED): 25.5 ML
BH CV ECHO MEAS - ESV(TEICH): 33.4 ML
BH CV ECHO MEAS - FS: 28.5 %
BH CV ECHO MEAS - IVS/LVPW: 1.2
BH CV ECHO MEAS - IVSD: 1.2 CM
BH CV ECHO MEAS - LA DIMENSION: 4.2 CM
BH CV ECHO MEAS - LA/AO: 1.4
BH CV ECHO MEAS - LV MASS(C)D: 159.4 GRAMS
BH CV ECHO MEAS - LV MASS(C)DI: 100 GRAMS/M^2
BH CV ECHO MEAS - LVIDD: 4.1 CM
BH CV ECHO MEAS - LVIDS: 2.9 CM
BH CV ECHO MEAS - LVOT AREA (M): 3.1 CM^2
BH CV ECHO MEAS - LVOT AREA: 3.1 CM^2
BH CV ECHO MEAS - LVOT DIAM: 2 CM
BH CV ECHO MEAS - LVPWD: 1 CM
BH CV ECHO MEAS - SI(CUBED): 27.8 ML/M^2
BH CV ECHO MEAS - SI(TEICH): 26 ML/M^2
BH CV ECHO MEAS - SV(CUBED): 44.3 ML
BH CV ECHO MEAS - SV(TEICH): 41.5 ML
BUN SERPL-MCNC: 16 MG/DL (ref 8–23)
BUN/CREAT SERPL: 17 (ref 7–25)
CALCIUM SPEC-SCNC: 10.1 MG/DL (ref 8.6–10.5)
CHLORIDE SERPL-SCNC: 97 MMOL/L (ref 98–107)
CHOLEST SERPL-MCNC: 214 MG/DL (ref 0–200)
CO2 SERPL-SCNC: 28 MMOL/L (ref 22–29)
CREAT SERPL-MCNC: 0.94 MG/DL (ref 0.57–1)
DEPRECATED RDW RBC AUTO: 39.9 FL (ref 37–54)
ERYTHROCYTE [DISTWIDTH] IN BLOOD BY AUTOMATED COUNT: 12.1 % (ref 12.3–15.4)
FOLATE SERPL-MCNC: 11.7 NG/ML (ref 4.78–24.2)
GFR SERPL CREATININE-BSD FRML MDRD: 56 ML/MIN/1.73
GLUCOSE SERPL-MCNC: 112 MG/DL (ref 65–99)
HCT VFR BLD AUTO: 41 % (ref 34–46.6)
HDLC SERPL-MCNC: 73 MG/DL (ref 40–60)
HGB BLD-MCNC: 12.9 G/DL (ref 12–15.9)
LDLC SERPL CALC-MCNC: 130 MG/DL (ref 0–100)
LDLC/HDLC SERPL: 1.76 {RATIO}
LV EF 2D ECHO EST: 60 %
MAXIMAL PREDICTED HEART RATE: 131 BPM
MCH RBC QN AUTO: 28.2 PG (ref 26.6–33)
MCHC RBC AUTO-ENTMCNC: 31.5 G/DL (ref 31.5–35.7)
MCV RBC AUTO: 89.7 FL (ref 79–97)
NT-PROBNP SERPL-MCNC: 1703 PG/ML (ref 0–1800)
PLATELET # BLD AUTO: 199 10*3/MM3 (ref 140–450)
PMV BLD AUTO: 10.1 FL (ref 6–12)
POTASSIUM SERPL-SCNC: 3.7 MMOL/L (ref 3.5–5.2)
QT INTERVAL: 428 MS
QT INTERVAL: 444 MS
QTC INTERVAL: 492 MS
QTC INTERVAL: 493 MS
RBC # BLD AUTO: 4.57 10*6/MM3 (ref 3.77–5.28)
SODIUM SERPL-SCNC: 140 MMOL/L (ref 136–145)
STRESS TARGET HR: 111 BPM
TRIGL SERPL-MCNC: 63 MG/DL (ref 0–150)
TSH SERPL DL<=0.05 MIU/L-ACNC: 1.95 UIU/ML (ref 0.27–4.2)
VIT B12 BLD-MCNC: 407 PG/ML (ref 211–946)
VLDLC SERPL-MCNC: 11 MG/DL (ref 5–40)
WBC # BLD AUTO: 5.51 10*3/MM3 (ref 3.4–10.8)

## 2021-07-24 PROCEDURE — 99222 1ST HOSP IP/OBS MODERATE 55: CPT | Performed by: FAMILY MEDICINE

## 2021-07-24 PROCEDURE — 94799 UNLISTED PULMONARY SVC/PX: CPT

## 2021-07-24 PROCEDURE — 93308 TTE F-UP OR LMTD: CPT

## 2021-07-24 PROCEDURE — 84443 ASSAY THYROID STIM HORMONE: CPT | Performed by: NURSE PRACTITIONER

## 2021-07-24 PROCEDURE — 25010000002 PROMETHAZINE PER 50 MG: Performed by: INTERNAL MEDICINE

## 2021-07-24 PROCEDURE — 63710000001 PROMETHAZINE PER 12.5 MG: Performed by: FAMILY MEDICINE

## 2021-07-24 PROCEDURE — 85027 COMPLETE CBC AUTOMATED: CPT | Performed by: NURSE PRACTITIONER

## 2021-07-24 PROCEDURE — 93321 DOPPLER ECHO F-UP/LMTD STD: CPT

## 2021-07-24 PROCEDURE — 25010000002 NA FERRIC GLUC CPLX PER 12.5 MG: Performed by: INTERNAL MEDICINE

## 2021-07-24 PROCEDURE — 80061 LIPID PANEL: CPT | Performed by: NURSE PRACTITIONER

## 2021-07-24 PROCEDURE — 82746 ASSAY OF FOLIC ACID SERUM: CPT | Performed by: NURSE PRACTITIONER

## 2021-07-24 PROCEDURE — 82607 VITAMIN B-12: CPT | Performed by: NURSE PRACTITIONER

## 2021-07-24 PROCEDURE — 25010000002 ENOXAPARIN PER 10 MG: Performed by: NURSE PRACTITIONER

## 2021-07-24 PROCEDURE — 80048 BASIC METABOLIC PNL TOTAL CA: CPT | Performed by: NURSE PRACTITIONER

## 2021-07-24 PROCEDURE — 25010000002 FUROSEMIDE PER 20 MG: Performed by: EMERGENCY MEDICINE

## 2021-07-24 PROCEDURE — 25010000002 DIPHENHYDRAMINE PER 50 MG: Performed by: INTERNAL MEDICINE

## 2021-07-24 PROCEDURE — 93325 DOPPLER ECHO COLOR FLOW MAPG: CPT

## 2021-07-24 RX ORDER — FENTANYL 12 UG/H
1 PATCH TRANSDERMAL
Status: DISCONTINUED | OUTPATIENT
Start: 2021-07-25 | End: 2021-07-26 | Stop reason: HOSPADM

## 2021-07-24 RX ORDER — GABAPENTIN 300 MG/1
600 CAPSULE ORAL EVERY 8 HOURS SCHEDULED
Status: DISCONTINUED | OUTPATIENT
Start: 2021-07-24 | End: 2021-07-26 | Stop reason: HOSPADM

## 2021-07-24 RX ORDER — QUETIAPINE FUMARATE 25 MG/1
50 TABLET, FILM COATED ORAL ONCE
Status: COMPLETED | OUTPATIENT
Start: 2021-07-24 | End: 2021-07-24

## 2021-07-24 RX ORDER — AMITRIPTYLINE HYDROCHLORIDE 25 MG/1
25 TABLET, FILM COATED ORAL EVERY 12 HOURS SCHEDULED
Status: DISCONTINUED | OUTPATIENT
Start: 2021-07-24 | End: 2021-07-26 | Stop reason: HOSPADM

## 2021-07-24 RX ORDER — FLUTICASONE PROPIONATE 50 MCG
2 SPRAY, SUSPENSION (ML) NASAL DAILY
Status: DISCONTINUED | OUTPATIENT
Start: 2021-07-24 | End: 2021-07-24

## 2021-07-24 RX ORDER — HYDROCODONE BITARTRATE AND ACETAMINOPHEN 10; 325 MG/1; MG/1
1 TABLET ORAL EVERY 6 HOURS PRN
Status: DISCONTINUED | OUTPATIENT
Start: 2021-07-24 | End: 2021-07-26 | Stop reason: HOSPADM

## 2021-07-24 RX ORDER — CETIRIZINE HYDROCHLORIDE 10 MG/1
5 TABLET ORAL DAILY
Status: DISCONTINUED | OUTPATIENT
Start: 2021-07-24 | End: 2021-07-26 | Stop reason: HOSPADM

## 2021-07-24 RX ORDER — SODIUM CHLORIDE 0.9 % (FLUSH) 0.9 %
10 SYRINGE (ML) INJECTION AS NEEDED
Status: DISCONTINUED | OUTPATIENT
Start: 2021-07-24 | End: 2021-07-26 | Stop reason: HOSPADM

## 2021-07-24 RX ORDER — FENTANYL 50 UG/H
1 PATCH TRANSDERMAL
Status: DISCONTINUED | OUTPATIENT
Start: 2021-07-24 | End: 2021-07-24

## 2021-07-24 RX ORDER — FENTANYL 12 UG/H
1 PATCH TRANSDERMAL
Status: DISCONTINUED | OUTPATIENT
Start: 2021-07-24 | End: 2021-07-24

## 2021-07-24 RX ORDER — SODIUM CHLORIDE 0.9 % (FLUSH) 0.9 %
10 SYRINGE (ML) INJECTION EVERY 12 HOURS SCHEDULED
Status: DISCONTINUED | OUTPATIENT
Start: 2021-07-24 | End: 2021-07-26 | Stop reason: HOSPADM

## 2021-07-24 RX ORDER — FENTANYL 50 UG/H
1 PATCH TRANSDERMAL
Status: DISCONTINUED | OUTPATIENT
Start: 2021-07-25 | End: 2021-07-26 | Stop reason: HOSPADM

## 2021-07-24 RX ORDER — DIPHENHYDRAMINE HYDROCHLORIDE 50 MG/ML
25 INJECTION INTRAMUSCULAR; INTRAVENOUS ONCE
Status: COMPLETED | OUTPATIENT
Start: 2021-07-24 | End: 2021-07-24

## 2021-07-24 RX ORDER — PANTOPRAZOLE SODIUM 40 MG/1
40 TABLET, DELAYED RELEASE ORAL 2 TIMES DAILY
Status: DISCONTINUED | OUTPATIENT
Start: 2021-07-24 | End: 2021-07-24

## 2021-07-24 RX ORDER — POTASSIUM CHLORIDE 750 MG/1
10 CAPSULE, EXTENDED RELEASE ORAL DAILY
Status: DISCONTINUED | OUTPATIENT
Start: 2021-07-24 | End: 2021-07-26 | Stop reason: HOSPADM

## 2021-07-24 RX ORDER — PANTOPRAZOLE SODIUM 40 MG/1
40 TABLET, DELAYED RELEASE ORAL 2 TIMES DAILY
Status: DISCONTINUED | OUTPATIENT
Start: 2021-07-24 | End: 2021-07-26 | Stop reason: HOSPADM

## 2021-07-24 RX ORDER — FLUTICASONE PROPIONATE 50 MCG
2 SPRAY, SUSPENSION (ML) NASAL DAILY
Status: DISCONTINUED | OUTPATIENT
Start: 2021-07-24 | End: 2021-07-26 | Stop reason: HOSPADM

## 2021-07-24 RX ORDER — PROMETHAZINE HYDROCHLORIDE 12.5 MG/1
12.5 TABLET ORAL ONCE
Status: COMPLETED | OUTPATIENT
Start: 2021-07-24 | End: 2021-07-24

## 2021-07-24 RX ADMIN — PANTOPRAZOLE SODIUM 40 MG: 40 TABLET, DELAYED RELEASE ORAL at 06:20

## 2021-07-24 RX ADMIN — GABAPENTIN 600 MG: 300 CAPSULE ORAL at 06:11

## 2021-07-24 RX ADMIN — PROMETHAZINE HYDROCHLORIDE 12.5 MG: 12.5 TABLET ORAL at 08:19

## 2021-07-24 RX ADMIN — POTASSIUM CHLORIDE 10 MEQ: 750 CAPSULE, EXTENDED RELEASE ORAL at 09:57

## 2021-07-24 RX ADMIN — HYDROCODONE BITARTRATE AND ACETAMINOPHEN 1 TABLET: 10; 325 TABLET ORAL at 20:58

## 2021-07-24 RX ADMIN — PROMETHAZINE HYDROCHLORIDE 12.5 MG: 25 INJECTION INTRAMUSCULAR; INTRAVENOUS at 18:39

## 2021-07-24 RX ADMIN — QUETIAPINE FUMARATE 50 MG: 25 TABLET ORAL at 21:48

## 2021-07-24 RX ADMIN — GABAPENTIN 600 MG: 300 CAPSULE ORAL at 13:19

## 2021-07-24 RX ADMIN — FLUTICASONE PROPIONATE 2 SPRAY: 50 SPRAY, METERED NASAL at 08:19

## 2021-07-24 RX ADMIN — ENOXAPARIN SODIUM 30 MG: 30 INJECTION SUBCUTANEOUS at 09:57

## 2021-07-24 RX ADMIN — AMITRIPTYLINE HYDROCHLORIDE 25 MG: 25 TABLET, FILM COATED ORAL at 09:56

## 2021-07-24 RX ADMIN — AMITRIPTYLINE HYDROCHLORIDE 25 MG: 25 TABLET, FILM COATED ORAL at 20:01

## 2021-07-24 RX ADMIN — FUROSEMIDE 40 MG: 10 INJECTION, SOLUTION INTRAVENOUS at 00:02

## 2021-07-24 RX ADMIN — GABAPENTIN 600 MG: 300 CAPSULE ORAL at 20:01

## 2021-07-24 RX ADMIN — PANTOPRAZOLE SODIUM 40 MG: 40 TABLET, DELAYED RELEASE ORAL at 17:54

## 2021-07-24 RX ADMIN — HYDROCODONE BITARTRATE AND ACETAMINOPHEN 1 TABLET: 10; 325 TABLET ORAL at 14:04

## 2021-07-24 RX ADMIN — DIPHENHYDRAMINE HYDROCHLORIDE 25 MG: 50 INJECTION INTRAMUSCULAR; INTRAVENOUS at 21:47

## 2021-07-24 RX ADMIN — SODIUM CHLORIDE 250 MG: 9 INJECTION, SOLUTION INTRAVENOUS at 14:44

## 2021-07-24 RX ADMIN — SODIUM CHLORIDE, PRESERVATIVE FREE 10 ML: 5 INJECTION INTRAVENOUS at 09:58

## 2021-07-24 RX ADMIN — CETIRIZINE HYDROCHLORIDE 5 MG: 10 TABLET, FILM COATED ORAL at 09:57

## 2021-07-24 NOTE — DISCHARGE INSTRUCTIONS
Take all medication as prescribed.    Monitor oxygen saturations at home.    Follow-up primary care physician for recheck in 2 days return to the ER with any further concern.

## 2021-07-24 NOTE — PLAN OF CARE
Goal Outcome Evaluation:  Plan of Care Reviewed With: patient           Outcome Summary: Alert and oriented. PO pain medication given. Ambulates with assist x1. Will continue to monitor.

## 2021-07-24 NOTE — H&P
[facility] Medicine Services  HISTORY AND PHYSICAL    Patient Name: Sheryl Conner  : 1931  MRN: [id number]  Primary Care Physician: Manan Garcia MD  Date of admission: 2021    Subjective   Subjective     Chief Complaint:  Confusion and shortness of air    HPI:  Sheryl Conner is a 89 y.o. female with a history of normocytic anemia requiring periodic blood transfusions secondary to bleeding AVMs and a watermelon stomach followed by Dr. Hoff, GAVE, GERD, chronic pain, hypertension, hyperlipidemia, SSS, A. Fib, presents to the ED with altered mental status.  Patient reports that she went to sleep last night around 2330 and did not wake up until 1500 today, which is not like her.  When she woke up she was confused, and was experiencing some shortness of air and generalized weakness.  She states that she has not taken her bumex for the past 3 days (Rx'd prn daily).  She denies fever, cough, chest pain, nausea, vomiting, diarrhea, dysuria, edema, or any other complaints at this time.  Chest x-ray shows no active disease.  CT of the head shows no acute intracranial abnormality.  UA with no signs of infection.  While in the ED oxygen saturation was 89% on room air.  Patient was given Lasix 40 mg IV x1 dose in the ED, and is being admitted to the hospital medicine service for further evaluation and management.    COVID Details:        Symptoms: [x] NONE [] Fever []  Cough [] Shortness of breath [] Change in taste or smell  The patient has a COVID HM Topic on their chart, and they are fully vaccinated.    Review of Systems   Constitutional: Positive for fatigue. Negative for appetite change, chills and fever.   HENT: Negative.    Eyes: Negative.    Respiratory: Positive for shortness of breath. Negative for cough and wheezing.    Cardiovascular: Negative.    Gastrointestinal: Negative.    Endocrine: Negative.    Genitourinary: Negative.    Musculoskeletal: Negative.     Skin: Negative.    Allergic/Immunologic: Negative.    Neurological: Positive for weakness (generalized). Negative for dizziness, syncope, speech difficulty, light-headedness, numbness and headaches.   Hematological: Negative.    Psychiatric/Behavioral: Positive for confusion.        All other systems reviewed and are negative.     Personal History     Past Medical History:   Diagnosis Date   • Anemia    • Arthritis    • Fibromyalgia    • GERD (gastroesophageal reflux disease)    • History of transfusion    • Hypertension    • Kidney stone    • MG (myasthenia gravis) (CMS/HCC)     history of   • Pacemaker        Past Surgical History:   Procedure Laterality Date   • CARDIAC SURGERY     • COLONOSCOPY     • HEMORRHOIDECTOMY     • TUBAL ABDOMINAL LIGATION     • UPPER GASTROINTESTINAL ENDOSCOPY     • UPPER GASTROINTESTINAL ENDOSCOPY  05/29/2018       Family History:  family history includes Cancer in her son; Diabetes in her father; No Known Problems in her mother. Otherwise pertinent FHx was reviewed and unremarkable.     Social History:  reports that she has never smoked. She has never used smokeless tobacco. She reports that she does not drink alcohol and does not use drugs.  Social History     Social History Narrative    Caffeine: 1 serving per day. Pt lives at home alone.       Medications:  HYDROcodone-acetaminophen, amitriptyline, bumetanide, cetirizine, diclofenac, fentaNYL, fluticasone, gabapentin, pantoprazole, potassium chloride, and promethazine    Allergies   Allergen Reactions   • Codeine Sulfate Unknown (See Comments)     Pt took in the 70's-pt not sure of what happened   • Cozaar [Losartan] Unknown (See Comments)     Pt can not remember   • Crestor [Rosuvastatin] Unknown (See Comments)     Pt can not remember   • Dexlansoprazole Unknown (See Comments)     Pt can not remember   • Lipitor [Atorvastatin] Unknown (See Comments)     Pt can not remember   • Lisinopril Unknown (See Comments)     Pt can not  remember   • Nexium [Esomeprazole Magnesium] Unknown (See Comments)     Pt can not remember   • Norvasc [Amlodipine] Unknown (See Comments)     Pt can not remember   • Sulfadiazine Unknown (See Comments)     Pt can not remember   • Toprol Xl [Metoprolol Tartrate] Unknown (See Comments)     Pt can not remember   • Zetia [Ezetimibe] Unknown (See Comments)     Pt can not remember   • Zocor [Simvastatin] Unknown (See Comments)     Pt can not remember   • Augmentin [Amoxicillin-Pot Clavulanate] GI Intolerance     nausea   • Celebrex [Celecoxib] GI Intolerance       Objective   Objective     Vital Signs:   Temp:  [98.4 °F (36.9 °C)] 98.4 °F (36.9 °C)  Heart Rate:  [76-90] 76  Resp:  [16] 16  BP: (175-178)/(73-76) 175/73    Physical Exam   Constitutional: Awake, alert, resting in bed  Eyes: PERRLA, sclerae anicteric, no conjunctival injection  HENT: NCAT, mucous membranes moist  Neck: Supple, no thyromegaly, no lymphadenopathy, trachea midline  Respiratory: Mild crackles in the bases bilaterally, nonlabored respirations   Cardiovascular: RRR, no murmurs, rubs, or gallops, palpable pedal pulses bilaterally  Gastrointestinal: Positive bowel sounds, soft, nontender, nondistended  Musculoskeletal: No bilateral ankle edema, no clubbing or cyanosis to extremities  Psychiatric: Appropriate affect, cooperative  Neurologic: Oriented x 3, some mild confusion during conversation, strength symmetric in all extremities, Cranial Nerves grossly intact to confrontation, speech clear  Skin: No rashes       Result Review:  I have personally reviewed the results from the time of this admission to 07/24/21 12:21 AM EDT and agree with these findings:  [x]  Laboratory  []  Microbiology  [x]  Radiology  [x]  EKG/Telemetry   []  Cardiology/Vascular   []  Pathology  [x]  Old records  []  Other:  Most notable findings include:     LAB RESULTS:      Lab 07/23/21  1834 07/23/21  1807   WBC  --  7.06   HEMOGLOBIN  --  11.9*   HEMOGLOBIN, POC 12.2  --     HEMATOCRIT  --  37.8   HEMATOCRIT POC 36*  --    PLATELETS  --  203   NEUTROS ABS  --  5.74   IMMATURE GRANS (ABS)  --  0.02   LYMPHS ABS  --  0.81   MONOS ABS  --  0.39   EOS ABS  --  0.08   MCV  --  91.1         Lab 07/23/21  1831 07/23/21 1807   SODIUM  --  142   POTASSIUM  --  4.2   CHLORIDE  --  104   CO2  --  26.0   ANION GAP  --  12.0   BUN  --  15   CREATININE 1.00 0.98   GLUCOSE  --  102*   CALCIUM  --  9.2   MAGNESIUM  --  2.1         Lab 07/23/21  1807   TOTAL PROTEIN 7.3   ALBUMIN 4.20   GLOBULIN 3.1   ALT (SGPT) 6   AST (SGOT) 20   BILIRUBIN 0.9   ALK PHOS 67         Lab 07/23/21  1843 07/23/21  1807   PROBNP 1,703.0  --    TROPONIN T  --  <0.010                 Lab 07/23/21 1834   PH, ARTERIAL 7.38     UA    Urinalysis 7/23/21 7/23/21 1912 1912   Squamous Epithelial Cells, UA  7-12 (A)   Specific Gravity, UA 1.017    Ketones, UA Negative    Blood, UA Negative    Leukocytes, UA Small (1+) (A)    Nitrite, UA Negative    RBC, UA  0-2   WBC, UA  6-12 (A)   Bacteria, UA  None Seen   (A) Abnormal value            Microbiology Results (last 10 days)     Procedure Component Value - Date/Time    COVID PRE-OP / PRE-PROCEDURE SCREENING ORDER (NO ISOLATION) - Swab, Nasopharynx [[id number]]  (Normal) Collected: 07/23/21 1923    Lab Status: Final result Specimen: Swab from Nasopharynx Updated: 07/23/21 2000    Narrative:      The following orders were created for panel order COVID PRE-OP / PRE-PROCEDURE SCREENING ORDER (NO ISOLATION) - Swab, Nasopharynx.  Procedure                               Abnormality         Status                     ---------                               -----------         ------                     COVID-19 and FLU A/B PCR...[[id number]]  Normal              Final result                 Please view results for these tests on the individual orders.    COVID-19 and FLU A/B PCR - Swab, Nasopharynx [[id number]]  (Normal) Collected: 07/23/21 1923    Lab Status: Final result Specimen: Swab  from Nasopharynx Updated: 07/23/21 2000     COVID19 Not Detected     Influenza A PCR Not Detected     Influenza B PCR Not Detected    Narrative:      Fact sheet for providers: https://www.fda.gov/media/831722/download    Fact sheet for patients: https://www.fda.gov/media/120820/download    Test performed by PCR.          XR Chest 1 View    Result Date: 7/23/2021  CHEST X-RAY, 7/23/2021  HISTORY:  89-year-old female in the ED complaining of fatigue and confusion today.  TECHNIQUE: AP portable upright chest x-ray.  FINDINGS: Heart size and pulmonary vascularity are within normal limits. Median sternotomy. Dual-chamber cardiac pacemaker. Mild scattered pulmonary scarring. No visible airspace consolidation, pulmonary edema or pleural effusion. Old healed right anterior 6th rib fracture. Congenital right C7 cervical rib.     Impression: 1. No active disease. 2. Median sternotomy. Pacemaker. Signer Name: Dionte Albarran MD  Signed: 7/23/2021 7:28 PM  Workstation Name: [facility]  [facility]    CT Head Without Contrast Stroke Protocol    Result Date: 7/23/2021  EXAMINATION: CT HEAD WO CONTRAST - 07/23/2021  INDICATION: Follow up stroke.  TECHNIQUE: CT head without intravenous contrast following stroke protocol.  The radiation dose reduction device was turned on for each scan per the ALARA (As Low as Reasonably Achievable) protocol.  COMPARISON: CT head 06/01/2014  FINDINGS: Midline structures are symmetric without evidence of mass, mass effect or midline shift. Ventricles and sulci mildly prominent and mesial temporal regions of likely atrophy versus age-related volume loss along with moderate chronic small vessel ischemic disease. No intra-axial hemorrhage or extra-axial fluid collection. Globes and orbits unremarkable. Paranasal sinuses and mastoid air cells are grossly clear and well-pneumatized. Calvarium intact.      Impression: No acute intracranial abnormality with mild to moderate  senescent changes.  Scan performed on 07/23/2021 at 1830 hours. Scan report made available 07/23/2021 at 1847 hours.  DICTATED:   07/23/2021 EDITED/ls :   07/23/2021         Results for orders placed during the hospital encounter of 01/13/21    Adult Transthoracic Echo Complete W/ Cont if Necessary Per Protocol    Interpretation Summary  · Left ventricular ejection fraction appears to be 61 - 65%. Left ventricular systolic function is normal.  · Left ventricular diastolic function is consistent with (grade II w/high LAP) pseudonormalization.  · Left ventricular wall thickness is consistent with concentric hypertrophy.  · Left atrial cavity is moderate to severely dilated  · Estimated right ventricular systolic pressure from tricuspid regurgitation is mildly elevated (35-45 mmHg). Calculated right ventricular systolic pressure from tricuspid regurgitation is 43 mmHg.  · An electronic lead is present in the right atrium. 1.30cm x 0.70cm echodense, globular structure noted on electronic lead in right atrium. Unchanged from previous evaluation, echocardiographically most consistent with chronic/organized/sclerotic thrombus.      Assessment/Plan   Assessment & Plan       Hypoxia    Paroxysmal atrial fibrillation (CMS/HCC)    SSS (sick sinus syndrome) (CMS/HCC)    Essential hypertension    Hyperlipidemia    CKD (chronic kidney disease) stage 3, GFR 30-59 ml/min (CMS/HCC)    GERD (gastroesophageal reflux disease)    Chronic pain syndrome    AMS (altered mental status)    Acute congestive heart failure (CMS/HCC)    Sheryl Conner is a 89 y.o. female with a history of normocytic anemia requiring periodic blood transfusions secondary to bleeding AVMs and a watermelon stomach followed by Dr. Hoff, GAVE, GERD, chronic pain, hypertension, hyperlipidemia, SSS, A. Fib, presents to the ED with altered mental status.      Assessment and plan:    AMS  --CT of the head shows no acute intracranial abnormality with mild to moderate  senescent changes  --currently back to baseline  --Chest x-ray shows no active disease  --UA unremarkable  --Fall precautions  --neuro checks  --B12, folate, tsh    Hypoxia  --Oxygen saturation 89% on room air in ED  --Continuous pulse ox  --Supplemental oxygen to keep oxygen saturation greater than 92%    Acute on chronic diastolic CHF  --Echo 1/13/2021 shows EF 61-65%  --Chest x-ray shows no active disease  --Was given Lasix 40 mg IV x1 dose in the ED  --Monitor I's and O's  --Daily weights  --echo in the am  --Takes Bumex as needed for swelling at home (not taken for 3 days)  --will defer further diuresis to am provider  --am labs    CKD  --Stable  --Baseline creatinine 1.  89-1.0  --Creatinine 1.00 today    HTN  HLD  PAF  SSS  --s/p PPM  --follows with Dr. Seals  --not a candidate for chronic anticoagulation d/t upper GIB and recurrent symptomatic anemia    Chronic pain syndrome  --Karl reviewed  --On fentanyl patch at home and 600 mg of gabapentin 3 times daily  --will hold Norco  for now due to AMS    GERD  --Protonix      DVT prophylaxis:  Lovenox    CODE STATUS:    Level Of Support Discussed With: Patient  Code Status: CPR  Medical Interventions (Level of Support Prior to Arrest): Full      This note has been completed as part of a split-shared workflow.   Signature: Electronically signed by MICHELLE Reed, 07/24/21, 1:27 AM EDT.         Attending   Admission Attestation       I have seen and examined the patient, performing an independent face-to-face diagnostic evaluation with plan of care reviewed and developed with the advanced practice clinician (APC).      Brief Summary Statement:   Sheryl Conner is a 89 y.o. female with past medical history of normocytic anemia requiring previous blood transfusions secondary to AVMs, hx of GAVE, GERD HTN, SSS and a fib. Pt was brought to [facility] ED due to AMS. She was noted to be confused when she woke up and remained confused so family decided to  bring there to the ED for further eval. She had been off bumex for 3 days. In ED she was noted to be hypoxic with sats of 89% on RA. Therefore the decision was made to admit pt to [facility] for further eval and management.     Remainder of detailed HPI is as noted by APC and has been reviewed and/or edited by me for completeness.    Attending Physical Exam:  Constitutional: Awake, alert  Eyes: PERRLA, sclerae anicteric, no conjunctival injection  HENT: NCAT, mucous membranes moist  Neck: Supple, no thyromegaly, no lymphadenopathy, trachea midline  Respiratory: mild bilateral crackles   Cardiovascular: RRR, no murmurs, rubs, or gallops, palpable pedal pulses bilaterally  Gastrointestinal: Positive bowel sounds, soft, nontender, nondistended  Musculoskeletal: No bilateral ankle edema, no clubbing or cyanosis to extremities  Psychiatric: Appropriate affect, cooperative  Neurologic: Oriented x 3, strength symmetric in all extremities, Cranial Nerves grossly intact to confrontation, speech clear  Skin: No rashes    Brief Assessment/Plan :  See detailed assessment and plan developed with APC which I have reviewed and/or edited for completeness.    Admission Status: I believe that this patient meets INPATIENT status due to hypoxia and acute on chronic CHF exac.  I feel patient’s risk for adverse outcomes and need for care warrant INPATIENT evaluation and I predict the patient’s care encounter to likely last beyond 2 midnights.        Rianna Joshua, DO  07/24/21

## 2021-07-24 NOTE — PROGRESS NOTES
[facility] Medicine Services  PROGRESS NOTE    Patient Name: Sheryl Conner  : 1931  MRN: [id number]    Date of Admission: 2021  Primary Care Physician: Manan Garcia MD    Subjective   Subjective     CC:  AMS    HPI:  Pt admitted earlier this am by my colleague.  Multiple complaints of pain this am. Better by time of my evaluation. Also asking for IV iron since she missed her appointment on the .     ROS:  Gen- No fevers, chills  CV- No chest pain, palpitations  Resp- No cough, dyspnea  GI- No N/V/D, abd pain        Objective   Objective     Vital Signs:   Temp:  [98.4 °F (36.9 °C)] 98.4 °F (36.9 °C)  Heart Rate:  [70-90] 90  Resp:  [16] 16  BP: (146-203)/(67-82) 179/82     Physical Exam:  Constitutional: No acute distress, awake, alert  HENT: NCAT, mucous membranes moist  Respiratory: Clear to auscultation bilaterally, respiratory effort normal   Cardiovascular: RRR, no murmurs, rubs, or gallops  Gastrointestinal: Positive bowel sounds, soft, nontender, nondistended  Musculoskeletal: No bilateral ankle edema  Psychiatric: Appropriate affect, cooperative  Neurologic: Oriented x 3, strength symmetric in all extremities, Cranial Nerves grossly intact to confrontation, speech clear  Skin: No rashes    Results Reviewed:  LAB RESULTS:      Lab 21  0621  1834 21  1807   WBC 5.51  --  7.06   HEMOGLOBIN 12.9  --  11.9*   HEMOGLOBIN, POC  --  12.2  --    HEMATOCRIT 41.0  --  37.8   HEMATOCRIT POC  --  36*  --    PLATELETS 199  --  203   NEUTROS ABS  --   --  5.74   IMMATURE GRANS (ABS)  --   --  0.02   LYMPHS ABS  --   --  0.81   MONOS ABS  --   --  0.39   EOS ABS  --   --  0.08   MCV 89.7  --  91.1         Lab 21  0621 21  18321  1807   SODIUM 140  --  142   POTASSIUM 3.7  --  4.2   CHLORIDE 97*  --  104   CO2 28.0  --  26.0   ANION GAP 15.0  --  12.0   BUN 16  --  15   CREATININE 0.94 1.00 0.98   GLUCOSE 112*  --  102*    CALCIUM 10.1  --  9.2   MAGNESIUM  --   --  2.1   TSH 1.950  --   --          Lab 07/23/21  1807   TOTAL PROTEIN 7.3   ALBUMIN 4.20   GLOBULIN 3.1   ALT (SGPT) 6   AST (SGOT) 20   BILIRUBIN 0.9   ALK PHOS 67         Lab 07/23/21  1843 07/23/21  1807   PROBNP 1,703.0  --    TROPONIN T  --  <0.010         Lab 07/24/21  0621   CHOLESTEROL 214*   LDL CHOL 130*   HDL CHOL 73*   TRIGLYCERIDES 63             Lab 07/23/21  1834   PH, ARTERIAL 7.38     Brief Urine Lab Results  (Last result in the past 365 days)      Color   Clarity   Blood   Leuk Est   Nitrite   Protein   CREAT   Urine HCG        07/23/21 1912 Yellow Clear Negative Small (1+) Negative Trace               Microbiology Results Abnormal     Procedure Component Value - Date/Time    COVID PRE-OP / PRE-PROCEDURE SCREENING ORDER (NO ISOLATION) - Swab, Nasopharynx [[id number]]  (Normal) Collected: 07/23/21 1923    Lab Status: Final result Specimen: Swab from Nasopharynx Updated: 07/23/21 2000    Narrative:      The following orders were created for panel order COVID PRE-OP / PRE-PROCEDURE SCREENING ORDER (NO ISOLATION) - Swab, Nasopharynx.  Procedure                               Abnormality         Status                     ---------                               -----------         ------                     COVID-19 and FLU A/B PCR...[[id number]]  Normal              Final result                 Please view results for these tests on the individual orders.    COVID-19 and FLU A/B PCR - Swab, Nasopharynx [[id number]]  (Normal) Collected: 07/23/21 1923    Lab Status: Final result Specimen: Swab from Nasopharynx Updated: 07/23/21 2000     COVID19 Not Detected     Influenza A PCR Not Detected     Influenza B PCR Not Detected    Narrative:      Fact sheet for providers: https://www.fda.gov/media/934087/download    Fact sheet for patients: https://www.fda.gov/media/644592/download    Test performed by PCR.          XR Chest 1 View    Result Date: 7/23/2021  CHEST  X-RAY, 7/23/2021  HISTORY:  89-year-old female in the ED complaining of fatigue and confusion today.  TECHNIQUE: AP portable upright chest x-ray.  FINDINGS: Heart size and pulmonary vascularity are within normal limits. Median sternotomy. Dual-chamber cardiac pacemaker. Mild scattered pulmonary scarring. No visible airspace consolidation, pulmonary edema or pleural effusion. Old healed right anterior 6th rib fracture. Congenital right C7 cervical rib.     Impression: 1. No active disease. 2. Median sternotomy. Pacemaker. Signer Name: Dionte Albarran MD  Signed: 7/23/2021 7:28 PM  [facility]  [facility]    CT Head Without Contrast Stroke Protocol    Result Date: 7/23/2021  EXAMINATION: CT HEAD WO CONTRAST - 07/23/2021  INDICATION: Follow up stroke.  TECHNIQUE: CT head without intravenous contrast following stroke protocol.  The radiation dose reduction device was turned on for each scan per the ALARA (As Low as Reasonably Achievable) protocol.  COMPARISON: CT head 06/01/2014  FINDINGS: Midline structures are symmetric without evidence of mass, mass effect or midline shift. Ventricles and sulci mildly prominent and mesial temporal regions of likely atrophy versus age-related volume loss along with moderate chronic small vessel ischemic disease. No intra-axial hemorrhage or extra-axial fluid collection. Globes and orbits unremarkable. Paranasal sinuses and mastoid air cells are grossly clear and well-pneumatized. Calvarium intact.      Impression: No acute intracranial abnormality with mild to moderate senescent changes.  Scan performed on 07/23/2021 at 1830 hours. Scan report made available 07/23/2021 at 1847 hours.  DICTATED:   07/23/2021 EDITED/ls :   07/23/2021         Results for orders placed during the hospital encounter of 01/13/21    Adult Transthoracic Echo Complete W/ Cont if Necessary Per Protocol    Interpretation Summary  · Left ventricular ejection  fraction appears to be 61 - 65%. Left ventricular systolic function is normal.  · Left ventricular diastolic function is consistent with (grade II w/high LAP) pseudonormalization.  · Left ventricular wall thickness is consistent with concentric hypertrophy.  · Left atrial cavity is moderate to severely dilated  · Estimated right ventricular systolic pressure from tricuspid regurgitation is mildly elevated (35-45 mmHg). Calculated right ventricular systolic pressure from tricuspid regurgitation is 43 mmHg.  · An electronic lead is present in the right atrium. 1.30cm x 0.70cm echodense, globular structure noted on electronic lead in right atrium. Unchanged from previous evaluation, echocardiographically most consistent with chronic/organized/sclerotic thrombus.      I have reviewed the medications:  Scheduled Meds:amitriptyline, 25 mg, Oral, Q12H  cetirizine, 5 mg, Oral, Daily  [START ON 7/25/2021] fentaNYL, 1 patch, Transdermal, Q72H   And  [START ON 7/25/2021] Check Fentanyl Patch Placement, 1 each, Does not apply, Q12H  [START ON 7/25/2021] fentaNYL, 1 patch, Transdermal, Q72H   And  [START ON 7/25/2021] Check Fentanyl Patch Placement, 1 each, Does not apply, Q12H  enoxaparin, 30 mg, Subcutaneous, Q24H  fluticasone, 2 spray, Each Nare, Daily  gabapentin, 600 mg, Oral, Q8H  pantoprazole, 40 mg, Oral, BID  potassium chloride, 10 mEq, Oral, Daily  promethazine, 12.5 mg, Oral, Once  sodium chloride, 10 mL, Intravenous, Q12H      Continuous Infusions:   PRN Meds:.HYDROcodone-acetaminophen  •  sodium chloride  •  sodium chloride  •  sodium chloride    Assessment/Plan   Assessment & Plan     Active Hospital Problems    Diagnosis  POA   • AMS (altered mental status) [R41.82]  Yes   • Acute congestive heart failure (CMS/HCC) [I50.9]  Yes   • Chronic pain syndrome [G89.4]  Yes   • CKD (chronic kidney disease) stage 3, GFR 30-59 ml/min (CMS/HCC) [N18.30]  Yes   • GERD (gastroesophageal reflux disease) [K21.9]  Yes   •  Paroxysmal atrial fibrillation (CMS/HCC) [I48.0]  Yes   • SSS (sick sinus syndrome) (CMS/HCC) [I49.5]  Yes   • Essential hypertension [I10]  Yes   • Hypoxia [R09.02]  Yes   • Hyperlipidemia [E78.5]  Yes      Resolved Hospital Problems   No resolved problems to display.        Brief Hospital Course to date:  Sheryl Conner is a 89 y.o. female with past medical history of normocytic anemia requiring previous blood transfusions secondary to AVMs, hx of GAVE, GERD HTN, SSS and Afib who presented to the ER with AMS and hypoxia.    Plan:    AMS  --CT of the head shows no acute intracranial abnormality with mild to moderate senescent changes  --currently back to baseline  --Chest x-ray shows no active disease  --UA unremarkable  --Fall precautions  --neuro checks     Hypoxia  Acute on chronic dHF  --Oxygen saturation 89% on room air in ED  --Continuous pulse ox  --Supplemental oxygen to keep oxygen saturation greater than 92%  --Echo 1/13/2021 shows EF 61-65%  --Chest x-ray shows no active disease  --Was given Lasix 40 mg IV x1 dose in the ED  --proBNP only 1700   --Monitor Is and Os  --Daily weights  --repeat TTE PENDING  --Takes Bumex as needed for swelling at home (not taken for 3 days)  --no further diuresis for now  --am labs     CKD  --Stable  --Baseline creatinine 0.89-1.0  --Creatinine 1.00 today     HTN  HLD  PAF  SSS  --s/p PPM  --follows with Dr. Seals  --not a candidate for chronic anticoagulation d/t upper GIB and recurrent symptomatic anemia     Chronic pain syndrome  --Karl reviewed  --On fentanyl patch at home and 600 mg of gabapentin 3 times daily  -- held Norco  due to AMS, will resume as pt is complaining of pain and is back to baseline mental status     GERD  --Protonix    Chronic iron deficiency anemia  --HgB is actually okay/wnl  --pt asking for her IV iron infusion since she missed her infusion on 7/20.   Will order per her request.   --follows with Dr. Hoff    DVT prophylaxis:  Medical DVT  DISPLAY PLAN FREE TEXT prophylaxis orders are present.       Disposition: I expect the patient to be discharged TBD, likely home tomorrow    CODE STATUS:   Code Status and Medical Interventions:   Ordered at: 07/24/21 0115     Level Of Support Discussed With:    Patient     Code Status:    CPR     Medical Interventions (Level of Support Prior to Arrest):    Full       Latonia Ortiz MD  07/24/21

## 2021-07-25 PROBLEM — N18.31 STAGE 3A CHRONIC KIDNEY DISEASE (HCC): Status: ACTIVE | Noted: 2019-07-22

## 2021-07-25 PROBLEM — N17.9 ACUTE KIDNEY INJURY (HCC): Status: ACTIVE | Noted: 2021-07-25

## 2021-07-25 LAB
ANION GAP SERPL CALCULATED.3IONS-SCNC: 14 MMOL/L (ref 5–15)
BACTERIA SPEC AEROBE CULT: NO GROWTH
BUN SERPL-MCNC: 32 MG/DL (ref 8–23)
BUN/CREAT SERPL: 24.1 (ref 7–25)
CALCIUM SPEC-SCNC: 9 MG/DL (ref 8.6–10.5)
CHLORIDE SERPL-SCNC: 99 MMOL/L (ref 98–107)
CO2 SERPL-SCNC: 26 MMOL/L (ref 22–29)
CREAT SERPL-MCNC: 1.33 MG/DL (ref 0.57–1)
DEPRECATED RDW RBC AUTO: 39.8 FL (ref 37–54)
ERYTHROCYTE [DISTWIDTH] IN BLOOD BY AUTOMATED COUNT: 12.1 % (ref 12.3–15.4)
GFR SERPL CREATININE-BSD FRML MDRD: 38 ML/MIN/1.73
GLUCOSE SERPL-MCNC: 93 MG/DL (ref 65–99)
HCT VFR BLD AUTO: 38.9 % (ref 34–46.6)
HGB BLD-MCNC: 12.3 G/DL (ref 12–15.9)
MCH RBC QN AUTO: 28.7 PG (ref 26.6–33)
MCHC RBC AUTO-ENTMCNC: 31.6 G/DL (ref 31.5–35.7)
MCV RBC AUTO: 90.7 FL (ref 79–97)
PLATELET # BLD AUTO: 202 10*3/MM3 (ref 140–450)
PMV BLD AUTO: 10.1 FL (ref 6–12)
POTASSIUM SERPL-SCNC: 4.1 MMOL/L (ref 3.5–5.2)
RBC # BLD AUTO: 4.29 10*6/MM3 (ref 3.77–5.28)
SODIUM SERPL-SCNC: 139 MMOL/L (ref 136–145)
WBC # BLD AUTO: 4.48 10*3/MM3 (ref 3.4–10.8)

## 2021-07-25 PROCEDURE — 80048 BASIC METABOLIC PNL TOTAL CA: CPT | Performed by: INTERNAL MEDICINE

## 2021-07-25 PROCEDURE — 99233 SBSQ HOSP IP/OBS HIGH 50: CPT | Performed by: INTERNAL MEDICINE

## 2021-07-25 PROCEDURE — 97161 PT EVAL LOW COMPLEX 20 MIN: CPT

## 2021-07-25 PROCEDURE — 25010000002 ENOXAPARIN PER 10 MG: Performed by: NURSE PRACTITIONER

## 2021-07-25 PROCEDURE — 85027 COMPLETE CBC AUTOMATED: CPT | Performed by: INTERNAL MEDICINE

## 2021-07-25 PROCEDURE — 25010000002 FUROSEMIDE PER 20 MG: Performed by: INTERNAL MEDICINE

## 2021-07-25 RX ORDER — FUROSEMIDE 10 MG/ML
40 INJECTION INTRAMUSCULAR; INTRAVENOUS ONCE
Status: COMPLETED | OUTPATIENT
Start: 2021-07-25 | End: 2021-07-25

## 2021-07-25 RX ADMIN — HYDROCODONE BITARTRATE AND ACETAMINOPHEN 1 TABLET: 10; 325 TABLET ORAL at 21:45

## 2021-07-25 RX ADMIN — CETIRIZINE HYDROCHLORIDE 5 MG: 10 TABLET, FILM COATED ORAL at 09:52

## 2021-07-25 RX ADMIN — HYDROCODONE BITARTRATE AND ACETAMINOPHEN 1 TABLET: 10; 325 TABLET ORAL at 15:06

## 2021-07-25 RX ADMIN — POTASSIUM CHLORIDE 10 MEQ: 750 CAPSULE, EXTENDED RELEASE ORAL at 09:51

## 2021-07-25 RX ADMIN — FENTANYL 1 PATCH: 50 PATCH TRANSDERMAL at 09:47

## 2021-07-25 RX ADMIN — SODIUM CHLORIDE, PRESERVATIVE FREE 10 ML: 5 INJECTION INTRAVENOUS at 20:11

## 2021-07-25 RX ADMIN — AMITRIPTYLINE HYDROCHLORIDE 25 MG: 25 TABLET, FILM COATED ORAL at 20:10

## 2021-07-25 RX ADMIN — PANTOPRAZOLE SODIUM 40 MG: 40 TABLET, DELAYED RELEASE ORAL at 17:32

## 2021-07-25 RX ADMIN — FENTANYL 1 PATCH: 12 PATCH, EXTENDED RELEASE TRANSDERMAL at 09:47

## 2021-07-25 RX ADMIN — FUROSEMIDE 40 MG: 10 INJECTION, SOLUTION INTRAVENOUS at 09:46

## 2021-07-25 RX ADMIN — SODIUM CHLORIDE, PRESERVATIVE FREE 10 ML: 5 INJECTION INTRAVENOUS at 09:46

## 2021-07-25 RX ADMIN — GABAPENTIN 600 MG: 300 CAPSULE ORAL at 15:03

## 2021-07-25 RX ADMIN — GABAPENTIN 600 MG: 300 CAPSULE ORAL at 20:10

## 2021-07-25 RX ADMIN — ENOXAPARIN SODIUM 30 MG: 30 INJECTION SUBCUTANEOUS at 09:47

## 2021-07-25 RX ADMIN — AMITRIPTYLINE HYDROCHLORIDE 25 MG: 25 TABLET, FILM COATED ORAL at 09:52

## 2021-07-25 RX ADMIN — GABAPENTIN 600 MG: 300 CAPSULE ORAL at 09:51

## 2021-07-25 NOTE — PLAN OF CARE
A&Ox4 with some forgetfulness, PT has been highly anxious/agitated, frequently trying to get out of bed. One time order of IV benadryl /PO Seroquel administered at 2147. Paced rhythm on cardiac mx. VSS. Mobility appropriate for age (89 y old)  Will cont to mx. Call light in reach.

## 2021-07-25 NOTE — PROGRESS NOTES
[facility] Medicine Services  PROGRESS NOTE    Patient Name: Sheryl Conner  : 1931  MRN: [id number]    Date of Admission: 2021  Primary Care Physician: Manan Garcia MD    Subjective   Subjective     CC:  Follow up shortness of breath/ AMS    HPI:  Alert and oriented this morning. Feeling well, denies dyspnea or cough. Denies any problems urinating.    ROS:  Gen- No fevers, chills  CV- No chest pain, palpitations  GI- No N/V/D, abd pain     Objective   Objective     Vital Signs:   Temp:  [97.6 °F (36.4 °C)-98.6 °F (37 °C)] 98.6 °F (37 °C)  Heart Rate:  [] 100  Resp:  [16-18] 17  BP: ()/(50-82) 92/50  Flow (L/min):  [2] 2     Physical Exam:  Constitutional: No acute distress, awake, alert  HENT: NCAT, mucous membranes moist  Respiratory: Clear to auscultation bilaterally, respiratory effort normal on room air   Cardiovascular: RRR, no murmurs  Gastrointestinal: Positive bowel sounds, soft, nontender, nondistended  Psychiatric: Appropriate affect, cooperative  Neurologic: Oriented to person and place, Cranial Nerves grossly intact to confrontation, speech clear  Skin: No rashes    Results Reviewed:  LAB RESULTS:      Lab 21  0809 21  0621  1834 21  1807   WBC 4.48 5.51  --  7.06   HEMOGLOBIN 12.3 12.9  --  11.9*   HEMOGLOBIN, POC  --   --  12.2  --    HEMATOCRIT 38.9 41.0  --  37.8   HEMATOCRIT POC  --   --  36*  --    PLATELETS 202 199  --  203   NEUTROS ABS  --   --   --  5.74   IMMATURE GRANS (ABS)  --   --   --  0.02   LYMPHS ABS  --   --   --  0.81   MONOS ABS  --   --   --  0.39   EOS ABS  --   --   --  0.08   MCV 90.7 89.7  --  91.1         Lab 21  0809 21  0621  1831 21  1807   SODIUM 139 140  --  142   POTASSIUM 4.1 3.7  --  4.2   CHLORIDE 99 97*  --  104   CO2 26.0 28.0  --  26.0   ANION GAP 14.0 15.0  --  12.0   BUN 32* 16  --  15   CREATININE 1.33* 0.94 1.00 0.98   GLUCOSE 93 112*  --   102*   CALCIUM 9.0 10.1  --  9.2   MAGNESIUM  --   --   --  2.1   TSH  --  1.950  --   --          Lab 07/23/21  1807   TOTAL PROTEIN 7.3   ALBUMIN 4.20   GLOBULIN 3.1   ALT (SGPT) 6   AST (SGOT) 20   BILIRUBIN 0.9   ALK PHOS 67         Lab 07/23/21  1843 07/23/21  1807   PROBNP 1,703.0  --    TROPONIN T  --  <0.010         Lab 07/24/21 0621   CHOLESTEROL 214*   LDL CHOL 130*   HDL CHOL 73*   TRIGLYCERIDES 63         Lab 07/24/21 0621   FOLATE 11.70   VITAMIN B 12 407         Lab 07/23/21  1834   PH, ARTERIAL 7.38     Brief Urine Lab Results  (Last result in the past 365 days)      Color   Clarity   Blood   Leuk Est   Nitrite   Protein   CREAT   Urine HCG        07/23/21 1912 Yellow Clear Negative Small (1+) Negative Trace               Microbiology Results Abnormal     Procedure Component Value - Date/Time    Urine Culture - Urine, Urine, Clean Catch [[id number]]  (Normal) Collected: 07/23/21 1912    Lab Status: Final result Specimen: Urine, Clean Catch Updated: 07/25/21 0903     Urine Culture No growth    COVID PRE-OP / PRE-PROCEDURE SCREENING ORDER (NO ISOLATION) - Swab, Nasopharynx [[id number]]  (Normal) Collected: 07/23/21 1923    Lab Status: Final result Specimen: Swab from Nasopharynx Updated: 07/23/21 2000    Narrative:      The following orders were created for panel order COVID PRE-OP / PRE-PROCEDURE SCREENING ORDER (NO ISOLATION) - Swab, Nasopharynx.  Procedure                               Abnormality         Status                     ---------                               -----------         ------                     COVID-19 and FLU A/B PCR...[[id number]]  Normal              Final result                 Please view results for these tests on the individual orders.    COVID-19 and FLU A/B PCR - Swab, Nasopharynx [[id number]]  (Normal) Collected: 07/23/21 1923    Lab Status: Final result Specimen: Swab from Nasopharynx Updated: 07/23/21 2000     COVID19 Not Detected     Influenza A PCR Not Detected      Influenza B PCR Not Detected    Narrative:      Fact sheet for providers: https://www.fda.gov/media/127840/download    Fact sheet for patients: https://www.fda.gov/media/194370/download    Test performed by PCR.          XR Chest 1 View    Result Date: 7/23/2021  CHEST X-RAY, 7/23/2021  HISTORY:  89-year-old female in the ED complaining of fatigue and confusion today.  TECHNIQUE: AP portable upright chest x-ray.  FINDINGS: Heart size and pulmonary vascularity are within normal limits. Median sternotomy. Dual-chamber cardiac pacemaker. Mild scattered pulmonary scarring. No visible airspace consolidation, pulmonary edema or pleural effusion. Old healed right anterior 6th rib fracture. Congenital right C7 cervical rib.     Impression: 1. No active disease. 2. Median sternotomy. Pacemaker. Signer Name: Dionte Albarran MD  Signed: 7/23/2021 7:28 PM  Workstation Name: [facility]-  [facility]    Adult Transthoracic Echo Limited W/ Cont if Necessary Per Protocol    Result Date: 7/24/2021  · Estimated left ventricular EF = 60% Left ventricular systolic function is normal. · Incomplete exam due to patient uncooperation      CT Head Without Contrast Stroke Protocol    Result Date: 7/24/2021  EXAMINATION: CT HEAD WO CONTRAST - 07/23/2021  INDICATION: Follow up stroke.  TECHNIQUE: CT head without intravenous contrast following stroke protocol.  The radiation dose reduction device was turned on for each scan per the ALARA (As Low as Reasonably Achievable) protocol.  COMPARISON: CT head 06/01/2014  FINDINGS: Midline structures are symmetric without evidence of mass, mass effect or midline shift. Ventricles and sulci mildly prominent and mesial temporal regions of likely atrophy versus age-related volume loss along with moderate chronic small vessel ischemic disease. No intra-axial hemorrhage or extra-axial fluid collection. Globes and orbits unremarkable. Paranasal sinuses and mastoid air cells are  grossly clear and well-pneumatized. Calvarium intact.      Impression: No acute intracranial abnormality with mild to moderate senescent changes.  Scan performed on 07/23/2021 at 1830 hours. Scan report made available 07/23/2021 at 1847 hours.  DICTATED:   07/23/2021 EDITED/ls :   07/23/2021   This report was finalized on 7/24/2021 10:32 AM by Dr. Neymar Myles.        Results for orders placed during the hospital encounter of 07/23/21    Adult Transthoracic Echo Limited W/ Cont if Necessary Per Protocol    Interpretation Summary  · Estimated left ventricular EF = 60% Left ventricular systolic function is normal.  · Incomplete exam due to patient uncooperation      I have reviewed the medications:  Scheduled Meds:amitriptyline, 25 mg, Oral, Q12H  cetirizine, 5 mg, Oral, Daily  fentaNYL, 1 patch, Transdermal, Q72H   And  Check Fentanyl Patch Placement, 1 each, Does not apply, Q12H  fentaNYL, 1 patch, Transdermal, Q72H   And  Check Fentanyl Patch Placement, 1 each, Does not apply, Q12H  enoxaparin, 30 mg, Subcutaneous, Q24H  fluticasone, 2 spray, Each Nare, Daily  gabapentin, 600 mg, Oral, Q8H  pantoprazole, 40 mg, Oral, BID  potassium chloride, 10 mEq, Oral, Daily  sodium chloride, 10 mL, Intravenous, Q12H      Continuous Infusions:   PRN Meds:.HYDROcodone-acetaminophen  •  promethazine  •  sodium chloride  •  sodium chloride  •  sodium chloride    Assessment/Plan   Assessment & Plan     Active Hospital Problems    Diagnosis  POA   • AMS (altered mental status) [R41.82]  Yes   • Acute congestive heart failure (CMS/HCC) [I50.9]  Yes   • Chronic pain syndrome [G89.4]  Yes   • Stage 3a chronic kidney disease (CMS/HCC) [N18.31]  Yes   • GERD (gastroesophageal reflux disease) [K21.9]  Yes   • Paroxysmal atrial fibrillation (CMS/HCC) [I48.0]  Yes   • SSS (sick sinus syndrome) (CMS/HCC) [I49.5]  Yes   • Essential hypertension [I10]  Yes   • Hypoxia [R09.02]  Yes   • Hyperlipidemia [E78.5]  Yes      Resolved Hospital Problems    No resolved problems to display.        Brief Hospital Course to date:  Sheryl Conner is a 89 y.o. female  With a PMH of normocytic anemia requiring blood transfusions due to AVMs, hx of GAVE, GERD, HTN, SSS, and atrial fibrillation who was admitted on 7/23 for AMS, found to be hypoxic on admit, likely from acute decompensated HFpEF.     Patient and problems new to me today    Metabolic encephalopathy   -Resolved, back to baseline    Acute hypoxia  Acute decompensated HFpEF   -On 2L nasal canula   -Echocardiogram with EF 60%, LV systolic function normal   -Received dose of Lasix yesterday, another dose ordered for this morning    CKD 3a stable   -Creatinine increased, labs came back after she was given dose of Lasix this morning, so need to repeat BMP tomorrow    Addendum - I spoke with her daughter, Sheyla. Says her mom has had a significant change in personality and is very mean, which is not how she normally is. Will consult telepsych     DVT prophylaxis:  Medical DVT prophylaxis orders are present.       Disposition: I expect the patient to be discharged home tomorrow if kidney function stable.    CODE STATUS:   Code Status and Medical Interventions:   Ordered at: 07/24/21 0115     Level Of Support Discussed With:    Patient     Code Status:    CPR     Medical Interventions (Level of Support Prior to Arrest):    Full       Jovita Camargo MD  07/25/21

## 2021-07-25 NOTE — PLAN OF CARE
Goal Outcome Evaluation:  Plan of Care Reviewed With: patient        Progress: no change (PT eval)  Outcome Summary: PT eval complete. Pt presents at baseline function with her mobility and reporting she is ready to go home. Recommend d/c home. She ambulated 150 feet with no AD or LOB.

## 2021-07-25 NOTE — THERAPY DISCHARGE NOTE
Patient Name: Sheryl Conner  : 1931    MRN: [id number]                              Today's Date: 2021       Admit Date: 2021    Visit Dx:     ICD-10-CM ICD-9-CM   1. Hypoxia  R09.02 799.02   2. Somnolence  R40.0 780.09     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   • Right knee pain   • GAVE (gastric antral vascular ectasia)   • Hypoxia   • Paroxysmal atrial fibrillation (CMS/HCC)   • SSS (sick sinus syndrome) (CMS/HCC)   • Essential hypertension   • Functional heart murmur   • Idiopathic osteoarthritis   • Hyperlipidemia   • Stage 3a chronic kidney disease (CMS/HCC)   • GERD (gastroesophageal reflux disease)   • Cervical pain (neck)   • Fibromyalgia   • Arthritis   • Chronic pain syndrome   • Pacemaker   • Cervical spondylosis without myelopathy   • DDD (degenerative disc disease), cervical   • Cervical spinal stenosis   • AMS (altered mental status)   • Acute congestive heart failure (CMS/HCC)   • Acute kidney injury (CMS/HCC)     Past Medical History:   Diagnosis Date   • Anemia    • Arthritis    • Fibromyalgia    • GERD (gastroesophageal reflux disease)    • History of transfusion    • Hypertension    • Kidney stone    • MG (myasthenia gravis) (CMS/HCC)     history of   • Pacemaker      Past Surgical History:   Procedure Laterality Date   • CARDIAC SURGERY     • COLONOSCOPY     • HEMORRHOIDECTOMY     • TUBAL ABDOMINAL LIGATION     • UPPER GASTROINTESTINAL ENDOSCOPY     • UPPER GASTROINTESTINAL ENDOSCOPY  2018     General Information     Row Name 21 1431          Physical Therapy Time and Intention    Document Type  discharge evaluation/summary  -     Mode of Treatment  physical therapy  -     Row Name 21 1431          General Information    Patient Profile Reviewed  yes  -     Prior Level of Function  independent:;all household mobility;community  mobility;gait;transfer;bed mobility;ADL's  -     Existing Precautions/Restrictions  oxygen therapy device and L/min  -     Barriers to Rehab  none identified  -[facility] Name 07/25/21 1431          Living Environment    Lives With  alone  -JH     Row Name 07/25/21 1431          Stairs Within Home, Primary    Number of Stairs, Within Home, Primary  none  -JH     Row Name 07/25/21 1431          Cognition    Orientation Status (Cognition)  oriented x 3  -JH     Row Name 07/25/21 1431          Safety Issues, Functional Mobility    Impairments Affecting Function (Mobility)  endurance/activity tolerance  -       User Key  (r) = Recorded By, (t) = Taken By, (c) = Cosigned By    Initials Name Provider Type     Brian Pulido, PT Physical Therapist        Mobility     [facility] Name 07/25/21 1432          Bed Mobility    Bed Mobility  bed mobility (all) activities  -     All Activities, [place] (Bed Mobility)  independent  -JH     Row Name 07/25/21 1432          Sit-Stand Transfer    Sit-Stand [place] (Transfers)  independent  -JH     Row Name 07/25/21 1432          Gait/Stairs (Locomotion)    [place] Level (Gait)  standby assist  -     Distance in Feet (Gait)  150 feet  -     Comment (Gait/Stairs)  Pt ambulated with step through pattern, no AD, no LOB noted, no reports of SOA.  -       User Key  (r) = Recorded By, (t) = Taken By, (c) = Cosigned By    Initials Name Provider Type     Brian Pulido, PT Physical Therapist        Obj/Interventions     [facility] Name 07/25/21 1432          Range of Motion Comprehensive    General Range of Motion  no range of motion deficits identified  -JH     Row Name 07/25/21 1432          Strength Comprehensive (MMT)    General Manual Muscle Testing (MMT) Assessment  no strength deficits identified  -JH     Row Name 07/25/21 1432          Balance    Balance Assessment  sitting static balance;sitting dynamic balance;standing static balance;standing dynamic balance  -      Static Sitting Balance  WFL  -     Dynamic Sitting Balance  WFL  -JH     Static Standing Balance  WFL  -JH     Dynamic Standing Balance  WFL  -       User Key  (r) = Recorded By, (t) = Taken By, (c) = Cosigned By    Initials Name Provider Type     Brian Pulido, PT Physical Therapist        Goals/Plan    No documentation.       Clinical Impression     [facility] Name 07/25/21 1433          Pain    Additional Documentation  Pain Scale: Numbers Pre/Post-Treatment (Group)  -JH     Row Name 07/25/21 1433          Pain Scale: Numbers Pre/Post-Treatment    Pretreatment Pain Rating  0/10 - no pain  -     Posttreatment Pain Rating  0/10 - no pain  -[facility] Name 07/25/21 1433          Plan of Care Review    Plan of Care Reviewed With  patient  -     Progress  no change PT eval  -     Outcome Summary  PT eval complete. Pt presents at baseline function with her mobility and reporting she is ready to go home. Recommend d/c home. She ambulated 150 feet with no AD or LOB.  -[facility] Name 07/25/21 1433          Therapy Assessment/Plan (PT)    Criteria for Skilled Interventions Met (PT)  no;no problems identified which require skilled intervention;does not meet criteria for skilled intervention  -[facility] Name 07/25/21 1433          Vital Signs    Pre Systolic BP Rehab  146  -JH     Pre Treatment Diastolic BP  76  -JH     Pre SpO2 (%)  90  -JH     O2 Delivery Pre Treatment  room air  -     O2 Delivery Intra Treatment  supplemental O2  -     Post SpO2 (%)  100  -JH     O2 Delivery Post Treatment  nasal cannula  -     Pre Patient Position  Supine  -     Intra Patient Position  Standing  -     Post Patient Position  Supine  -[facility] Name 07/25/21 1433          Positioning and Restraints    Pre-Treatment Position  in bed  -     Post Treatment Position  bed  -     In Bed  notified nsg;supine;call light within reach;encouraged to call for assist;side rails up x3  -       User Key  (r) = Recorded By, (t)  = Taken By, (c) = Cosigned By    Initials Name Provider Type    Brian Whittington, PT Physical Therapist        Outcome Measures     Row Name 07/25/21 1435 07/25/21 0952       How much help from another person do you currently need...    Turning from your back to your side while in flat bed without using bedrails?  4  -  4  -SC    Moving from lying on back to sitting on the side of a flat bed without bedrails?  4  -  3  -SC    Moving to and from a bed to a chair (including a wheelchair)?  4  -  3  -SC    Standing up from a chair using your arms (e.g., wheelchair, bedside chair)?  4  -  3  -SC    Climbing 3-5 steps with a railing?  4  -  3  -SC    To walk in hospital room?  4  -  3  -SC    AM-PAC 6 Clicks Score (PT)  24  -  19  -SC    Row Name 07/25/21 1435          Functional Assessment    Outcome Measure Options  AM-PAC 6 Clicks Basic Mobility (PT)  -       User Key  (r) = Recorded By, (t) = Taken By, (c) = Cosigned By    Initials Name Provider Type    SC Krysten Petit RN Registered Nurse    Brian Whittington, PT Physical Therapist        Physical Therapy Education                 Title: PT OT SLP Therapies (Done)     Topic: Physical Therapy (Done)     Point: Mobility training (Done)     Learning Progress Summary           Patient Acceptance, E,TB, VU by  at 7/25/2021 1435    Comment: Edu on PT services, POC, d/c planning                   Point: Home exercise program (Done)     Learning Progress Summary           Patient Acceptance, E,TB, VU by  at 7/25/2021 1435    Comment: Edu on PT services, POC, d/c planning                   Point: Body mechanics (Done)     Learning Progress Summary           Patient Acceptance, E,TB, VU by  at 7/25/2021 1435    Comment: Edu on PT services, POC, d/c planning                   Point: Precautions (Done)     Learning Progress Summary           Patient Acceptance, E,TB, VU by  at 7/25/2021 1435    Comment: Edu on PT services, POC, d/c planning                                User Key     Initials Effective Dates Name Provider Type [facility] 09/22/20 -  Brian Pulido, PT Physical Therapist PT              PT Recommendation and Plan     Plan of Care Reviewed With: patient  Progress: no change (PT eval)  Outcome Summary: PT eval complete. Pt presents at baseline function with her mobility and reporting she is ready to go home. Recommend d/c home. She ambulated 150 feet with no AD or LOB.     Time Calculation:   PT Charges     Row Name 07/25/21 1436             Time Calculation    Start Time  1400  -JH      PT Received On  07/25/21  -         Untimed Charges    PT Eval/Re-eval Minutes  50  -JH         Total Minutes    Untimed Charges Total Minutes  50  -JH       Total Minutes  50  -JH        User Key  (r) = Recorded By, (t) = Taken By, (c) = Cosigned By    Initials Name Provider Type     Brian Pulido, PT Physical Therapist        Therapy Charges for Today     Code Description Service Date Service Provider Modifiers Qty    58480322708 HC PT EVAL LOW COMPLEXITY 4 7/25/2021 Brian Pulido, PT GP 1          PT G-Codes  Outcome Measure Options: AM-PAC 6 Clicks Basic Mobility (PT)  AM-PAC 6 Clicks Score (PT): 24    PT Discharge Summary  Anticipated Discharge Disposition (PT): home  Reason for Discharge: Independent, At baseline function  Discharge Destination: Home    Brian Pulido PT  7/25/2021

## 2021-07-26 ENCOUNTER — READMISSION MANAGEMENT (OUTPATIENT)
Dept: CALL CENTER | Facility: HOSPITAL | Age: 86
End: 2021-07-26

## 2021-07-26 VITALS
HEART RATE: 91 BPM | HEIGHT: 63 IN | RESPIRATION RATE: 16 BRPM | WEIGHT: 127 LBS | SYSTOLIC BLOOD PRESSURE: 109 MMHG | OXYGEN SATURATION: 90 % | BODY MASS INDEX: 22.5 KG/M2 | DIASTOLIC BLOOD PRESSURE: 48 MMHG | TEMPERATURE: 97.6 F

## 2021-07-26 PROBLEM — I50.33 ACUTE ON CHRONIC DIASTOLIC CONGESTIVE HEART FAILURE (HCC): Status: ACTIVE | Noted: 2021-07-24

## 2021-07-26 PROBLEM — G93.41 METABOLIC ENCEPHALOPATHY: Status: RESOLVED | Noted: 2021-07-24 | Resolved: 2021-07-26

## 2021-07-26 PROBLEM — R41.82 AMS (ALTERED MENTAL STATUS): Status: RESOLVED | Noted: 2021-07-24 | Resolved: 2021-07-26

## 2021-07-26 PROBLEM — I50.33 ACUTE ON CHRONIC DIASTOLIC CONGESTIVE HEART FAILURE (HCC): Status: RESOLVED | Noted: 2021-07-24 | Resolved: 2021-07-26

## 2021-07-26 PROBLEM — R09.02 HYPOXIA: Status: RESOLVED | Noted: 2018-06-28 | Resolved: 2021-07-26

## 2021-07-26 LAB
ANION GAP SERPL CALCULATED.3IONS-SCNC: 14 MMOL/L (ref 5–15)
BUN SERPL-MCNC: 38 MG/DL (ref 8–23)
BUN/CREAT SERPL: 27.1 (ref 7–25)
CALCIUM SPEC-SCNC: 9.2 MG/DL (ref 8.6–10.5)
CHLORIDE SERPL-SCNC: 99 MMOL/L (ref 98–107)
CO2 SERPL-SCNC: 23 MMOL/L (ref 22–29)
CREAT SERPL-MCNC: 1.4 MG/DL (ref 0.57–1)
GFR SERPL CREATININE-BSD FRML MDRD: 35 ML/MIN/1.73
GLUCOSE SERPL-MCNC: 83 MG/DL (ref 65–99)
POTASSIUM SERPL-SCNC: 3.8 MMOL/L (ref 3.5–5.2)
SODIUM SERPL-SCNC: 136 MMOL/L (ref 136–145)

## 2021-07-26 PROCEDURE — 99239 HOSP IP/OBS DSCHRG MGMT >30: CPT | Performed by: FAMILY MEDICINE

## 2021-07-26 PROCEDURE — 25010000002 ENOXAPARIN PER 10 MG: Performed by: NURSE PRACTITIONER

## 2021-07-26 PROCEDURE — 80048 BASIC METABOLIC PNL TOTAL CA: CPT | Performed by: INTERNAL MEDICINE

## 2021-07-26 RX ADMIN — POTASSIUM CHLORIDE 10 MEQ: 750 CAPSULE, EXTENDED RELEASE ORAL at 08:31

## 2021-07-26 RX ADMIN — PANTOPRAZOLE SODIUM 40 MG: 40 TABLET, DELAYED RELEASE ORAL at 06:46

## 2021-07-26 RX ADMIN — GABAPENTIN 600 MG: 300 CAPSULE ORAL at 06:46

## 2021-07-26 RX ADMIN — ENOXAPARIN SODIUM 30 MG: 30 INJECTION SUBCUTANEOUS at 08:31

## 2021-07-26 RX ADMIN — CETIRIZINE HYDROCHLORIDE 5 MG: 10 TABLET, FILM COATED ORAL at 08:31

## 2021-07-26 RX ADMIN — FLUTICASONE PROPIONATE 2 SPRAY: 50 SPRAY, METERED NASAL at 08:31

## 2021-07-26 RX ADMIN — AMITRIPTYLINE HYDROCHLORIDE 25 MG: 25 TABLET, FILM COATED ORAL at 08:31

## 2021-07-26 RX ADMIN — HYDROCODONE BITARTRATE AND ACETAMINOPHEN 1 TABLET: 10; 325 TABLET ORAL at 08:31

## 2021-07-26 NOTE — PLAN OF CARE
Goal Outcome Evaluation:               Pt is alert and oriented X 4. VSS. Pain controlled po meds. 2L oxygen on nasal canula.

## 2021-07-26 NOTE — DISCHARGE SUMMARY
"    [facility] Medicine Services  DISCHARGE SUMMARY    Patient Name: Sheryl Conner  : 1931  MRN: [id number]    Date of Admission: 2021  6:15 PM  Date of Discharge:  21    Primary Care Physician: Manan Garcia MD    Consults     Date and Time Order Name Status Description    2021  1:29 PM Inpatient Psychiatrist Consult            Hospital Course     Presenting Problem:   Hypoxia [R09.02]    Active Hospital Problems    Diagnosis  POA   • Acute kidney injury (CMS/HCC) [N17.9]  No   • Chronic pain syndrome [G89.4]  Yes   • GERD (gastroesophageal reflux disease) [K21.9]  Yes   • Paroxysmal atrial fibrillation (CMS/HCC) [I48.0]  Yes   • SSS (sick sinus syndrome) (CMS/HCC) [I49.5]  Yes   • Essential hypertension [I10]  Yes   • Hyperlipidemia [E78.5]  Yes      Resolved Hospital Problems    Diagnosis Date Resolved POA   • Metabolic encephalopathy [G93.41] 2021 Yes   • Acute on chronic diastolic congestive heart failure (CMS/HCC) [I50.33] 2021 Yes   • Hypoxia [R09.02] 2021 Yes          Hospital Course:  Sheryl Conner is a 89 y.o. female With a PMH of normocytic anemia requiring blood transfusions due to AVMs, hx of GAVE, GERD, HTN, SSS, and atrial fibrillation (not chronically anticoagulated due to GIB hx) who was admitted on  for AMS, found to be hypoxic on admit from acute decompensated diastolic CHF (had not taken home Bumex x 3 -4 days).      Metabolic encephalopathy, likely hypoxia related  -Resolved, back to baseline     Acute hypoxia  A/C diastolic CHF  -now on RA after diuresis  -Echocardiogram with EF 60%, LV systolic function normal   -s/p IV diuresis to renal tolerance of 1.5x baseline with resolved symtpoms     RUTHY on CKD 3a  -Creatinine increased with mild RUTHY related to diuresing to renal tolerance  -will need repeat BMP eval at PCP follow up with in 1 week         Day of Discharge     HPI:   \"I gotta get out of here today\".  " On RA, no SOA or edema.  Eager for discharge.       Vital Signs:   Temp:  [97.6 °F (36.4 °C)-98.5 °F (36.9 °C)] 97.6 °F (36.4 °C)  Heart Rate:  [] 91  Resp:  [16-19] 16  BP: (109-143)/(48-74) 109/48     Physical Exam:  Constitutional: No acute distress, awake, alert, nontoxic, normal body habitus  Respiratory: Clear to auscultation bilaterally, good effort, nonlabored respirations   Cardiovascular: RRR  Musculoskeletal: No peripheral edema, normal muscle tone for age  Psychiatric: Appropriate affect, good insight and judgement, cooperative      Pertinent  and/or Most Recent Results     LAB RESULTS:      Lab 07/25/21  0809 07/24/21 0621 07/23/21 1834 07/23/21  1807   WBC 4.48 5.51  --  7.06   HEMOGLOBIN 12.3 12.9  --  11.9*   HEMOGLOBIN, POC  --   --  12.2  --    HEMATOCRIT 38.9 41.0  --  37.8   HEMATOCRIT POC  --   --  36*  --    PLATELETS 202 199  --  203   NEUTROS ABS  --   --   --  5.74   IMMATURE GRANS (ABS)  --   --   --  0.02   LYMPHS ABS  --   --   --  0.81   MONOS ABS  --   --   --  0.39   EOS ABS  --   --   --  0.08   MCV 90.7 89.7  --  91.1         Lab 07/26/21  0821 07/25/21  0809 07/24/21 0621 07/23/21  1831 07/23/21  1807   SODIUM 136 139 140  --  142   POTASSIUM 3.8 4.1 3.7  --  4.2   CHLORIDE 99 99 97*  --  104   CO2 23.0 26.0 28.0  --  26.0   ANION GAP 14.0 14.0 15.0  --  12.0   BUN 38* 32* 16  --  15   CREATININE 1.40* 1.33* 0.94 1.00 0.98   GLUCOSE 83 93 112*  --  102*   CALCIUM 9.2 9.0 10.1  --  9.2   MAGNESIUM  --   --   --   --  2.1   TSH  --   --  1.950  --   --          Lab 07/23/21  1807   TOTAL PROTEIN 7.3   ALBUMIN 4.20   GLOBULIN 3.1   ALT (SGPT) 6   AST (SGOT) 20   BILIRUBIN 0.9   ALK PHOS 67         Lab 07/23/21  1843 07/23/21  1807   PROBNP 1,703.0  --    TROPONIN T  --  <0.010         Lab 07/24/21  0621   CHOLESTEROL 214*   LDL CHOL 130*   HDL CHOL 73*   TRIGLYCERIDES 63         Lab 07/24/21  0621   FOLATE 11.70   VITAMIN B 12 407         Lab 07/23/21  1834   PH, ARTERIAL 7.38      Brief Urine Lab Results  (Last result in the past 365 days)      Color   Clarity   Blood   Leuk Est   Nitrite   Protein   CREAT   Urine HCG        07/23/21 1912 Yellow Clear Negative Small (1+) Negative Trace             Microbiology Results (last 10 days)     Procedure Component Value - Date/Time    COVID PRE-OP / PRE-PROCEDURE SCREENING ORDER (NO ISOLATION) - Swab, Nasopharynx [[id number]]  (Normal) Collected: 07/23/21 1923    Lab Status: Final result Specimen: Swab from Nasopharynx Updated: 07/23/21 2000    Narrative:      The following orders were created for panel order COVID PRE-OP / PRE-PROCEDURE SCREENING ORDER (NO ISOLATION) - Swab, Nasopharynx.  Procedure                               Abnormality         Status                     ---------                               -----------         ------                     COVID-19 and FLU A/B PCR...[[id number]]  Normal              Final result                 Please view results for these tests on the individual orders.    COVID-19 and FLU A/B PCR - Swab, Nasopharynx [[id number]]  (Normal) Collected: 07/23/21 1923    Lab Status: Final result Specimen: Swab from Nasopharynx Updated: 07/23/21 2000     COVID19 Not Detected     Influenza A PCR Not Detected     Influenza B PCR Not Detected    Narrative:      Fact sheet for providers: https://www.fda.gov/media/537165/download    Fact sheet for patients: https://www.fda.gov/media/672283/download    Test performed by PCR.    Urine Culture - Urine, Urine, Clean Catch [[id number]]  (Normal) Collected: 07/23/21 1912    Lab Status: Final result Specimen: Urine, Clean Catch Updated: 07/25/21 0903     Urine Culture No growth          XR Chest 1 View    Result Date: 7/23/2021  CHEST X-RAY, 7/23/2021  HISTORY:  89-year-old female in the ED complaining of fatigue and confusion today.  TECHNIQUE: AP portable upright chest x-ray.  FINDINGS: Heart size and pulmonary vascularity are within normal limits. Median sternotomy.  Dual-chamber cardiac pacemaker. Mild scattered pulmonary scarring. No visible airspace consolidation, pulmonary edema or pleural effusion. Old healed right anterior 6th rib fracture. Congenital right C7 cervical rib.     1. No active disease. 2. Median sternotomy. Pacemaker. Signer Name: Dionte Albarran MD  Signed: 7/23/2021 7:28 PM  Workstation Name: [facility]  [facility]    Adult Transthoracic Echo Limited W/ Cont if Necessary Per Protocol    Result Date: 7/24/2021  · Estimated left ventricular EF = 60% Left ventricular systolic function is normal. · Incomplete exam due to patient uncooperation      CT Head Without Contrast Stroke Protocol    Result Date: 7/24/2021  EXAMINATION: CT HEAD WO CONTRAST - 07/23/2021  INDICATION: Follow up stroke.  TECHNIQUE: CT head without intravenous contrast following stroke protocol.  The radiation dose reduction device was turned on for each scan per the ALARA (As Low as Reasonably Achievable) protocol.  COMPARISON: CT head 06/01/2014  FINDINGS: Midline structures are symmetric without evidence of mass, mass effect or midline shift. Ventricles and sulci mildly prominent and mesial temporal regions of likely atrophy versus age-related volume loss along with moderate chronic small vessel ischemic disease. No intra-axial hemorrhage or extra-axial fluid collection. Globes and orbits unremarkable. Paranasal sinuses and mastoid air cells are grossly clear and well-pneumatized. Calvarium intact.      No acute intracranial abnormality with mild to moderate senescent changes.  Scan performed on 07/23/2021 at 1830 hours. Scan report made available 07/23/2021 at 1847 hours.  DICTATED:   07/23/2021 EDITED/ls :   07/23/2021   This report was finalized on 7/24/2021 10:32 AM by Dr. Neymar Myles.                Results for orders placed during the hospital encounter of 07/23/21    Adult Transthoracic Echo Limited W/ Cont if Necessary Per Protocol    Interpretation  Summary  · Estimated left ventricular EF = 60% Left ventricular systolic function is normal.  · Incomplete exam due to patient uncooperation      Plan for Follow-up of Pending Labs/Results: Dr. Patel  Pending Labs     Order Current Status    POC Protime / INR In process        Discharge Details        Discharge Medications      Continue These Medications      Instructions Start Date   amitriptyline 25 MG tablet  Commonly known as: ELAVIL   1 tablet each morning and 1 tablet at bedtime      bumetanide 0.5 MG tablet  Commonly known as: BUMEX   TAKE 1 TABLET BY MOUTH EVERY OTHER DAY AS NEEDED FOR SWELLING      cetirizine 10 MG tablet  Commonly known as: zyrTEC   10 mg, Oral, Daily      diclofenac 1 % gel gel  Commonly known as: Voltaren   4 g, Topical, 4 Times Daily      fentaNYL 50 MCG/HR patch  Commonly known as: DURAGESIC   APPLY 1 PATCH TOPICALLY Q 72 H      fentaNYL 12 MCG/HR  Commonly known as: DURAGESIC   APPLY 1 PATCH TOPICALLY TO SKIN Q 72 H      fluticasone 50 MCG/ACT nasal spray  Commonly known as: FLONASE   fluticasone propionate 50 mcg/actuation nasal spray,suspension   2 sprays each nostril daily      gabapentin 600 MG tablet  Commonly known as: NEURONTIN   600 mg, Oral, 3 Times Daily      HYDROcodone-acetaminophen  MG per tablet  Commonly known as: NORCO   1 tablet, Oral, Every 6 Hours PRN      pantoprazole 40 MG EC tablet  Commonly known as: PROTONIX   TAKE 1 TABLET BY MOUTH TWICE DAILY      potassium chloride 10 MEQ CR tablet   TAKE 1 TABLET BY MOUTH ONCE DAILY      promethazine 25 MG tablet  Commonly known as: PHENERGAN   25 mg, Oral, Every 6 Hours PRN             Allergies   Allergen Reactions   • Codeine Sulfate Unknown (See Comments)     Pt took in the 70's-pt not sure of what happened   • Cozaar [Losartan] Unknown (See Comments)     Pt can not remember   • Crestor [Rosuvastatin] Unknown (See Comments)     Pt can not remember   • Dexlansoprazole Unknown (See Comments)     Pt can not remember    • Lipitor [Atorvastatin] Unknown (See Comments)     Pt can not remember   • Lisinopril Unknown (See Comments)     Pt can not remember   • Nexium [Esomeprazole Magnesium] Unknown (See Comments)     Pt can not remember   • Norvasc [Amlodipine] Unknown (See Comments)     Pt can not remember   • Sulfadiazine Unknown (See Comments)     Pt can not remember   • Toprol Xl [Metoprolol Tartrate] Unknown (See Comments)     Pt can not remember   • Zetia [Ezetimibe] Unknown (See Comments)     Pt can not remember   • Zocor [Simvastatin] Unknown (See Comments)     Pt can not remember   • Augmentin [Amoxicillin-Pot Clavulanate] GI Intolerance     nausea   • Celebrex [Celecoxib] GI Intolerance         Discharge Disposition:  Home or Self Care    Diet:  Hospital:  Diet Order   Procedures   • Diet Regular; Cardiac            CODE STATUS:    Code Status and Medical Interventions:   Ordered at: 07/24/21 0115     Level Of Support Discussed With:    Patient     Code Status:    CPR     Medical Interventions (Level of Support Prior to Arrest):    Full       Future Appointments   Date Time Provider Department Center   7/27/2021  1:30 PM CHAIR 3 [facility]   10/13/2021  2:45 PM Rahat Morris MD [facility]   10/27/2021  2:45 PM Andi Seals III, MD [facility]       Additional Instructions for the Follow-ups that You Need to Schedule     Discharge Follow-up with PCP   As directed       Currently Documented PCP:    Manan Garcia MD    PCP Phone Number:    [phone]     Follow Up Details: within 1 week for Transition of Care -- will need BMP evaluation to repeat creatinine after diuresis during this hospital stay                     Estephania Patel MD  07/26/21      Time Spent on Discharge:  I spent  40  minutes on this discharge activity which included: face-to-face encounter with the patient, reviewing the data in the system, coordination of the care with the nursing staff as well as consultants,  documentation, and entering orders.

## 2021-07-26 NOTE — CASE MANAGEMENT/SOCIAL WORK
Discharge Planning Assessment  [facility]     Patient Name: Sheryl Carrillo  MRN: [id number]  Today's Date: 7/26/2021    Admit Date: 7/23/2021    Discharge Needs Assessment     Row Name 07/26/21 1136       Living Environment    Lives With  alone    Current Living Arrangements  home/apartment/condo    Primary Care Provided by  self    Provides Primary Care For  no one    Family Caregiver if Needed  grandchild(red), adult;child(red), adult    Quality of Family Relationships  unable to assess    Able to Return to Prior Arrangements  yes       Resource/Environmental Concerns    Resource/Environmental Concerns  none       Transition Planning    Patient/Family Anticipated Services at Transition  none    Transportation Anticipated  family or friend will provide       Discharge Needs Assessment    Readmission Within the Last 30 Days  no previous admission in last 30 days    Concerns to be Addressed  no discharge needs identified    Anticipated Changes Related to Illness  none    Equipment Needed After Discharge  none    Current Discharge Risk  lives alone        Discharge Plan     Row Name 07/26/21 1137       Plan    Plan  Home    Patient/Family in Agreement with Plan  yes    Plan Comments  I met with ms carrillo to discuss d/.c plan. Her plan is to return home. she lives alone,. states she was independent with all ADLs,driving, etc. prior to admission. she lives in a one level home, she has a PCP she sees regularly. She does not have home O2 .PT phil noted. No d/c needs identified at this time, will need to determine if home O2 needed.    Final Discharge Disposition Code  01 - home or self-care        Continued Care and Services - Admitted Since 7/23/2021    Coordination has not been started for this encounter.       Expected Discharge Date and Time     Expected Discharge Date Expected Discharge Time    Jul 30, 2021         Demographic Summary     Row Name 07/26/21 1135       General Information    Admission Type  inpatient     Arrived From  home    Referral Source  admission list    Reason for Consult  discharge planning    Preferred Language  English    General Information Comments  PCP- Ryan Garcia       Contact Information    Permission Granted to Share Info With      Contact Information Comments  Sheyla Zimmerman ( daughter) [phone]        Functional Status     Row Name 07/26/21 1136       Functional Status    Usual Activity Tolerance  good    Current Activity Tolerance  -- see PT notes       Functional Status, IADL    Medications  independent    Meal Preparation  independent    Housekeeping  independent    Shopping  independent       Mental Status    General Appearance WDL  WDL       Mental Status Summary    Recent Changes in Mental Status/Cognitive Functioning  no changes       Employment/    Employment Status  retired    Employment/ Comments  insurance- Medicare & Misc        Psychosocial    No documentation.       Abuse/Neglect    No documentation.       Legal    No documentation.       Substance Abuse    No documentation.       Patient Forms    No documentation.           Sonja C Kellerman, RN

## 2021-07-27 ENCOUNTER — TRANSITIONAL CARE MANAGEMENT TELEPHONE ENCOUNTER (OUTPATIENT)
Dept: CALL CENTER | Facility: HOSPITAL | Age: 86
End: 2021-07-27

## 2021-07-27 ENCOUNTER — TELEPHONE (OUTPATIENT)
Dept: ONCOLOGY | Facility: CLINIC | Age: 86
End: 2021-07-27

## 2021-07-27 ENCOUNTER — APPOINTMENT (OUTPATIENT)
Dept: ONCOLOGY | Facility: HOSPITAL | Age: 86
End: 2021-07-27

## 2021-07-27 NOTE — OUTREACH NOTE
Prep Survey      Responses   [facility] patient discharged from?  [place]   Is LACE score < 7 ?  No   Emergency Room discharge w/ pulse ox?  No   Eligibility  [facility]   Date of Admission  07/23/21   Date of Discharge  07/26/21   Discharge diagnosis  Metabolic encephalopathy/Acute hypoxia/RUTHY on CKD   Does the patient have one of the following disease processes/diagnoses(primary or secondary)?  Other   Does the patient have Home health ordered?  No   Is there a DME ordered?  No   Prep survey completed?  Yes          Kailee Wilkinson RN

## 2021-07-27 NOTE — TELEPHONE ENCOUNTER
Returned call to patient. At this time she informed nurse that she had missed her appointments.     Nurse called scheduling and was able to get appointment scheduled for August 3. @ 11:30. Patient stating she understood

## 2021-07-27 NOTE — TELEPHONE ENCOUNTER
Caller: JONI GARCIA    Relationship to patient: SELF    Best call back number: [phone]    Patient is needing: TO REQUEST PHONE CALL FROM RN.

## 2021-07-27 NOTE — OUTREACH NOTE
Call Center TCM Note      Responses   [facility] patient discharged from?  [place]   Does the patient have one of the following disease processes/diagnoses(primary or secondary)?  Other   TCM attempt successful?  Yes [Sheyla, daughter]   Call start time  0914   Call end time  0917   Discharge diagnosis  Metabolic encephalopathy/Acute hypoxia/RUTHY on CKD   Person spoke with today (if not patient) and relationship  Patient   Meds reviewed with patient/caregiver?  Yes   Is the patient having any side effects they believe may be caused by any medication additions or changes?  No   Does the patient have all medications ordered at discharge?  N/A   Is the patient taking all medications as directed (includes completed medication regime)?  Yes   Does the patient have a primary care provider?   Yes   Does the patient have an appointment with their PCP within 7 days of discharge?  Greater than 7 days   Comments regarding PCP  [facility] [place] [facility] appointment on 8/9/21 at 9:30 am   What is preventing the patient from scheduling follow up appointments within 7 days of discharge?  Earlier appointment not available   Nursing Interventions  Verified appointment date/time/provider   Has the patient kept scheduled appointments due by today?  N/A   What is the Home health agency?   No HH   Psychosocial issues?  No   Did the patient receive a copy of their discharge instructions?  Yes   Nursing interventions  Reviewed instructions with patient   What is the patient's perception of their health status since discharge?  Improving   Is the patient/caregiver able to teach back signs and symptoms related to disease process for when to call PCP?  Yes   Is the patient/caregiver able to teach back signs and symptoms related to disease process for when to call 911?  Yes   Is the patient/caregiver able to teach back the hierarchy of who to call/visit for symptoms/problems? PCP, Specialist, Home health nurse, Urgent Care, ED, 911  Yes   If the  patient is a current smoker, are they able to teach back resources for cessation?  Not a smoker   TCM call completed?  Yes   Wrap up additional comments  Pt states she is doing better. Pt verified PCP [facility] appt on 8/9/21. No questions/concerns.          Batsheva Joseph RN    7/27/2021, 09:19 EDT

## 2021-08-03 ENCOUNTER — HOSPITAL ENCOUNTER (OUTPATIENT)
Dept: ONCOLOGY | Facility: HOSPITAL | Age: 86
Setting detail: INFUSION SERIES
Discharge: HOME | End: 2021-08-03

## 2021-08-03 VITALS
SYSTOLIC BLOOD PRESSURE: 167 MMHG | RESPIRATION RATE: 20 BRPM | HEART RATE: 77 BPM | DIASTOLIC BLOOD PRESSURE: 79 MMHG | BODY MASS INDEX: 22.32 KG/M2 | HEIGHT: 63 IN | TEMPERATURE: 96.8 F | WEIGHT: 126 LBS

## 2021-08-03 DIAGNOSIS — D50.0 IRON DEFICIENCY ANEMIA DUE TO CHRONIC BLOOD LOSS: ICD-10-CM

## 2021-08-03 DIAGNOSIS — K31.819 GAVE (GASTRIC ANTRAL VASCULAR ECTASIA): Primary | ICD-10-CM

## 2021-08-03 DIAGNOSIS — Z78.9 MEDICATION INTOLERANCE: ICD-10-CM

## 2021-08-03 DIAGNOSIS — K90.9 MALABSORPTION IN THE ELDERLY: ICD-10-CM

## 2021-08-03 LAB — FERRITIN SERPL-MCNC: 168.2 NG/ML (ref 13–150)

## 2021-08-03 PROCEDURE — 96374 THER/PROPH/DIAG INJ IV PUSH: CPT

## 2021-08-03 PROCEDURE — 25010000002 FERRIC CARBOXYMALTOSE 750 MG/15ML SOLUTION 15 ML VIAL: Performed by: INTERNAL MEDICINE

## 2021-08-03 PROCEDURE — 82728 ASSAY OF FERRITIN: CPT | Performed by: INTERNAL MEDICINE

## 2021-08-03 RX ORDER — SODIUM CHLORIDE 9 MG/ML
250 INJECTION, SOLUTION INTRAVENOUS ONCE
Status: COMPLETED | OUTPATIENT
Start: 2021-08-03 | End: 2021-08-03

## 2021-08-03 RX ADMIN — SODIUM CHLORIDE 250 ML: 9 INJECTION, SOLUTION INTRAVENOUS at 12:22

## 2021-08-03 RX ADMIN — FERRIC CARBOXYMALTOSE INJECTION 750 MG: 50 INJECTION, SOLUTION INTRAVENOUS at 12:22

## 2021-08-03 NOTE — PROGRESS NOTES
Spoke with Liberty Ramírez RN concerning patient's second dose of injectafer.  Patient received a dose when in hospital last week.  No further doses needed.

## 2021-08-05 ENCOUNTER — READMISSION MANAGEMENT (OUTPATIENT)
Dept: CALL CENTER | Facility: HOSPITAL | Age: 86
End: 2021-08-05

## 2021-08-05 NOTE — OUTREACH NOTE
Medical Week 2 Survey      Responses   [facility] patient discharged from?  [place]   Does the patient have one of the following disease processes/diagnoses(primary or secondary)?  Other   Week 2 attempt successful?  Yes   Call start time  0931   Discharge diagnosis  Metabolic encephalopathy/Acute hypoxia/RUTHY on CKD   Call end time  0941   Person spoke with today (if not patient) and relationship  Patient   Meds reviewed with patient/caregiver?  Yes   Is the patient having any side effects they believe may be caused by any medication additions or changes?  No   Does the patient have all medications ordered at discharge?  N/A   Is the patient taking all medications as directed (includes completed medication regime)?  Yes   Does the patient have a primary care provider?   Yes   Does the patient have an appointment with their PCP within 7 days of discharge?  Greater than 7 days   Comments regarding PCP  hospital [place] [facility] appointment on 8/9/21 at 9:30 am   What is preventing the patient from scheduling follow up appointments within 7 days of discharge?  Earlier appointment not available   Nursing Interventions  Verified appointment date/time/provider   Has the patient kept scheduled appointments due by today?  N/A   Did the patient receive a copy of their discharge instructions?  Yes   Nursing interventions  Reviewed instructions with patient   What is the patient's perception of their health status since discharge?  Improving   Is the patient/caregiver able to teach back signs and symptoms related to disease process for when to call PCP?  Yes   Is the patient/caregiver able to teach back signs and symptoms related to disease process for when to call 911?  Yes   Is the patient/caregiver able to teach back the hierarchy of who to call/visit for symptoms/problems? PCP, Specialist, Home health nurse, Urgent Care, ED, 911  Yes   If the patient is a current smoker, are they able to teach back resources for cessation?   Not a smoker   Week 2 Call Completed?  Yes   Is the patient interested in additional calls from an ambulatory ?  NOTE:  applies to high risk patients requiring additional follow-up.  No   Revoked  No further contact(revokes)-requires comment   Wrap up additional comments  She does not feel she was treated well by her daughter in the hospital. Did not desire to talk to patient compliant.           Cally Willis RN

## 2021-08-09 ENCOUNTER — OFFICE VISIT (OUTPATIENT)
Dept: FAMILY MEDICINE CLINIC | Facility: CLINIC | Age: 86
End: 2021-08-09

## 2021-08-09 ENCOUNTER — LAB (OUTPATIENT)
Dept: LAB | Facility: HOSPITAL | Age: 86
End: 2021-08-09

## 2021-08-09 VITALS
SYSTOLIC BLOOD PRESSURE: 156 MMHG | HEART RATE: 88 BPM | RESPIRATION RATE: 18 BRPM | WEIGHT: 124 LBS | DIASTOLIC BLOOD PRESSURE: 64 MMHG | BODY MASS INDEX: 21.97 KG/M2 | HEIGHT: 63 IN | OXYGEN SATURATION: 93 % | TEMPERATURE: 97.3 F

## 2021-08-09 DIAGNOSIS — F32.A ANXIETY AND DEPRESSION: ICD-10-CM

## 2021-08-09 DIAGNOSIS — F41.9 ANXIETY AND DEPRESSION: ICD-10-CM

## 2021-08-09 DIAGNOSIS — R09.02 HYPOXIA: ICD-10-CM

## 2021-08-09 DIAGNOSIS — I10 ESSENTIAL HYPERTENSION: ICD-10-CM

## 2021-08-09 DIAGNOSIS — D50.0 IRON DEFICIENCY ANEMIA DUE TO CHRONIC BLOOD LOSS: ICD-10-CM

## 2021-08-09 DIAGNOSIS — K31.819 GAVE (GASTRIC ANTRAL VASCULAR ECTASIA): ICD-10-CM

## 2021-08-09 DIAGNOSIS — R41.82 ALTERED MENTAL STATUS, UNSPECIFIED ALTERED MENTAL STATUS TYPE: ICD-10-CM

## 2021-08-09 DIAGNOSIS — N30.00 ACUTE CYSTITIS WITHOUT HEMATURIA: Primary | ICD-10-CM

## 2021-08-09 LAB
ERYTHROCYTE [DISTWIDTH] IN BLOOD BY AUTOMATED COUNT: 13 % (ref 12.3–15.4)
FERRITIN SERPL-MCNC: 634.6 NG/ML (ref 13–150)
HCT VFR BLD AUTO: 36.7 % (ref 34–46.6)
HGB BLD-MCNC: 12.1 G/DL (ref 12–15.9)
MCH RBC QN AUTO: 28.3 PG (ref 26.6–33)
MCHC RBC AUTO-ENTMCNC: 33 G/DL (ref 31.5–35.7)
MCV RBC AUTO: 85.9 FL (ref 79–97)
PLATELET # BLD AUTO: 234 10*3/MM3 (ref 140–450)
PMV BLD AUTO: 6.9 FL (ref 6–12)
RBC # BLD AUTO: 4.28 10*6/MM3 (ref 3.77–5.28)
WBC # BLD AUTO: 5.5 10*3/MM3 (ref 3.4–10.8)

## 2021-08-09 PROCEDURE — 85027 COMPLETE CBC AUTOMATED: CPT

## 2021-08-09 PROCEDURE — 36415 COLL VENOUS BLD VENIPUNCTURE: CPT

## 2021-08-09 PROCEDURE — 82728 ASSAY OF FERRITIN: CPT

## 2021-08-09 PROCEDURE — 99495 TRANSJ CARE MGMT MOD F2F 14D: CPT | Performed by: FAMILY MEDICINE

## 2021-08-09 RX ORDER — CIPROFLOXACIN 500 MG/1
500 TABLET, FILM COATED ORAL 2 TIMES DAILY
Qty: 20 TABLET | Refills: 0 | Status: SHIPPED | OUTPATIENT
Start: 2021-08-09 | End: 2021-01-01

## 2021-08-09 NOTE — PROGRESS NOTES
Subjective   Sheryl Conner is a 89 y.o. female    Chief Complaint    Transitional care visit   Hospitalization 07/23/2021 through 07/26/2021 for urinary tract infection  Hypoxia  Altered mental status  Acute renal insufficiency  Chronic pain syndrome    History of Present Illness  The patient states that she is doing well today. She reports that she believes someone is signing her in to the hospital. The patient said that her daughter took her to the hospital, but she has never been in an isolation room. She reports that her bed was fixed so she could not get out of it, then it would ring every time she moved. The patient reports that she has bladder trouble again, and she needs a refill of her medication. She states that she has always had bladder trouble. The patient notes she has pain in her lower abdomen where her bladder is, and she also has occasional back pain.     She states that her breathing is not good. The patient denies having chest pain.     The patient notes that she has arthritis.    The following portions of the patient's history were reviewed and updated as appropriate: allergies, current medications, past social history and problem list    Review of Systems   Constitutional: Negative.  Negative for chills, fatigue, fever and unexpected weight change.   Respiratory: Negative.  Negative for cough, chest tightness, shortness of breath and wheezing.    Cardiovascular: Negative for chest pain, palpitations and leg swelling.   Gastrointestinal: Positive for abdominal pain. Negative for nausea and vomiting.   Endocrine: Negative for polydipsia, polyphagia and polyuria.   Genitourinary: Positive for difficulty urinating, dysuria, frequency and urgency. Negative for hematuria.   Musculoskeletal: Negative for arthralgias, back pain, gait problem and myalgias.   Skin: Negative for color change and rash.   Neurological: Negative for dizziness, tremors, syncope, weakness, numbness and headaches.    Hematological: Negative for adenopathy. Does not bruise/bleed easily.   Psychiatric/Behavioral: Negative for behavioral problems and dysphoric mood. The patient is not nervous/anxious.        Objective     Vitals:    08/09/21 0915   BP: 156/64   Pulse: 88   Resp: 18   Temp: 97.3 °F (36.3 °C)   SpO2: 93%       Physical Exam  Vitals and nursing note reviewed.   Constitutional:       Appearance: She is well-developed.   Neck:      Vascular: No JVD.   Cardiovascular:      Rate and Rhythm: Normal rate and regular rhythm.      Pulses: Normal pulses.      Heart sounds: Normal heart sounds. No murmur heard.     Pulmonary:      Effort: Pulmonary effort is normal. No respiratory distress.      Breath sounds: Normal breath sounds.   Abdominal:      General: Bowel sounds are normal. There is no distension.      Palpations: Abdomen is soft.      Tenderness: There is no abdominal tenderness. There is no guarding or rebound.   Musculoskeletal:      Lumbar back: Tenderness and bony tenderness present. No swelling, deformity or spasms. Decreased range of motion.   Skin:     General: Skin is warm and dry.   Neurological:      Mental Status: She is alert and oriented to person, place, and time.      Deep Tendon Reflexes: Reflexes are normal and symmetric.     RESULTS  CT scan of the head showed no intracranial abnormalities,  no bleeding, no stroke.    Assessment/Plan   Problems Addressed this Visit        Cardiac and Vasculature    Essential hypertension      Other Visit Diagnoses     Acute cystitis without hematuria    -  Primary    Relevant Medications    ciprofloxacin (CIPRO) 500 MG tablet    Anxiety and depression        Hypoxia        Altered mental status, unspecified altered mental status type          Diagnoses       Codes Comments    Acute cystitis without hematuria    -  Primary ICD-10-CM: N30.00  ICD-9-CM: 595.0     Anxiety and depression     ICD-10-CM: F41.9, F32.9  ICD-9-CM: 300.00, 311     Hypoxia     ICD-10-CM:  R09.02  ICD-9-CM: 799.02     Essential hypertension     ICD-10-CM: I10  ICD-9-CM: 401.9     Altered mental status, unspecified altered mental status type     ICD-10-CM: R41.82  ICD-9-CM: 780.97         Please note that portions of this document were completed with a voice recognition program. Efforts were made to edit the dictations, but occasionally words are mis-transcribed        Transcribed from ambient dictation for MARICARMEN Garcia MD by Luanne Live.  08/09/21   10:39 EDT    I have personally performed the services described in this document as transcribed by the above individual, and it is both accurate and complete.  MARICARMEN Garcia MD  8/9/2021  13:13 EDT

## 2021-08-30 ENCOUNTER — LAB (OUTPATIENT)
Dept: LAB | Facility: HOSPITAL | Age: 86
End: 2021-08-30

## 2021-08-30 DIAGNOSIS — D50.0 IRON DEFICIENCY ANEMIA DUE TO CHRONIC BLOOD LOSS: ICD-10-CM

## 2021-08-30 DIAGNOSIS — D50.0 IRON DEFICIENCY ANEMIA DUE TO CHRONIC BLOOD LOSS: Primary | ICD-10-CM

## 2021-08-30 LAB
ERYTHROCYTE [DISTWIDTH] IN BLOOD BY AUTOMATED COUNT: 13.4 % (ref 12.3–15.4)
FERRITIN SERPL-MCNC: 200.6 NG/ML (ref 13–150)
HCT VFR BLD AUTO: 37.6 % (ref 34–46.6)
HGB BLD-MCNC: 12.1 G/DL (ref 12–15.9)
LYMPHOCYTES # BLD AUTO: 0.6 10*3/MM3 (ref 0.7–3.1)
LYMPHOCYTES NFR BLD AUTO: 15.3 % (ref 19.6–45.3)
MCH RBC QN AUTO: 28.7 PG (ref 26.6–33)
MCHC RBC AUTO-ENTMCNC: 32.3 G/DL (ref 31.5–35.7)
MCV RBC AUTO: 88.7 FL (ref 79–97)
MONOCYTES # BLD AUTO: 0.1 10*3/MM3 (ref 0.1–0.9)
MONOCYTES NFR BLD AUTO: 3.7 % (ref 5–12)
NEUTROPHILS NFR BLD AUTO: 3.2 10*3/MM3 (ref 1.7–7)
NEUTROPHILS NFR BLD AUTO: 81 % (ref 42.7–76)
PLATELET # BLD AUTO: 213 10*3/MM3 (ref 140–450)
PMV BLD AUTO: 6.6 FL (ref 6–12)
RBC # BLD AUTO: 4.23 10*6/MM3 (ref 3.77–5.28)
WBC # BLD AUTO: 3.9 10*3/MM3 (ref 3.4–10.8)

## 2021-08-30 PROCEDURE — 82728 ASSAY OF FERRITIN: CPT

## 2021-08-30 PROCEDURE — 85025 COMPLETE CBC W/AUTO DIFF WBC: CPT

## 2021-08-30 PROCEDURE — 36415 COLL VENOUS BLD VENIPUNCTURE: CPT

## 2021-09-09 ENCOUNTER — HOSPITAL ENCOUNTER (EMERGENCY)
Facility: HOSPITAL | Age: 86
Discharge: HOME | End: 2021-09-09
Attending: EMERGENCY MEDICINE | Admitting: EMERGENCY MEDICINE

## 2021-09-09 ENCOUNTER — APPOINTMENT (OUTPATIENT)
Dept: GENERAL RADIOLOGY | Facility: HOSPITAL | Age: 86
End: 2021-09-09

## 2021-09-09 VITALS
DIASTOLIC BLOOD PRESSURE: 73 MMHG | HEIGHT: 63 IN | TEMPERATURE: 99 F | SYSTOLIC BLOOD PRESSURE: 184 MMHG | RESPIRATION RATE: 18 BRPM | BODY MASS INDEX: 22.15 KG/M2 | HEART RATE: 75 BPM | OXYGEN SATURATION: 92 % | WEIGHT: 125 LBS

## 2021-09-09 DIAGNOSIS — I50.31 ACUTE DIASTOLIC CHF (CONGESTIVE HEART FAILURE) (HCC): Primary | ICD-10-CM

## 2021-09-09 LAB
ALBUMIN SERPL-MCNC: 4 G/DL (ref 3.5–5.2)
ALBUMIN/GLOB SERPL: 1.4 G/DL
ALP SERPL-CCNC: 70 U/L (ref 39–117)
ALT SERPL W P-5'-P-CCNC: 10 U/L (ref 1–33)
ANION GAP SERPL CALCULATED.3IONS-SCNC: 10 MMOL/L (ref 5–15)
AST SERPL-CCNC: 17 U/L (ref 1–32)
BASOPHILS # BLD AUTO: 0.03 10*3/MM3 (ref 0–0.2)
BASOPHILS NFR BLD AUTO: 0.7 % (ref 0–1.5)
BILIRUB SERPL-MCNC: 0.4 MG/DL (ref 0–1.2)
BUN SERPL-MCNC: 17 MG/DL (ref 8–23)
BUN/CREAT SERPL: 18.5 (ref 7–25)
CALCIUM SPEC-SCNC: 9.2 MG/DL (ref 8.2–9.6)
CHLORIDE SERPL-SCNC: 104 MMOL/L (ref 98–107)
CO2 SERPL-SCNC: 26 MMOL/L (ref 22–29)
CREAT SERPL-MCNC: 0.92 MG/DL (ref 0.57–1)
DEPRECATED RDW RBC AUTO: 40.4 FL (ref 37–54)
EOSINOPHIL # BLD AUTO: 0.11 10*3/MM3 (ref 0–0.4)
EOSINOPHIL NFR BLD AUTO: 2.7 % (ref 0.3–6.2)
ERYTHROCYTE [DISTWIDTH] IN BLOOD BY AUTOMATED COUNT: 12.6 % (ref 12.3–15.4)
FLUAV RNA RESP QL NAA+PROBE: NOT DETECTED
FLUBV RNA RESP QL NAA+PROBE: NOT DETECTED
GFR SERPL CREATININE-BSD FRML MDRD: 57 ML/MIN/1.73
GLOBULIN UR ELPH-MCNC: 2.9 GM/DL
GLUCOSE SERPL-MCNC: 107 MG/DL (ref 65–99)
HCT VFR BLD AUTO: 38.2 % (ref 34–46.6)
HGB BLD-MCNC: 12.3 G/DL (ref 12–15.9)
HOLD SPECIMEN: NORMAL
IMM GRANULOCYTES # BLD AUTO: 0.01 10*3/MM3 (ref 0–0.05)
IMM GRANULOCYTES NFR BLD AUTO: 0.2 % (ref 0–0.5)
LIPASE SERPL-CCNC: 16 U/L (ref 13–60)
LYMPHOCYTES # BLD AUTO: 0.41 10*3/MM3 (ref 0.7–3.1)
LYMPHOCYTES NFR BLD AUTO: 10.1 % (ref 19.6–45.3)
MCH RBC QN AUTO: 28.6 PG (ref 26.6–33)
MCHC RBC AUTO-ENTMCNC: 32.2 G/DL (ref 31.5–35.7)
MCV RBC AUTO: 88.8 FL (ref 79–97)
MONOCYTES # BLD AUTO: 0.22 10*3/MM3 (ref 0.1–0.9)
MONOCYTES NFR BLD AUTO: 5.4 % (ref 5–12)
NEUTROPHILS NFR BLD AUTO: 3.29 10*3/MM3 (ref 1.7–7)
NEUTROPHILS NFR BLD AUTO: 80.9 % (ref 42.7–76)
NRBC BLD AUTO-RTO: 0 /100 WBC (ref 0–0.2)
NT-PROBNP SERPL-MCNC: 1607 PG/ML (ref 0–1800)
PLATELET # BLD AUTO: 166 10*3/MM3 (ref 140–450)
PMV BLD AUTO: 9.4 FL (ref 6–12)
POTASSIUM SERPL-SCNC: 4.3 MMOL/L (ref 3.5–5.2)
PROT SERPL-MCNC: 6.9 G/DL (ref 6–8.5)
QT INTERVAL: 436 MS
QT INTERVAL: 448 MS
QTC INTERVAL: 497 MS
QTC INTERVAL: 500 MS
RBC # BLD AUTO: 4.3 10*6/MM3 (ref 3.77–5.28)
SARS-COV-2 RNA RESP QL NAA+PROBE: NOT DETECTED
SODIUM SERPL-SCNC: 140 MMOL/L (ref 136–145)
TROPONIN T SERPL-MCNC: <0.01 NG/ML (ref 0–0.03)
TROPONIN T SERPL-MCNC: <0.01 NG/ML (ref 0–0.03)
WBC # BLD AUTO: 4.07 10*3/MM3 (ref 3.4–10.8)
WHOLE BLOOD HOLD SPECIMEN: NORMAL
WHOLE BLOOD HOLD SPECIMEN: NORMAL

## 2021-09-09 PROCEDURE — 85025 COMPLETE CBC W/AUTO DIFF WBC: CPT | Performed by: EMERGENCY MEDICINE

## 2021-09-09 PROCEDURE — 96374 THER/PROPH/DIAG INJ IV PUSH: CPT

## 2021-09-09 PROCEDURE — 87636 SARSCOV2 & INF A&B AMP PRB: CPT | Performed by: EMERGENCY MEDICINE

## 2021-09-09 PROCEDURE — 93005 ELECTROCARDIOGRAM TRACING: CPT | Performed by: EMERGENCY MEDICINE

## 2021-09-09 PROCEDURE — 83690 ASSAY OF LIPASE: CPT | Performed by: EMERGENCY MEDICINE

## 2021-09-09 PROCEDURE — 80053 COMPREHEN METABOLIC PANEL: CPT | Performed by: EMERGENCY MEDICINE

## 2021-09-09 PROCEDURE — 71045 X-RAY EXAM CHEST 1 VIEW: CPT

## 2021-09-09 PROCEDURE — 83880 ASSAY OF NATRIURETIC PEPTIDE: CPT | Performed by: EMERGENCY MEDICINE

## 2021-09-09 PROCEDURE — 99284 EMERGENCY DEPT VISIT MOD MDM: CPT

## 2021-09-09 PROCEDURE — 84484 ASSAY OF TROPONIN QUANT: CPT | Performed by: EMERGENCY MEDICINE

## 2021-09-09 RX ORDER — BUMETANIDE 0.25 MG/ML
1 INJECTION INTRAMUSCULAR; INTRAVENOUS ONCE
Status: COMPLETED | OUTPATIENT
Start: 2021-09-09 | End: 2021-09-09

## 2021-09-09 RX ORDER — FUROSEMIDE 10 MG/ML
40 INJECTION INTRAMUSCULAR; INTRAVENOUS ONCE
Status: DISCONTINUED | OUTPATIENT
Start: 2021-09-09 | End: 2021-09-09

## 2021-09-09 RX ORDER — SODIUM CHLORIDE 0.9 % (FLUSH) 0.9 %
10 SYRINGE (ML) INJECTION AS NEEDED
Status: DISCONTINUED | OUTPATIENT
Start: 2021-09-09 | End: 2021-09-09 | Stop reason: HOSPADM

## 2021-09-09 RX ORDER — ASPIRIN 81 MG/1
324 TABLET, CHEWABLE ORAL ONCE
Status: DISCONTINUED | OUTPATIENT
Start: 2021-09-09 | End: 2021-09-09 | Stop reason: HOSPADM

## 2021-09-09 RX ADMIN — BUMETANIDE 1 MG: 0.25 INJECTION, SOLUTION INTRAMUSCULAR; INTRAVENOUS at 12:33

## 2021-09-09 NOTE — ED PROVIDER NOTES
Subjective   Pt presents with shortness of breath.  She woke up feeling normal.  She went out to bring in her garbage cans and had dyspnea.  Triage note says she had chest pain but she specifically denies this to me.  She feels normal here in the ED at time of interview.  She has had no fever, cough, naussea, vomiting, diarrhea, myalgias, sore throat or leg edema.    She has had no med changes.  She is mostly compliant with home meds but she hasn't taken her Bumex in four days, or her PPI.  She is not on home oxygen.      History provided by:  Patient      Review of Systems   Constitutional: Negative for fever.   HENT: Negative for congestion and sore throat.    Respiratory: Positive for shortness of breath. Negative for cough.    Cardiovascular: Negative for chest pain and leg swelling.   Gastrointestinal: Negative for abdominal pain, diarrhea and vomiting.   All other systems reviewed and are negative.      Past Medical History:   Diagnosis Date   • Anemia    • Arthritis    • Fibromyalgia    • GERD (gastroesophageal reflux disease)    • History of transfusion    • Hypertension    • Kidney stone    • MG (myasthenia gravis) (CMS/[facility])     history of   • Pacemaker        Allergies   Allergen Reactions   • Codeine Sulfate Unknown (See Comments)     Pt took in the 70's-pt not sure of what happened   • Cozaar [Losartan] Unknown (See Comments)     Pt can not remember   • Crestor [Rosuvastatin] Unknown (See Comments)     Pt can not remember   • Dexlansoprazole Unknown (See Comments)     Pt can not remember   • Lipitor [Atorvastatin] Unknown (See Comments)     Pt can not remember   • Lisinopril Unknown (See Comments)     Pt can not remember   • Nexium [Esomeprazole Magnesium] Unknown (See Comments)     Pt can not remember   • Norvasc [Amlodipine] Unknown (See Comments)     Pt can not remember   • Sulfadiazine Unknown (See Comments)     Pt can not remember   • Toprol Xl [Metoprolol Tartrate] Unknown (See Comments)     Pt can  not remember   • Zetia [Ezetimibe] Unknown (See Comments)     Pt can not remember   • Zocor [Simvastatin] Unknown (See Comments)     Pt can not remember   • Augmentin [Amoxicillin-Pot Clavulanate] GI Intolerance     nausea   • Celebrex [Celecoxib] GI Intolerance       Past Surgical History:   Procedure Laterality Date   • CARDIAC SURGERY     • COLONOSCOPY     • HEMORRHOIDECTOMY     • TUBAL ABDOMINAL LIGATION     • UPPER GASTROINTESTINAL ENDOSCOPY     • UPPER GASTROINTESTINAL ENDOSCOPY  05/29/2018       Family History   Problem Relation Age of Onset   • No Known Problems Mother    • Diabetes Father    • Cancer Son        Social History     Socioeconomic History   • Marital status:      Spouse name: Not on file   • Number of children: Not on file   • Years of education: Not on file   • Highest education level: Not on file   Tobacco Use   • Smoking status: Never Smoker   • Smokeless tobacco: Never Used   Substance and Sexual Activity   • Alcohol use: No   • Drug use: No   • Sexual activity: Defer           Objective   Physical Exam  Vitals and nursing note reviewed.   Constitutional:       General: She is not in acute distress.     Appearance: Normal appearance. She is not ill-appearing.   HENT:      Head: Normocephalic and atraumatic.      Mouth/Throat:      Mouth: Mucous membranes are moist.   Eyes:      General: No scleral icterus.        Right eye: No discharge.         Left eye: No discharge.      Conjunctiva/sclera: Conjunctivae normal.   Cardiovascular:      Rate and Rhythm: Normal rate and regular rhythm.      Heart sounds: No murmur heard.     Pulmonary:      Effort: Pulmonary effort is normal. No respiratory distress.      Breath sounds: Examination of the left-middle field reveals rales. Examination of the right-lower field reveals rales. Examination of the left-lower field reveals rales. Rales present. No wheezing.   Abdominal:      General: Bowel sounds are normal. There is no distension.       Palpations: Abdomen is soft.      Tenderness: There is no abdominal tenderness. There is no guarding or rebound.   Musculoskeletal:         General: No swelling. Normal range of motion.      Cervical back: Normal range of motion and neck supple.      Right lower leg: No edema.      Left lower leg: No edema.   Skin:     General: Skin is warm and dry.      Findings: No rash.   Neurological:      General: No focal deficit present.      Mental Status: She is alert. Mental status is at baseline.   Psychiatric:         Mood and Affect: Mood normal.         Behavior: Behavior normal.         Thought Content: Thought content normal.         Procedures           ED Course         Slightly hypoxemic on arrival,oxygen applied.  No complaints in the ED.  Covid negative.  CXR negative.  Labs benign.  Two negative cardiac sets, EKG paced.    Reviewed previous records, previous admission with same complaints with hypoxemia, felt to be diastolic CHF, improved with diuresis.  IV Bumex given, she peed a lot.  She reports feeling fine, able to titrate to room air with her sats 92% or better.  Discussed mgmt of this as admission (given age and sats) vs discharge (in a pandemic).  She strongly prefers to go home.  I will have her double her home Bumex for the weekend and follow up with the CHF clinic, return to ED if she has any more concerning symptoms.                                    MDM  Number of Diagnoses or Management Options     Amount and/or Complexity of Data Reviewed  Clinical lab tests: reviewed and ordered  Tests in the radiology section of CPT®: reviewed and ordered  Decide to obtain previous medical records or to obtain history from someone other than the patient: yes  Review and summarize past medical records: yes  Independent visualization of images, tracings, or specimens: yes        Final diagnoses:   Acute diastolic CHF (congestive heart failure) (CMS/[facility])       ED Disposition  ED Disposition     ED Disposition  Condition Comment    Discharge Stable           [facility]  [address]  [address]  [facility] [id number] [phone]             Medication List      No changes were made to your prescriptions during this visit.          Koko Murrell MD  09/09/21 8125

## 2021-09-22 DIAGNOSIS — I10 ESSENTIAL HYPERTENSION: ICD-10-CM

## 2021-09-22 DIAGNOSIS — K31.819 GAVE (GASTRIC ANTRAL VASCULAR ECTASIA): ICD-10-CM

## 2021-09-22 RX ORDER — PANTOPRAZOLE SODIUM 40 MG/1
TABLET, DELAYED RELEASE ORAL
Qty: 60 TABLET | Refills: 11 | Status: SHIPPED | OUTPATIENT
Start: 2021-09-22 | End: 2022-01-01

## 2021-09-22 RX ORDER — POTASSIUM CHLORIDE 750 MG/1
TABLET, FILM COATED, EXTENDED RELEASE ORAL
Qty: 30 TABLET | Refills: 11 | Status: SHIPPED | OUTPATIENT
Start: 2021-09-22 | End: 2022-01-01

## 2021-09-27 DIAGNOSIS — M19.90 ARTHRITIS: ICD-10-CM

## 2021-09-28 NOTE — TELEPHONE ENCOUNTER
Provider:  Shan  Caller:  Automated refill request  Surgery:  NA  Surgery Date:    Last visit:  Office Visit with Shena Torrez PA-C (06/25/2020)    Next visit: NA    Reason for call:         Requested Prescriptions     Pending Prescriptions Disp Refills   • Diclofenac Sodium (VOLTAREN) 1 % gel gel [Pharmacy Med Name: DICLOFENAC SODIUM 1 % GEL 1 Gel] 400 g 0     Sig: APPLY 4 GRAMS TO AFFECTED AREA 4 TIMES DAILY AS DIRECTED     Pt seen 1x in 2020

## 2021-09-29 ENCOUNTER — LAB (OUTPATIENT)
Dept: LAB | Facility: HOSPITAL | Age: 86
End: 2021-09-29

## 2021-09-29 DIAGNOSIS — D50.0 IRON DEFICIENCY ANEMIA DUE TO CHRONIC BLOOD LOSS: Primary | ICD-10-CM

## 2021-09-29 DIAGNOSIS — D50.0 IRON DEFICIENCY ANEMIA DUE TO CHRONIC BLOOD LOSS: ICD-10-CM

## 2021-09-29 LAB
ERYTHROCYTE [DISTWIDTH] IN BLOOD BY AUTOMATED COUNT: 13.3 % (ref 12.3–15.4)
FERRITIN SERPL-MCNC: 110.5 NG/ML (ref 13–150)
HCT VFR BLD AUTO: 34 % (ref 34–46.6)
HGB BLD-MCNC: 11.2 G/DL (ref 12–15.9)
LYMPHOCYTES # BLD AUTO: 0.5 10*3/MM3 (ref 0.7–3.1)
LYMPHOCYTES NFR BLD AUTO: 13.6 % (ref 19.6–45.3)
MCH RBC QN AUTO: 29.1 PG (ref 26.6–33)
MCHC RBC AUTO-ENTMCNC: 32.9 G/DL (ref 31.5–35.7)
MCV RBC AUTO: 88.3 FL (ref 79–97)
MONOCYTES # BLD AUTO: 0.1 10*3/MM3 (ref 0.1–0.9)
MONOCYTES NFR BLD AUTO: 3.4 % (ref 5–12)
NEUTROPHILS NFR BLD AUTO: 3.2 10*3/MM3 (ref 1.7–7)
NEUTROPHILS NFR BLD AUTO: 83 % (ref 42.7–76)
PLATELET # BLD AUTO: 174 10*3/MM3 (ref 140–450)
PMV BLD AUTO: 7.1 FL (ref 6–12)
RBC # BLD AUTO: 3.85 10*6/MM3 (ref 3.77–5.28)
WBC # BLD AUTO: 3.9 10*3/MM3 (ref 3.4–10.8)

## 2021-09-29 PROCEDURE — 36415 COLL VENOUS BLD VENIPUNCTURE: CPT

## 2021-09-29 PROCEDURE — 85025 COMPLETE CBC W/AUTO DIFF WBC: CPT

## 2021-09-29 PROCEDURE — 82728 ASSAY OF FERRITIN: CPT

## 2021-10-21 ENCOUNTER — LAB (OUTPATIENT)
Dept: LAB | Facility: HOSPITAL | Age: 86
End: 2021-10-21

## 2021-10-21 DIAGNOSIS — D50.0 IRON DEFICIENCY ANEMIA DUE TO CHRONIC BLOOD LOSS: ICD-10-CM

## 2021-10-21 DIAGNOSIS — M19.90 ARTHRITIS: ICD-10-CM

## 2021-10-21 DIAGNOSIS — D50.0 IRON DEFICIENCY ANEMIA DUE TO CHRONIC BLOOD LOSS: Primary | ICD-10-CM

## 2021-10-21 LAB
ERYTHROCYTE [DISTWIDTH] IN BLOOD BY AUTOMATED COUNT: 13.3 % (ref 12.3–15.4)
FERRITIN SERPL-MCNC: 101 NG/ML (ref 13–150)
HCT VFR BLD AUTO: 38.5 % (ref 34–46.6)
HGB BLD-MCNC: 12.6 G/DL (ref 12–15.9)
LYMPHOCYTES # BLD AUTO: 0.6 10*3/MM3 (ref 0.7–3.1)
LYMPHOCYTES NFR BLD AUTO: 15.5 % (ref 19.6–45.3)
MCH RBC QN AUTO: 29.1 PG (ref 26.6–33)
MCHC RBC AUTO-ENTMCNC: 32.7 G/DL (ref 31.5–35.7)
MCV RBC AUTO: 89 FL (ref 79–97)
MONOCYTES # BLD AUTO: 0.2 10*3/MM3 (ref 0.1–0.9)
MONOCYTES NFR BLD AUTO: 5.8 % (ref 5–12)
NEUTROPHILS NFR BLD AUTO: 3.1 10*3/MM3 (ref 1.7–7)
NEUTROPHILS NFR BLD AUTO: 78.7 % (ref 42.7–76)
PLATELET # BLD AUTO: 180 10*3/MM3 (ref 140–450)
PMV BLD AUTO: 7.1 FL (ref 6–12)
RBC # BLD AUTO: 4.32 10*6/MM3 (ref 3.77–5.28)
WBC # BLD AUTO: 3.9 10*3/MM3 (ref 3.4–10.8)

## 2021-10-21 PROCEDURE — 36415 COLL VENOUS BLD VENIPUNCTURE: CPT

## 2021-10-21 PROCEDURE — 82728 ASSAY OF FERRITIN: CPT

## 2021-10-21 PROCEDURE — 85025 COMPLETE CBC W/AUTO DIFF WBC: CPT

## 2021-10-21 NOTE — TELEPHONE ENCOUNTER
Provider:  Shan  Caller:  Automated refill request  Surgery:  NA  Surgery Date: NA   Last visit:  06/25/2020  Next visit: NA    Reason for call:         Automated refill request for Voltaren gel.     Requested Prescriptions     Pending Prescriptions Disp Refills   • Diclofenac Sodium (VOLTAREN) 1 % gel gel [Pharmacy Med Name: DICLOFENAC SODIUM 1 % GEL 1 Gel] 400 g 0     Sig: APPLY 4 GRAMS TO AFFECTED AREA 4 TIMES DAILY AS DIRECTED

## 2021-11-04 RX ORDER — PROMETHAZINE HYDROCHLORIDE 25 MG/1
TABLET ORAL
Qty: 30 TABLET | Refills: 5 | Status: SHIPPED | OUTPATIENT
Start: 2021-11-04 | End: 2022-01-01 | Stop reason: SDUPTHER

## 2021-11-11 ENCOUNTER — APPOINTMENT (OUTPATIENT)
Dept: CT IMAGING | Facility: HOSPITAL | Age: 86
End: 2021-11-11

## 2021-11-11 ENCOUNTER — APPOINTMENT (OUTPATIENT)
Dept: GENERAL RADIOLOGY | Facility: HOSPITAL | Age: 86
End: 2021-11-11

## 2021-11-11 ENCOUNTER — HOSPITAL ENCOUNTER (EMERGENCY)
Facility: HOSPITAL | Age: 86
Discharge: HOME | End: 2021-11-11
Attending: EMERGENCY MEDICINE | Admitting: EMERGENCY MEDICINE

## 2021-11-11 VITALS
WEIGHT: 125 LBS | SYSTOLIC BLOOD PRESSURE: 172 MMHG | BODY MASS INDEX: 22.15 KG/M2 | RESPIRATION RATE: 18 BRPM | DIASTOLIC BLOOD PRESSURE: 81 MMHG | TEMPERATURE: 97.4 F | OXYGEN SATURATION: 97 % | HEIGHT: 63 IN | HEART RATE: 80 BPM

## 2021-11-11 DIAGNOSIS — R50.9 FEVER, UNSPECIFIED FEVER CAUSE: ICD-10-CM

## 2021-11-11 DIAGNOSIS — N28.9 RENAL INSUFFICIENCY: ICD-10-CM

## 2021-11-11 DIAGNOSIS — K83.8 COMMON BILE DUCT DILATION: ICD-10-CM

## 2021-11-11 DIAGNOSIS — R10.9 ABDOMINAL DISCOMFORT: Primary | ICD-10-CM

## 2021-11-11 DIAGNOSIS — I71.40 ABDOMINAL AORTIC ANEURYSM (AAA) WITHOUT RUPTURE (HCC): ICD-10-CM

## 2021-11-11 DIAGNOSIS — K57.90 DIVERTICULOSIS: ICD-10-CM

## 2021-11-11 LAB
ALBUMIN SERPL-MCNC: 4.4 G/DL (ref 3.5–5.2)
ALBUMIN/GLOB SERPL: 1.4 G/DL
ALP SERPL-CCNC: 72 U/L (ref 39–117)
ALT SERPL W P-5'-P-CCNC: 5 U/L (ref 1–33)
ANION GAP SERPL CALCULATED.3IONS-SCNC: 9 MMOL/L (ref 5–15)
AST SERPL-CCNC: 18 U/L (ref 1–32)
BACTERIA UR QL AUTO: NORMAL /HPF
BASOPHILS # BLD AUTO: 0.06 10*3/MM3 (ref 0–0.2)
BASOPHILS NFR BLD AUTO: 1.1 % (ref 0–1.5)
BILIRUB SERPL-MCNC: 0.4 MG/DL (ref 0–1.2)
BILIRUB UR QL STRIP: NEGATIVE
BUN SERPL-MCNC: 16 MG/DL (ref 8–23)
BUN/CREAT SERPL: 15.4 (ref 7–25)
CALCIUM SPEC-SCNC: 9.3 MG/DL (ref 8.2–9.6)
CHLORIDE SERPL-SCNC: 103 MMOL/L (ref 98–107)
CLARITY UR: CLEAR
CO2 SERPL-SCNC: 29 MMOL/L (ref 22–29)
COLOR UR: YELLOW
CREAT SERPL-MCNC: 1.04 MG/DL (ref 0.57–1)
D-LACTATE SERPL-SCNC: 0.8 MMOL/L (ref 0.5–2)
DEPRECATED RDW RBC AUTO: 40.9 FL (ref 37–54)
EOSINOPHIL # BLD AUTO: 0.1 10*3/MM3 (ref 0–0.4)
EOSINOPHIL NFR BLD AUTO: 1.8 % (ref 0.3–6.2)
ERYTHROCYTE [DISTWIDTH] IN BLOOD BY AUTOMATED COUNT: 12.3 % (ref 12.3–15.4)
FLUAV SUBTYP SPEC NAA+PROBE: NOT DETECTED
FLUBV RNA ISLT QL NAA+PROBE: NOT DETECTED
GFR SERPL CREATININE-BSD FRML MDRD: 50 ML/MIN/1.73
GLOBULIN UR ELPH-MCNC: 3.2 GM/DL
GLUCOSE SERPL-MCNC: 117 MG/DL (ref 65–99)
GLUCOSE UR STRIP-MCNC: NEGATIVE MG/DL
HCT VFR BLD AUTO: 38.8 % (ref 34–46.6)
HGB BLD-MCNC: 12.5 G/DL (ref 12–15.9)
HGB UR QL STRIP.AUTO: NEGATIVE
HOLD SPECIMEN: NORMAL
HYALINE CASTS UR QL AUTO: NORMAL /LPF
IMM GRANULOCYTES # BLD AUTO: 0.01 10*3/MM3 (ref 0–0.05)
IMM GRANULOCYTES NFR BLD AUTO: 0.2 % (ref 0–0.5)
KETONES UR QL STRIP: NEGATIVE
LEUKOCYTE ESTERASE UR QL STRIP.AUTO: ABNORMAL
LYMPHOCYTES # BLD AUTO: 0.36 10*3/MM3 (ref 0.7–3.1)
LYMPHOCYTES NFR BLD AUTO: 6.6 % (ref 19.6–45.3)
MAGNESIUM SERPL-MCNC: 2.1 MG/DL (ref 1.6–2.4)
MCH RBC QN AUTO: 29 PG (ref 26.6–33)
MCHC RBC AUTO-ENTMCNC: 32.2 G/DL (ref 31.5–35.7)
MCV RBC AUTO: 90 FL (ref 79–97)
MONOCYTES # BLD AUTO: 0.27 10*3/MM3 (ref 0.1–0.9)
MONOCYTES NFR BLD AUTO: 4.9 % (ref 5–12)
NEUTROPHILS NFR BLD AUTO: 4.69 10*3/MM3 (ref 1.7–7)
NEUTROPHILS NFR BLD AUTO: 85.4 % (ref 42.7–76)
NITRITE UR QL STRIP: NEGATIVE
NRBC BLD AUTO-RTO: 0 /100 WBC (ref 0–0.2)
PH UR STRIP.AUTO: 6.5 [PH] (ref 5–8)
PLATELET # BLD AUTO: 191 10*3/MM3 (ref 140–450)
PMV BLD AUTO: 9.6 FL (ref 6–12)
POTASSIUM SERPL-SCNC: 4.3 MMOL/L (ref 3.5–5.2)
PROT SERPL-MCNC: 7.6 G/DL (ref 6–8.5)
PROT UR QL STRIP: NEGATIVE
RBC # BLD AUTO: 4.31 10*6/MM3 (ref 3.77–5.28)
RBC # UR: NORMAL /HPF
REF LAB TEST METHOD: NORMAL
SARS-COV-2 RNA PNL SPEC NAA+PROBE: NOT DETECTED
SODIUM SERPL-SCNC: 141 MMOL/L (ref 136–145)
SP GR UR STRIP: 1.01 (ref 1–1.03)
SQUAMOUS #/AREA URNS HPF: NORMAL /HPF
TROPONIN T SERPL-MCNC: <0.01 NG/ML (ref 0–0.03)
UROBILINOGEN UR QL STRIP: ABNORMAL
WBC # BLD AUTO: 5.49 10*3/MM3 (ref 3.4–10.8)
WBC UR QL AUTO: NORMAL /HPF
WHOLE BLOOD HOLD SPECIMEN: NORMAL
WHOLE BLOOD HOLD SPECIMEN: NORMAL

## 2021-11-11 PROCEDURE — 25010000002 IOPAMIDOL 61 % SOLUTION: Performed by: EMERGENCY MEDICINE

## 2021-11-11 PROCEDURE — 74177 CT ABD & PELVIS W/CONTRAST: CPT

## 2021-11-11 PROCEDURE — 80053 COMPREHEN METABOLIC PANEL: CPT | Performed by: EMERGENCY MEDICINE

## 2021-11-11 PROCEDURE — 83605 ASSAY OF LACTIC ACID: CPT | Performed by: EMERGENCY MEDICINE

## 2021-11-11 PROCEDURE — 84484 ASSAY OF TROPONIN QUANT: CPT | Performed by: EMERGENCY MEDICINE

## 2021-11-11 PROCEDURE — 36415 COLL VENOUS BLD VENIPUNCTURE: CPT

## 2021-11-11 PROCEDURE — 99284 EMERGENCY DEPT VISIT MOD MDM: CPT

## 2021-11-11 PROCEDURE — 87636 SARSCOV2 & INF A&B AMP PRB: CPT | Performed by: EMERGENCY MEDICINE

## 2021-11-11 PROCEDURE — 85025 COMPLETE CBC W/AUTO DIFF WBC: CPT | Performed by: EMERGENCY MEDICINE

## 2021-11-11 PROCEDURE — 83735 ASSAY OF MAGNESIUM: CPT | Performed by: EMERGENCY MEDICINE

## 2021-11-11 PROCEDURE — 71045 X-RAY EXAM CHEST 1 VIEW: CPT

## 2021-11-11 PROCEDURE — 93005 ELECTROCARDIOGRAM TRACING: CPT | Performed by: EMERGENCY MEDICINE

## 2021-11-11 PROCEDURE — 81001 URINALYSIS AUTO W/SCOPE: CPT | Performed by: EMERGENCY MEDICINE

## 2021-11-11 PROCEDURE — 87040 BLOOD CULTURE FOR BACTERIA: CPT | Performed by: EMERGENCY MEDICINE

## 2021-11-11 RX ORDER — SACCHAROMYCES BOULARDII 250 MG
250 CAPSULE ORAL 2 TIMES DAILY
Qty: 20 CAPSULE | Refills: 0 | Status: SHIPPED | OUTPATIENT
Start: 2021-11-11 | End: 2021-11-21

## 2021-11-11 RX ORDER — SODIUM CHLORIDE 0.9 % (FLUSH) 0.9 %
10 SYRINGE (ML) INJECTION AS NEEDED
Status: DISCONTINUED | OUTPATIENT
Start: 2021-11-11 | End: 2021-11-12 | Stop reason: HOSPADM

## 2021-11-11 RX ORDER — AMOXICILLIN AND CLAVULANATE POTASSIUM 875; 125 MG/1; MG/1
1 TABLET, FILM COATED ORAL 2 TIMES DAILY
Qty: 16 TABLET | Refills: 0 | Status: SHIPPED | OUTPATIENT
Start: 2021-11-11 | End: 2021-11-19

## 2021-11-11 RX ADMIN — IOPAMIDOL 80 ML: 612 INJECTION, SOLUTION INTRAVENOUS at 18:55

## 2021-11-11 NOTE — ED PROVIDER NOTES
Subjective   Tired yesterday.  Today she checked her temp and noted a temp of 101.    Patient is a pleasant 89-year-old female who presents today with fever, abdominal discomfort, diarrhea, and generalized fatigue.  She states that yesterday she had some loose bowel movements, but not significant diarrhea.  She felt generally tired then and this persisted through the day today.  She had a temperature of 101 at home and has a combination prompting her grandson to bring her to the emergency department.  Denies similar episodes in the past.  She is healthy and does well and is independent.    I also talked to the grandson on the phone who provided most of the history.      Abdominal Pain  Pain location:  Generalized  Pain quality: aching    Pain radiates to:  Does not radiate  Pain severity:  Mild  Onset quality:  Gradual  Duration:  1 day  Timing:  Rare  Progression:  Waxing and waning  Chronicity:  New  Context: not recent illness and not suspicious food intake    Relieved by:  Nothing  Worsened by:  Nothing  Ineffective treatments:  None tried  Associated symptoms: diarrhea, fatigue and fever    Associated symptoms: no chest pain, no constipation, no hematemesis, no hematochezia, no melena, no nausea, no shortness of breath and no vomiting        Review of Systems   Constitutional: Positive for fatigue and fever.   Respiratory: Negative for shortness of breath.    Cardiovascular: Negative for chest pain.   Gastrointestinal: Positive for abdominal pain and diarrhea. Negative for constipation, hematemesis, hematochezia, melena, nausea and vomiting.   All other systems reviewed and are negative.      Past Medical History:   Diagnosis Date   • Anemia    • Arthritis    • Fibromyalgia    • GERD (gastroesophageal reflux disease)    • History of transfusion    • Hypertension    • Kidney stone    • MG (myasthenia gravis) ([facility])     history of   • Pacemaker        Allergies   Allergen Reactions   • Codeine Sulfate Unknown (See  Comments)     Pt took in the 70's-pt not sure of what happened   • Cozaar [Losartan] Unknown (See Comments)     Pt can not remember   • Crestor [Rosuvastatin] Unknown (See Comments)     Pt can not remember   • Dexlansoprazole Unknown (See Comments)     Pt can not remember   • Lipitor [Atorvastatin] Unknown (See Comments)     Pt can not remember   • Lisinopril Unknown (See Comments)     Pt can not remember   • Nexium [Esomeprazole Magnesium] Unknown (See Comments)     Pt can not remember   • Norvasc [Amlodipine] Unknown (See Comments)     Pt can not remember   • Sulfadiazine Unknown (See Comments)     Pt can not remember   • Toprol Xl [Metoprolol Tartrate] Unknown (See Comments)     Pt can not remember   • Zetia [Ezetimibe] Unknown (See Comments)     Pt can not remember   • Zocor [Simvastatin] Unknown (See Comments)     Pt can not remember   • Augmentin [Amoxicillin-Pot Clavulanate] GI Intolerance     nausea   • Celebrex [Celecoxib] GI Intolerance       Past Surgical History:   Procedure Laterality Date   • CARDIAC SURGERY     • COLONOSCOPY     • HEMORRHOIDECTOMY     • TUBAL ABDOMINAL LIGATION     • UPPER GASTROINTESTINAL ENDOSCOPY     • UPPER GASTROINTESTINAL ENDOSCOPY  05/29/2018       Family History   Problem Relation Age of Onset   • No Known Problems Mother    • Diabetes Father    • Cancer Son        Social History     Socioeconomic History   • Marital status:    Tobacco Use   • Smoking status: Never Smoker   • Smokeless tobacco: Never Used   Substance and Sexual Activity   • Alcohol use: No   • Drug use: No   • Sexual activity: Defer           Objective   Physical Exam  Vitals and nursing note reviewed.   Constitutional:       General: She is not in acute distress.     Appearance: Normal appearance.   HENT:      Head: Normocephalic and atraumatic.   Eyes:      Extraocular Movements: Extraocular movements intact.      Conjunctiva/sclera: Conjunctivae normal.      Pupils: Pupils are equal, round, and  reactive to light.   Cardiovascular:      Rate and Rhythm: Normal rate and regular rhythm.      Heart sounds: Normal heart sounds.   Pulmonary:      Effort: Pulmonary effort is normal. No respiratory distress.      Breath sounds: Normal breath sounds.   Abdominal:      General: Bowel sounds are normal. There is no distension.      Palpations: Abdomen is soft. There is no mass.      Tenderness: There is abdominal tenderness (Mild tenderness palpation just superior to the umbilicus.  Other than the mild tenderness is a benign abdominal exam). There is no rebound.   Musculoskeletal:         General: Normal range of motion.      Cervical back: Normal range of motion and neck supple.   Skin:     General: Skin is warm and dry.      Capillary Refill: Capillary refill takes less than 2 seconds.   Neurological:      Mental Status: She is alert and oriented to person, place, and time.   Psychiatric:         Behavior: Behavior normal.         Thought Content: Thought content normal.         Procedures           ED Course  ED Course as of 11/14/21 1243   Fri Nov 12, 2021   1136 Shorty Quintero MD  The patient's pharmacy called and notifies me of an allergy to Augmentin.  I have switched the prescription to Flagyl and Cipro. [MS]      ED Course User Index  [MS] Shorty Quintero MD      No results found for this or any previous visit (from the past 24 hour(s)).  Note: In addition to lab results from this visit, the labs listed above may include labs taken at another facility or during a different encounter within the last 24 hours. Please correlate lab times with ED admission and discharge times for further clarification of the services performed during this visit.    CT Abdomen Pelvis With Contrast   Final Result   1. No acute abnormality within the abdomen or pelvis.   2. Extrahepatic bile duct dilatation. Diffuse pancreatic atrophy. Nondistended gallbladder.   3. Mild lower colonic diverticulosis. Normal appendix.   4. Mild  aneurysmal dilatation mid infrarenal abdominal aorta measuring up to 3.2 cm.      Signer Name: Dionte Albarran MD    Signed: 11/11/2021 7:13 PM    Workstation Name: [facility]     Radiology [facility]      XR Chest 1 View   Final Result   Mild chronic appearing lung changes. No clearly acute chest   pathology is identified.       DICTATED:   11/11/2021   EDITED/lfs:   11/11/2021            This report was finalized on 11/11/2021 10:30 PM by Dr. Rodrigo Fletcher MD.            Vitals:    11/11/21 1930 11/11/21 2030 11/11/21 2100 11/11/21 2200   BP: (!) 205/117 166/81 177/83 172/81   BP Location:  Right arm Right arm    Patient Position:  Lying Lying    Pulse: 90 81 80 80   Resp: 18 16 14 18   Temp:       TempSrc:       SpO2: 93% 91% 93% 97%   Weight:       Height:         Medications   iopamidol (ISOVUE-300) 61 % injection 100 mL (80 mL Intravenous Given 11/11/21 1855)     ECG/EMG Results (last 24 hours)     ** No results found for the last 24 hours. **        ECG 12 Lead             No results found for this or any previous visit (from the past 24 hour(s)).  Note: In addition to lab results from this visit, the labs listed above may include labs taken at another facility or during a different encounter within the last 24 hours. Please correlate lab times with ED admission and discharge times for further clarification of the services performed during this visit.    CT Abdomen Pelvis With Contrast   Final Result   1. No acute abnormality within the abdomen or pelvis.   2. Extrahepatic bile duct dilatation. Diffuse pancreatic atrophy. Nondistended gallbladder.   3. Mild lower colonic diverticulosis. Normal appendix.   4. Mild aneurysmal dilatation mid infrarenal abdominal aorta measuring up to 3.2 cm.      Signer Name: Dionte Albarran MD    Signed: 11/11/2021 7:13 PM    Workstation Name: [facility]     Radiology [facility]      XR Chest 1 View   Final Result   Mild chronic appearing  lung changes. No clearly acute chest   pathology is identified.       DICTATED:   11/11/2021   EDITED/lfs:   11/11/2021            This report was finalized on 11/11/2021 10:30 PM by Dr. Rodrigo Fletcher MD.            Vitals:    11/11/21 1930 11/11/21 2030 11/11/21 2100 11/11/21 2200   BP: (!) 205/117 166/81 177/83 172/81   BP Location:  Right arm Right arm    Patient Position:  Lying Lying    Pulse: 90 81 80 80   Resp: 18 16 14 18   Temp:       TempSrc:       SpO2: 93% 91% 93% 97%   Weight:       Height:         Medications   iopamidol (ISOVUE-300) 61 % injection 100 mL (80 mL Intravenous Given 11/11/21 1855)     ECG/EMG Results (last 24 hours)     ** No results found for the last 24 hours. **        ECG 12 Lead           Results for JONI GARCIA (MRN [id number]) as of 11/14/2021 12:42   Ref. Range 11/11/2021 17:01 11/11/2021 17:02 11/11/2021 17:38 11/11/2021 17:46 11/11/2021 17:57   Troponin T Latest Ref Range: 0.000 - 0.030 ng/mL <0.010       Glucose Latest Ref Range: 65 - 99 mg/dL 117 (H)       Sodium Latest Ref Range: 136 - 145 mmol/L 141       Potassium Latest Ref Range: 3.5 - 5.2 mmol/L 4.3       CO2 Latest Ref Range: 22.0 - 29.0 mmol/L 29.0       Chloride Latest Ref Range: 98 - 107 mmol/L 103       Anion Gap Latest Ref Range: 5.0 - 15.0 mmol/L 9.0       Creatinine Latest Ref Range: 0.57 - 1.00 mg/dL 1.04 (H)       BUN Latest Ref Range: 8 - 23 mg/dL 16       BUN/Creatinine Ratio Latest Ref Range: 7.0 - 25.0  15.4       Calcium Latest Ref Range: 8.2 - 9.6 mg/dL 9.3       eGFR Non  Am Latest Ref Range: >60 mL/min/1.73 50 (L)       Alkaline Phosphatase Latest Ref Range: 39 - 117 U/L 72       Total Protein Latest Ref Range: 6.0 - 8.5 g/dL 7.6       ALT (SGPT) Latest Ref Range: 1 - 33 U/L 5       AST (SGOT) Latest Ref Range: 1 - 32 U/L 18       Total Bilirubin Latest Ref Range: 0.0 - 1.2 mg/dL 0.4       Albumin Latest Ref Range: 3.50 - 5.20 g/dL 4.40       Globulin Latest Units: gm/dL 3.2       A/G Ratio  Latest Units: g/dL 1.4       Lactate Latest Ref Range: 0.5 - 2.0 mmol/L 0.8       Magnesium Latest Ref Range: 1.6 - 2.4 mg/dL 2.1       WBC Latest Ref Range: 3.40 - 10.80 10*3/mm3 5.49       RBC Latest Ref Range: 3.77 - 5.28 10*6/mm3 4.31       Hemoglobin Latest Ref Range: 12.0 - 15.9 g/dL 12.5       Hematocrit Latest Ref Range: 34.0 - 46.6 % 38.8       RDW Latest Ref Range: 12.3 - 15.4 % 12.3       MCV Latest Ref Range: 79.0 - 97.0 fL 90.0       MCH Latest Ref Range: 26.6 - 33.0 pg 29.0       MCHC Latest Ref Range: 31.5 - 35.7 g/dL 32.2       MPV Latest Ref Range: 6.0 - 12.0 fL 9.6       Platelets Latest Ref Range: 140 - 450 10*3/mm3 191       RDW-SD Latest Ref Range: 37.0 - 54.0 fl 40.9       Neutrophil Rel % Latest Ref Range: 42.7 - 76.0 % 85.4 (H)       Lymphocyte Rel % Latest Ref Range: 19.6 - 45.3 % 6.6 (L)       Monocyte Rel % Latest Ref Range: 5.0 - 12.0 % 4.9 (L)       Eosinophil Rel % Latest Ref Range: 0.3 - 6.2 % 1.8       Basophil Rel % Latest Ref Range: 0.0 - 1.5 % 1.1       Immature Granulocyte Rel % Latest Ref Range: 0.0 - 0.5 % 0.2       Neutrophils Absolute Latest Ref Range: 1.70 - 7.00 10*3/mm3 4.69       Lymphocytes Absolute Latest Ref Range: 0.70 - 3.10 10*3/mm3 0.36 (L)       Monocytes Absolute Latest Ref Range: 0.10 - 0.90 10*3/mm3 0.27       Eosinophils Absolute Latest Ref Range: 0.00 - 0.40 10*3/mm3 0.10       Basophils Absolute Latest Ref Range: 0.00 - 0.20 10*3/mm3 0.06       Immature Grans, Absolute Latest Ref Range: 0.00 - 0.05 10*3/mm3 0.01       nRBC Latest Ref Range: 0.0 - 0.2 /100 WBC 0.0       Color, UA Latest Ref Range: Yellow, Straw      Yellow   Appearance, UA Latest Ref Range: Clear      Clear   Specific [place], UA Latest Ref Range: 1.001 - 1.030      1.013   pH, UA Latest Ref Range: 5.0 - 8.0      6.5   Glucose Latest Ref Range: Negative      Negative   Ketones, UA Latest Ref Range: Negative      Negative   Blood, UA Latest Ref Range: Negative      Negative   Nitrite, UA Latest  Ref Range: Negative      Negative   Leukocytes, UA Latest Ref Range: Negative      Trace (A)   Protein, UA Latest Ref Range: Negative      Negative   Bilirubin, UA Latest Ref Range: Negative      Negative   Urobilinogen, UA Latest Ref Range: 0.2 - 1.0 E.U./dL      0.2 E.U./dL   RBC, UA Latest Ref Range: None Seen, 0-2 /HPF     0-2   WBC, UA Latest Ref Range: None Seen, 0-2 /HPF     0-2   Bacteria, UA Latest Ref Range: None Seen, Trace /HPF     None Seen   Squamous Epithelial Cells, UA Latest Ref Range: None Seen, 0-2 /HPF     0-2   Hyaline Casts, UA Latest Ref Range: 0 - 6 /LPF     0-6   Methodology: Unknown     Manual Light Microscopy               MDM    Final diagnoses:   Abdominal discomfort   Diverticulosis   Fever, unspecified fever cause   Renal insufficiency   Abdominal aortic aneurysm (AAA) without rupture (HCC)   Common bile duct dilation       ED Disposition  ED Disposition     ED Disposition Condition Comment    Discharge Stable         No results found for this or any previous visit (from the past 24 hour(s)).  Note: In addition to lab results from this visit, the labs listed above may include labs taken at another facility or during a different encounter within the last 24 hours. Please correlate lab times with ED admission and discharge times for further clarification of the services performed during this visit.    CT Abdomen Pelvis With Contrast   Final Result   1. No acute abnormality within the abdomen or pelvis.   2. Extrahepatic bile duct dilatation. Diffuse pancreatic atrophy. Nondistended gallbladder.   3. Mild lower colonic diverticulosis. Normal appendix.   4. Mild aneurysmal dilatation mid infrarenal abdominal aorta measuring up to 3.2 cm.      Signer Name: Dionte Albarran MD    Signed: 11/11/2021 7:13 PM    Workstation Name: [email or web address]     [facility]      XR Chest 1 View   Final Result   Mild chronic appearing lung changes. No clearly acute chest   pathology  is identified.       DICTATED:   11/11/2021   EDITED/lfs:   11/11/2021            This report was finalized on 11/11/2021 10:30 PM by Dr. Rodrigo Fletcher MD.            Vitals:    11/11/21 1930 11/11/21 2030 11/11/21 2100 11/11/21 2200   BP: (!) 205/117 166/81 177/83 172/81   BP Location:  Right arm Right arm    Patient Position:  Lying Lying    Pulse: 90 81 80 80   Resp: 18 16 14 18   Temp:       TempSrc:       SpO2: 93% 91% 93% 97%   Weight:       Height:         Medications   iopamidol (ISOVUE-300) 61 % injection 100 mL (80 mL Intravenous Given 11/11/21 1855)     ECG/EMG Results (last 24 hours)     ** No results found for the last 24 hours. **        ECG 12 Lead                 Manan Garcia MD  [address] [facility] [address]  [place] [address]  [phone]    Schedule an appointment as soon as possible for a visit       [facility] Emergency Department  [address] [place] [address] [phone]    If symptoms worsen    Ethan Last MD  [address] [facility] [address]  [place] [address]  [phone]      As needed    Andi Cage MD  [address] [facility] [address]  [place] [address]  [phone]      As needed         Medication List      New Prescriptions    amoxicillin-clavulanate 875-125 MG per tablet  Commonly known as: AUGMENTIN  Take 1 tablet by mouth 2 (Two) Times a Day for 8 days.     saccharomyces boulardii 250 MG capsule  Commonly known as: FLORASTOR  Take 1 capsule by mouth 2 (Two) Times a Day for 10 days.           Where to Get Your Medications      These medications were sent to [facility] - [place], [place] - [address] - [phone]  - [phone]   [address] [facility] [address]    Phone: [phone]   · amoxicillin-clavulanate 875-125 MG per tablet  · saccharomyces boulardii 250 MG capsule          Dank Rainey DO  11/14/21 1243

## 2021-11-12 NOTE — DISCHARGE INSTRUCTIONS
Although we did not find a definitive source for your fever, given the diverticulosis noted on the CT along with your a slight discomfort in this left lower quadrant of the abdomen you have been prescribed antibiotic.  You had several incidental findings on your imaging today.  As discussed already, you were noted to have diverticulosis, which is not uncommon for people your age.  You are also found to have a mild abdominal aortic aneurysm.  This can be followed by your primary or vascular specialist.
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
## 2021-11-15 LAB
QT INTERVAL: 438 MS
QTC INTERVAL: 499 MS

## 2021-11-16 ENCOUNTER — LAB (OUTPATIENT)
Dept: LAB | Facility: HOSPITAL | Age: 86
End: 2021-11-16

## 2021-11-16 DIAGNOSIS — D50.0 IRON DEFICIENCY ANEMIA DUE TO CHRONIC BLOOD LOSS: ICD-10-CM

## 2021-11-16 LAB
BACTERIA SPEC AEROBE CULT: NORMAL
BACTERIA SPEC AEROBE CULT: NORMAL
ERYTHROCYTE [DISTWIDTH] IN BLOOD BY AUTOMATED COUNT: 12.6 % (ref 12.3–15.4)
FERRITIN SERPL-MCNC: 95.98 NG/ML (ref 13–150)
HCT VFR BLD AUTO: 37.3 % (ref 34–46.6)
HGB BLD-MCNC: 12.8 G/DL (ref 12–15.9)
LYMPHOCYTES # BLD AUTO: 0.8 10*3/MM3 (ref 0.7–3.1)
LYMPHOCYTES NFR BLD AUTO: 17.6 % (ref 19.6–45.3)
MCH RBC QN AUTO: 30.1 PG (ref 26.6–33)
MCHC RBC AUTO-ENTMCNC: 34.4 G/DL (ref 31.5–35.7)
MCV RBC AUTO: 87.4 FL (ref 79–97)
MONOCYTES # BLD AUTO: 0.1 10*3/MM3 (ref 0.1–0.9)
MONOCYTES NFR BLD AUTO: 2.8 % (ref 5–12)
NEUTROPHILS NFR BLD AUTO: 3.5 10*3/MM3 (ref 1.7–7)
NEUTROPHILS NFR BLD AUTO: 79.6 % (ref 42.7–76)
PLATELET # BLD AUTO: 179 10*3/MM3 (ref 140–450)
PMV BLD AUTO: 7 FL (ref 6–12)
RBC # BLD AUTO: 4.26 10*6/MM3 (ref 3.77–5.28)
WBC # BLD AUTO: 4.4 10*3/MM3 (ref 3.4–10.8)

## 2021-11-16 PROCEDURE — 85025 COMPLETE CBC W/AUTO DIFF WBC: CPT

## 2021-11-16 PROCEDURE — 36415 COLL VENOUS BLD VENIPUNCTURE: CPT

## 2021-11-16 PROCEDURE — 82728 ASSAY OF FERRITIN: CPT

## 2021-11-23 ENCOUNTER — TELEPHONE (OUTPATIENT)
Dept: CARDIOLOGY | Facility: CLINIC | Age: 86
End: 2021-11-23

## 2021-12-01 ENCOUNTER — TELEPHONE (OUTPATIENT)
Dept: ONCOLOGY | Facility: CLINIC | Age: 86
End: 2021-12-01

## 2021-12-01 NOTE — TELEPHONE ENCOUNTER
Caller: Sheryl Conner    Relationship to patient: Self    Best call back number: [phone]    Chief complaint: PATIENT WANTED TO SCHEDULE APPOINTMENT, SHE MISSED HER APPOINTMENT IN OCTOBER     Type of visit: FOLLOW UP      Additional notes:PLEASE CALL TO ADVISE

## 2021-12-03 RX ORDER — ACYCLOVIR 50 MG/G
1 OINTMENT TOPICAL
Qty: 15 G | Refills: 2 | Status: SHIPPED | OUTPATIENT
Start: 2021-12-03 | End: 2022-01-01

## 2021-12-04 PROCEDURE — 93294 REM INTERROG EVL PM/LDLS PM: CPT | Performed by: INTERNAL MEDICINE

## 2021-12-04 PROCEDURE — 93296 REM INTERROG EVL PM/IDS: CPT | Performed by: INTERNAL MEDICINE

## 2021-12-22 NOTE — PROGRESS NOTES
[facility]  Office visit  Sheryl ROSAS Dalila  8/17/1931    There is no work phone number on file.    VISIT DATE:  12/22/2021      PCP: Manan Garcia MD  [id number]   [place] [facility] [address]    CC:  Chief Complaint   Patient presents with   • Paroxysmal atrial fibrillation (CMS/HCC)       Previous cardiac studies and procedures:  2012 diagnosed with symptomatic paroxysmal A. fib and tachybradycardia syndrome.  2012 Medtronic pacemaker  2012 cardiac catheterization: No flow limiting stenosis.  June 2018 echo  · Left ventricular systolic function is hyperdynamic (EF > 70).  · Left atrial cavity size is mildly dilated.  · Mean aortic valve gradient is 13 mmhg which is borderline mild aortic stenosis    January 2021 transthoracic echo  · Left ventricular ejection fraction appears to be 61 - 65%. Left ventricular systolic function is normal.  · Left ventricular diastolic function is consistent with (grade II w/high LAP) pseudonormalization.  · Left ventricular wall thickness is consistent with concentric hypertrophy.  · Left atrial cavity is moderate to severely dilated  · Estimated right ventricular systolic pressure from tricuspid regurgitation is mildly elevated (35-45 mmHg). Calculated right ventricular systolic pressure from tricuspid regurgitation is 43 mmHg.  · An electronic lead is present in the right atrium. 1.30cm x 0.70cm echodense, globular structure noted on electronic lead in right atrium. Unchanged from previous evaluation, echocardiographically most consistent with chronic/organized/sclerotic thrombus.    ASSESSMENT:   Diagnosis Plan   1. SSS (sick sinus syndrome) (HCC)     2. Paroxysmal atrial fibrillation (HCC)     3. Essential hypertension     4. Mixed hyperlipidemia       Device interrogation:  Medtronic dual-chamber  2 % atrial pacing, sensed P wave 2 mV, threshold 5 V at 1 ms, impedance 293 ohms  100% ventricular pacing, no underlying at 40,  "threshold 0.5 V at 0.4 ms, impedance 672 ohms  5 months estimated JAYA.  No arrhythmia    PLAN:  High-grade AV block: Currently stable and asymptomatic.  Continue routine follow-up in device clinic.  Device dependent.  Known chronic high atrial threshold, limited atrial pacing, adequate sensing.    Paroxysmal atrial fibrillation: Chads Vas equal to 4. Not a candidate for chronic anticoagulation due to upper GI bleeding and recurrent issues with symptomatic anemia. Minimal burden of arrhythmia.     Hypertension: Goal less than 140/90 mmHg.   currently labile but with overall reasonable control, evidence of whitecoat hypertension.  Previously discontinued combination of amlodipine and olmesartan and due to worsening lower extremity edema.  Will consider thiazide diuretic or Aldactone in the future if we need better afterload reduction.     Venous insufficiency: Continue Bumex on an as-needed basis.  Currently well controlled     Dyspnea on exertion: Likely multifactorial, some element of diastolic congestive heart failure is contributing.  Currently euvolemic and compensated.  Continue current medical therapy.    Right atrial pacemaker lead mass: Stable on follow-up echo, consistent with chronic/organized/sclerotic thrombus.  No further serial surveillance recommended.    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  Denies chest pain, palpitations.    Lower extremity edema is currently well controlled.  Did not tolerate Lasix due to nausea.  Stable shortness of breath in a class II pattern.      PHYSICAL EXAMINATION:  Vitals:    12/22/21 1541   BP: 138/68   BP Location: Left arm   Patient Position: Sitting   Pulse: 76   SpO2: 94%   Weight: 56.7 kg (125 lb)   Height: 160 cm (63\")     General Appearance:    Alert, " cooperative, no distress, appears stated age   Head:    Normocephalic, without obvious abnormality, atraumatic   Eyes:    conjunctiva/corneas clear   Nose:   Nares normal, septum midline, mucosa normal, no drainage   Throat:   Lips, teeth and gums normal   Neck:   Supple, symmetrical, trachea midline, no carotid    bruit or JVD   Lungs:     Clear to auscultation bilaterally, respirations unlabored   Chest Wall:    No tenderness or deformity    Heart:    Regular rate and rhythm, S1 and S2 normal, 1/6 early peaking systolic murmur right upper sternal border, rub   or gallop, normal carotid impulse bilaterally without bruit.   Abdomen:     Soft, non-tender   Extremities:   Extremities normal, atraumatic, no cyanosis or edema   Pulses:   2+ and symmetric all extremities   Skin:   Skin color, texture, turgor normal, no rashes or lesions       Diagnostic Data:  Procedures  Lab Results   Component Value Date    TRIG 63 07/24/2021    HDL 73 (H) 07/24/2021     Lab Results   Component Value Date    GLUCOSE 117 (H) 11/11/2021    BUN 16 11/11/2021    CREATININE 1.04 (H) 11/11/2021     11/11/2021    K 4.3 11/11/2021     11/11/2021    CO2 29.0 11/11/2021     No results found for: HGBA1C  Lab Results   Component Value Date    WBC 4.00 12/13/2021    HGB 12.6 12/13/2021    HCT 38.9 12/13/2021     12/13/2021       Allergies  Allergies   Allergen Reactions   • Codeine Sulfate Unknown (See Comments)     Pt took in the 70's-pt not sure of what happened   • Cozaar [Losartan] Unknown (See Comments)     Pt can not remember   • Crestor [Rosuvastatin] Unknown (See Comments)     Pt can not remember   • Dexlansoprazole Unknown (See Comments)     Pt can not remember   • Lipitor [Atorvastatin] Unknown (See Comments)     Pt can not remember   • Lisinopril Unknown (See Comments)     Pt can not remember   • Nexium [Esomeprazole Magnesium] Unknown (See Comments)     Pt can not remember   • Norvasc [Amlodipine] Unknown (See  Comments)     Pt can not remember   • Sulfadiazine Unknown (See Comments)     Pt can not remember   • Toprol Xl [Metoprolol Tartrate] Unknown (See Comments)     Pt can not remember   • Zetia [Ezetimibe] Unknown (See Comments)     Pt can not remember   • Zocor [Simvastatin] Unknown (See Comments)     Pt can not remember   • Augmentin [Amoxicillin-Pot Clavulanate] GI Intolerance     nausea   • Celebrex [Celecoxib] GI Intolerance       Current Medications    Current Outpatient Medications:   •  acyclovir (Zovirax) 5 % ointment, Apply 1 application topically to the appropriate area as directed 5 (Five) Times a Day., Disp: 15 g, Rfl: 2  •  amitriptyline (ELAVIL) 25 MG tablet, 1 tablet each morning and 1 tablet at bedtime, Disp: 60 tablet, Rfl: 11  •  bumetanide (BUMEX) 0.5 MG tablet, TAKE 1 TABLET BY MOUTH EVERY OTHER DAY AS NEEDED FOR SWELLING, Disp: 90 tablet, Rfl: 2  •  cetirizine (ZyrTEC) 10 MG tablet, Take 10 mg by mouth daily., Disp: , Rfl:   •  Diclofenac Sodium (VOLTAREN) 1 % gel gel, APPLY 4 GRAMS TO AFFECTED AREA 4 TIMES DAILY AS DIRECTED, Disp: 400 g, Rfl: 0  •  fentaNYL (DURAGESIC) 12 MCG/HR, APPLY 1 PATCH TOPICALLY TO SKIN Q 72 H, Disp: , Rfl:   •  fentaNYL (DURAGESIC) 50 MCG/HR patch, APPLY 1 PATCH TOPICALLY Q 72 H, Disp: , Rfl:   •  fluticasone (FLONASE) 50 MCG/ACT nasal spray, fluticasone propionate 50 mcg/actuation nasal spray,suspension  2 sprays each nostril daily, Disp: , Rfl:   •  gabapentin (NEURONTIN) 600 MG tablet, Take 600 mg by mouth 3 (Three) Times a Day., Disp: , Rfl: 1  •  HYDROcodone-acetaminophen (NORCO)  MG per tablet, Take 1 tablet by mouth every 6 (six) hours as needed for moderate pain (4-6)., Disp: , Rfl:   •  pantoprazole (PROTONIX) 40 MG EC tablet, TAKE 1 TABLET BY MOUTH TWICE DAILY, Disp: 60 tablet, Rfl: 11  •  potassium chloride 10 MEQ CR tablet, TAKE 1 TABLET BY MOUTH ONCE DAILY, Disp: 30 tablet, Rfl: 11  •  promethazine (PHENERGAN) 25 MG tablet, TAKE 1 TABLET BY MOUTH EVERY  6 HOURS AS NEEDED FOR NAUSEA AND VOMITING, Disp: 30 tablet, Rfl: 5          ROS  Review of Systems   Cardiovascular: Negative for chest pain, dyspnea on exertion, irregular heartbeat and palpitations.   Respiratory: Negative for cough and snoring.        SOCIAL HX  Social History     Socioeconomic History   • Marital status:    Tobacco Use   • Smoking status: Never Smoker   • Smokeless tobacco: Never Used   Substance and Sexual Activity   • Alcohol use: No   • Drug use: No   • Sexual activity: Defer       FAMILY HX  Family History   Problem Relation Age of Onset   • No Known Problems Mother    • Diabetes Father    • Cancer Son              Andi Seals III, MD, FACC

## 2021-12-27 NOTE — PROGRESS NOTES
"PROBLEM LIST:  1. 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 Status post EGD colonoscopy  3. Status post Iv iron  infusion periodically  4. Bone Marrow biopsy  - normal - done by Dr. Reeves  5. 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  lady with normocytic anemia related to chronic blood loss.  She requires periodic iron infusions.  She has not required any transfusion support.  Last blood transfusion on 10/3/2019.     Denies any active bleeding.  Fatigue seems to be tolerating well.  Her biggest concern is her neck pain.  Which is related to significant DJD.  I have been able to manage her transfusion needs by giving her IV iron periodically.  Her last infusion was in April.  She is starting to drop her counts again.    Past medical history, social history and family history was reviewed and unchanged from prior visit.    Review of Systems:    Review of Systems   Constitutional: Positive for fatigue.   HENT: Negative.    Eyes: Negative.    Respiratory: Positive for shortness of breath.    Cardiovascular: Negative.    Gastrointestinal: Negative.    Endocrine: Negative.    Genitourinary: Negative.    Musculoskeletal: Positive for back pain and neck pain.   Skin: Negative.    Allergic/Immunologic: Negative.    Neurological: Negative.    Hematological: Negative.    Psychiatric/Behavioral: Negative.       A comprehensive 14 point review of systems was performed and was negative except as mentioned.      Medications:  The current medication list was reviewed in the EMR    ALLERGIES:    Allergies   Allergen Reactions   • Codeine Sulfate Unknown (See Comments)     Pt took in the 70's-pt not sure of what happened   • Cozaar [Losartan] Unknown (See Comments)     Pt can not remember   • Crestor [Rosuvastatin] Unknown (See Comments)     Pt can not remember   • Dexlansoprazole Unknown (See Comments)     Pt can " "not remember   • Lipitor [Atorvastatin] Unknown (See Comments)     Pt can not remember   • Lisinopril Unknown (See Comments)     Pt can not remember   • Nexium [Esomeprazole Magnesium] Unknown (See Comments)     Pt can not remember   • Norvasc [Amlodipine] Unknown (See Comments)     Pt can not remember   • Sulfadiazine Unknown (See Comments)     Pt can not remember   • Toprol Xl [Metoprolol Tartrate] Unknown (See Comments)     Pt can not remember   • Zetia [Ezetimibe] Unknown (See Comments)     Pt can not remember   • Zocor [Simvastatin] Unknown (See Comments)     Pt can not remember   • Augmentin [Amoxicillin-Pot Clavulanate] GI Intolerance     nausea   • Celebrex [Celecoxib] GI Intolerance         Physical Exam    VITAL SIGNS:  BP (!) 212/93   Pulse 82   Temp 97.3 °F (36.3 °C) (Temporal)   Resp 16   Ht 160 cm (63\")   Wt 56.5 kg (124 lb 8 oz)   SpO2 95%   BMI 22.05 kg/m²      Performance Status: 1    General: well appearing, in no acute distress  HEENT: sclera anicteric, oropharynx clear, neck is supple  Lymphatics: no cervical, supraclavicular, or axillary adenopathy  Cardiovascular: regular rate and rhythm, no murmurs, rubs or gallops  Lungs: clear to auscultation bilaterally  Abdomen: soft, nontender, nondistended.  No palpable organomegaly  Extremities: no lower extremity edema  Skin: no rashes, lesions, bruising, or petechiae  Msk:  Shows no weakness of the large muscle groups  Psych: Mood is stable        RECENT LABS:    Lab Results   Component Value Date    WBC 4.00 12/27/2021    HGB 11.9 (L) 12/27/2021    HCT 37.0 12/27/2021    MCV 86.8 12/27/2021     12/27/2021     Lab Results   Component Value Date    FERRITIN 62.84 12/13/2021    FERRITIN 95.98 11/16/2021    FERRITIN 101.00 10/21/2021     Lab Results   Component Value Date    HGB 11.9 (L) 12/27/2021    HGB 12.6 12/13/2021    HGB 12.8 11/16/2021    HGB 12.5 11/11/2021    HGB 12.6 10/21/2021    HGB 11.2 (L) 09/29/2021    HGB 12.3 09/09/2021    " HGB 12.1 08/30/2021    HGB 12.1 08/09/2021    HGB 12.3 07/25/2021     Ct Cervical Spine Without Contrast    Result Date: 5/6/2020  1. Advanced multilevel degenerative disc disease, with probably moderate canal stenosis at C3-4 and C4-5 due to prominent posterior vertebral osteophytes, and some posterior element hypertrophy. 2. Milder degenerative changes, and bony foraminal stenosis elsewhere as discussed above by disc level. No evidence of acute or healing trauma.       Ct Thoracic Spine Without Contrast    Result Date: 5/6/2020  1. Advanced multilevel degenerative disc disease, with probably moderate canal stenosis at C3-4 and C4-5 due to prominent posterior vertebral osteophytes, and some posterior element hypertrophy. 2. Milder degenerative changes, and bony foraminal stenosis elsewhere as discussed above by disc level. No evidence of acute or healing trauma.  CT SCAN OF THE THORACIC SPINE: There is exaggeration of the normal thoracic lordosis. No significant focal subluxation or evidence of vertebral compression deformity is seen. There is moderate multilevel degenerative disc disease. No destructive or blastic bony lesions are seen. Coronal images show a minimal dextroconvex scoliosis. Axial bone window images show no evidence of acute bony trauma. Soft tissue axial images of the thoracic spine show the usual limited detail of the canal for non-myelographic scans. The thoracic canal appears congenitally rather ample. No gross evidence of HNP or canal stenosis is appreciated down to the level of T9-T10 where there is a prominent posterior vertebral osteophyte but only borderline canal narrowing. Below this level, the canal appears unremarkable. No pathologic paraspinous mass or inflammatory change is seen. Review of the included portions of the lungs shows a mild patchy, widely distributed appearance of faint groundglass opacity, not well defined, more central than peripheral, and with no crazy paving pattern  or lung consolidation. This is very similar to the appearance of the 10/05/2019 chest radiograph which showed mild interstitial edema. Although technically indeterminate for Covid-19 pneumonia, there are no typical features of Covid-19, and findings are explainable on the basis of the patient's previously noted interstitial disease alone.  IMPRESSION: 1. No evidence of acute thoracic spine trauma, advanced degenerative disc disease, or gross evidence of thoracic spinal stenosis. 2. Mild and rather diffuse, faint groundglass opacity of the lungs as noted. Although technically indeterminate for Covid-19, the findings are consistent with the mild interstitial edema pattern seen on the 10/05/2019 exam. According to the CT technologist, the patient has no complaint of any current or recent respiratory symptoms.  D:  05/06/2020 E:  05/06/2020         Assessment/Plan    1. normocytic anemia requiring periodic blood transfusions secondary to bleeding AVMs and a Watermelon stomach.  Underwent argon treatment with Dr. Llamas.  Her hemoglobin is starting to drop and her ferritin has also dropped.  We will plan to infuse her with iron next week.. continue periodic iron infusions.  This in the past has prevented us from having to transfuse her.  Hopefully it works again.    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    3.  Severe neck pain related to DJD        Rahat Morris MD  [facility]    12/27/2021

## 2022-01-01 ENCOUNTER — APPOINTMENT (OUTPATIENT)
Dept: GENERAL RADIOLOGY | Facility: HOSPITAL | Age: 87
End: 2022-01-01

## 2022-01-01 ENCOUNTER — NURSE TRIAGE (OUTPATIENT)
Dept: CALL CENTER | Facility: HOSPITAL | Age: 87
End: 2022-01-01

## 2022-01-01 ENCOUNTER — TELEPHONE (OUTPATIENT)
Dept: CARDIOLOGY | Facility: CLINIC | Age: 87
End: 2022-01-01

## 2022-01-01 ENCOUNTER — LAB (OUTPATIENT)
Dept: LAB | Facility: HOSPITAL | Age: 87
End: 2022-01-01

## 2022-01-01 ENCOUNTER — OFFICE VISIT (OUTPATIENT)
Dept: CARDIOLOGY | Facility: CLINIC | Age: 87
End: 2022-01-01

## 2022-01-01 ENCOUNTER — HOME CARE VISIT (OUTPATIENT)
Dept: HOME HEALTH SERVICES | Facility: HOME HEALTHCARE | Age: 87
End: 2022-01-01

## 2022-01-01 ENCOUNTER — APPOINTMENT (OUTPATIENT)
Dept: CARDIOLOGY | Facility: HOSPITAL | Age: 87
End: 2022-01-01

## 2022-01-01 ENCOUNTER — APPOINTMENT (OUTPATIENT)
Dept: CT IMAGING | Facility: HOSPITAL | Age: 87
End: 2022-01-01

## 2022-01-01 ENCOUNTER — TELEPHONE (OUTPATIENT)
Dept: ONCOLOGY | Facility: CLINIC | Age: 87
End: 2022-01-01

## 2022-01-01 ENCOUNTER — TELEPHONE (OUTPATIENT)
Dept: FAMILY MEDICINE CLINIC | Facility: CLINIC | Age: 87
End: 2022-01-01

## 2022-01-01 ENCOUNTER — OFFICE VISIT (OUTPATIENT)
Dept: FAMILY MEDICINE CLINIC | Facility: CLINIC | Age: 87
End: 2022-01-01

## 2022-01-01 ENCOUNTER — TELEPHONE (OUTPATIENT)
Dept: PSYCHIATRY | Facility: CLINIC | Age: 87
End: 2022-01-01

## 2022-01-01 ENCOUNTER — READMISSION MANAGEMENT (OUTPATIENT)
Dept: CALL CENTER | Facility: HOSPITAL | Age: 87
End: 2022-01-01

## 2022-01-01 ENCOUNTER — TELEPHONE (OUTPATIENT)
Dept: ORTHOPEDIC SURGERY | Facility: CLINIC | Age: 87
End: 2022-01-01

## 2022-01-01 ENCOUNTER — HOSPITAL ENCOUNTER (OUTPATIENT)
Dept: ONCOLOGY | Facility: HOSPITAL | Age: 87
Setting detail: INFUSION SERIES
Discharge: HOME | End: 2022-01-07

## 2022-01-01 ENCOUNTER — HOSPITAL ENCOUNTER (OUTPATIENT)
Facility: HOSPITAL | Age: 87
Discharge: HOME | End: 2022-08-02
Attending: STUDENT IN AN ORGANIZED HEALTH CARE EDUCATION/TRAINING PROGRAM | Admitting: STUDENT IN AN ORGANIZED HEALTH CARE EDUCATION/TRAINING PROGRAM

## 2022-01-01 ENCOUNTER — HOSPITAL ENCOUNTER (OUTPATIENT)
Dept: ONCOLOGY | Facility: HOSPITAL | Age: 87
Setting detail: INFUSION SERIES
Discharge: HOME | End: 2022-01-14

## 2022-01-01 ENCOUNTER — TRANSITIONAL CARE MANAGEMENT TELEPHONE ENCOUNTER (OUTPATIENT)
Dept: CALL CENTER | Facility: HOSPITAL | Age: 87
End: 2022-01-01

## 2022-01-01 ENCOUNTER — HOSPITAL ENCOUNTER (OUTPATIENT)
Dept: GENERAL RADIOLOGY | Facility: HOSPITAL | Age: 87
Discharge: HOME | End: 2022-10-07
Admitting: PHYSICIAN ASSISTANT

## 2022-01-01 ENCOUNTER — OFFICE VISIT (OUTPATIENT)
Dept: ONCOLOGY | Facility: CLINIC | Age: 87
End: 2022-01-01

## 2022-01-01 ENCOUNTER — HOSPITAL ENCOUNTER (OUTPATIENT)
Dept: ONCOLOGY | Facility: HOSPITAL | Age: 87
Setting detail: INFUSION SERIES
Discharge: HOME | End: 2022-01-28

## 2022-01-01 ENCOUNTER — HOSPITAL ENCOUNTER (OUTPATIENT)
Facility: HOSPITAL | Age: 87
Setting detail: OBSERVATION
LOS: 1 days | Discharge: HOME | End: 2022-07-16
Attending: EMERGENCY MEDICINE | Admitting: INTERNAL MEDICINE

## 2022-01-01 ENCOUNTER — OFFICE VISIT (OUTPATIENT)
Dept: ORTHOPEDIC SURGERY | Facility: CLINIC | Age: 87
End: 2022-01-01

## 2022-01-01 ENCOUNTER — HOME HEALTH ADMISSION (OUTPATIENT)
Dept: HOME HEALTH SERVICES | Facility: HOME HEALTHCARE | Age: 87
End: 2022-01-01

## 2022-01-01 ENCOUNTER — TRANSCRIBE ORDERS (OUTPATIENT)
Dept: FAMILY MEDICINE CLINIC | Facility: CLINIC | Age: 87
End: 2022-01-01

## 2022-01-01 ENCOUNTER — HOSPITAL ENCOUNTER (OUTPATIENT)
Facility: HOSPITAL | Age: 87
Setting detail: OBSERVATION
Discharge: HOME | End: 2022-11-01
Attending: EMERGENCY MEDICINE | Admitting: INTERNAL MEDICINE

## 2022-01-01 ENCOUNTER — PREP FOR SURGERY (OUTPATIENT)
Dept: OTHER | Facility: HOSPITAL | Age: 87
End: 2022-01-01

## 2022-01-01 ENCOUNTER — HOSPITAL ENCOUNTER (EMERGENCY)
Facility: HOSPITAL | Age: 87
Discharge: HOME | End: 2022-11-04
Attending: EMERGENCY MEDICINE | Admitting: EMERGENCY MEDICINE

## 2022-01-01 ENCOUNTER — APPOINTMENT (OUTPATIENT)
Dept: ONCOLOGY | Facility: HOSPITAL | Age: 87
End: 2022-01-01

## 2022-01-01 VITALS
SYSTOLIC BLOOD PRESSURE: 180 MMHG | RESPIRATION RATE: 20 BRPM | HEART RATE: 82 BPM | WEIGHT: 121 LBS | HEIGHT: 63 IN | TEMPERATURE: 98.2 F | BODY MASS INDEX: 21.44 KG/M2 | DIASTOLIC BLOOD PRESSURE: 84 MMHG | OXYGEN SATURATION: 91 %

## 2022-01-01 VITALS
BODY MASS INDEX: 20.48 KG/M2 | WEIGHT: 115.6 LBS | TEMPERATURE: 96.6 F | RESPIRATION RATE: 18 BRPM | OXYGEN SATURATION: 99 % | SYSTOLIC BLOOD PRESSURE: 130 MMHG | HEIGHT: 63 IN | DIASTOLIC BLOOD PRESSURE: 80 MMHG | HEART RATE: 85 BPM

## 2022-01-01 VITALS
SYSTOLIC BLOOD PRESSURE: 142 MMHG | DIASTOLIC BLOOD PRESSURE: 76 MMHG | HEART RATE: 85 BPM | WEIGHT: 121 LBS | HEIGHT: 63 IN | OXYGEN SATURATION: 91 % | BODY MASS INDEX: 21.44 KG/M2

## 2022-01-01 VITALS
DIASTOLIC BLOOD PRESSURE: 78 MMHG | SYSTOLIC BLOOD PRESSURE: 148 MMHG | TEMPERATURE: 98.1 F | OXYGEN SATURATION: 98 % | RESPIRATION RATE: 16 BRPM | HEART RATE: 68 BPM

## 2022-01-01 VITALS
RESPIRATION RATE: 12 BRPM | OXYGEN SATURATION: 98 % | DIASTOLIC BLOOD PRESSURE: 62 MMHG | HEART RATE: 78 BPM | TEMPERATURE: 96.1 F | SYSTOLIC BLOOD PRESSURE: 120 MMHG

## 2022-01-01 VITALS
SYSTOLIC BLOOD PRESSURE: 152 MMHG | OXYGEN SATURATION: 95 % | RESPIRATION RATE: 16 BRPM | BODY MASS INDEX: 20.38 KG/M2 | WEIGHT: 115 LBS | TEMPERATURE: 97.3 F | HEART RATE: 88 BPM | SYSTOLIC BLOOD PRESSURE: 130 MMHG | DIASTOLIC BLOOD PRESSURE: 80 MMHG | DIASTOLIC BLOOD PRESSURE: 58 MMHG | RESPIRATION RATE: 14 BRPM | HEIGHT: 63 IN | HEART RATE: 80 BPM

## 2022-01-01 VITALS
DIASTOLIC BLOOD PRESSURE: 71 MMHG | HEART RATE: 78 BPM | BODY MASS INDEX: 20.83 KG/M2 | RESPIRATION RATE: 18 BRPM | HEART RATE: 85 BPM | DIASTOLIC BLOOD PRESSURE: 56 MMHG | HEIGHT: 64 IN | BODY MASS INDEX: 21.79 KG/M2 | OXYGEN SATURATION: 93 % | HEIGHT: 63 IN | TEMPERATURE: 97.5 F | WEIGHT: 122 LBS | RESPIRATION RATE: 18 BRPM | SYSTOLIC BLOOD PRESSURE: 159 MMHG | WEIGHT: 123 LBS | TEMPERATURE: 98.4 F | SYSTOLIC BLOOD PRESSURE: 149 MMHG

## 2022-01-01 VITALS
BODY MASS INDEX: 21.97 KG/M2 | HEIGHT: 63 IN | HEART RATE: 71 BPM | TEMPERATURE: 98.3 F | RESPIRATION RATE: 16 BRPM | SYSTOLIC BLOOD PRESSURE: 148 MMHG | WEIGHT: 124 LBS | DIASTOLIC BLOOD PRESSURE: 49 MMHG

## 2022-01-01 VITALS
DIASTOLIC BLOOD PRESSURE: 80 MMHG | TEMPERATURE: 97.4 F | OXYGEN SATURATION: 96 % | RESPIRATION RATE: 16 BRPM | SYSTOLIC BLOOD PRESSURE: 152 MMHG | HEART RATE: 74 BPM

## 2022-01-01 VITALS
HEART RATE: 80 BPM | RESPIRATION RATE: 18 BRPM | TEMPERATURE: 98 F | DIASTOLIC BLOOD PRESSURE: 80 MMHG | SYSTOLIC BLOOD PRESSURE: 150 MMHG | OXYGEN SATURATION: 96 %

## 2022-01-01 VITALS
OXYGEN SATURATION: 98 % | HEART RATE: 77 BPM | HEIGHT: 63 IN | RESPIRATION RATE: 18 BRPM | DIASTOLIC BLOOD PRESSURE: 66 MMHG | BODY MASS INDEX: 21.69 KG/M2 | WEIGHT: 122.4 LBS | SYSTOLIC BLOOD PRESSURE: 134 MMHG | TEMPERATURE: 98.5 F

## 2022-01-01 VITALS
HEART RATE: 91 BPM | WEIGHT: 113 LBS | SYSTOLIC BLOOD PRESSURE: 139 MMHG | RESPIRATION RATE: 18 BRPM | BODY MASS INDEX: 20.02 KG/M2 | DIASTOLIC BLOOD PRESSURE: 83 MMHG | HEIGHT: 63 IN | OXYGEN SATURATION: 95 % | TEMPERATURE: 98.5 F

## 2022-01-01 VITALS
HEIGHT: 63 IN | BODY MASS INDEX: 20.38 KG/M2 | SYSTOLIC BLOOD PRESSURE: 130 MMHG | WEIGHT: 115 LBS | DIASTOLIC BLOOD PRESSURE: 70 MMHG

## 2022-01-01 VITALS
DIASTOLIC BLOOD PRESSURE: 72 MMHG | SYSTOLIC BLOOD PRESSURE: 140 MMHG | WEIGHT: 121.03 LBS | HEIGHT: 63 IN | BODY MASS INDEX: 21.45 KG/M2

## 2022-01-01 VITALS
SYSTOLIC BLOOD PRESSURE: 154 MMHG | OXYGEN SATURATION: 99 % | DIASTOLIC BLOOD PRESSURE: 92 MMHG | HEART RATE: 87 BPM | WEIGHT: 121 LBS | BODY MASS INDEX: 21.44 KG/M2 | TEMPERATURE: 97.2 F | HEIGHT: 63 IN

## 2022-01-01 VITALS — DIASTOLIC BLOOD PRESSURE: 55 MMHG | OXYGEN SATURATION: 94 % | SYSTOLIC BLOOD PRESSURE: 133 MMHG | HEART RATE: 82 BPM

## 2022-01-01 VITALS
DIASTOLIC BLOOD PRESSURE: 78 MMHG | SYSTOLIC BLOOD PRESSURE: 148 MMHG | OXYGEN SATURATION: 97 % | TEMPERATURE: 98.1 F | HEART RATE: 94 BPM

## 2022-01-01 VITALS
TEMPERATURE: 99 F | WEIGHT: 120 LBS | HEART RATE: 84 BPM | OXYGEN SATURATION: 94 % | DIASTOLIC BLOOD PRESSURE: 60 MMHG | RESPIRATION RATE: 16 BRPM | HEIGHT: 63 IN | SYSTOLIC BLOOD PRESSURE: 119 MMHG | BODY MASS INDEX: 21.26 KG/M2

## 2022-01-01 DIAGNOSIS — K31.819 GAVE (GASTRIC ANTRAL VASCULAR ECTASIA): Primary | ICD-10-CM

## 2022-01-01 DIAGNOSIS — K90.9 MALABSORPTION IN THE ELDERLY: ICD-10-CM

## 2022-01-01 DIAGNOSIS — N39.0 URINARY TRACT INFECTION IN FEMALE: ICD-10-CM

## 2022-01-01 DIAGNOSIS — M75.51 BURSITIS OF RIGHT SHOULDER: ICD-10-CM

## 2022-01-01 DIAGNOSIS — M75.41 IMPINGEMENT SYNDROME OF RIGHT SHOULDER: ICD-10-CM

## 2022-01-01 DIAGNOSIS — M79.605 LEFT LEG PAIN: ICD-10-CM

## 2022-01-01 DIAGNOSIS — I10 ACCELERATED HYPERTENSION: ICD-10-CM

## 2022-01-01 DIAGNOSIS — D50.0 IRON DEFICIENCY ANEMIA DUE TO CHRONIC BLOOD LOSS: ICD-10-CM

## 2022-01-01 DIAGNOSIS — Z45.010 PACEMAKER BATTERY DEPLETION: Primary | ICD-10-CM

## 2022-01-01 DIAGNOSIS — R41.82 ALTERED MENTAL STATUS, UNSPECIFIED ALTERED MENTAL STATUS TYPE: ICD-10-CM

## 2022-01-01 DIAGNOSIS — S49.91XA INJURY OF RIGHT SHOULDER, INITIAL ENCOUNTER: Primary | ICD-10-CM

## 2022-01-01 DIAGNOSIS — G89.29 HIP PAIN, CHRONIC, RIGHT: ICD-10-CM

## 2022-01-01 DIAGNOSIS — M75.21 BICEPS TENDINITIS OF RIGHT UPPER EXTREMITY: Primary | ICD-10-CM

## 2022-01-01 DIAGNOSIS — R41.89 COGNITIVE IMPAIRMENT: ICD-10-CM

## 2022-01-01 DIAGNOSIS — M50.30 DDD (DEGENERATIVE DISC DISEASE), CERVICAL: Primary | ICD-10-CM

## 2022-01-01 DIAGNOSIS — M19.90 ARTHRITIS: ICD-10-CM

## 2022-01-01 DIAGNOSIS — E44.0 MODERATE MALNUTRITION: ICD-10-CM

## 2022-01-01 DIAGNOSIS — M75.21 BICEPS TENDINITIS OF RIGHT UPPER EXTREMITY: ICD-10-CM

## 2022-01-01 DIAGNOSIS — M25.551 HIP PAIN, CHRONIC, RIGHT: ICD-10-CM

## 2022-01-01 DIAGNOSIS — D50.0 IRON DEFICIENCY ANEMIA DUE TO CHRONIC BLOOD LOSS: Primary | ICD-10-CM

## 2022-01-01 DIAGNOSIS — K56.41 FECAL IMPACTION: ICD-10-CM

## 2022-01-01 DIAGNOSIS — G89.4 CHRONIC PAIN SYNDROME: ICD-10-CM

## 2022-01-01 DIAGNOSIS — I50.9 ACUTE CONGESTIVE HEART FAILURE, UNSPECIFIED HEART FAILURE TYPE: ICD-10-CM

## 2022-01-01 DIAGNOSIS — I44.2 AV BLOCK, COMPLETE: ICD-10-CM

## 2022-01-01 DIAGNOSIS — M17.0 PRIMARY OSTEOARTHRITIS OF BOTH KNEES: ICD-10-CM

## 2022-01-01 DIAGNOSIS — R41.82 ALTERED MENTAL STATUS, UNSPECIFIED ALTERED MENTAL STATUS TYPE: Primary | ICD-10-CM

## 2022-01-01 DIAGNOSIS — Z78.9 MEDICATION INTOLERANCE: ICD-10-CM

## 2022-01-01 DIAGNOSIS — R41.0 DELIRIUM: Primary | ICD-10-CM

## 2022-01-01 DIAGNOSIS — G89.29 HIP PAIN, CHRONIC, RIGHT: Primary | ICD-10-CM

## 2022-01-01 DIAGNOSIS — R53.1 WEAKNESS: ICD-10-CM

## 2022-01-01 DIAGNOSIS — I10 ESSENTIAL HYPERTENSION: ICD-10-CM

## 2022-01-01 DIAGNOSIS — Z45.018 ELECTIVE REPLACEMENT INDICATED FOR PACEMAKER: Primary | ICD-10-CM

## 2022-01-01 DIAGNOSIS — I10 ESSENTIAL HYPERTENSION: Primary | ICD-10-CM

## 2022-01-01 DIAGNOSIS — Z95.0 PACEMAKER: ICD-10-CM

## 2022-01-01 DIAGNOSIS — M25.511 RIGHT SHOULDER PAIN, UNSPECIFIED CHRONICITY: ICD-10-CM

## 2022-01-01 DIAGNOSIS — F32.A ANXIETY AND DEPRESSION: ICD-10-CM

## 2022-01-01 DIAGNOSIS — R41.0 DISORIENTATION: ICD-10-CM

## 2022-01-01 DIAGNOSIS — N39.0 ACUTE UTI: Primary | ICD-10-CM

## 2022-01-01 DIAGNOSIS — I48.0 PAROXYSMAL ATRIAL FIBRILLATION: ICD-10-CM

## 2022-01-01 DIAGNOSIS — K59.00 CONSTIPATION, UNSPECIFIED CONSTIPATION TYPE: Primary | ICD-10-CM

## 2022-01-01 DIAGNOSIS — Z45.010 PACEMAKER BATTERY DEPLETION: ICD-10-CM

## 2022-01-01 DIAGNOSIS — M47.814 THORACIC SPONDYLOSIS: ICD-10-CM

## 2022-01-01 DIAGNOSIS — I10 ELEVATED BLOOD PRESSURE READING WITH DIAGNOSIS OF HYPERTENSION: ICD-10-CM

## 2022-01-01 DIAGNOSIS — Z45.018 ELECTIVE REPLACEMENT INDICATED FOR PACEMAKER: ICD-10-CM

## 2022-01-01 DIAGNOSIS — M25.551 HIP PAIN, CHRONIC, RIGHT: Primary | ICD-10-CM

## 2022-01-01 DIAGNOSIS — K31.819 GAVE (GASTRIC ANTRAL VASCULAR ECTASIA): ICD-10-CM

## 2022-01-01 DIAGNOSIS — M54.2 CERVICAL PAIN (NECK): ICD-10-CM

## 2022-01-01 DIAGNOSIS — M79.7 FIBROMYALGIA: ICD-10-CM

## 2022-01-01 DIAGNOSIS — M48.02 CERVICAL SPINAL STENOSIS: ICD-10-CM

## 2022-01-01 DIAGNOSIS — N39.0 ACUTE UTI: ICD-10-CM

## 2022-01-01 DIAGNOSIS — F41.9 ANXIETY AND DEPRESSION: ICD-10-CM

## 2022-01-01 LAB
ALBUMIN SERPL-MCNC: 4 G/DL (ref 3.5–5.2)
ALBUMIN SERPL-MCNC: 4 G/DL (ref 3.5–5.2)
ALBUMIN SERPL-MCNC: 4.1 G/DL (ref 3.5–5.2)
ALBUMIN SERPL-MCNC: 4.1 G/DL (ref 3.5–5.2)
ALBUMIN SERPL-MCNC: 4.7 G/DL (ref 3.5–5.2)
ALBUMIN/GLOB SERPL: 1.2 G/DL
ALBUMIN/GLOB SERPL: 1.3 G/DL
ALBUMIN/GLOB SERPL: 1.5 G/DL
ALBUMIN/GLOB SERPL: 1.6 G/DL
ALBUMIN/GLOB SERPL: 1.7 G/DL
ALP SERPL-CCNC: 59 U/L (ref 39–117)
ALP SERPL-CCNC: 61 U/L (ref 39–117)
ALP SERPL-CCNC: 62 U/L (ref 39–117)
ALP SERPL-CCNC: 65 U/L (ref 39–117)
ALP SERPL-CCNC: 75 U/L (ref 39–117)
ALT SERPL W P-5'-P-CCNC: 10 U/L (ref 1–33)
ALT SERPL W P-5'-P-CCNC: 12 U/L (ref 1–33)
ALT SERPL W P-5'-P-CCNC: 12 U/L (ref 1–33)
ALT SERPL W P-5'-P-CCNC: 19 U/L (ref 1–33)
ALT SERPL W P-5'-P-CCNC: 9 U/L (ref 1–33)
AMPHET+METHAMPHET UR QL: NEGATIVE
AMPHETAMINES UR QL: NEGATIVE
ANION GAP SERPL CALCULATED.3IONS-SCNC: 10 MMOL/L (ref 5–15)
ANION GAP SERPL CALCULATED.3IONS-SCNC: 10 MMOL/L (ref 5–15)
ANION GAP SERPL CALCULATED.3IONS-SCNC: 11 MMOL/L (ref 5–15)
ANION GAP SERPL CALCULATED.3IONS-SCNC: 12 MMOL/L (ref 5–15)
ANION GAP SERPL CALCULATED.3IONS-SCNC: 12 MMOL/L (ref 5–15)
ANION GAP SERPL CALCULATED.3IONS-SCNC: 13 MMOL/L (ref 5–15)
ANION GAP SERPL CALCULATED.3IONS-SCNC: 16 MMOL/L (ref 5–15)
ANION GAP SERPL CALCULATED.3IONS-SCNC: 16 MMOL/L (ref 5–15)
ANION GAP SERPL CALCULATED.3IONS-SCNC: 9 MMOL/L (ref 5–15)
AST SERPL-CCNC: 21 U/L (ref 1–32)
AST SERPL-CCNC: 22 U/L (ref 1–32)
AST SERPL-CCNC: 28 U/L (ref 1–32)
AST SERPL-CCNC: 36 U/L (ref 1–32)
AST SERPL-CCNC: 36 U/L (ref 1–32)
BACTERIA SPEC AEROBE CULT: NO GROWTH
BACTERIA SPEC AEROBE CULT: NORMAL
BACTERIA SPEC AEROBE CULT: NORMAL
BACTERIA UR QL AUTO: ABNORMAL /HPF
BACTERIA UR QL AUTO: ABNORMAL /HPF
BARBITURATES UR QL SCN: NEGATIVE
BASOPHILS # BLD AUTO: 0.02 10*3/MM3 (ref 0–0.2)
BASOPHILS # BLD AUTO: 0.03 10*3/MM3 (ref 0–0.2)
BASOPHILS # BLD AUTO: 0.03 10*3/MM3 (ref 0–0.2)
BASOPHILS # BLD AUTO: 0.04 10*3/MM3 (ref 0–0.2)
BASOPHILS # BLD AUTO: 0.05 10*3/MM3 (ref 0–0.2)
BASOPHILS # BLD AUTO: 0.05 10*3/MM3 (ref 0–0.2)
BASOPHILS # BLD AUTO: 0.06 10*3/MM3 (ref 0–0.2)
BASOPHILS # BLD AUTO: 0.06 10*3/MM3 (ref 0–0.2)
BASOPHILS NFR BLD AUTO: 0.4 % (ref 0–1.5)
BASOPHILS NFR BLD AUTO: 0.4 % (ref 0–1.5)
BASOPHILS NFR BLD AUTO: 0.5 % (ref 0–1.5)
BASOPHILS NFR BLD AUTO: 0.6 % (ref 0–1.5)
BASOPHILS NFR BLD AUTO: 0.6 % (ref 0–1.5)
BASOPHILS NFR BLD AUTO: 0.7 % (ref 0–1.5)
BASOPHILS NFR BLD AUTO: 0.8 % (ref 0–1.5)
BASOPHILS NFR BLD AUTO: 1.1 % (ref 0–1.5)
BASOPHILS NFR BLD AUTO: 1.2 % (ref 0–1.5)
BASOPHILS NFR BLD AUTO: 1.4 % (ref 0–1.5)
BENZODIAZ UR QL SCN: NEGATIVE
BH CV XLRA MEAS LEFT DIST CCA EDV: 9.3 CM/SEC
BH CV XLRA MEAS LEFT DIST CCA PSV: 62.7 CM/SEC
BH CV XLRA MEAS LEFT ICA/CCA RATIO: -0.9
BH CV XLRA MEAS LEFT MID CCA PSV: 58.4 CM/SEC
BH CV XLRA MEAS LEFT PROX CCA EDV: 11.2 CM/SEC
BH CV XLRA MEAS LEFT PROX CCA PSV: 96.3 CM/SEC
BH CV XLRA MEAS LEFT PROX ECA PSV: 58.4 CM/SEC
BH CV XLRA MEAS LEFT PROX ICA EDV: 11.8 CM/SEC
BH CV XLRA MEAS LEFT PROX ICA PSV: 52.8 CM/SEC
BH CV XLRA MEAS RIGHT DIST CCA EDV: 11.8 CM/SEC
BH CV XLRA MEAS RIGHT DIST CCA PSV: 55.9 CM/SEC
BH CV XLRA MEAS RIGHT ICA/CCA RATIO: 0.93
BH CV XLRA MEAS RIGHT MID CCA EDV: 9.9 CM/SEC
BH CV XLRA MEAS RIGHT MID CCA PSV: 72.7 CM/SEC
BH CV XLRA MEAS RIGHT MID ICA EDV: 7.5 CM/SEC
BH CV XLRA MEAS RIGHT MID ICA PSV: 67.6 CM/SEC
BH CV XLRA MEAS RIGHT PROX CCA EDV: 8.1 CM/SEC
BH CV XLRA MEAS RIGHT PROX CCA PSV: 50.9 CM/SEC
BH CV XLRA MEAS RIGHT PROX ECA PSV: 83.9 CM/SEC
BH CV XLRA MEAS RIGHT PROX ICA EDV: 8.8 CM/SEC
BH CV XLRA MEAS RIGHT PROX ICA PSV: 51.4 CM/SEC
BH CV XLRA MEAS RIGHT PROX SCLA PSV: 107 CM/SEC
BILIRUB SERPL-MCNC: 0.4 MG/DL (ref 0–1.2)
BILIRUB SERPL-MCNC: 0.7 MG/DL (ref 0–1.2)
BILIRUB SERPL-MCNC: 0.8 MG/DL (ref 0–1.2)
BILIRUB UR QL STRIP: NEGATIVE
BUN SERPL-MCNC: 10 MG/DL (ref 8–23)
BUN SERPL-MCNC: 11 MG/DL (ref 8–23)
BUN SERPL-MCNC: 11 MG/DL (ref 8–23)
BUN SERPL-MCNC: 12 MG/DL (ref 8–23)
BUN SERPL-MCNC: 12 MG/DL (ref 8–23)
BUN SERPL-MCNC: 14 MG/DL (ref 8–23)
BUN SERPL-MCNC: 14 MG/DL (ref 8–23)
BUN SERPL-MCNC: 15 MG/DL (ref 8–23)
BUN SERPL-MCNC: 23 MG/DL (ref 8–23)
BUN/CREAT SERPL: 10.5 (ref 7–25)
BUN/CREAT SERPL: 10.5 (ref 7–25)
BUN/CREAT SERPL: 10.9 (ref 7–25)
BUN/CREAT SERPL: 11.6 (ref 7–25)
BUN/CREAT SERPL: 11.9 (ref 7–25)
BUN/CREAT SERPL: 15.2 (ref 7–25)
BUN/CREAT SERPL: 17.7 (ref 7–25)
BUN/CREAT SERPL: 18.8 (ref 7–25)
BUN/CREAT SERPL: 21.7 (ref 7–25)
BUPRENORPHINE SERPL-MCNC: NEGATIVE NG/ML
CALCIUM SPEC-SCNC: 10.6 MG/DL (ref 8.2–9.6)
CALCIUM SPEC-SCNC: 9 MG/DL (ref 8.2–9.6)
CALCIUM SPEC-SCNC: 9 MG/DL (ref 8.2–9.6)
CALCIUM SPEC-SCNC: 9.1 MG/DL (ref 8.2–9.6)
CALCIUM SPEC-SCNC: 9.1 MG/DL (ref 8.2–9.6)
CALCIUM SPEC-SCNC: 9.2 MG/DL (ref 8.2–9.6)
CALCIUM SPEC-SCNC: 9.3 MG/DL (ref 8.2–9.6)
CALCIUM SPEC-SCNC: 9.5 MG/DL (ref 8.2–9.6)
CALCIUM SPEC-SCNC: 9.7 MG/DL (ref 8.2–9.6)
CANNABINOIDS SERPL QL: NEGATIVE
CHLORIDE SERPL-SCNC: 101 MMOL/L (ref 98–107)
CHLORIDE SERPL-SCNC: 102 MMOL/L (ref 98–107)
CHLORIDE SERPL-SCNC: 102 MMOL/L (ref 98–107)
CHLORIDE SERPL-SCNC: 103 MMOL/L (ref 98–107)
CHLORIDE SERPL-SCNC: 103 MMOL/L (ref 98–107)
CHLORIDE SERPL-SCNC: 104 MMOL/L (ref 98–107)
CHLORIDE SERPL-SCNC: 107 MMOL/L (ref 98–107)
CHLORIDE SERPL-SCNC: 107 MMOL/L (ref 98–107)
CHLORIDE SERPL-SCNC: 95 MMOL/L (ref 98–107)
CHOLEST SERPL-MCNC: 207 MG/DL (ref 0–200)
CLARITY UR: ABNORMAL
CLARITY UR: ABNORMAL
CLARITY UR: CLEAR
CO2 SERPL-SCNC: 21 MMOL/L (ref 22–29)
CO2 SERPL-SCNC: 23 MMOL/L (ref 22–29)
CO2 SERPL-SCNC: 23 MMOL/L (ref 22–29)
CO2 SERPL-SCNC: 24 MMOL/L (ref 22–29)
CO2 SERPL-SCNC: 27 MMOL/L (ref 22–29)
CO2 SERPL-SCNC: 28 MMOL/L (ref 22–29)
CO2 SERPL-SCNC: 28 MMOL/L (ref 22–29)
COCAINE UR QL: NEGATIVE
COLOR UR: YELLOW
CREAT BLDA-MCNC: 1 MG/DL (ref 0.6–1.3)
CREAT SERPL-MCNC: 0.79 MG/DL (ref 0.57–1)
CREAT SERPL-MCNC: 0.8 MG/DL (ref 0.57–1)
CREAT SERPL-MCNC: 0.92 MG/DL (ref 0.57–1)
CREAT SERPL-MCNC: 0.95 MG/DL (ref 0.57–1)
CREAT SERPL-MCNC: 0.95 MG/DL (ref 0.57–1)
CREAT SERPL-MCNC: 1.01 MG/DL (ref 0.57–1)
CREAT SERPL-MCNC: 1.05 MG/DL (ref 0.57–1)
CREAT SERPL-MCNC: 1.06 MG/DL (ref 0.57–1)
CREAT SERPL-MCNC: 1.1 MG/DL (ref 0.57–1)
D DIMER PPP FEU-MCNC: 1.15 MCGFEU/ML (ref 0.01–0.5)
D-LACTATE SERPL-SCNC: 0.8 MMOL/L (ref 0.5–2)
D-LACTATE SERPL-SCNC: 1.8 MMOL/L (ref 0.5–2)
DEPRECATED RDW RBC AUTO: 39.5 FL (ref 37–54)
DEPRECATED RDW RBC AUTO: 39.8 FL (ref 37–54)
DEPRECATED RDW RBC AUTO: 40.8 FL (ref 37–54)
DEPRECATED RDW RBC AUTO: 41.1 FL (ref 37–54)
DEPRECATED RDW RBC AUTO: 41.1 FL (ref 37–54)
DEPRECATED RDW RBC AUTO: 41.4 FL (ref 37–54)
DEPRECATED RDW RBC AUTO: 41.5 FL (ref 37–54)
DEPRECATED RDW RBC AUTO: 41.5 FL (ref 37–54)
DEPRECATED RDW RBC AUTO: 42.8 FL (ref 37–54)
DEPRECATED RDW RBC AUTO: 43.2 FL (ref 37–54)
DEPRECATED RDW RBC AUTO: 43.3 FL (ref 37–54)
EGFRCR SERPLBLD CKD-EPI 2021: 47.8 ML/MIN/1.73
EGFRCR SERPLBLD CKD-EPI 2021: 49.7 ML/MIN/1.73
EGFRCR SERPLBLD CKD-EPI 2021: 50.3 ML/MIN/1.73
EGFRCR SERPLBLD CKD-EPI 2021: 52.7 ML/MIN/1.73
EGFRCR SERPLBLD CKD-EPI 2021: 57 ML/MIN/1.73
EGFRCR SERPLBLD CKD-EPI 2021: 57 ML/MIN/1.73
EGFRCR SERPLBLD CKD-EPI 2021: 58.9 ML/MIN/1.73
EGFRCR SERPLBLD CKD-EPI 2021: 69.7 ML/MIN/1.73
EGFRCR SERPLBLD CKD-EPI 2021: 70.7 ML/MIN/1.73
EOSINOPHIL # BLD AUTO: 0.02 10*3/MM3 (ref 0–0.4)
EOSINOPHIL # BLD AUTO: 0.05 10*3/MM3 (ref 0–0.4)
EOSINOPHIL # BLD AUTO: 0.07 10*3/MM3 (ref 0–0.4)
EOSINOPHIL # BLD AUTO: 0.08 10*3/MM3 (ref 0–0.4)
EOSINOPHIL # BLD AUTO: 0.13 10*3/MM3 (ref 0–0.4)
EOSINOPHIL # BLD AUTO: 0.13 10*3/MM3 (ref 0–0.4)
EOSINOPHIL # BLD AUTO: 0.15 10*3/MM3 (ref 0–0.4)
EOSINOPHIL # BLD AUTO: 0.17 10*3/MM3 (ref 0–0.4)
EOSINOPHIL NFR BLD AUTO: 0.1 % (ref 0.3–6.2)
EOSINOPHIL NFR BLD AUTO: 0.3 % (ref 0.3–6.2)
EOSINOPHIL NFR BLD AUTO: 0.3 % (ref 0.3–6.2)
EOSINOPHIL NFR BLD AUTO: 0.9 % (ref 0.3–6.2)
EOSINOPHIL NFR BLD AUTO: 1.1 % (ref 0.3–6.2)
EOSINOPHIL NFR BLD AUTO: 2.4 % (ref 0.3–6.2)
EOSINOPHIL NFR BLD AUTO: 2.6 % (ref 0.3–6.2)
EOSINOPHIL NFR BLD AUTO: 3 % (ref 0.3–6.2)
EOSINOPHIL NFR BLD AUTO: 3.1 % (ref 0.3–6.2)
EOSINOPHIL NFR BLD AUTO: 3.7 % (ref 0.3–6.2)
ERYTHROCYTE [DISTWIDTH] IN BLOOD BY AUTOMATED COUNT: 12.3 % (ref 12.3–15.4)
ERYTHROCYTE [DISTWIDTH] IN BLOOD BY AUTOMATED COUNT: 12.3 % (ref 12.3–15.4)
ERYTHROCYTE [DISTWIDTH] IN BLOOD BY AUTOMATED COUNT: 12.6 % (ref 12.3–15.4)
ERYTHROCYTE [DISTWIDTH] IN BLOOD BY AUTOMATED COUNT: 12.6 % (ref 12.3–15.4)
ERYTHROCYTE [DISTWIDTH] IN BLOOD BY AUTOMATED COUNT: 12.7 % (ref 12.3–15.4)
ERYTHROCYTE [DISTWIDTH] IN BLOOD BY AUTOMATED COUNT: 12.7 % (ref 12.3–15.4)
ERYTHROCYTE [DISTWIDTH] IN BLOOD BY AUTOMATED COUNT: 12.8 % (ref 12.3–15.4)
ERYTHROCYTE [DISTWIDTH] IN BLOOD BY AUTOMATED COUNT: 12.8 % (ref 12.3–15.4)
ERYTHROCYTE [DISTWIDTH] IN BLOOD BY AUTOMATED COUNT: 13.1 % (ref 12.3–15.4)
ERYTHROCYTE [DISTWIDTH] IN BLOOD BY AUTOMATED COUNT: 13.2 % (ref 12.3–15.4)
ERYTHROCYTE [DISTWIDTH] IN BLOOD BY AUTOMATED COUNT: 13.4 % (ref 12.3–15.4)
ERYTHROCYTE [DISTWIDTH] IN BLOOD BY AUTOMATED COUNT: 13.4 % (ref 12.3–15.4)
ERYTHROCYTE [DISTWIDTH] IN BLOOD BY AUTOMATED COUNT: 13.6 % (ref 12.3–15.4)
ERYTHROCYTE [DISTWIDTH] IN BLOOD BY AUTOMATED COUNT: 13.7 % (ref 12.3–15.4)
ERYTHROCYTE [DISTWIDTH] IN BLOOD BY AUTOMATED COUNT: 13.8 % (ref 12.3–15.4)
ERYTHROCYTE [DISTWIDTH] IN BLOOD BY AUTOMATED COUNT: 13.9 % (ref 12.3–15.4)
ERYTHROCYTE [DISTWIDTH] IN BLOOD BY AUTOMATED COUNT: 13.9 % (ref 12.3–15.4)
ERYTHROCYTE [DISTWIDTH] IN BLOOD BY AUTOMATED COUNT: 14.3 % (ref 12.3–15.4)
ERYTHROCYTE [SEDIMENTATION RATE] IN BLOOD: 33 MM/HR (ref 0–30)
FERRITIN SERPL-MCNC: 107.1 NG/ML (ref 13–150)
FERRITIN SERPL-MCNC: 110.9 NG/ML (ref 13–150)
FERRITIN SERPL-MCNC: 146 NG/ML (ref 13–150)
FERRITIN SERPL-MCNC: 151.5 NG/ML (ref 13–150)
FERRITIN SERPL-MCNC: 160.8 NG/ML (ref 13–150)
FERRITIN SERPL-MCNC: 179 NG/ML (ref 13–150)
FERRITIN SERPL-MCNC: 220.8 NG/ML (ref 13–150)
FERRITIN SERPL-MCNC: 344.1 NG/ML (ref 13–150)
FERRITIN SERPL-MCNC: 354.1 NG/ML (ref 13–150)
FLUAV RNA RESP QL NAA+PROBE: NOT DETECTED
FLUAV RNA RESP QL NAA+PROBE: NOT DETECTED
FLUBV RNA RESP QL NAA+PROBE: NOT DETECTED
FLUBV RNA RESP QL NAA+PROBE: NOT DETECTED
FOLATE SERPL-MCNC: 17.9 NG/ML (ref 4.78–24.2)
GLOBULIN UR ELPH-MCNC: 2.4 GM/DL
GLOBULIN UR ELPH-MCNC: 2.5 GM/DL
GLOBULIN UR ELPH-MCNC: 2.6 GM/DL
GLOBULIN UR ELPH-MCNC: 3.2 GM/DL
GLOBULIN UR ELPH-MCNC: 3.8 GM/DL
GLUCOSE BLDC GLUCOMTR-MCNC: 106 MG/DL (ref 70–130)
GLUCOSE BLDC GLUCOMTR-MCNC: 131 MG/DL (ref 70–130)
GLUCOSE BLDC GLUCOMTR-MCNC: 56 MG/DL (ref 70–130)
GLUCOSE BLDC GLUCOMTR-MCNC: 73 MG/DL (ref 70–130)
GLUCOSE BLDC GLUCOMTR-MCNC: 80 MG/DL (ref 70–130)
GLUCOSE BLDC GLUCOMTR-MCNC: 85 MG/DL (ref 70–130)
GLUCOSE BLDC GLUCOMTR-MCNC: 90 MG/DL (ref 70–130)
GLUCOSE BLDC GLUCOMTR-MCNC: 96 MG/DL (ref 70–130)
GLUCOSE SERPL-MCNC: 102 MG/DL (ref 65–99)
GLUCOSE SERPL-MCNC: 110 MG/DL (ref 65–99)
GLUCOSE SERPL-MCNC: 122 MG/DL (ref 65–99)
GLUCOSE SERPL-MCNC: 126 MG/DL (ref 65–99)
GLUCOSE SERPL-MCNC: 141 MG/DL (ref 65–99)
GLUCOSE SERPL-MCNC: 73 MG/DL (ref 65–99)
GLUCOSE SERPL-MCNC: 85 MG/DL (ref 65–99)
GLUCOSE SERPL-MCNC: 96 MG/DL (ref 65–99)
GLUCOSE SERPL-MCNC: 99 MG/DL (ref 65–99)
GLUCOSE UR STRIP-MCNC: NEGATIVE MG/DL
HCT VFR BLD AUTO: 38.6 % (ref 34–46.6)
HCT VFR BLD AUTO: 38.8 % (ref 34–46.6)
HCT VFR BLD AUTO: 39 % (ref 34–46.6)
HCT VFR BLD AUTO: 39.3 % (ref 34–46.6)
HCT VFR BLD AUTO: 39.8 % (ref 34–46.6)
HCT VFR BLD AUTO: 40 % (ref 34–46.6)
HCT VFR BLD AUTO: 40.1 % (ref 34–46.6)
HCT VFR BLD AUTO: 40.4 % (ref 34–46.6)
HCT VFR BLD AUTO: 40.6 % (ref 34–46.6)
HCT VFR BLD AUTO: 41.1 % (ref 34–46.6)
HCT VFR BLD AUTO: 41.1 % (ref 34–46.6)
HCT VFR BLD AUTO: 42.2 % (ref 34–46.6)
HCT VFR BLD AUTO: 42.3 % (ref 34–46.6)
HCT VFR BLD AUTO: 42.4 % (ref 34–46.6)
HCT VFR BLD AUTO: 42.5 % (ref 34–46.6)
HCT VFR BLD AUTO: 45.4 % (ref 34–46.6)
HDLC SERPL-MCNC: 62 MG/DL (ref 40–60)
HGB BLD-MCNC: 12.2 G/DL (ref 12–15.9)
HGB BLD-MCNC: 12.3 G/DL (ref 12–15.9)
HGB BLD-MCNC: 12.5 G/DL (ref 12–15.9)
HGB BLD-MCNC: 12.6 G/DL (ref 12–15.9)
HGB BLD-MCNC: 12.7 G/DL (ref 12–15.9)
HGB BLD-MCNC: 12.9 G/DL (ref 12–15.9)
HGB BLD-MCNC: 13 G/DL (ref 12–15.9)
HGB BLD-MCNC: 13.1 G/DL (ref 12–15.9)
HGB BLD-MCNC: 13.4 G/DL (ref 12–15.9)
HGB BLD-MCNC: 13.4 G/DL (ref 12–15.9)
HGB BLD-MCNC: 13.9 G/DL (ref 12–15.9)
HGB BLD-MCNC: 13.9 G/DL (ref 12–15.9)
HGB BLD-MCNC: 14.6 G/DL (ref 12–15.9)
HGB UR QL STRIP.AUTO: NEGATIVE
HOLD SPECIMEN: NORMAL
HYALINE CASTS UR QL AUTO: ABNORMAL /LPF
HYALINE CASTS UR QL AUTO: ABNORMAL /LPF
IMM GRANULOCYTES # BLD AUTO: 0 10*3/MM3 (ref 0–0.05)
IMM GRANULOCYTES # BLD AUTO: 0.01 10*3/MM3 (ref 0–0.05)
IMM GRANULOCYTES # BLD AUTO: 0.02 10*3/MM3 (ref 0–0.05)
IMM GRANULOCYTES # BLD AUTO: 0.02 10*3/MM3 (ref 0–0.05)
IMM GRANULOCYTES # BLD AUTO: 0.05 10*3/MM3 (ref 0–0.05)
IMM GRANULOCYTES # BLD AUTO: 0.08 10*3/MM3 (ref 0–0.05)
IMM GRANULOCYTES NFR BLD AUTO: 0 % (ref 0–0.5)
IMM GRANULOCYTES NFR BLD AUTO: 0.2 % (ref 0–0.5)
IMM GRANULOCYTES NFR BLD AUTO: 0.2 % (ref 0–0.5)
IMM GRANULOCYTES NFR BLD AUTO: 0.3 % (ref 0–0.5)
IMM GRANULOCYTES NFR BLD AUTO: 0.4 % (ref 0–0.5)
IMM GRANULOCYTES NFR BLD AUTO: 1.2 % (ref 0–0.5)
IRON 24H UR-MRATE: 66 MCG/DL (ref 37–145)
IRON SATN MFR SERPL: 30 % (ref 20–50)
KETONES UR QL STRIP: ABNORMAL
KETONES UR QL STRIP: NEGATIVE
KETONES UR QL STRIP: NEGATIVE
LDLC SERPL CALC-MCNC: 131 MG/DL (ref 0–100)
LDLC/HDLC SERPL: 2.09 {RATIO}
LEUKOCYTE ESTERASE UR QL STRIP.AUTO: ABNORMAL
LEUKOCYTE ESTERASE UR QL STRIP.AUTO: ABNORMAL
LEUKOCYTE ESTERASE UR QL STRIP.AUTO: NEGATIVE
LIPASE SERPL-CCNC: 18 U/L (ref 13–60)
LYMPHOCYTES # BLD AUTO: 0.32 10*3/MM3 (ref 0.7–3.1)
LYMPHOCYTES # BLD AUTO: 0.37 10*3/MM3 (ref 0.7–3.1)
LYMPHOCYTES # BLD AUTO: 0.37 10*3/MM3 (ref 0.7–3.1)
LYMPHOCYTES # BLD AUTO: 0.41 10*3/MM3 (ref 0.7–3.1)
LYMPHOCYTES # BLD AUTO: 0.42 10*3/MM3 (ref 0.7–3.1)
LYMPHOCYTES # BLD AUTO: 0.45 10*3/MM3 (ref 0.7–3.1)
LYMPHOCYTES # BLD AUTO: 0.45 10*3/MM3 (ref 0.7–3.1)
LYMPHOCYTES # BLD AUTO: 0.49 10*3/MM3 (ref 0.7–3.1)
LYMPHOCYTES # BLD AUTO: 0.5 10*3/MM3 (ref 0.7–3.1)
LYMPHOCYTES # BLD AUTO: 0.58 10*3/MM3 (ref 0.7–3.1)
LYMPHOCYTES # BLD AUTO: 0.58 10*3/MM3 (ref 0.7–3.1)
LYMPHOCYTES # BLD AUTO: 0.6 10*3/MM3 (ref 0.7–3.1)
LYMPHOCYTES # BLD AUTO: 0.7 10*3/MM3 (ref 0.7–3.1)
LYMPHOCYTES # BLD AUTO: 0.8 10*3/MM3 (ref 0.7–3.1)
LYMPHOCYTES NFR BLD AUTO: 11.8 % (ref 19.6–45.3)
LYMPHOCYTES NFR BLD AUTO: 11.9 % (ref 19.6–45.3)
LYMPHOCYTES NFR BLD AUTO: 12.5 % (ref 19.6–45.3)
LYMPHOCYTES NFR BLD AUTO: 13.8 % (ref 19.6–45.3)
LYMPHOCYTES NFR BLD AUTO: 14.9 % (ref 19.6–45.3)
LYMPHOCYTES NFR BLD AUTO: 16 % (ref 19.6–45.3)
LYMPHOCYTES NFR BLD AUTO: 16.3 % (ref 19.6–45.3)
LYMPHOCYTES NFR BLD AUTO: 17.6 % (ref 19.6–45.3)
LYMPHOCYTES NFR BLD AUTO: 2.6 % (ref 19.6–45.3)
LYMPHOCYTES NFR BLD AUTO: 21.5 % (ref 19.6–45.3)
LYMPHOCYTES NFR BLD AUTO: 6 % (ref 19.6–45.3)
LYMPHOCYTES NFR BLD AUTO: 6 % (ref 19.6–45.3)
LYMPHOCYTES NFR BLD AUTO: 6.9 % (ref 19.6–45.3)
LYMPHOCYTES NFR BLD AUTO: 8 % (ref 19.6–45.3)
LYMPHOCYTES NFR BLD AUTO: 9.7 % (ref 19.6–45.3)
MAGNESIUM SERPL-MCNC: 1.8 MG/DL (ref 1.7–2.3)
MAGNESIUM SERPL-MCNC: 2 MG/DL (ref 1.6–2.4)
MAGNESIUM SERPL-MCNC: 2 MG/DL (ref 1.6–2.4)
MAGNESIUM SERPL-MCNC: 2 MG/DL (ref 1.7–2.3)
MAXIMAL PREDICTED HEART RATE: 129 BPM
MCH RBC QN AUTO: 26.4 PG (ref 26.6–33)
MCH RBC QN AUTO: 26.8 PG (ref 26.6–33)
MCH RBC QN AUTO: 26.9 PG (ref 26.6–33)
MCH RBC QN AUTO: 27 PG (ref 26.6–33)
MCH RBC QN AUTO: 27.4 PG (ref 26.6–33)
MCH RBC QN AUTO: 28.1 PG (ref 26.6–33)
MCH RBC QN AUTO: 28.2 PG (ref 26.6–33)
MCH RBC QN AUTO: 28.3 PG (ref 26.6–33)
MCH RBC QN AUTO: 28.4 PG (ref 26.6–33)
MCH RBC QN AUTO: 28.5 PG (ref 26.6–33)
MCH RBC QN AUTO: 28.5 PG (ref 26.6–33)
MCH RBC QN AUTO: 28.6 PG (ref 26.6–33)
MCH RBC QN AUTO: 28.7 PG (ref 26.6–33)
MCH RBC QN AUTO: 28.8 PG (ref 26.6–33)
MCH RBC QN AUTO: 28.9 PG (ref 26.6–33)
MCHC RBC AUTO-ENTMCNC: 30.2 G/DL (ref 31.5–35.7)
MCHC RBC AUTO-ENTMCNC: 30.5 G/DL (ref 31.5–35.7)
MCHC RBC AUTO-ENTMCNC: 30.7 G/DL (ref 31.5–35.7)
MCHC RBC AUTO-ENTMCNC: 30.7 G/DL (ref 31.5–35.7)
MCHC RBC AUTO-ENTMCNC: 30.8 G/DL (ref 31.5–35.7)
MCHC RBC AUTO-ENTMCNC: 31.6 G/DL (ref 31.5–35.7)
MCHC RBC AUTO-ENTMCNC: 31.7 G/DL (ref 31.5–35.7)
MCHC RBC AUTO-ENTMCNC: 31.8 G/DL (ref 31.5–35.7)
MCHC RBC AUTO-ENTMCNC: 31.9 G/DL (ref 31.5–35.7)
MCHC RBC AUTO-ENTMCNC: 32 G/DL (ref 31.5–35.7)
MCHC RBC AUTO-ENTMCNC: 32.1 G/DL (ref 31.5–35.7)
MCHC RBC AUTO-ENTMCNC: 32.2 G/DL (ref 31.5–35.7)
MCHC RBC AUTO-ENTMCNC: 32.4 G/DL (ref 31.5–35.7)
MCHC RBC AUTO-ENTMCNC: 32.4 G/DL (ref 31.5–35.7)
MCHC RBC AUTO-ENTMCNC: 32.6 G/DL (ref 31.5–35.7)
MCHC RBC AUTO-ENTMCNC: 32.7 G/DL (ref 31.5–35.7)
MCHC RBC AUTO-ENTMCNC: 32.7 G/DL (ref 31.5–35.7)
MCHC RBC AUTO-ENTMCNC: 32.9 G/DL (ref 31.5–35.7)
MCV RBC AUTO: 86.6 FL (ref 79–97)
MCV RBC AUTO: 87.2 FL (ref 79–97)
MCV RBC AUTO: 87.3 FL (ref 79–97)
MCV RBC AUTO: 87.5 FL (ref 79–97)
MCV RBC AUTO: 87.6 FL (ref 79–97)
MCV RBC AUTO: 87.7 FL (ref 79–97)
MCV RBC AUTO: 87.8 FL (ref 79–97)
MCV RBC AUTO: 87.9 FL (ref 79–97)
MCV RBC AUTO: 88.2 FL (ref 79–97)
MCV RBC AUTO: 88.5 FL (ref 79–97)
MCV RBC AUTO: 88.7 FL (ref 79–97)
MCV RBC AUTO: 88.7 FL (ref 79–97)
MCV RBC AUTO: 89 FL (ref 79–97)
MCV RBC AUTO: 89 FL (ref 79–97)
MCV RBC AUTO: 89.4 FL (ref 79–97)
MCV RBC AUTO: 90.2 FL (ref 79–97)
MCV RBC AUTO: 90.2 FL (ref 79–97)
MCV RBC AUTO: 90.8 FL (ref 79–97)
METHADONE UR QL SCN: NEGATIVE
MONOCYTES # BLD AUTO: 0.2 10*3/MM3 (ref 0.1–0.9)
MONOCYTES # BLD AUTO: 0.25 10*3/MM3 (ref 0.1–0.9)
MONOCYTES # BLD AUTO: 0.27 10*3/MM3 (ref 0.1–0.9)
MONOCYTES # BLD AUTO: 0.28 10*3/MM3 (ref 0.1–0.9)
MONOCYTES # BLD AUTO: 0.3 10*3/MM3 (ref 0.1–0.9)
MONOCYTES # BLD AUTO: 0.32 10*3/MM3 (ref 0.1–0.9)
MONOCYTES # BLD AUTO: 0.35 10*3/MM3 (ref 0.1–0.9)
MONOCYTES # BLD AUTO: 0.38 10*3/MM3 (ref 0.1–0.9)
MONOCYTES # BLD AUTO: 0.47 10*3/MM3 (ref 0.1–0.9)
MONOCYTES # BLD AUTO: 0.49 10*3/MM3 (ref 0.1–0.9)
MONOCYTES # BLD AUTO: 1.03 10*3/MM3 (ref 0.1–0.9)
MONOCYTES NFR BLD AUTO: 3.8 % (ref 5–12)
MONOCYTES NFR BLD AUTO: 4.5 % (ref 5–12)
MONOCYTES NFR BLD AUTO: 4.6 % (ref 5–12)
MONOCYTES NFR BLD AUTO: 4.9 % (ref 5–12)
MONOCYTES NFR BLD AUTO: 5.2 % (ref 5–12)
MONOCYTES NFR BLD AUTO: 5.5 % (ref 5–12)
MONOCYTES NFR BLD AUTO: 5.5 % (ref 5–12)
MONOCYTES NFR BLD AUTO: 5.8 % (ref 5–12)
MONOCYTES NFR BLD AUTO: 5.8 % (ref 5–12)
MONOCYTES NFR BLD AUTO: 6.2 % (ref 5–12)
MONOCYTES NFR BLD AUTO: 6.7 % (ref 5–12)
MONOCYTES NFR BLD AUTO: 7.1 % (ref 5–12)
MONOCYTES NFR BLD AUTO: 7.2 % (ref 5–12)
MONOCYTES NFR BLD AUTO: 7.2 % (ref 5–12)
MONOCYTES NFR BLD AUTO: 7.3 % (ref 5–12)
MONOCYTES NFR BLD AUTO: 7.6 % (ref 5–12)
MONOCYTES NFR BLD AUTO: 8.1 % (ref 5–12)
MRSA DNA SPEC QL NAA+PROBE: NEGATIVE
NEUTROPHILS NFR BLD AUTO: 12.51 10*3/MM3 (ref 1.7–7)
NEUTROPHILS NFR BLD AUTO: 2.6 10*3/MM3 (ref 1.7–7)
NEUTROPHILS NFR BLD AUTO: 2.8 10*3/MM3 (ref 1.7–7)
NEUTROPHILS NFR BLD AUTO: 2.9 10*3/MM3 (ref 1.7–7)
NEUTROPHILS NFR BLD AUTO: 3.3 10*3/MM3 (ref 1.7–7)
NEUTROPHILS NFR BLD AUTO: 3.34 10*3/MM3 (ref 1.7–7)
NEUTROPHILS NFR BLD AUTO: 3.4 10*3/MM3 (ref 1.7–7)
NEUTROPHILS NFR BLD AUTO: 3.57 10*3/MM3 (ref 1.7–7)
NEUTROPHILS NFR BLD AUTO: 3.7 10*3/MM3 (ref 1.7–7)
NEUTROPHILS NFR BLD AUTO: 3.8 10*3/MM3 (ref 1.7–7)
NEUTROPHILS NFR BLD AUTO: 3.81 10*3/MM3 (ref 1.7–7)
NEUTROPHILS NFR BLD AUTO: 3.9 10*3/MM3 (ref 1.7–7)
NEUTROPHILS NFR BLD AUTO: 4.14 10*3/MM3 (ref 1.7–7)
NEUTROPHILS NFR BLD AUTO: 4.71 10*3/MM3 (ref 1.7–7)
NEUTROPHILS NFR BLD AUTO: 5.36 10*3/MM3 (ref 1.7–7)
NEUTROPHILS NFR BLD AUTO: 5.45 10*3/MM3 (ref 1.7–7)
NEUTROPHILS NFR BLD AUTO: 5.83 10*3/MM3 (ref 1.7–7)
NEUTROPHILS NFR BLD AUTO: 73.6 % (ref 42.7–76)
NEUTROPHILS NFR BLD AUTO: 75.2 % (ref 42.7–76)
NEUTROPHILS NFR BLD AUTO: 76.9 % (ref 42.7–76)
NEUTROPHILS NFR BLD AUTO: 77.6 % (ref 42.7–76)
NEUTROPHILS NFR BLD AUTO: 78.2 % (ref 42.7–76)
NEUTROPHILS NFR BLD AUTO: 78.2 % (ref 42.7–76)
NEUTROPHILS NFR BLD AUTO: 78.8 % (ref 42.7–76)
NEUTROPHILS NFR BLD AUTO: 79.4 % (ref 42.7–76)
NEUTROPHILS NFR BLD AUTO: 81.3 % (ref 42.7–76)
NEUTROPHILS NFR BLD AUTO: 81.7 % (ref 42.7–76)
NEUTROPHILS NFR BLD AUTO: 81.8 % (ref 42.7–76)
NEUTROPHILS NFR BLD AUTO: 82.4 % (ref 42.7–76)
NEUTROPHILS NFR BLD AUTO: 83.5 % (ref 42.7–76)
NEUTROPHILS NFR BLD AUTO: 83.7 % (ref 42.7–76)
NEUTROPHILS NFR BLD AUTO: 84.8 % (ref 42.7–76)
NEUTROPHILS NFR BLD AUTO: 87.7 % (ref 42.7–76)
NEUTROPHILS NFR BLD AUTO: 89.2 % (ref 42.7–76)
NITRITE UR QL STRIP: NEGATIVE
NRBC BLD AUTO-RTO: 0 /100 WBC (ref 0–0.2)
NT-PROBNP SERPL-MCNC: 2595 PG/ML (ref 0–1800)
OPIATES UR QL: POSITIVE
OXYCODONE UR QL SCN: NEGATIVE
PCP UR QL SCN: NEGATIVE
PH UR STRIP.AUTO: 6 [PH] (ref 5–8)
PH UR STRIP.AUTO: 6 [PH] (ref 5–8)
PH UR STRIP.AUTO: 7.5 [PH] (ref 5–8)
PHOSPHATE SERPL-MCNC: 3.6 MG/DL (ref 2.5–4.5)
PLATELET # BLD AUTO: 156 10*3/MM3 (ref 140–450)
PLATELET # BLD AUTO: 156 10*3/MM3 (ref 140–450)
PLATELET # BLD AUTO: 170 10*3/MM3 (ref 140–450)
PLATELET # BLD AUTO: 174 10*3/MM3 (ref 140–450)
PLATELET # BLD AUTO: 177 10*3/MM3 (ref 140–450)
PLATELET # BLD AUTO: 177 10*3/MM3 (ref 140–450)
PLATELET # BLD AUTO: 179 10*3/MM3 (ref 140–450)
PLATELET # BLD AUTO: 181 10*3/MM3 (ref 140–450)
PLATELET # BLD AUTO: 188 10*3/MM3 (ref 140–450)
PLATELET # BLD AUTO: 193 10*3/MM3 (ref 140–450)
PLATELET # BLD AUTO: 199 10*3/MM3 (ref 140–450)
PLATELET # BLD AUTO: 200 10*3/MM3 (ref 140–450)
PLATELET # BLD AUTO: 210 10*3/MM3 (ref 140–450)
PLATELET # BLD AUTO: 211 10*3/MM3 (ref 140–450)
PLATELET # BLD AUTO: 212 10*3/MM3 (ref 140–450)
PLATELET # BLD AUTO: 212 10*3/MM3 (ref 140–450)
PLATELET # BLD AUTO: 218 10*3/MM3 (ref 140–450)
PLATELET # BLD AUTO: 242 10*3/MM3 (ref 140–450)
PMV BLD AUTO: 10 FL (ref 6–12)
PMV BLD AUTO: 10 FL (ref 6–12)
PMV BLD AUTO: 6.6 FL (ref 6–12)
PMV BLD AUTO: 6.7 FL (ref 6–12)
PMV BLD AUTO: 6.9 FL (ref 6–12)
PMV BLD AUTO: 7 FL (ref 6–12)
PMV BLD AUTO: 7 FL (ref 6–12)
PMV BLD AUTO: 7.1 FL (ref 6–12)
PMV BLD AUTO: 7.3 FL (ref 6–12)
PMV BLD AUTO: 8.9 FL (ref 6–12)
PMV BLD AUTO: 9.1 FL (ref 6–12)
PMV BLD AUTO: 9.2 FL (ref 6–12)
PMV BLD AUTO: 9.5 FL (ref 6–12)
PMV BLD AUTO: 9.7 FL (ref 6–12)
PMV BLD AUTO: 9.7 FL (ref 6–12)
PMV BLD AUTO: 9.8 FL (ref 6–12)
POTASSIUM SERPL-SCNC: 3.3 MMOL/L (ref 3.5–5.2)
POTASSIUM SERPL-SCNC: 3.3 MMOL/L (ref 3.5–5.2)
POTASSIUM SERPL-SCNC: 3.5 MMOL/L (ref 3.5–5.2)
POTASSIUM SERPL-SCNC: 3.7 MMOL/L (ref 3.5–5.2)
POTASSIUM SERPL-SCNC: 3.8 MMOL/L (ref 3.5–5.2)
POTASSIUM SERPL-SCNC: 4.1 MMOL/L (ref 3.5–5.2)
POTASSIUM SERPL-SCNC: 4.1 MMOL/L (ref 3.5–5.2)
POTASSIUM SERPL-SCNC: 4.7 MMOL/L (ref 3.5–5.2)
POTASSIUM SERPL-SCNC: 4.7 MMOL/L (ref 3.5–5.2)
PROCALCITONIN SERPL-MCNC: 0.05 NG/ML (ref 0–0.25)
PROCALCITONIN SERPL-MCNC: 0.07 NG/ML (ref 0–0.25)
PROPOXYPH UR QL: NEGATIVE
PROT SERPL-MCNC: 6.5 G/DL (ref 6–8.5)
PROT SERPL-MCNC: 6.6 G/DL (ref 6–8.5)
PROT SERPL-MCNC: 6.6 G/DL (ref 6–8.5)
PROT SERPL-MCNC: 7.2 G/DL (ref 6–8.5)
PROT SERPL-MCNC: 8.5 G/DL (ref 6–8.5)
PROT UR QL STRIP: NEGATIVE
QT INTERVAL: 408 MS
QT INTERVAL: 436 MS
QT INTERVAL: 440 MS
QTC INTERVAL: 483 MS
QTC INTERVAL: 490 MS
QTC INTERVAL: 495 MS
RBC # BLD AUTO: 4.28 10*6/MM3 (ref 3.77–5.28)
RBC # BLD AUTO: 4.38 10*6/MM3 (ref 3.77–5.28)
RBC # BLD AUTO: 4.42 10*6/MM3 (ref 3.77–5.28)
RBC # BLD AUTO: 4.43 10*6/MM3 (ref 3.77–5.28)
RBC # BLD AUTO: 4.47 10*6/MM3 (ref 3.77–5.28)
RBC # BLD AUTO: 4.55 10*6/MM3 (ref 3.77–5.28)
RBC # BLD AUTO: 4.58 10*6/MM3 (ref 3.77–5.28)
RBC # BLD AUTO: 4.58 10*6/MM3 (ref 3.77–5.28)
RBC # BLD AUTO: 4.59 10*6/MM3 (ref 3.77–5.28)
RBC # BLD AUTO: 4.6 10*6/MM3 (ref 3.77–5.28)
RBC # BLD AUTO: 4.67 10*6/MM3 (ref 3.77–5.28)
RBC # BLD AUTO: 4.69 10*6/MM3 (ref 3.77–5.28)
RBC # BLD AUTO: 4.75 10*6/MM3 (ref 3.77–5.28)
RBC # BLD AUTO: 4.82 10*6/MM3 (ref 3.77–5.28)
RBC # BLD AUTO: 4.82 10*6/MM3 (ref 3.77–5.28)
RBC # BLD AUTO: 4.83 10*6/MM3 (ref 3.77–5.28)
RBC # BLD AUTO: 4.84 10*6/MM3 (ref 3.77–5.28)
RBC # BLD AUTO: 5.2 10*6/MM3 (ref 3.77–5.28)
RBC # UR STRIP: ABNORMAL /HPF
RBC # UR STRIP: ABNORMAL /HPF
REF LAB TEST METHOD: ABNORMAL
REF LAB TEST METHOD: ABNORMAL
RIGHT ARM BP: NORMAL MMHG
SARS-COV-2 RNA RESP QL NAA+PROBE: NOT DETECTED
SARS-COV-2 RNA RESP QL NAA+PROBE: NOT DETECTED
SODIUM SERPL-SCNC: 137 MMOL/L (ref 136–145)
SODIUM SERPL-SCNC: 138 MMOL/L (ref 136–145)
SODIUM SERPL-SCNC: 139 MMOL/L (ref 136–145)
SODIUM SERPL-SCNC: 140 MMOL/L (ref 136–145)
SODIUM SERPL-SCNC: 140 MMOL/L (ref 136–145)
SODIUM SERPL-SCNC: 141 MMOL/L (ref 136–145)
SODIUM SERPL-SCNC: 141 MMOL/L (ref 136–145)
SODIUM SERPL-SCNC: 142 MMOL/L (ref 136–145)
SODIUM SERPL-SCNC: 143 MMOL/L (ref 136–145)
SP GR UR STRIP: 1.01 (ref 1–1.03)
SP GR UR STRIP: 1.01 (ref 1–1.03)
SP GR UR STRIP: >=1.03 (ref 1–1.03)
SQUAMOUS #/AREA URNS HPF: ABNORMAL /HPF
SQUAMOUS #/AREA URNS HPF: ABNORMAL /HPF
STRESS TARGET HR: 110 BPM
TIBC SERPL-MCNC: 224 MCG/DL (ref 298–536)
TRANSFERRIN SERPL-MCNC: 150 MG/DL (ref 200–360)
TRICYCLICS UR QL SCN: POSITIVE
TRIGL SERPL-MCNC: 77 MG/DL (ref 0–150)
TROPONIN T SERPL-MCNC: 0.02 NG/ML (ref 0–0.03)
TROPONIN T SERPL-MCNC: 0.02 NG/ML (ref 0–0.03)
TROPONIN T SERPL-MCNC: <0.01 NG/ML (ref 0–0.03)
TSH SERPL DL<=0.05 MIU/L-ACNC: 1.1 UIU/ML (ref 0.27–4.2)
TSH SERPL DL<=0.05 MIU/L-ACNC: 2.15 UIU/ML (ref 0.27–4.2)
UROBILINOGEN UR QL STRIP: ABNORMAL
UROBILINOGEN UR QL STRIP: ABNORMAL
UROBILINOGEN UR QL STRIP: NORMAL
VIT B12 BLD-MCNC: 389 PG/ML (ref 211–946)
VLDLC SERPL-MCNC: 14 MG/DL (ref 5–40)
WBC # UR STRIP: ABNORMAL /HPF
WBC # UR STRIP: ABNORMAL /HPF
WBC NRBC COR # BLD: 14.04 10*3/MM3 (ref 3.4–10.8)
WBC NRBC COR # BLD: 3.3 10*3/MM3 (ref 3.4–10.8)
WBC NRBC COR # BLD: 3.34 10*3/MM3 (ref 3.4–10.8)
WBC NRBC COR # BLD: 3.8 10*3/MM3 (ref 3.4–10.8)
WBC NRBC COR # BLD: 3.9 10*3/MM3 (ref 3.4–10.8)
WBC NRBC COR # BLD: 4.1 10*3/MM3 (ref 3.4–10.8)
WBC NRBC COR # BLD: 4.3 10*3/MM3 (ref 3.4–10.8)
WBC NRBC COR # BLD: 4.31 10*3/MM3 (ref 3.4–10.8)
WBC NRBC COR # BLD: 4.6 10*3/MM3 (ref 3.4–10.8)
WBC NRBC COR # BLD: 4.6 10*3/MM3 (ref 3.4–10.8)
WBC NRBC COR # BLD: 4.64 10*3/MM3 (ref 3.4–10.8)
WBC NRBC COR # BLD: 4.8 10*3/MM3 (ref 3.4–10.8)
WBC NRBC COR # BLD: 4.87 10*3/MM3 (ref 3.4–10.8)
WBC NRBC COR # BLD: 5.06 10*3/MM3 (ref 3.4–10.8)
WBC NRBC COR # BLD: 5.64 10*3/MM3 (ref 3.4–10.8)
WBC NRBC COR # BLD: 6.12 10*3/MM3 (ref 3.4–10.8)
WBC NRBC COR # BLD: 6.51 10*3/MM3 (ref 3.4–10.8)
WBC NRBC COR # BLD: 6.87 10*3/MM3 (ref 3.4–10.8)
WHOLE BLOOD HOLD COAG: NORMAL
WHOLE BLOOD HOLD COAG: NORMAL
WHOLE BLOOD HOLD SPECIMEN: NORMAL
WHOLE BLOOD HOLD SPECIMEN: NORMAL

## 2022-01-01 PROCEDURE — 99226 PR SBSQ OBSERVATION CARE/DAY 35 MINUTES: CPT | Performed by: INTERNAL MEDICINE

## 2022-01-01 PROCEDURE — 25010000002 IOPAMIDOL 61 % SOLUTION: Performed by: EMERGENCY MEDICINE

## 2022-01-01 PROCEDURE — 80061 LIPID PANEL: CPT | Performed by: NURSE PRACTITIONER

## 2022-01-01 PROCEDURE — 97110 THERAPEUTIC EXERCISES: CPT

## 2022-01-01 PROCEDURE — 82728 ASSAY OF FERRITIN: CPT

## 2022-01-01 PROCEDURE — 97530 THERAPEUTIC ACTIVITIES: CPT

## 2022-01-01 PROCEDURE — 84484 ASSAY OF TROPONIN QUANT: CPT | Performed by: EMERGENCY MEDICINE

## 2022-01-01 PROCEDURE — 25010000002 THIAMINE PER 100 MG: Performed by: INTERNAL MEDICINE

## 2022-01-01 PROCEDURE — G0378 HOSPITAL OBSERVATION PER HR: HCPCS

## 2022-01-01 PROCEDURE — 84145 PROCALCITONIN (PCT): CPT | Performed by: INTERNAL MEDICINE

## 2022-01-01 PROCEDURE — 85027 COMPLETE CBC AUTOMATED: CPT | Performed by: PHYSICIAN ASSISTANT

## 2022-01-01 PROCEDURE — 85025 COMPLETE CBC W/AUTO DIFF WBC: CPT | Performed by: NURSE PRACTITIONER

## 2022-01-01 PROCEDURE — 97162 PT EVAL MOD COMPLEX 30 MIN: CPT

## 2022-01-01 PROCEDURE — 87641 MR-STAPH DNA AMP PROBE: CPT | Performed by: INTERNAL MEDICINE

## 2022-01-01 PROCEDURE — 85652 RBC SED RATE AUTOMATED: CPT | Performed by: INTERNAL MEDICINE

## 2022-01-01 PROCEDURE — 99285 EMERGENCY DEPT VISIT HI MDM: CPT

## 2022-01-01 PROCEDURE — 99212 OFFICE O/P EST SF 10 MIN: CPT | Performed by: ORTHOPAEDIC SURGERY

## 2022-01-01 PROCEDURE — 83690 ASSAY OF LIPASE: CPT | Performed by: NURSE PRACTITIONER

## 2022-01-01 PROCEDURE — 73502 X-RAY EXAM HIP UNI 2-3 VIEWS: CPT

## 2022-01-01 PROCEDURE — 25010000002 CYANOCOBALAMIN PER 1000 MCG: Performed by: INTERNAL MEDICINE

## 2022-01-01 PROCEDURE — 99224 PR SBSQ OBSERVATION CARE/DAY 15 MINUTES: CPT | Performed by: INTERNAL MEDICINE

## 2022-01-01 PROCEDURE — 82607 VITAMIN B-12: CPT | Performed by: FAMILY MEDICINE

## 2022-01-01 PROCEDURE — 99152 MOD SED SAME PHYS/QHP 5/>YRS: CPT | Performed by: STUDENT IN AN ORGANIZED HEALTH CARE EDUCATION/TRAINING PROGRAM

## 2022-01-01 PROCEDURE — 63710000001 POTASSIUM CHLORIDE 10 MEQ CAPSULE CONTROLLED-RELEASE: Performed by: STUDENT IN AN ORGANIZED HEALTH CARE EDUCATION/TRAINING PROGRAM

## 2022-01-01 PROCEDURE — 83735 ASSAY OF MAGNESIUM: CPT | Performed by: EMERGENCY MEDICINE

## 2022-01-01 PROCEDURE — 83735 ASSAY OF MAGNESIUM: CPT | Performed by: PHYSICIAN ASSISTANT

## 2022-01-01 PROCEDURE — 96372 THER/PROPH/DIAG INJ SC/IM: CPT

## 2022-01-01 PROCEDURE — A9270 NON-COVERED ITEM OR SERVICE: HCPCS | Performed by: STUDENT IN AN ORGANIZED HEALTH CARE EDUCATION/TRAINING PROGRAM

## 2022-01-01 PROCEDURE — 87040 BLOOD CULTURE FOR BACTERIA: CPT | Performed by: PHYSICIAN ASSISTANT

## 2022-01-01 PROCEDURE — 87636 SARSCOV2 & INF A&B AMP PRB: CPT | Performed by: NURSE PRACTITIONER

## 2022-01-01 PROCEDURE — 85025 COMPLETE CBC W/AUTO DIFF WBC: CPT

## 2022-01-01 PROCEDURE — 96361 HYDRATE IV INFUSION ADD-ON: CPT

## 2022-01-01 PROCEDURE — 93880 EXTRACRANIAL BILAT STUDY: CPT

## 2022-01-01 PROCEDURE — 93294 REM INTERROG EVL PM/LDLS PM: CPT | Performed by: INTERNAL MEDICINE

## 2022-01-01 PROCEDURE — 25010000002 VANCOMYCIN PER 500 MG

## 2022-01-01 PROCEDURE — 99225 PR SBSQ OBSERVATION CARE/DAY 25 MINUTES: CPT | Performed by: INTERNAL MEDICINE

## 2022-01-01 PROCEDURE — 63710000001 GABAPENTIN 300 MG CAPSULE: Performed by: STUDENT IN AN ORGANIZED HEALTH CARE EDUCATION/TRAINING PROGRAM

## 2022-01-01 PROCEDURE — 36415 COLL VENOUS BLD VENIPUNCTURE: CPT

## 2022-01-01 PROCEDURE — 93005 ELECTROCARDIOGRAM TRACING: CPT | Performed by: NURSE PRACTITIONER

## 2022-01-01 PROCEDURE — 82962 GLUCOSE BLOOD TEST: CPT

## 2022-01-01 PROCEDURE — 97116 GAIT TRAINING THERAPY: CPT

## 2022-01-01 PROCEDURE — 96375 TX/PRO/DX INJ NEW DRUG ADDON: CPT

## 2022-01-01 PROCEDURE — 83605 ASSAY OF LACTIC ACID: CPT | Performed by: NURSE PRACTITIONER

## 2022-01-01 PROCEDURE — 96365 THER/PROPH/DIAG IV INF INIT: CPT

## 2022-01-01 PROCEDURE — C9803 HOPD COVID-19 SPEC COLLECT: HCPCS

## 2022-01-01 PROCEDURE — 83605 ASSAY OF LACTIC ACID: CPT | Performed by: PHYSICIAN ASSISTANT

## 2022-01-01 PROCEDURE — 81001 URINALYSIS AUTO W/SCOPE: CPT | Performed by: NURSE PRACTITIONER

## 2022-01-01 PROCEDURE — 25010000002 CEFAZOLIN PER 500 MG: Performed by: PHYSICIAN ASSISTANT

## 2022-01-01 PROCEDURE — 82746 ASSAY OF FOLIC ACID SERUM: CPT | Performed by: FAMILY MEDICINE

## 2022-01-01 PROCEDURE — 83735 ASSAY OF MAGNESIUM: CPT | Performed by: INTERNAL MEDICINE

## 2022-01-01 PROCEDURE — 93005 ELECTROCARDIOGRAM TRACING: CPT | Performed by: EMERGENCY MEDICINE

## 2022-01-01 PROCEDURE — 99217 PR OBSERVATION CARE DISCHARGE MANAGEMENT: CPT | Performed by: INTERNAL MEDICINE

## 2022-01-01 PROCEDURE — 99284 EMERGENCY DEPT VISIT MOD MDM: CPT

## 2022-01-01 PROCEDURE — 80048 BASIC METABOLIC PNL TOTAL CA: CPT | Performed by: PHYSICIAN ASSISTANT

## 2022-01-01 PROCEDURE — 81001 URINALYSIS AUTO W/SCOPE: CPT | Performed by: EMERGENCY MEDICINE

## 2022-01-01 PROCEDURE — G0180 MD CERTIFICATION HHA PATIENT: HCPCS | Performed by: FAMILY MEDICINE

## 2022-01-01 PROCEDURE — 83880 ASSAY OF NATRIURETIC PEPTIDE: CPT | Performed by: NURSE PRACTITIONER

## 2022-01-01 PROCEDURE — 25010000002 PIPERACILLIN SOD-TAZOBACTAM PER 1 G: Performed by: INTERNAL MEDICINE

## 2022-01-01 PROCEDURE — G0299 HHS/HOSPICE OF RN EA 15 MIN: HCPCS

## 2022-01-01 PROCEDURE — 80306 DRUG TEST PRSMV INSTRMNT: CPT | Performed by: NURSE PRACTITIONER

## 2022-01-01 PROCEDURE — 74177 CT ABD & PELVIS W/CONTRAST: CPT

## 2022-01-01 PROCEDURE — G0495 RN CARE TRAIN/EDU IN HH: HCPCS

## 2022-01-01 PROCEDURE — 93280 PM DEVICE PROGR EVAL DUAL: CPT | Performed by: INTERNAL MEDICINE

## 2022-01-01 PROCEDURE — 25010000002 ZIPRASIDONE MESYLATE PER 10 MG: Performed by: INTERNAL MEDICINE

## 2022-01-01 PROCEDURE — 63710000001 CETIRIZINE 10 MG TABLET: Performed by: STUDENT IN AN ORGANIZED HEALTH CARE EDUCATION/TRAINING PROGRAM

## 2022-01-01 PROCEDURE — C1889 IMPLANT/INSERT DEVICE, NOC: HCPCS | Performed by: STUDENT IN AN ORGANIZED HEALTH CARE EDUCATION/TRAINING PROGRAM

## 2022-01-01 PROCEDURE — 85025 COMPLETE CBC W/AUTO DIFF WBC: CPT | Performed by: INTERNAL MEDICINE

## 2022-01-01 PROCEDURE — P9612 CATHETERIZE FOR URINE SPEC: HCPCS

## 2022-01-01 PROCEDURE — 97166 OT EVAL MOD COMPLEX 45 MIN: CPT

## 2022-01-01 PROCEDURE — 80048 BASIC METABOLIC PNL TOTAL CA: CPT | Performed by: INTERNAL MEDICINE

## 2022-01-01 PROCEDURE — 84100 ASSAY OF PHOSPHORUS: CPT | Performed by: INTERNAL MEDICINE

## 2022-01-01 PROCEDURE — 25010000002 MIDAZOLAM PER 1 MG: Performed by: STUDENT IN AN ORGANIZED HEALTH CARE EDUCATION/TRAINING PROGRAM

## 2022-01-01 PROCEDURE — 82565 ASSAY OF CREATININE: CPT

## 2022-01-01 PROCEDURE — 93296 REM INTERROG EVL PM/IDS: CPT | Performed by: INTERNAL MEDICINE

## 2022-01-01 PROCEDURE — 93005 ELECTROCARDIOGRAM TRACING: CPT

## 2022-01-01 PROCEDURE — 97535 SELF CARE MNGMENT TRAINING: CPT

## 2022-01-01 PROCEDURE — 25010000002 FERRIC CARBOXYMALTOSE 750 MG/15ML SOLUTION 15 ML VIAL: Performed by: INTERNAL MEDICINE

## 2022-01-01 PROCEDURE — 80053 COMPREHEN METABOLIC PANEL: CPT | Performed by: EMERGENCY MEDICINE

## 2022-01-01 PROCEDURE — 99214 OFFICE O/P EST MOD 30 MIN: CPT | Performed by: INTERNAL MEDICINE

## 2022-01-01 PROCEDURE — 25010000002 HALOPERIDOL LACTATE PER 5 MG: Performed by: INTERNAL MEDICINE

## 2022-01-01 PROCEDURE — 63710000001 AMITRIPTYLINE 25 MG TABLET: Performed by: STUDENT IN AN ORGANIZED HEALTH CARE EDUCATION/TRAINING PROGRAM

## 2022-01-01 PROCEDURE — 70450 CT HEAD/BRAIN W/O DYE: CPT

## 2022-01-01 PROCEDURE — 99220 PR INITIAL OBSERVATION CARE/DAY 70 MINUTES: CPT | Performed by: INTERNAL MEDICINE

## 2022-01-01 PROCEDURE — 63710000001 PANTOPRAZOLE 40 MG TABLET DELAYED-RELEASE: Performed by: STUDENT IN AN ORGANIZED HEALTH CARE EDUCATION/TRAINING PROGRAM

## 2022-01-01 PROCEDURE — 85025 COMPLETE CBC W/AUTO DIFF WBC: CPT | Performed by: EMERGENCY MEDICINE

## 2022-01-01 PROCEDURE — 99213 OFFICE O/P EST LOW 20 MIN: CPT | Performed by: FAMILY MEDICINE

## 2022-01-01 PROCEDURE — 25010000002 FENTANYL CITRATE (PF) 50 MCG/ML SOLUTION: Performed by: STUDENT IN AN ORGANIZED HEALTH CARE EDUCATION/TRAINING PROGRAM

## 2022-01-01 PROCEDURE — 81003 URINALYSIS AUTO W/O SCOPE: CPT | Performed by: EMERGENCY MEDICINE

## 2022-01-01 PROCEDURE — 90792 PSYCH DIAG EVAL W/MED SRVCS: CPT | Performed by: NURSE PRACTITIONER

## 2022-01-01 PROCEDURE — 80053 COMPREHEN METABOLIC PANEL: CPT | Performed by: NURSE PRACTITIONER

## 2022-01-01 PROCEDURE — 99213 OFFICE O/P EST LOW 20 MIN: CPT | Performed by: PHYSICIAN ASSISTANT

## 2022-01-01 PROCEDURE — 84466 ASSAY OF TRANSFERRIN: CPT | Performed by: FAMILY MEDICINE

## 2022-01-01 PROCEDURE — 87636 SARSCOV2 & INF A&B AMP PRB: CPT | Performed by: PHYSICIAN ASSISTANT

## 2022-01-01 PROCEDURE — 99203 OFFICE O/P NEW LOW 30 MIN: CPT | Performed by: ORTHOPAEDIC SURGERY

## 2022-01-01 PROCEDURE — 93880 EXTRACRANIAL BILAT STUDY: CPT | Performed by: INTERNAL MEDICINE

## 2022-01-01 PROCEDURE — 0 IOPAMIDOL PER 1 ML: Performed by: EMERGENCY MEDICINE

## 2022-01-01 PROCEDURE — 0 LIDOCAINE 1 % SOLUTION: Performed by: STUDENT IN AN ORGANIZED HEALTH CARE EDUCATION/TRAINING PROGRAM

## 2022-01-01 PROCEDURE — 87086 URINE CULTURE/COLONY COUNT: CPT | Performed by: PHYSICIAN ASSISTANT

## 2022-01-01 PROCEDURE — 33228 REMV&REPLC PM GEN DUAL LEAD: CPT | Performed by: STUDENT IN AN ORGANIZED HEALTH CARE EDUCATION/TRAINING PROGRAM

## 2022-01-01 PROCEDURE — 99213 OFFICE O/P EST LOW 20 MIN: CPT | Performed by: INTERNAL MEDICINE

## 2022-01-01 PROCEDURE — 96376 TX/PRO/DX INJ SAME DRUG ADON: CPT

## 2022-01-01 PROCEDURE — C1785 PMKR, DUAL, RATE-RESP: HCPCS | Performed by: STUDENT IN AN ORGANIZED HEALTH CARE EDUCATION/TRAINING PROGRAM

## 2022-01-01 PROCEDURE — 83540 ASSAY OF IRON: CPT | Performed by: FAMILY MEDICINE

## 2022-01-01 PROCEDURE — 84484 ASSAY OF TROPONIN QUANT: CPT | Performed by: NURSE PRACTITIONER

## 2022-01-01 PROCEDURE — 99222 1ST HOSP IP/OBS MODERATE 55: CPT | Performed by: INTERNAL MEDICINE

## 2022-01-01 PROCEDURE — 96366 THER/PROPH/DIAG IV INF ADDON: CPT

## 2022-01-01 PROCEDURE — G0151 HHCP-SERV OF PT,EA 15 MIN: HCPCS

## 2022-01-01 PROCEDURE — 25010000002 ONDANSETRON PER 1 MG: Performed by: STUDENT IN AN ORGANIZED HEALTH CARE EDUCATION/TRAINING PROGRAM

## 2022-01-01 PROCEDURE — 82728 ASSAY OF FERRITIN: CPT | Performed by: INTERNAL MEDICINE

## 2022-01-01 PROCEDURE — 99239 HOSP IP/OBS DSCHRG MGMT >30: CPT | Performed by: INTERNAL MEDICINE

## 2022-01-01 PROCEDURE — 25010000002 LORAZEPAM PER 2 MG: Performed by: FAMILY MEDICINE

## 2022-01-01 PROCEDURE — 99153 MOD SED SAME PHYS/QHP EA: CPT | Performed by: STUDENT IN AN ORGANIZED HEALTH CARE EDUCATION/TRAINING PROGRAM

## 2022-01-01 PROCEDURE — 71045 X-RAY EXAM CHEST 1 VIEW: CPT

## 2022-01-01 PROCEDURE — 84443 ASSAY THYROID STIM HORMONE: CPT | Performed by: NURSE PRACTITIONER

## 2022-01-01 PROCEDURE — 96374 THER/PROPH/DIAG INJ IV PUSH: CPT

## 2022-01-01 PROCEDURE — 84443 ASSAY THYROID STIM HORMONE: CPT | Performed by: INTERNAL MEDICINE

## 2022-01-01 PROCEDURE — 94640 AIRWAY INHALATION TREATMENT: CPT

## 2022-01-01 PROCEDURE — 25010000002 HEPARIN (PORCINE) PER 1000 UNITS: Performed by: INTERNAL MEDICINE

## 2022-01-01 PROCEDURE — 63710000001 HYDROCODONE-ACETAMINOPHEN 10-325 MG TABLET: Performed by: STUDENT IN AN ORGANIZED HEALTH CARE EDUCATION/TRAINING PROGRAM

## 2022-01-01 PROCEDURE — 71046 X-RAY EXAM CHEST 2 VIEWS: CPT

## 2022-01-01 PROCEDURE — G0300 HHS/HOSPICE OF LPN EA 15 MIN: HCPCS

## 2022-01-01 PROCEDURE — 94664 DEMO&/EVAL PT USE INHALER: CPT

## 2022-01-01 PROCEDURE — 63710000001 FLUTICASONE 50 MCG/ACT SUSPENSION 16 G BOTTLE: Performed by: STUDENT IN AN ORGANIZED HEALTH CARE EDUCATION/TRAINING PROGRAM

## 2022-01-01 PROCEDURE — 85379 FIBRIN DEGRADATION QUANT: CPT | Performed by: INTERNAL MEDICINE

## 2022-01-01 PROCEDURE — G0155 HHCP-SVS OF CSW,EA 15 MIN: HCPCS

## 2022-01-01 PROCEDURE — 20550 NJX 1 TENDON SHEATH/LIGAMENT: CPT | Performed by: ORTHOPAEDIC SURGERY

## 2022-01-01 PROCEDURE — G0152 HHCP-SERV OF OT,EA 15 MIN: HCPCS

## 2022-01-01 PROCEDURE — 80053 COMPREHEN METABOLIC PANEL: CPT | Performed by: INTERNAL MEDICINE

## 2022-01-01 DEVICE — ENV PM AIGISRX ANTIBAC RESORB 2.5X2.7IN MD: Type: IMPLANTABLE DEVICE | Status: FUNCTIONAL

## 2022-01-01 DEVICE — GEN PM AZURE XT SURESCAN DR MRI: Type: IMPLANTABLE DEVICE | Status: FUNCTIONAL

## 2022-01-01 RX ORDER — CHOLECALCIFEROL (VITAMIN D3) 125 MCG
5 CAPSULE ORAL NIGHTLY
Status: DISCONTINUED | OUTPATIENT
Start: 2022-01-01 | End: 2022-01-01

## 2022-01-01 RX ORDER — AMITRIPTYLINE HYDROCHLORIDE 25 MG/1
25 TABLET, FILM COATED ORAL EVERY 12 HOURS SCHEDULED
Status: DISCONTINUED | OUTPATIENT
Start: 2022-01-01 | End: 2022-01-01 | Stop reason: HOSPADM

## 2022-01-01 RX ORDER — LORAZEPAM 2 MG/ML
0.5 INJECTION INTRAMUSCULAR ONCE
Status: COMPLETED | OUTPATIENT
Start: 2022-01-01 | End: 2022-01-01

## 2022-01-01 RX ORDER — HYDROCODONE BITARTRATE AND ACETAMINOPHEN 5; 325 MG/1; MG/1
1 TABLET ORAL EVERY 6 HOURS PRN
Status: DISCONTINUED | OUTPATIENT
Start: 2022-01-01 | End: 2022-01-01

## 2022-01-01 RX ORDER — FLUTICASONE PROPIONATE 50 MCG
1 SPRAY, SUSPENSION (ML) NASAL DAILY
Status: DISCONTINUED | OUTPATIENT
Start: 2022-01-01 | End: 2022-01-01 | Stop reason: HOSPADM

## 2022-01-01 RX ORDER — FENTANYL 12 UG/H
1 PATCH TRANSDERMAL
Status: DISCONTINUED | OUTPATIENT
Start: 2022-01-01 | End: 2022-01-01 | Stop reason: HOSPADM

## 2022-01-01 RX ORDER — SODIUM CHLORIDE 9 MG/ML
250 INJECTION, SOLUTION INTRAVENOUS ONCE
Status: COMPLETED | OUTPATIENT
Start: 2022-01-01 | End: 2022-01-01

## 2022-01-01 RX ORDER — CALCIUM CARBONATE 200(500)MG
2 TABLET,CHEWABLE ORAL 3 TIMES DAILY PRN
Status: DISCONTINUED | OUTPATIENT
Start: 2022-01-01 | End: 2022-01-01 | Stop reason: HOSPADM

## 2022-01-01 RX ORDER — LIDOCAINE HYDROCHLORIDE 10 MG/ML
INJECTION, SOLUTION INFILTRATION; PERINEURAL AS NEEDED
Status: DISCONTINUED | OUTPATIENT
Start: 2022-01-01 | End: 2022-01-01 | Stop reason: HOSPADM

## 2022-01-01 RX ORDER — BUPIVACAINE HYDROCHLORIDE 5 MG/ML
INJECTION, SOLUTION PERINEURAL AS NEEDED
Status: DISCONTINUED | OUTPATIENT
Start: 2022-01-01 | End: 2022-01-01 | Stop reason: HOSPADM

## 2022-01-01 RX ORDER — POTASSIUM CHLORIDE 750 MG/1
TABLET, FILM COATED, EXTENDED RELEASE ORAL
Qty: 30 TABLET | Refills: 11 | Status: SHIPPED | OUTPATIENT
Start: 2022-01-01

## 2022-01-01 RX ORDER — ACETAMINOPHEN 325 MG/1
650 TABLET ORAL EVERY 4 HOURS PRN
Status: DISCONTINUED | OUTPATIENT
Start: 2022-01-01 | End: 2022-01-01 | Stop reason: HOSPADM

## 2022-01-01 RX ORDER — ONDANSETRON 2 MG/ML
4 INJECTION INTRAMUSCULAR; INTRAVENOUS EVERY 6 HOURS PRN
Status: DISCONTINUED | OUTPATIENT
Start: 2022-01-01 | End: 2022-01-01 | Stop reason: HOSPADM

## 2022-01-01 RX ORDER — PANTOPRAZOLE SODIUM 40 MG/1
40 TABLET, DELAYED RELEASE ORAL
Status: DISCONTINUED | OUTPATIENT
Start: 2022-01-01 | End: 2022-01-01 | Stop reason: HOSPADM

## 2022-01-01 RX ORDER — FENTANYL 50 UG/H
1 PATCH TRANSDERMAL
Status: DISCONTINUED | OUTPATIENT
Start: 2022-01-01 | End: 2022-01-01

## 2022-01-01 RX ORDER — AMITRIPTYLINE HYDROCHLORIDE 25 MG/1
25 TABLET, FILM COATED ORAL 2 TIMES DAILY
Status: DISCONTINUED | OUTPATIENT
Start: 2022-01-01 | End: 2022-01-01

## 2022-01-01 RX ORDER — MIDAZOLAM HYDROCHLORIDE 1 MG/ML
INJECTION INTRAMUSCULAR; INTRAVENOUS AS NEEDED
Status: DISCONTINUED | OUTPATIENT
Start: 2022-01-01 | End: 2022-01-01 | Stop reason: HOSPADM

## 2022-01-01 RX ORDER — HYDROCODONE BITARTRATE AND ACETAMINOPHEN 10; 325 MG/1; MG/1
1 TABLET ORAL EVERY 6 HOURS PRN
Status: DISCONTINUED | OUTPATIENT
Start: 2022-01-01 | End: 2022-01-01 | Stop reason: HOSPADM

## 2022-01-01 RX ORDER — POTASSIUM CHLORIDE 750 MG/1
40 CAPSULE, EXTENDED RELEASE ORAL ONCE
Status: DISCONTINUED | OUTPATIENT
Start: 2022-01-01 | End: 2022-01-01

## 2022-01-01 RX ORDER — AMITRIPTYLINE HYDROCHLORIDE 25 MG/1
25 TABLET, FILM COATED ORAL NIGHTLY
Qty: 30 TABLET | Refills: 2 | Status: SHIPPED | OUTPATIENT
Start: 2022-01-01

## 2022-01-01 RX ORDER — LANOLIN ALCOHOL/MO/W.PET/CERES
1000 CREAM (GRAM) TOPICAL DAILY
Qty: 30 TABLET | Refills: 3 | Status: SHIPPED | OUTPATIENT
Start: 2022-01-01 | End: 2022-01-01

## 2022-01-01 RX ORDER — MEGESTROL ACETATE 40 MG/ML
400 SUSPENSION ORAL DAILY
Qty: 300 ML | Refills: 0 | Status: SHIPPED | OUTPATIENT
Start: 2022-01-01 | End: 2022-01-01

## 2022-01-01 RX ORDER — CARBAMAZEPINE 200 MG/1
100 TABLET ORAL 2 TIMES DAILY
Qty: 60 TABLET | Refills: 2 | Status: SHIPPED | OUTPATIENT
Start: 2022-01-01 | End: 2022-01-01

## 2022-01-01 RX ORDER — NIFEDIPINE 90 MG/1
90 TABLET, EXTENDED RELEASE ORAL
Qty: 30 TABLET | Refills: 2 | Status: SHIPPED | OUTPATIENT
Start: 2022-01-01 | End: 2023-01-31

## 2022-01-01 RX ORDER — PANTOPRAZOLE SODIUM 40 MG/1
TABLET, DELAYED RELEASE ORAL
Qty: 60 TABLET | Refills: 11 | Status: SHIPPED | OUTPATIENT
Start: 2022-01-01

## 2022-01-01 RX ORDER — HYDROCODONE BITARTRATE AND ACETAMINOPHEN 10; 325 MG/1; MG/1
1 TABLET ORAL ONCE
Status: COMPLETED | OUTPATIENT
Start: 2022-01-01 | End: 2022-01-01

## 2022-01-01 RX ORDER — NITROFURANTOIN 25; 75 MG/1; MG/1
100 CAPSULE ORAL 2 TIMES DAILY
Qty: 6 CAPSULE | Refills: 0 | Status: SHIPPED | OUTPATIENT
Start: 2022-01-01 | End: 2022-01-01

## 2022-01-01 RX ORDER — METHYLPREDNISOLONE 4 MG/1
TABLET ORAL
Qty: 1 EACH | Refills: 0 | Status: SHIPPED | OUTPATIENT
Start: 2022-01-01 | End: 2022-01-01

## 2022-01-01 RX ORDER — NITROGLYCERIN 0.4 MG/1
0.4 TABLET SUBLINGUAL
Status: DISCONTINUED | OUTPATIENT
Start: 2022-01-01 | End: 2022-01-01 | Stop reason: HOSPADM

## 2022-01-01 RX ORDER — CETIRIZINE HYDROCHLORIDE 10 MG/1
5 TABLET ORAL DAILY
Status: DISCONTINUED | OUTPATIENT
Start: 2022-01-01 | End: 2022-01-01 | Stop reason: HOSPADM

## 2022-01-01 RX ORDER — QUETIAPINE FUMARATE 25 MG/1
25 TABLET, FILM COATED ORAL NIGHTLY
Status: DISCONTINUED | OUTPATIENT
Start: 2022-01-01 | End: 2022-01-01 | Stop reason: HOSPADM

## 2022-01-01 RX ORDER — IPRATROPIUM BROMIDE AND ALBUTEROL SULFATE 2.5; .5 MG/3ML; MG/3ML
3 SOLUTION RESPIRATORY (INHALATION)
Status: DISCONTINUED | OUTPATIENT
Start: 2022-01-01 | End: 2022-01-01

## 2022-01-01 RX ORDER — SODIUM CHLORIDE 0.9 % (FLUSH) 0.9 %
10 SYRINGE (ML) INJECTION AS NEEDED
Status: DISCONTINUED | OUTPATIENT
Start: 2022-01-01 | End: 2022-01-01 | Stop reason: HOSPADM

## 2022-01-01 RX ORDER — AMOXICILLIN 250 MG
1 CAPSULE ORAL DAILY
Qty: 15 TABLET | Refills: 0 | Status: SHIPPED | OUTPATIENT
Start: 2022-01-01

## 2022-01-01 RX ORDER — ACETAMINOPHEN 650 MG/1
650 SUPPOSITORY RECTAL EVERY 4 HOURS PRN
Status: DISCONTINUED | OUTPATIENT
Start: 2022-01-01 | End: 2022-01-01 | Stop reason: HOSPADM

## 2022-01-01 RX ORDER — TRIAMCINOLONE ACETONIDE 40 MG/ML
40 INJECTION, SUSPENSION INTRA-ARTICULAR; INTRAMUSCULAR
Status: COMPLETED | OUTPATIENT
Start: 2022-01-01 | End: 2022-01-01

## 2022-01-01 RX ORDER — SODIUM CHLORIDE 9 MG/ML
75 INJECTION, SOLUTION INTRAVENOUS CONTINUOUS
Status: ACTIVE | OUTPATIENT
Start: 2022-01-01 | End: 2022-01-01

## 2022-01-01 RX ORDER — GABAPENTIN 100 MG/1
100 CAPSULE ORAL EVERY 12 HOURS SCHEDULED
Status: DISCONTINUED | OUTPATIENT
Start: 2022-01-01 | End: 2022-01-01 | Stop reason: HOSPADM

## 2022-01-01 RX ORDER — METHYLPREDNISOLONE 4 MG/1
TABLET ORAL
Qty: 1 EACH | Refills: 0 | Status: SHIPPED | OUTPATIENT
Start: 2022-01-01 | End: 2022-01-01 | Stop reason: HOSPADM

## 2022-01-01 RX ORDER — AMITRIPTYLINE HYDROCHLORIDE 25 MG/1
25 TABLET, FILM COATED ORAL NIGHTLY
Status: DISCONTINUED | OUTPATIENT
Start: 2022-01-01 | End: 2022-01-01 | Stop reason: HOSPADM

## 2022-01-01 RX ORDER — MEGESTROL ACETATE 40 MG/ML
400 SUSPENSION ORAL DAILY
Status: DISCONTINUED | OUTPATIENT
Start: 2022-01-01 | End: 2022-01-01 | Stop reason: HOSPADM

## 2022-01-01 RX ORDER — CARBAMAZEPINE 200 MG/1
100 TABLET ORAL EVERY 8 HOURS SCHEDULED
Status: DISCONTINUED | OUTPATIENT
Start: 2022-01-01 | End: 2022-01-01 | Stop reason: HOSPADM

## 2022-01-01 RX ORDER — BISACODYL 10 MG
10 SUPPOSITORY, RECTAL RECTAL DAILY PRN
Status: DISCONTINUED | OUTPATIENT
Start: 2022-01-01 | End: 2022-01-01 | Stop reason: HOSPADM

## 2022-01-01 RX ORDER — OXYCODONE HYDROCHLORIDE AND ACETAMINOPHEN 5; 325 MG/1; MG/1
1 TABLET ORAL ONCE
Status: COMPLETED | OUTPATIENT
Start: 2022-01-01 | End: 2022-01-01

## 2022-01-01 RX ORDER — HYDRALAZINE HYDROCHLORIDE 50 MG/1
100 TABLET, FILM COATED ORAL EVERY 8 HOURS SCHEDULED
Status: DISCONTINUED | OUTPATIENT
Start: 2022-01-01 | End: 2022-01-01

## 2022-01-01 RX ORDER — SODIUM CHLORIDE 0.9 % (FLUSH) 0.9 %
10 SYRINGE (ML) INJECTION EVERY 12 HOURS SCHEDULED
Status: DISCONTINUED | OUTPATIENT
Start: 2022-01-01 | End: 2022-01-01 | Stop reason: HOSPADM

## 2022-01-01 RX ORDER — ZIPRASIDONE MESYLATE 20 MG/ML
10 INJECTION, POWDER, LYOPHILIZED, FOR SOLUTION INTRAMUSCULAR EVERY 6 HOURS PRN
Status: DISCONTINUED | OUTPATIENT
Start: 2022-01-01 | End: 2022-01-01

## 2022-01-01 RX ORDER — PANTOPRAZOLE SODIUM 40 MG/1
40 TABLET, DELAYED RELEASE ORAL 2 TIMES DAILY
Status: DISCONTINUED | OUTPATIENT
Start: 2022-01-01 | End: 2022-01-01 | Stop reason: HOSPADM

## 2022-01-01 RX ORDER — BUMETANIDE 1 MG/1
0.5 TABLET ORAL DAILY
Status: DISCONTINUED | OUTPATIENT
Start: 2022-01-01 | End: 2022-01-01

## 2022-01-01 RX ORDER — BISACODYL 5 MG/1
5 TABLET, DELAYED RELEASE ORAL DAILY PRN
Status: DISCONTINUED | OUTPATIENT
Start: 2022-01-01 | End: 2022-01-01 | Stop reason: HOSPADM

## 2022-01-01 RX ORDER — FENTANYL 50 UG/H
1 PATCH TRANSDERMAL
Status: DISCONTINUED | OUTPATIENT
Start: 2022-01-01 | End: 2022-01-01 | Stop reason: HOSPADM

## 2022-01-01 RX ORDER — POTASSIUM CHLORIDE 750 MG/1
10 CAPSULE, EXTENDED RELEASE ORAL DAILY
Refills: 11 | Status: DISCONTINUED | OUTPATIENT
Start: 2022-01-01 | End: 2022-01-01 | Stop reason: HOSPADM

## 2022-01-01 RX ORDER — CEFAZOLIN SODIUM 2 G/100ML
2 INJECTION, SOLUTION INTRAVENOUS
Status: CANCELLED | OUTPATIENT
Start: 2022-01-01

## 2022-01-01 RX ORDER — ACETAMINOPHEN 325 MG/1
650 TABLET ORAL EVERY 4 HOURS PRN
Status: CANCELLED | OUTPATIENT
Start: 2022-01-01

## 2022-01-01 RX ORDER — HYDROCODONE BITARTRATE AND ACETAMINOPHEN 5; 325 MG/1; MG/1
1 TABLET ORAL EVERY 4 HOURS PRN
Status: DISCONTINUED | OUTPATIENT
Start: 2022-01-01 | End: 2022-01-01

## 2022-01-01 RX ORDER — GABAPENTIN 300 MG/1
600 CAPSULE ORAL EVERY 8 HOURS SCHEDULED
Refills: 1 | Status: DISCONTINUED | OUTPATIENT
Start: 2022-01-01 | End: 2022-01-01 | Stop reason: HOSPADM

## 2022-01-01 RX ORDER — CLONIDINE HYDROCHLORIDE 0.1 MG/1
0.1 TABLET ORAL EVERY 6 HOURS PRN
Status: DISCONTINUED | OUTPATIENT
Start: 2022-01-01 | End: 2022-01-01

## 2022-01-01 RX ORDER — IBUPROFEN 400 MG/1
400 TABLET ORAL EVERY 6 HOURS PRN
Status: DISCONTINUED | OUTPATIENT
Start: 2022-01-01 | End: 2022-01-01 | Stop reason: HOSPADM

## 2022-01-01 RX ORDER — NIFEDIPINE 30 MG/1
30 TABLET, EXTENDED RELEASE ORAL
Status: DISCONTINUED | OUTPATIENT
Start: 2022-01-01 | End: 2022-01-01

## 2022-01-01 RX ORDER — BUMETANIDE 0.5 MG/1
TABLET ORAL
Qty: 45 TABLET | Refills: 0 | OUTPATIENT
Start: 2022-01-01

## 2022-01-01 RX ORDER — HEPARIN SODIUM 5000 [USP'U]/ML
5000 INJECTION, SOLUTION INTRAVENOUS; SUBCUTANEOUS EVERY 8 HOURS SCHEDULED
Status: DISCONTINUED | OUTPATIENT
Start: 2022-01-01 | End: 2022-01-01 | Stop reason: HOSPADM

## 2022-01-01 RX ORDER — SODIUM CHLORIDE 9 MG/ML
INJECTION, SOLUTION INTRAVENOUS CONTINUOUS PRN
Status: DISCONTINUED | OUTPATIENT
Start: 2022-01-01 | End: 2022-01-01 | Stop reason: HOSPADM

## 2022-01-01 RX ORDER — ACETAMINOPHEN 160 MG/5ML
650 SOLUTION ORAL EVERY 4 HOURS PRN
Status: DISCONTINUED | OUTPATIENT
Start: 2022-01-01 | End: 2022-01-01 | Stop reason: HOSPADM

## 2022-01-01 RX ORDER — POLYETHYLENE GLYCOL 3350 17 G/17G
17 POWDER, FOR SOLUTION ORAL DAILY PRN
Status: DISCONTINUED | OUTPATIENT
Start: 2022-01-01 | End: 2022-01-01 | Stop reason: HOSPADM

## 2022-01-01 RX ORDER — IPRATROPIUM BROMIDE AND ALBUTEROL SULFATE 2.5; .5 MG/3ML; MG/3ML
3 SOLUTION RESPIRATORY (INHALATION) ONCE
Status: COMPLETED | OUTPATIENT
Start: 2022-01-01 | End: 2022-01-01

## 2022-01-01 RX ORDER — CETIRIZINE HYDROCHLORIDE 10 MG/1
10 TABLET ORAL DAILY
Status: DISCONTINUED | OUTPATIENT
Start: 2022-01-01 | End: 2022-01-01 | Stop reason: HOSPADM

## 2022-01-01 RX ORDER — GABAPENTIN 600 MG/1
300 TABLET ORAL 2 TIMES DAILY
Qty: 20 TABLET | Refills: 1 | Status: SHIPPED | OUTPATIENT
Start: 2022-01-01

## 2022-01-01 RX ORDER — FLUTICASONE PROPIONATE 50 MCG
2 SPRAY, SUSPENSION (ML) NASAL DAILY
Status: DISCONTINUED | OUTPATIENT
Start: 2022-01-01 | End: 2022-01-01 | Stop reason: HOSPADM

## 2022-01-01 RX ORDER — ROPINIROLE 0.5 MG/1
0.5 TABLET, FILM COATED ORAL NIGHTLY
Qty: 30 TABLET | Refills: 2 | Status: SHIPPED | OUTPATIENT
Start: 2022-01-01 | End: 2022-01-01

## 2022-01-01 RX ORDER — PROMETHAZINE HYDROCHLORIDE 25 MG/1
25 TABLET ORAL EVERY 6 HOURS PRN
Qty: 30 TABLET | Refills: 5 | Status: SHIPPED | OUTPATIENT
Start: 2022-01-01 | End: 2022-01-01 | Stop reason: HOSPADM

## 2022-01-01 RX ORDER — QUETIAPINE FUMARATE 25 MG/1
25 TABLET, FILM COATED ORAL NIGHTLY
Qty: 30 TABLET | Refills: 2 | Status: SHIPPED | OUTPATIENT
Start: 2022-01-01 | End: 2022-01-01

## 2022-01-01 RX ORDER — HYDRALAZINE HYDROCHLORIDE 50 MG/1
50 TABLET, FILM COATED ORAL EVERY 6 HOURS SCHEDULED
Status: DISCONTINUED | OUTPATIENT
Start: 2022-01-01 | End: 2022-01-01

## 2022-01-01 RX ORDER — POTASSIUM CHLORIDE 1.5 G/1.77G
40 POWDER, FOR SOLUTION ORAL ONCE
Status: COMPLETED | OUTPATIENT
Start: 2022-01-01 | End: 2022-01-01

## 2022-01-01 RX ORDER — AMOXICILLIN 250 MG
2 CAPSULE ORAL 2 TIMES DAILY
Status: DISCONTINUED | OUTPATIENT
Start: 2022-01-01 | End: 2022-01-01 | Stop reason: HOSPADM

## 2022-01-01 RX ORDER — BUMETANIDE 0.25 MG/ML
1 INJECTION INTRAMUSCULAR; INTRAVENOUS ONCE
Status: COMPLETED | OUTPATIENT
Start: 2022-01-01 | End: 2022-01-01

## 2022-01-01 RX ORDER — POTASSIUM CHLORIDE 750 MG/1
40 CAPSULE, EXTENDED RELEASE ORAL EVERY 4 HOURS
Status: COMPLETED | OUTPATIENT
Start: 2022-01-01 | End: 2022-01-01

## 2022-01-01 RX ORDER — SODIUM CHLORIDE 0.9 % (FLUSH) 0.9 %
10 SYRINGE (ML) INJECTION AS NEEDED
Status: CANCELLED | OUTPATIENT
Start: 2022-01-01

## 2022-01-01 RX ORDER — CLONIDINE HYDROCHLORIDE 0.1 MG/1
0.1 TABLET ORAL ONCE
Status: COMPLETED | OUTPATIENT
Start: 2022-01-01 | End: 2022-01-01

## 2022-01-01 RX ORDER — ONDANSETRON 2 MG/ML
INJECTION INTRAMUSCULAR; INTRAVENOUS AS NEEDED
Status: DISCONTINUED | OUTPATIENT
Start: 2022-01-01 | End: 2022-01-01 | Stop reason: HOSPADM

## 2022-01-01 RX ORDER — SODIUM CHLORIDE 9 MG/ML
250 INJECTION, SOLUTION INTRAVENOUS ONCE
Status: CANCELLED | OUTPATIENT
Start: 2022-01-01

## 2022-01-01 RX ORDER — FENTANYL CITRATE 50 UG/ML
INJECTION, SOLUTION INTRAMUSCULAR; INTRAVENOUS AS NEEDED
Status: DISCONTINUED | OUTPATIENT
Start: 2022-01-01 | End: 2022-01-01 | Stop reason: HOSPADM

## 2022-01-01 RX ORDER — BUMETANIDE 0.25 MG/ML
2 INJECTION INTRAMUSCULAR; INTRAVENOUS ONCE
Status: COMPLETED | OUTPATIENT
Start: 2022-01-01 | End: 2022-01-01

## 2022-01-01 RX ORDER — HALOPERIDOL 5 MG/ML
0.5 INJECTION INTRAMUSCULAR ONCE
Status: COMPLETED | OUTPATIENT
Start: 2022-01-01 | End: 2022-01-01

## 2022-01-01 RX ORDER — ROPINIROLE 0.5 MG/1
0.5 TABLET, FILM COATED ORAL NIGHTLY
Status: DISCONTINUED | OUTPATIENT
Start: 2022-01-01 | End: 2022-01-01 | Stop reason: HOSPADM

## 2022-01-01 RX ORDER — GABAPENTIN 300 MG/1
300 CAPSULE ORAL EVERY 12 HOURS SCHEDULED
Status: DISCONTINUED | OUTPATIENT
Start: 2022-01-01 | End: 2022-01-01

## 2022-01-01 RX ORDER — SODIUM CHLORIDE 0.9 % (FLUSH) 0.9 %
3 SYRINGE (ML) INJECTION EVERY 12 HOURS SCHEDULED
Status: DISCONTINUED | OUTPATIENT
Start: 2022-01-01 | End: 2022-01-01 | Stop reason: HOSPADM

## 2022-01-01 RX ORDER — NIFEDIPINE 60 MG/1
60 TABLET, EXTENDED RELEASE ORAL
Status: DISCONTINUED | OUTPATIENT
Start: 2022-01-01 | End: 2022-01-01

## 2022-01-01 RX ORDER — CHOLECALCIFEROL (VITAMIN D3) 125 MCG
5 CAPSULE ORAL NIGHTLY PRN
Status: DISCONTINUED | OUTPATIENT
Start: 2022-01-01 | End: 2022-01-01 | Stop reason: HOSPADM

## 2022-01-01 RX ORDER — OXYCODONE HYDROCHLORIDE AND ACETAMINOPHEN 5; 325 MG/1; MG/1
1 TABLET ORAL EVERY 4 HOURS PRN
Status: DISCONTINUED | OUTPATIENT
Start: 2022-01-01 | End: 2022-01-01 | Stop reason: HOSPADM

## 2022-01-01 RX ORDER — SODIUM CHLORIDE 0.9 % (FLUSH) 0.9 %
3 SYRINGE (ML) INJECTION EVERY 12 HOURS SCHEDULED
Status: CANCELLED | OUTPATIENT
Start: 2022-01-01

## 2022-01-01 RX ORDER — FUROSEMIDE 10 MG/ML
40 INJECTION INTRAMUSCULAR; INTRAVENOUS ONCE
Status: DISCONTINUED | OUTPATIENT
Start: 2022-01-01 | End: 2022-01-01

## 2022-01-01 RX ORDER — GABAPENTIN 300 MG/1
300 CAPSULE ORAL EVERY 8 HOURS SCHEDULED
Status: DISCONTINUED | OUTPATIENT
Start: 2022-01-01 | End: 2022-01-01 | Stop reason: HOSPADM

## 2022-01-01 RX ORDER — SODIUM CHLORIDE 9 MG/ML
125 INJECTION, SOLUTION INTRAVENOUS CONTINUOUS
Status: ACTIVE | OUTPATIENT
Start: 2022-01-01 | End: 2022-01-01

## 2022-01-01 RX ORDER — OXYCODONE HYDROCHLORIDE AND ACETAMINOPHEN 5; 325 MG/1; MG/1
1 TABLET ORAL EVERY 4 HOURS PRN
Qty: 20 TABLET | Refills: 0 | Status: SHIPPED | OUTPATIENT
Start: 2022-01-01 | End: 2022-01-01

## 2022-01-01 RX ORDER — BUPIVACAINE HCL/0.9 % NACL/PF 0.1 %
2 PLASTIC BAG, INJECTION (ML) EPIDURAL
Status: COMPLETED | OUTPATIENT
Start: 2022-01-01 | End: 2022-01-01

## 2022-01-01 RX ORDER — BUMETANIDE 0.5 MG/1
TABLET ORAL
Qty: 90 TABLET | Refills: 0 | Status: SHIPPED | OUTPATIENT
Start: 2022-01-01 | End: 2022-01-01 | Stop reason: HOSPADM

## 2022-01-01 RX ORDER — CEFDINIR 300 MG/1
300 CAPSULE ORAL 2 TIMES DAILY
Qty: 20 CAPSULE | Refills: 0 | Status: SHIPPED | OUTPATIENT
Start: 2022-01-01

## 2022-01-01 RX ORDER — QUETIAPINE FUMARATE 25 MG/1
50 TABLET, FILM COATED ORAL NIGHTLY
Status: DISCONTINUED | OUTPATIENT
Start: 2022-01-01 | End: 2022-01-01

## 2022-01-01 RX ORDER — NITROGLYCERIN 0.4 MG/1
0.4 TABLET SUBLINGUAL
Status: CANCELLED | OUTPATIENT
Start: 2022-01-01

## 2022-01-01 RX ORDER — ONDANSETRON 2 MG/ML
4 INJECTION INTRAMUSCULAR; INTRAVENOUS EVERY 6 HOURS PRN
Status: CANCELLED | OUTPATIENT
Start: 2022-01-01

## 2022-01-01 RX ORDER — QUETIAPINE FUMARATE 25 MG/1
25 TABLET, FILM COATED ORAL NIGHTLY
Status: DISCONTINUED | OUTPATIENT
Start: 2022-01-01 | End: 2022-01-01

## 2022-01-01 RX ORDER — LIDOCAINE HYDROCHLORIDE 10 MG/ML
5 INJECTION, SOLUTION EPIDURAL; INFILTRATION; INTRACAUDAL; PERINEURAL
Status: COMPLETED | OUTPATIENT
Start: 2022-01-01 | End: 2022-01-01

## 2022-01-01 RX ORDER — LANOLIN ALCOHOL/MO/W.PET/CERES
100 CREAM (GRAM) TOPICAL DAILY
Qty: 30 TABLET | Refills: 2 | Status: SHIPPED | OUTPATIENT
Start: 2022-01-01

## 2022-01-01 RX ORDER — CYANOCOBALAMIN 1000 UG/ML
1000 INJECTION, SOLUTION INTRAMUSCULAR; SUBCUTANEOUS DAILY
Status: DISCONTINUED | OUTPATIENT
Start: 2022-01-01 | End: 2022-01-01 | Stop reason: HOSPADM

## 2022-01-01 RX ORDER — HYDRALAZINE HYDROCHLORIDE 10 MG/1
10 TABLET, FILM COATED ORAL EVERY 6 HOURS SCHEDULED
Status: DISCONTINUED | OUTPATIENT
Start: 2022-01-01 | End: 2022-01-01

## 2022-01-01 RX ORDER — AMITRIPTYLINE HYDROCHLORIDE 25 MG/1
TABLET, FILM COATED ORAL
Qty: 60 TABLET | Refills: 11 | Status: ON HOLD | OUTPATIENT
Start: 2022-01-01 | End: 2022-01-01 | Stop reason: SDUPTHER

## 2022-01-01 RX ORDER — VANCOMYCIN HYDROCHLORIDE 1 G/200ML
20 INJECTION, SOLUTION INTRAVENOUS ONCE
Status: COMPLETED | OUTPATIENT
Start: 2022-01-01 | End: 2022-01-01

## 2022-01-01 RX ADMIN — HYDRALAZINE HYDROCHLORIDE 100 MG: 50 TABLET, FILM COATED ORAL at 09:21

## 2022-01-01 RX ADMIN — GABAPENTIN 100 MG: 100 CAPSULE ORAL at 09:11

## 2022-01-01 RX ADMIN — SODIUM CHLORIDE 1000 ML: 9 INJECTION, SOLUTION INTRAVENOUS at 17:51

## 2022-01-01 RX ADMIN — AMITRIPTYLINE HYDROCHLORIDE 25 MG: 25 TABLET, FILM COATED ORAL at 20:16

## 2022-01-01 RX ADMIN — HYDROCODONE BITARTRATE AND ACETAMINOPHEN 1 TABLET: 10; 325 TABLET ORAL at 18:05

## 2022-01-01 RX ADMIN — Medication 10 ML: at 08:14

## 2022-01-01 RX ADMIN — AMITRIPTYLINE HYDROCHLORIDE 25 MG: 25 TABLET, FILM COATED ORAL at 21:08

## 2022-01-01 RX ADMIN — AMITRIPTYLINE HYDROCHLORIDE 25 MG: 25 TABLET, FILM COATED ORAL at 23:08

## 2022-01-01 RX ADMIN — DICLOFENAC 2 G: 10 GEL TOPICAL at 20:30

## 2022-01-01 RX ADMIN — MEGESTROL ACETATE 400 MG: 40 SUSPENSION ORAL at 16:03

## 2022-01-01 RX ADMIN — POTASSIUM CHLORIDE 10 MEQ: 750 CAPSULE, EXTENDED RELEASE ORAL at 08:03

## 2022-01-01 RX ADMIN — ZIPRASIDONE MESYLATE 10 MG: 20 INJECTION, POWDER, LYOPHILIZED, FOR SOLUTION INTRAMUSCULAR at 19:42

## 2022-01-01 RX ADMIN — VANCOMYCIN HYDROCHLORIDE 1000 MG: 1 INJECTION, SOLUTION INTRAVENOUS at 02:54

## 2022-01-01 RX ADMIN — CARBAMAZEPINE 100 MG: 200 TABLET ORAL at 14:25

## 2022-01-01 RX ADMIN — MINERAL OIL: 1000 LIQUID ORAL at 17:11

## 2022-01-01 RX ADMIN — MINERAL OIL: 1000 LIQUID ORAL at 11:50

## 2022-01-01 RX ADMIN — Medication 2 G: at 13:25

## 2022-01-01 RX ADMIN — DICLOFENAC 2 G: 10 GEL TOPICAL at 09:00

## 2022-01-01 RX ADMIN — QUETIAPINE FUMARATE 25 MG: 25 TABLET ORAL at 20:01

## 2022-01-01 RX ADMIN — DICLOFENAC 2 G: 10 GEL TOPICAL at 08:54

## 2022-01-01 RX ADMIN — Medication 3 ML: at 08:03

## 2022-01-01 RX ADMIN — BUMETANIDE 2 MG: 0.25 INJECTION INTRAMUSCULAR; INTRAVENOUS at 23:08

## 2022-01-01 RX ADMIN — DICLOFENAC 2 G: 10 GEL TOPICAL at 20:07

## 2022-01-01 RX ADMIN — Medication 5 MG: at 21:09

## 2022-01-01 RX ADMIN — DICLOFENAC 2 G: 10 GEL TOPICAL at 20:15

## 2022-01-01 RX ADMIN — HALOPERIDOL LACTATE 0.5 MG: 5 INJECTION, SOLUTION INTRAMUSCULAR at 00:14

## 2022-01-01 RX ADMIN — MINERAL OIL: 1000 LIQUID ORAL at 09:01

## 2022-01-01 RX ADMIN — AMITRIPTYLINE HYDROCHLORIDE 25 MG: 25 TABLET, FILM COATED ORAL at 21:19

## 2022-01-01 RX ADMIN — OXYCODONE AND ACETAMINOPHEN 1 TABLET: 5; 325 TABLET ORAL at 15:57

## 2022-01-01 RX ADMIN — CARBAMAZEPINE 100 MG: 200 TABLET ORAL at 20:15

## 2022-01-01 RX ADMIN — THIAMINE HYDROCHLORIDE 500 MG: 100 INJECTION, SOLUTION INTRAMUSCULAR; INTRAVENOUS at 09:10

## 2022-01-01 RX ADMIN — THIAMINE HYDROCHLORIDE 500 MG: 100 INJECTION, SOLUTION INTRAMUSCULAR; INTRAVENOUS at 15:18

## 2022-01-01 RX ADMIN — Medication 5 MG: at 20:00

## 2022-01-01 RX ADMIN — CALCIUM CARBONATE (ANTACID) CHEW TAB 500 MG 1 TABLET: 500 CHEW TAB at 20:05

## 2022-01-01 RX ADMIN — CYANOCOBALAMIN 1000 MCG: 1000 INJECTION, SOLUTION INTRAMUSCULAR; SUBCUTANEOUS at 09:12

## 2022-01-01 RX ADMIN — CARBAMAZEPINE 100 MG: 200 TABLET ORAL at 13:33

## 2022-01-01 RX ADMIN — HYDROCODONE BITARTRATE AND ACETAMINOPHEN 1 TABLET: 5; 325 TABLET ORAL at 09:13

## 2022-01-01 RX ADMIN — MINERAL OIL: 1000 LIQUID ORAL at 18:30

## 2022-01-01 RX ADMIN — AMITRIPTYLINE HYDROCHLORIDE 25 MG: 25 TABLET, FILM COATED ORAL at 21:26

## 2022-01-01 RX ADMIN — GABAPENTIN 100 MG: 100 CAPSULE ORAL at 09:20

## 2022-01-01 RX ADMIN — TRIAMCINOLONE ACETONIDE 40 MG: 40 INJECTION, SUSPENSION INTRA-ARTICULAR; INTRAMUSCULAR at 14:42

## 2022-01-01 RX ADMIN — BUMETANIDE 1 MG: 0.25 INJECTION, SOLUTION INTRAMUSCULAR; INTRAVENOUS at 12:54

## 2022-01-01 RX ADMIN — CYANOCOBALAMIN 1000 MCG: 1000 INJECTION, SOLUTION INTRAMUSCULAR; SUBCUTANEOUS at 08:15

## 2022-01-01 RX ADMIN — MEGESTROL ACETATE 400 MG: 40 SUSPENSION ORAL at 09:08

## 2022-01-01 RX ADMIN — LIDOCAINE HYDROCHLORIDE 5 ML: 10 INJECTION, SOLUTION EPIDURAL; INFILTRATION; INTRACAUDAL; PERINEURAL at 14:42

## 2022-01-01 RX ADMIN — HYDRALAZINE HYDROCHLORIDE 100 MG: 50 TABLET, FILM COATED ORAL at 05:56

## 2022-01-01 RX ADMIN — CARBAMAZEPINE 100 MG: 200 TABLET ORAL at 15:57

## 2022-01-01 RX ADMIN — GABAPENTIN 100 MG: 100 CAPSULE ORAL at 21:19

## 2022-01-01 RX ADMIN — MINERAL OIL: 1000 LIQUID ORAL at 22:22

## 2022-01-01 RX ADMIN — THIAMINE HCL TAB 100 MG 100 MG: 100 TAB at 09:20

## 2022-01-01 RX ADMIN — NICARDIPINE HYDROCHLORIDE 5 MG/HR: 25 INJECTION, SOLUTION INTRAVENOUS at 03:52

## 2022-01-01 RX ADMIN — CALCIUM CARBONATE (ANTACID) CHEW TAB 500 MG 2 TABLET: 500 CHEW TAB at 19:01

## 2022-01-01 RX ADMIN — HYDRALAZINE HYDROCHLORIDE 100 MG: 50 TABLET, FILM COATED ORAL at 14:24

## 2022-01-01 RX ADMIN — ZIPRASIDONE MESYLATE 10 MG: 20 INJECTION, POWDER, LYOPHILIZED, FOR SOLUTION INTRAMUSCULAR at 08:31

## 2022-01-01 RX ADMIN — HYDRALAZINE HYDROCHLORIDE 100 MG: 50 TABLET, FILM COATED ORAL at 20:19

## 2022-01-01 RX ADMIN — HYDROCODONE BITARTRATE AND ACETAMINOPHEN 1 TABLET: 5; 325 TABLET ORAL at 21:20

## 2022-01-01 RX ADMIN — CYANOCOBALAMIN 1000 MCG: 1000 INJECTION, SOLUTION INTRAMUSCULAR; SUBCUTANEOUS at 09:18

## 2022-01-01 RX ADMIN — DICLOFENAC 2 G: 10 GEL TOPICAL at 17:11

## 2022-01-01 RX ADMIN — GABAPENTIN 100 MG: 100 CAPSULE ORAL at 07:56

## 2022-01-01 RX ADMIN — GABAPENTIN 100 MG: 100 CAPSULE ORAL at 20:29

## 2022-01-01 RX ADMIN — MEGESTROL ACETATE 400 MG: 40 SUSPENSION ORAL at 09:13

## 2022-01-01 RX ADMIN — GABAPENTIN 100 MG: 100 CAPSULE ORAL at 20:18

## 2022-01-01 RX ADMIN — MINERAL OIL: 1000 LIQUID ORAL at 11:49

## 2022-01-01 RX ADMIN — HYDROCODONE BITARTRATE AND ACETAMINOPHEN 1 TABLET: 5; 325 TABLET ORAL at 14:33

## 2022-01-01 RX ADMIN — CARBAMAZEPINE 100 MG: 200 TABLET ORAL at 05:51

## 2022-01-01 RX ADMIN — Medication 10 ML: at 20:20

## 2022-01-01 RX ADMIN — HYDROCODONE BITARTRATE AND ACETAMINOPHEN 1 TABLET: 10; 325 TABLET ORAL at 02:53

## 2022-01-01 RX ADMIN — DICLOFENAC 2 G: 10 GEL TOPICAL at 17:14

## 2022-01-01 RX ADMIN — HYDRALAZINE HYDROCHLORIDE 50 MG: 50 TABLET, FILM COATED ORAL at 11:53

## 2022-01-01 RX ADMIN — QUETIAPINE FUMARATE 50 MG: 25 TABLET ORAL at 21:19

## 2022-01-01 RX ADMIN — GABAPENTIN 300 MG: 300 CAPSULE ORAL at 08:52

## 2022-01-01 RX ADMIN — Medication 10 ML: at 09:09

## 2022-01-01 RX ADMIN — SENNOSIDES AND DOCUSATE SODIUM 2 TABLET: 50; 8.6 TABLET ORAL at 09:12

## 2022-01-01 RX ADMIN — AMITRIPTYLINE HYDROCHLORIDE 25 MG: 25 TABLET, FILM COATED ORAL at 08:53

## 2022-01-01 RX ADMIN — Medication 10 ML: at 21:20

## 2022-01-01 RX ADMIN — SENNOSIDES AND DOCUSATE SODIUM 2 TABLET: 50; 8.6 TABLET ORAL at 21:09

## 2022-01-01 RX ADMIN — ROPINIROLE HYDROCHLORIDE 0.5 MG: 0.5 TABLET, FILM COATED ORAL at 21:08

## 2022-01-01 RX ADMIN — CALCIUM CARBONATE (ANTACID) CHEW TAB 500 MG 2 TABLET: 500 CHEW TAB at 15:57

## 2022-01-01 RX ADMIN — MINERAL OIL: 1000 LIQUID ORAL at 17:26

## 2022-01-01 RX ADMIN — CETIRIZINE HYDROCHLORIDE TABLETS 5 MG: 10 TABLET, FILM COATED ORAL at 08:39

## 2022-01-01 RX ADMIN — HYDRALAZINE HYDROCHLORIDE 10 MG: 10 TABLET ORAL at 18:05

## 2022-01-01 RX ADMIN — LORAZEPAM 0.5 MG: 2 INJECTION INTRAMUSCULAR; INTRAVENOUS at 05:52

## 2022-01-01 RX ADMIN — HYDRALAZINE HYDROCHLORIDE 100 MG: 50 TABLET, FILM COATED ORAL at 05:48

## 2022-01-01 RX ADMIN — SENNOSIDES AND DOCUSATE SODIUM 2 TABLET: 50; 8.6 TABLET ORAL at 20:16

## 2022-01-01 RX ADMIN — GABAPENTIN 600 MG: 300 CAPSULE ORAL at 23:08

## 2022-01-01 RX ADMIN — CYANOCOBALAMIN 1000 MCG: 1000 INJECTION, SOLUTION INTRAMUSCULAR; SUBCUTANEOUS at 09:08

## 2022-01-01 RX ADMIN — MINERAL OIL: 1000 LIQUID ORAL at 17:18

## 2022-01-01 RX ADMIN — DICLOFENAC 2 G: 10 GEL TOPICAL at 11:08

## 2022-01-01 RX ADMIN — FENTANYL 1 PATCH: 50 PATCH TRANSDERMAL at 08:37

## 2022-01-01 RX ADMIN — OXYCODONE AND ACETAMINOPHEN 1 TABLET: 5; 325 TABLET ORAL at 15:16

## 2022-01-01 RX ADMIN — QUETIAPINE FUMARATE 50 MG: 25 TABLET ORAL at 21:08

## 2022-01-01 RX ADMIN — IOPAMIDOL 70 ML: 612 INJECTION, SOLUTION INTRAVENOUS at 20:44

## 2022-01-01 RX ADMIN — SODIUM CHLORIDE 75 ML/HR: 9 INJECTION, SOLUTION INTRAVENOUS at 02:54

## 2022-01-01 RX ADMIN — MINERAL OIL: 1000 LIQUID ORAL at 21:10

## 2022-01-01 RX ADMIN — MINERAL OIL: 1000 LIQUID ORAL at 09:10

## 2022-01-01 RX ADMIN — HYDROCODONE BITARTRATE AND ACETAMINOPHEN 1 TABLET: 5; 325 TABLET ORAL at 16:02

## 2022-01-01 RX ADMIN — Medication 5 MG: at 00:56

## 2022-01-01 RX ADMIN — MINERAL OIL: 1000 LIQUID ORAL at 20:27

## 2022-01-01 RX ADMIN — SODIUM CHLORIDE 500 ML: 9 INJECTION, SOLUTION INTRAVENOUS at 21:29

## 2022-01-01 RX ADMIN — GABAPENTIN 100 MG: 100 CAPSULE ORAL at 09:09

## 2022-01-01 RX ADMIN — FENTANYL 1 PATCH: 50 PATCH TRANSDERMAL at 05:50

## 2022-01-01 RX ADMIN — HYDROCODONE BITARTRATE AND ACETAMINOPHEN 1 TABLET: 5; 325 TABLET ORAL at 08:53

## 2022-01-01 RX ADMIN — FERRIC CARBOXYMALTOSE INJECTION 750 MG: 50 INJECTION, SOLUTION INTRAVENOUS at 12:20

## 2022-01-01 RX ADMIN — OXYCODONE AND ACETAMINOPHEN 1 TABLET: 5; 325 TABLET ORAL at 15:09

## 2022-01-01 RX ADMIN — THIAMINE HYDROCHLORIDE 500 MG: 100 INJECTION, SOLUTION INTRAMUSCULAR; INTRAVENOUS at 08:02

## 2022-01-01 RX ADMIN — MEGESTROL ACETATE 400 MG: 40 SUSPENSION ORAL at 07:57

## 2022-01-01 RX ADMIN — SENNOSIDES AND DOCUSATE SODIUM 2 TABLET: 50; 8.6 TABLET ORAL at 21:26

## 2022-01-01 RX ADMIN — CARBAMAZEPINE 100 MG: 200 TABLET ORAL at 05:48

## 2022-01-01 RX ADMIN — BUMETANIDE 0.5 MG: 1 TABLET ORAL at 08:52

## 2022-01-01 RX ADMIN — DICLOFENAC 2 G: 10 GEL TOPICAL at 18:30

## 2022-01-01 RX ADMIN — ACETAMINOPHEN 650 MG: 325 TABLET, FILM COATED ORAL at 18:41

## 2022-01-01 RX ADMIN — AMITRIPTYLINE HYDROCHLORIDE 25 MG: 25 TABLET, FILM COATED ORAL at 20:19

## 2022-01-01 RX ADMIN — HYDROCODONE BITARTRATE AND ACETAMINOPHEN 1 TABLET: 10; 325 TABLET ORAL at 04:24

## 2022-01-01 RX ADMIN — DICLOFENAC 2 G: 10 GEL TOPICAL at 17:37

## 2022-01-01 RX ADMIN — DICLOFENAC 2 G: 10 GEL TOPICAL at 21:09

## 2022-01-01 RX ADMIN — ROPINIROLE HYDROCHLORIDE 0.5 MG: 0.5 TABLET, FILM COATED ORAL at 21:19

## 2022-01-01 RX ADMIN — CARBAMAZEPINE 100 MG: 200 TABLET ORAL at 15:09

## 2022-01-01 RX ADMIN — DICLOFENAC 2 G: 10 GEL TOPICAL at 09:22

## 2022-01-01 RX ADMIN — ACETAMINOPHEN 325MG 650 MG: 325 TABLET ORAL at 14:20

## 2022-01-01 RX ADMIN — CETIRIZINE HYDROCHLORIDE TABLETS 10 MG: 10 TABLET, FILM COATED ORAL at 08:03

## 2022-01-01 RX ADMIN — CARBAMAZEPINE 100 MG: 200 TABLET ORAL at 22:12

## 2022-01-01 RX ADMIN — SODIUM CHLORIDE 125 ML/HR: 9 INJECTION, SOLUTION INTRAVENOUS at 22:49

## 2022-01-01 RX ADMIN — SENNOSIDES AND DOCUSATE SODIUM 2 TABLET: 50; 8.6 TABLET ORAL at 09:01

## 2022-01-01 RX ADMIN — NIFEDIPINE 60 MG: 60 TABLET, EXTENDED RELEASE ORAL at 14:26

## 2022-01-01 RX ADMIN — QUETIAPINE FUMARATE 25 MG: 25 TABLET ORAL at 20:29

## 2022-01-01 RX ADMIN — HYDROCODONE BITARTRATE AND ACETAMINOPHEN 1 TABLET: 5; 325 TABLET ORAL at 05:51

## 2022-01-01 RX ADMIN — MINERAL OIL: 1000 LIQUID ORAL at 11:53

## 2022-01-01 RX ADMIN — MEGESTROL ACETATE 400 MG: 40 SUSPENSION ORAL at 09:22

## 2022-01-01 RX ADMIN — PANTOPRAZOLE SODIUM 40 MG: 40 TABLET, DELAYED RELEASE ORAL at 08:03

## 2022-01-01 RX ADMIN — MEGESTROL ACETATE 400 MG: 40 SUSPENSION ORAL at 09:16

## 2022-01-01 RX ADMIN — Medication 10 ML: at 21:09

## 2022-01-01 RX ADMIN — AMITRIPTYLINE HYDROCHLORIDE 25 MG: 25 TABLET, FILM COATED ORAL at 08:03

## 2022-01-01 RX ADMIN — FLUTICASONE PROPIONATE 2 SPRAY: 50 SPRAY, METERED NASAL at 08:02

## 2022-01-01 RX ADMIN — SENNOSIDES AND DOCUSATE SODIUM 2 TABLET: 50; 8.6 TABLET ORAL at 20:00

## 2022-01-01 RX ADMIN — ROPINIROLE HYDROCHLORIDE 0.5 MG: 0.5 TABLET, FILM COATED ORAL at 20:01

## 2022-01-01 RX ADMIN — THIAMINE HCL TAB 100 MG 100 MG: 100 TAB at 11:07

## 2022-01-01 RX ADMIN — DICLOFENAC 2 G: 10 GEL TOPICAL at 17:09

## 2022-01-01 RX ADMIN — FENTANYL 1 PATCH: 50 PATCH TRANSDERMAL at 03:12

## 2022-01-01 RX ADMIN — OXYCODONE AND ACETAMINOPHEN 1 TABLET: 5; 325 TABLET ORAL at 09:16

## 2022-01-01 RX ADMIN — SODIUM CHLORIDE 125 ML/HR: 9 INJECTION, SOLUTION INTRAVENOUS at 22:35

## 2022-01-01 RX ADMIN — MINERAL OIL: 1000 LIQUID ORAL at 08:16

## 2022-01-01 RX ADMIN — DICLOFENAC 2 G: 10 GEL TOPICAL at 21:19

## 2022-01-01 RX ADMIN — POTASSIUM CHLORIDE 40 MEQ: 750 CAPSULE, EXTENDED RELEASE ORAL at 13:27

## 2022-01-01 RX ADMIN — NIFEDIPINE 90 MG: 30 TABLET, FILM COATED, EXTENDED RELEASE ORAL at 09:12

## 2022-01-01 RX ADMIN — MINERAL OIL: 1000 LIQUID ORAL at 20:30

## 2022-01-01 RX ADMIN — OXYCODONE AND ACETAMINOPHEN 1 TABLET: 5; 325 TABLET ORAL at 05:47

## 2022-01-01 RX ADMIN — HYDRALAZINE HYDROCHLORIDE 100 MG: 50 TABLET, FILM COATED ORAL at 13:26

## 2022-01-01 RX ADMIN — AMITRIPTYLINE HYDROCHLORIDE 25 MG: 25 TABLET, FILM COATED ORAL at 20:29

## 2022-01-01 RX ADMIN — HYDROCODONE BITARTRATE AND ACETAMINOPHEN 1 TABLET: 5; 325 TABLET ORAL at 13:26

## 2022-01-01 RX ADMIN — DICLOFENAC 2 G: 10 GEL TOPICAL at 11:53

## 2022-01-01 RX ADMIN — DICLOFENAC 2 G: 10 GEL TOPICAL at 09:09

## 2022-01-01 RX ADMIN — MINERAL OIL: 1000 LIQUID ORAL at 11:07

## 2022-01-01 RX ADMIN — DICLOFENAC 2 G: 10 GEL TOPICAL at 09:11

## 2022-01-01 RX ADMIN — SODIUM CHLORIDE 250 ML: 9 INJECTION, SOLUTION INTRAVENOUS at 13:15

## 2022-01-01 RX ADMIN — HYDRALAZINE HYDROCHLORIDE 100 MG: 50 TABLET, FILM COATED ORAL at 05:51

## 2022-01-01 RX ADMIN — HYDRALAZINE HYDROCHLORIDE 50 MG: 50 TABLET, FILM COATED ORAL at 05:46

## 2022-01-01 RX ADMIN — HEPARIN SODIUM 5000 UNITS: 5000 INJECTION, SOLUTION INTRAVENOUS; SUBCUTANEOUS at 06:04

## 2022-01-01 RX ADMIN — GABAPENTIN 100 MG: 100 CAPSULE ORAL at 20:01

## 2022-01-01 RX ADMIN — SODIUM CHLORIDE 250 ML: 9 INJECTION, SOLUTION INTRAVENOUS at 12:20

## 2022-01-01 RX ADMIN — CARBAMAZEPINE 100 MG: 200 TABLET ORAL at 21:08

## 2022-01-01 RX ADMIN — HYDROCODONE BITARTRATE AND ACETAMINOPHEN 1 TABLET: 5; 325 TABLET ORAL at 22:07

## 2022-01-01 RX ADMIN — GABAPENTIN 600 MG: 300 CAPSULE ORAL at 08:03

## 2022-01-01 RX ADMIN — PANTOPRAZOLE SODIUM 40 MG: 40 TABLET, DELAYED RELEASE ORAL at 08:40

## 2022-01-01 RX ADMIN — AMITRIPTYLINE HYDROCHLORIDE 25 MG: 25 TABLET, FILM COATED ORAL at 02:53

## 2022-01-01 RX ADMIN — QUETIAPINE FUMARATE 25 MG: 25 TABLET ORAL at 20:15

## 2022-01-01 RX ADMIN — SODIUM CHLORIDE 125 ML/HR: 9 INJECTION, SOLUTION INTRAVENOUS at 18:44

## 2022-01-01 RX ADMIN — CARBAMAZEPINE 100 MG: 200 TABLET ORAL at 05:47

## 2022-01-01 RX ADMIN — THIAMINE HCL TAB 100 MG 100 MG: 100 TAB at 09:19

## 2022-01-01 RX ADMIN — OXYCODONE AND ACETAMINOPHEN 1 TABLET: 5; 325 TABLET ORAL at 23:15

## 2022-01-01 RX ADMIN — CARBAMAZEPINE 100 MG: 200 TABLET ORAL at 05:56

## 2022-01-01 RX ADMIN — NIFEDIPINE 90 MG: 30 TABLET, FILM COATED, EXTENDED RELEASE ORAL at 09:20

## 2022-01-01 RX ADMIN — SENNOSIDES AND DOCUSATE SODIUM 2 TABLET: 50; 8.6 TABLET ORAL at 09:19

## 2022-01-01 RX ADMIN — CLONIDINE HYDROCHLORIDE 0.1 MG: 0.1 TABLET ORAL at 10:59

## 2022-01-01 RX ADMIN — HYDRALAZINE HYDROCHLORIDE 100 MG: 50 TABLET, FILM COATED ORAL at 21:08

## 2022-01-01 RX ADMIN — CLONIDINE HYDROCHLORIDE 0.1 MG: 0.1 TABLET ORAL at 18:36

## 2022-01-01 RX ADMIN — SENNOSIDES AND DOCUSATE SODIUM 2 TABLET: 50; 8.6 TABLET ORAL at 20:29

## 2022-01-01 RX ADMIN — MINERAL OIL: 1000 LIQUID ORAL at 09:22

## 2022-01-01 RX ADMIN — CARBAMAZEPINE 100 MG: 200 TABLET ORAL at 20:17

## 2022-01-01 RX ADMIN — AMITRIPTYLINE HYDROCHLORIDE 25 MG: 25 TABLET, FILM COATED ORAL at 08:47

## 2022-01-01 RX ADMIN — CARBAMAZEPINE 100 MG: 200 TABLET ORAL at 13:30

## 2022-01-01 RX ADMIN — Medication 10 ML: at 09:00

## 2022-01-01 RX ADMIN — DICLOFENAC 2 G: 10 GEL TOPICAL at 09:16

## 2022-01-01 RX ADMIN — GABAPENTIN 100 MG: 100 CAPSULE ORAL at 20:15

## 2022-01-01 RX ADMIN — DICLOFENAC 2 G: 10 GEL TOPICAL at 17:25

## 2022-01-01 RX ADMIN — HYDRALAZINE HYDROCHLORIDE 100 MG: 50 TABLET, FILM COATED ORAL at 14:26

## 2022-01-01 RX ADMIN — MINERAL OIL: 1000 LIQUID ORAL at 12:35

## 2022-01-01 RX ADMIN — SENNOSIDES AND DOCUSATE SODIUM 2 TABLET: 50; 8.6 TABLET ORAL at 08:52

## 2022-01-01 RX ADMIN — DICLOFENAC 2 G: 10 GEL TOPICAL at 12:35

## 2022-01-01 RX ADMIN — CARBAMAZEPINE 100 MG: 200 TABLET ORAL at 14:24

## 2022-01-01 RX ADMIN — GABAPENTIN 100 MG: 100 CAPSULE ORAL at 21:08

## 2022-01-01 RX ADMIN — OXYCODONE AND ACETAMINOPHEN 1 TABLET: 5; 325 TABLET ORAL at 20:00

## 2022-01-01 RX ADMIN — DICLOFENAC 2 G: 10 GEL TOPICAL at 07:53

## 2022-01-01 RX ADMIN — CYANOCOBALAMIN 1000 MCG: 1000 INJECTION, SOLUTION INTRAMUSCULAR; SUBCUTANEOUS at 07:56

## 2022-01-01 RX ADMIN — FERRIC CARBOXYMALTOSE INJECTION 750 MG: 50 INJECTION, SOLUTION INTRAVENOUS at 13:16

## 2022-01-01 RX ADMIN — DICLOFENAC 2 G: 10 GEL TOPICAL at 11:48

## 2022-01-01 RX ADMIN — FENTANYL 1 PATCH: 12 PATCH, EXTENDED RELEASE TRANSDERMAL at 08:35

## 2022-01-01 RX ADMIN — SENNOSIDES AND DOCUSATE SODIUM 2 TABLET: 50; 8.6 TABLET ORAL at 07:57

## 2022-01-01 RX ADMIN — SENNOSIDES AND DOCUSATE SODIUM 2 TABLET: 50; 8.6 TABLET ORAL at 09:09

## 2022-01-01 RX ADMIN — OXYCODONE AND ACETAMINOPHEN 1 TABLET: 5; 325 TABLET ORAL at 10:21

## 2022-01-01 RX ADMIN — THIAMINE HCL TAB 100 MG 100 MG: 100 TAB at 09:12

## 2022-01-01 RX ADMIN — HYDRALAZINE HYDROCHLORIDE 100 MG: 50 TABLET, FILM COATED ORAL at 21:19

## 2022-01-01 RX ADMIN — SENNOSIDES AND DOCUSATE SODIUM 2 TABLET: 50; 8.6 TABLET ORAL at 09:24

## 2022-01-01 RX ADMIN — POTASSIUM CHLORIDE 40 MEQ: 1.5 POWDER, FOR SOLUTION ORAL at 11:53

## 2022-01-01 RX ADMIN — SODIUM CHLORIDE 1000 ML: 9 INJECTION, SOLUTION INTRAVENOUS at 22:30

## 2022-01-01 RX ADMIN — DICLOFENAC 2 G: 10 GEL TOPICAL at 20:43

## 2022-01-01 RX ADMIN — HYDRALAZINE HYDROCHLORIDE 10 MG: 10 TABLET ORAL at 05:51

## 2022-01-01 RX ADMIN — TAZOBACTAM SODIUM AND PIPERACILLIN SODIUM 3.38 G: 375; 3 INJECTION, SOLUTION INTRAVENOUS at 06:04

## 2022-01-01 RX ADMIN — CARBAMAZEPINE 100 MG: 200 TABLET ORAL at 21:19

## 2022-01-01 RX ADMIN — CARBAMAZEPINE 100 MG: 200 TABLET ORAL at 16:02

## 2022-01-01 RX ADMIN — OXYCODONE AND ACETAMINOPHEN 1 TABLET: 5; 325 TABLET ORAL at 20:16

## 2022-01-01 RX ADMIN — DICLOFENAC 2 G: 10 GEL TOPICAL at 11:50

## 2022-01-01 RX ADMIN — CARBAMAZEPINE 100 MG: 200 TABLET ORAL at 06:21

## 2022-01-01 RX ADMIN — CLONIDINE HYDROCHLORIDE 0.1 MG: 0.1 TABLET ORAL at 12:40

## 2022-01-01 RX ADMIN — GABAPENTIN 100 MG: 100 CAPSULE ORAL at 09:23

## 2022-01-01 RX ADMIN — DICLOFENAC 2 G: 10 GEL TOPICAL at 13:31

## 2022-01-01 RX ADMIN — HYDROCODONE BITARTRATE AND ACETAMINOPHEN 1 TABLET: 5; 325 TABLET ORAL at 05:47

## 2022-01-01 RX ADMIN — THIAMINE HYDROCHLORIDE 500 MG: 100 INJECTION, SOLUTION INTRAMUSCULAR; INTRAVENOUS at 08:14

## 2022-01-01 RX ADMIN — SENNOSIDES AND DOCUSATE SODIUM 2 TABLET: 50; 8.6 TABLET ORAL at 20:18

## 2022-01-01 RX ADMIN — CALCIUM CARBONATE (ANTACID) CHEW TAB 500 MG 2 TABLET: 500 CHEW TAB at 13:31

## 2022-01-01 RX ADMIN — Medication 10 ML: at 08:54

## 2022-01-01 RX ADMIN — SODIUM CHLORIDE 125 ML/HR: 9 INJECTION, SOLUTION INTRAVENOUS at 06:40

## 2022-01-01 RX ADMIN — HYDROCODONE BITARTRATE AND ACETAMINOPHEN 1 TABLET: 5; 325 TABLET ORAL at 02:28

## 2022-01-01 RX ADMIN — SODIUM CHLORIDE 125 ML/HR: 9 INJECTION, SOLUTION INTRAVENOUS at 06:37

## 2022-01-01 RX ADMIN — SODIUM CHLORIDE 125 ML/HR: 9 INJECTION, SOLUTION INTRAVENOUS at 14:09

## 2022-01-01 RX ADMIN — SENNOSIDES AND DOCUSATE SODIUM 2 TABLET: 50; 8.6 TABLET ORAL at 21:19

## 2022-01-01 RX ADMIN — OXYCODONE AND ACETAMINOPHEN 1 TABLET: 5; 325 TABLET ORAL at 18:54

## 2022-01-01 RX ADMIN — HYDROCODONE BITARTRATE AND ACETAMINOPHEN 1 TABLET: 5; 325 TABLET ORAL at 10:48

## 2022-01-01 RX ADMIN — Medication 10 ML: at 08:02

## 2022-01-01 RX ADMIN — Medication 10 ML: at 02:55

## 2022-01-01 RX ADMIN — MINERAL OIL: 1000 LIQUID ORAL at 20:17

## 2022-01-01 RX ADMIN — HYDROCODONE BITARTRATE AND ACETAMINOPHEN 1 TABLET: 10; 325 TABLET ORAL at 14:20

## 2022-01-01 RX ADMIN — OXYCODONE HYDROCHLORIDE AND ACETAMINOPHEN 1 TABLET: 5; 325 TABLET ORAL at 11:44

## 2022-01-01 RX ADMIN — GABAPENTIN 300 MG: 300 CAPSULE ORAL at 00:00

## 2022-01-01 RX ADMIN — NIFEDIPINE 90 MG: 30 TABLET, FILM COATED, EXTENDED RELEASE ORAL at 09:19

## 2022-01-01 RX ADMIN — CYANOCOBALAMIN 1000 MCG: 1000 INJECTION, SOLUTION INTRAMUSCULAR; SUBCUTANEOUS at 09:01

## 2022-01-01 RX ADMIN — AMITRIPTYLINE HYDROCHLORIDE 25 MG: 25 TABLET, FILM COATED ORAL at 20:00

## 2022-01-01 RX ADMIN — OXYCODONE AND ACETAMINOPHEN 1 TABLET: 5; 325 TABLET ORAL at 06:26

## 2022-01-01 RX ADMIN — GABAPENTIN 100 MG: 100 CAPSULE ORAL at 09:01

## 2022-01-01 RX ADMIN — DICLOFENAC 2 G: 10 GEL TOPICAL at 08:16

## 2022-01-01 RX ADMIN — HYDROCODONE BITARTRATE AND ACETAMINOPHEN 1 TABLET: 5; 325 TABLET ORAL at 18:30

## 2022-01-01 RX ADMIN — POTASSIUM CHLORIDE 40 MEQ: 1.5 POWDER, FOR SOLUTION ORAL at 11:44

## 2022-01-01 RX ADMIN — TAZOBACTAM SODIUM AND PIPERACILLIN SODIUM 3.38 G: 375; 3 INJECTION, SOLUTION INTRAVENOUS at 22:30

## 2022-01-01 RX ADMIN — HYDRALAZINE HYDROCHLORIDE 10 MG: 10 TABLET ORAL at 00:00

## 2022-01-01 RX ADMIN — AMITRIPTYLINE HYDROCHLORIDE 25 MG: 25 TABLET, FILM COATED ORAL at 09:08

## 2022-01-01 RX ADMIN — MINERAL OIL: 1000 LIQUID ORAL at 17:09

## 2022-01-01 RX ADMIN — CLONIDINE HYDROCHLORIDE 0.1 MG: 0.1 TABLET ORAL at 00:00

## 2022-01-01 RX ADMIN — HYDROCODONE BITARTRATE AND ACETAMINOPHEN 1 TABLET: 5; 325 TABLET ORAL at 00:00

## 2022-01-01 RX ADMIN — MINERAL OIL: 1000 LIQUID ORAL at 09:12

## 2022-01-01 RX ADMIN — QUETIAPINE FUMARATE 25 MG: 25 TABLET ORAL at 20:19

## 2022-01-01 RX ADMIN — CALCIUM CARBONATE (ANTACID) CHEW TAB 500 MG 2 TABLET: 500 CHEW TAB at 12:35

## 2022-01-01 RX ADMIN — IOPAMIDOL 100 ML: 755 INJECTION, SOLUTION INTRAVENOUS at 21:10

## 2022-01-01 RX ADMIN — FENTANYL 1 PATCH: 50 PATCH TRANSDERMAL at 05:46

## 2022-01-01 RX ADMIN — CARBAMAZEPINE 100 MG: 200 TABLET ORAL at 09:21

## 2022-01-01 RX ADMIN — ROPINIROLE HYDROCHLORIDE 0.5 MG: 0.5 TABLET, FILM COATED ORAL at 20:29

## 2022-01-01 RX ADMIN — NIFEDIPINE 60 MG: 60 TABLET, EXTENDED RELEASE ORAL at 09:01

## 2022-01-01 RX ADMIN — GABAPENTIN 300 MG: 300 CAPSULE ORAL at 06:04

## 2022-01-01 RX ADMIN — MINERAL OIL: 1000 LIQUID ORAL at 08:07

## 2022-01-01 RX ADMIN — CYANOCOBALAMIN 1000 MCG: 1000 INJECTION, SOLUTION INTRAMUSCULAR; SUBCUTANEOUS at 09:20

## 2022-01-01 RX ADMIN — HYDROCODONE BITARTRATE AND ACETAMINOPHEN 1 TABLET: 5; 325 TABLET ORAL at 07:54

## 2022-01-01 RX ADMIN — ROPINIROLE HYDROCHLORIDE 0.5 MG: 0.5 TABLET, FILM COATED ORAL at 20:15

## 2022-01-01 RX ADMIN — MINERAL OIL: 1000 LIQUID ORAL at 09:20

## 2022-01-01 RX ADMIN — IPRATROPIUM BROMIDE AND ALBUTEROL SULFATE 3 ML: .5; 3 SOLUTION RESPIRATORY (INHALATION) at 17:32

## 2022-01-01 RX ADMIN — ACETAMINOPHEN 650 MG: 325 TABLET, FILM COATED ORAL at 18:11

## 2022-01-01 RX ADMIN — MINERAL OIL: 1000 LIQUID ORAL at 21:20

## 2022-01-01 RX ADMIN — Medication 5 MG: at 21:20

## 2022-01-01 RX ADMIN — ROPINIROLE HYDROCHLORIDE 0.5 MG: 0.5 TABLET, FILM COATED ORAL at 20:18

## 2022-01-01 RX ADMIN — POTASSIUM CHLORIDE 40 MEQ: 750 CAPSULE, EXTENDED RELEASE ORAL at 09:13

## 2022-01-01 RX ADMIN — MEGESTROL ACETATE 400 MG: 40 SUSPENSION ORAL at 09:00

## 2022-01-01 RX ADMIN — GABAPENTIN 600 MG: 300 CAPSULE ORAL at 18:06

## 2022-01-01 RX ADMIN — OXYCODONE AND ACETAMINOPHEN 1 TABLET: 5; 325 TABLET ORAL at 00:56

## 2022-02-23 NOTE — TELEPHONE ENCOUNTER
Called patient and left a message letting her know that her pacemaker reading is due today. Left my name and number stating that if she needed help sending in the reading or if she has any concerns to call me.

## 2022-03-08 NOTE — TELEPHONE ENCOUNTER
Caller: Sheyla Zimmerman    Relationship: Emergency Contact    Best call back number: [phone]    What is the best time to reach you: ANYTIME    Who are you requesting to speak with (clinical staff, provider,  specific staff member): CLINICAL    What was the call regarding:CALLING TO VERIFY IF OFFICE PROVIDES THE IVIG IMMUNOGLOBULIN INFUSION    Do you require a callback: YES

## 2022-03-16 NOTE — TELEPHONE ENCOUNTER
Rx Refill Note  Requested Prescriptions     Pending Prescriptions Disp Refills   • amitriptyline (ELAVIL) 25 MG tablet [Pharmacy Med Name: AMITRIPTYLINE HCL 25 MG TAB 25 Tablet] 60 tablet 11     Sig: TAKE 1 TABLET BY MOUTH ONCE DAILY IN THE MORNING AND 1 TABLET ONCE NIGHTLY AT BEDTIME      Last office visit with prescribing clinician: 8/9/2021      Next office visit with prescribing clinician: Visit date not found            Roberta Diaz MA  03/16/22, 11:12 EDT

## 2022-04-25 NOTE — TELEPHONE ENCOUNTER
Pt called requesting refills of Phenergan 25 mg tab PRN, took last one today, and her Bumetanide that she takes every other day and has 3 pills left. Please send to the [facility]. Pt would like a couple months order if possible.

## 2022-05-12 NOTE — PROGRESS NOTES
Subjective   Sheryl Conner is a 89 y.o. female  Shoulder Pain (Shoulder injury x2 weeks ago from picking up bag fertilizer, diclofenac gel has been helping. Sees pain management Dr. Mic Stafford )      History of Present Illness     Patient is a 89-year-old female seen today due to right shoulder pain for the last couple of weeks. She does have a pain management doctor that she already sees, Dr. Stafford. The patient reports that she picked up a 40 pound bag of fertilizer and it hurt her right shoulder. She states that she felt pain when she was lifting it up. The patient reports that she is having trouble lifting her right arm now. She notes she has been using diclofenac gel to the area.    She states that she has been using diclofenac gel on her knees for her chronic knee pain.    The following portions of the patient's history were reviewed and updated as appropriate: allergies, current medications, past social history and problem list    Review of Systems   Constitutional: Positive for activity change.   Musculoskeletal: Positive for arthralgias ( right shoulder) and myalgias. Negative for gait problem and joint swelling.   Skin: Negative.    Neurological: Negative for weakness and numbness.   Psychiatric/Behavioral: Positive for sleep disturbance.       Objective     Vitals:    05/12/22 1339   BP: 154/92   Pulse: 87   Temp: 97.2 °F (36.2 °C)   SpO2: 99%       Physical Exam  Vitals and nursing note reviewed.   Constitutional:       General: She is not in acute distress.     Appearance: Normal appearance. She is well-developed. She is not ill-appearing, toxic-appearing or diaphoretic.   Cardiovascular:      Pulses: Normal pulses.   Musculoskeletal:         General: Tenderness ( AC joint right shoulder, dec ROM) present. No swelling, deformity or signs of injury.   Skin:     General: Skin is warm and dry.      Coloration: Skin is not pale.      Findings: No erythema or rash.   Neurological:      Mental Status: She  is alert and oriented to person, place, and time.      Sensory: No sensory deficit.      Motor: No abnormal muscle tone.      Coordination: Coordination normal.   Psychiatric:         Mood and Affect: Mood normal.         Behavior: Behavior normal.         Assessment & Plan     Diagnoses and all orders for this visit:    1. Injury of right shoulder, initial encounter (Primary)  -     Ambulatory Referral to Orthopedic Surgery    Other orders  -     methylPREDNISolone (MEDROL) 4 MG dose pack; Take as directed on package instructions.  Dispense: 1 each; Refill: 0     I am going to prescribe a steroid to try to calm down any inflammation in the joint and put a referral to orthopedic surgery. If her symptoms completely clear up, she can cancel the appointment. I want them to have it scheduled for after she finishes this medicine in a week, ortho can recheck her. If it is not better, they can get right on seeing if she needs an injection or physical therapy because if her symptoms remain for too long, it also can limit her ability to get it strengthened again. I advised her to be careful with her right arm in the meantime.    Transcribed from ambient dictation for June Rossi PA-C by LALA BLACKBURN.  05/12/22   14:44 EDT    Patient verbalized consent to the visit recording.

## 2022-05-20 NOTE — TELEPHONE ENCOUNTER
Ms. Conner called back earlier to find out if she had to send in a reading. We told her yes. She said that she'll send one in.      A couple hours went by and Ms. Conner never sent in the reading. So I called her back to remind her that she needed to send in the reading because her pacemaker battery is low and nearing JAYA. She told me that she will send it in after she puts her groceries away.      About an hour later Ms. Conner called back saying she couldn't get the monitor to work. She and I worked together numerous times to get the monitor to send in a reading. We were unsuccessful.    I told her to not worry about it and to remember to come to her appointment with Dr. Seals in June.  I told her that this appointment was very important and she needed to show up. She agreed with me.

## 2022-05-31 PROBLEM — M75.41 IMPINGEMENT SYNDROME OF RIGHT SHOULDER: Status: ACTIVE | Noted: 2022-01-01

## 2022-05-31 PROBLEM — M75.21 BICEPS TENDINITIS OF RIGHT UPPER EXTREMITY: Status: ACTIVE | Noted: 2022-01-01

## 2022-05-31 PROBLEM — M75.51 BURSITIS OF RIGHT SHOULDER: Status: ACTIVE | Noted: 2022-01-01

## 2022-05-31 NOTE — PROGRESS NOTES
[facility] [place] [facility] Office Visit - Min Hancock MD    Office Visit       Patient Name: Sheryl Conner    Chief Complaint:   Chief Complaint   Patient presents with   • Right Shoulder - Pain       Referring Physician: June Rossi PA-C    History of Present Illness:   Sheryl Conner is a 89 y.o. female who presents with right body part: shoulder Reason: pain.  Onset:Onset: atraumatic and gradual in nature. The issue has been ongoing for 1 month(s). Pain is a 9/10 on the pain scale. Pain is described as Pain Characterization: aching. Associated symptoms include Symptoms: pain. The pain is worse with walking and climbing stairs; pain medication and/or NSAID improve the pain. Previous treatments have included: NSAIDS.  I have reviewed the patient's history of present illness as noted/entered above.    I have reviewed the patient's past medical history, surgical history, social history, family history, medications, and allergies as noted in the electronic medical record and as noted/entered.  I have reviewed the patient's review of systems as noted/enter and updated as noted in the patient's HPI.    Right shoulder pain  Anterior biceps strain 1 month ago lifting fertilizer bag    Enjoys gardening      Subjective   Subjective      Review of Systems   Constitutional: Negative.  Negative for chills, fatigue and fever.   HENT: Negative.  Negative for congestion and dental problem.    Eyes: Negative.  Negative for blurred vision.   Respiratory: Negative.  Negative for shortness of breath.    Cardiovascular: Negative.  Negative for leg swelling.   Gastrointestinal: Negative.  Negative for abdominal pain.   Endocrine: Negative.  Negative for polyuria.   Genitourinary: Negative.  Negative for difficulty urinating.   Musculoskeletal: Positive for arthralgias.   Skin: Negative.    Allergic/Immunologic: Negative.    Neurological: Negative.     Hematological: Negative.  Negative for adenopathy.   Psychiatric/Behavioral: Negative.  Negative for behavioral problems.        Past Medical History:   Past Medical History:   Diagnosis Date   • Anemia    • Arthritis    • Fibromyalgia    • GERD (gastroesophageal reflux disease)    • History of transfusion    • Hypertension    • Kidney stone    • MG (myasthenia gravis) (HCC)     history of   • Pacemaker        Past Surgical History:   Past Surgical History:   Procedure Laterality Date   • CARDIAC SURGERY      pacemaker placed   • COLONOSCOPY     • FOOT SURGERY Left     in the 1980s   • HEMORRHOIDECTOMY     • TUBAL ABDOMINAL LIGATION     • UPPER GASTROINTESTINAL ENDOSCOPY     • UPPER GASTROINTESTINAL ENDOSCOPY  05/29/2018       Family History:   Family History   Problem Relation Age of Onset   • No Known Problems Mother    • Diabetes Father    • Cancer Son        Social History:   Social History     Socioeconomic History   • Marital status:    Tobacco Use   • Smoking status: Never Smoker   • Smokeless tobacco: Never Used   Substance and Sexual Activity   • Alcohol use: No   • Drug use: No   • Sexual activity: Defer       Medications:   Current Outpatient Medications:   •  acyclovir (Zovirax) 5 % ointment, Apply 1 application topically to the appropriate area as directed 5 (Five) Times a Day., Disp: 15 g, Rfl: 2  •  amitriptyline (ELAVIL) 25 MG tablet, TAKE 1 TABLET BY MOUTH ONCE DAILY IN THE MORNING AND 1 TABLET ONCE NIGHTLY AT BEDTIME, Disp: 60 tablet, Rfl: 11  •  bumetanide (BUMEX) 0.5 MG tablet, TAKE 1 TABLET BY MOUTH EVERY OTHER DAY AS NEEDED FOR SWELLING, Disp: 90 tablet, Rfl: 0  •  cetirizine (ZyrTEC) 10 MG tablet, Take 10 mg by mouth daily., Disp: , Rfl:   •  Diclofenac Sodium (VOLTAREN) 1 % gel gel, APPLY 4 GRAMS TO AFFECTED AREA 4 TIMES DAILY AS DIRECTED, Disp: 400 g, Rfl: 0  •  fentaNYL (DURAGESIC) 12 MCG/HR, APPLY 1 PATCH TOPICALLY TO SKIN Q 72 H, Disp: , Rfl:   •  fentaNYL (DURAGESIC) 50 MCG/HR  patch, APPLY 1 PATCH TOPICALLY Q 72 H, Disp: , Rfl:   •  fluticasone (FLONASE) 50 MCG/ACT nasal spray, fluticasone propionate 50 mcg/actuation nasal spray,suspension  2 sprays each nostril daily, Disp: , Rfl:   •  gabapentin (NEURONTIN) 600 MG tablet, Take 600 mg by mouth 3 (Three) Times a Day., Disp: , Rfl: 1  •  HYDROcodone-acetaminophen (NORCO)  MG per tablet, Take 1 tablet by mouth every 6 (six) hours as needed for moderate pain (4-6)., Disp: , Rfl:   •  pantoprazole (PROTONIX) 40 MG EC tablet, TAKE 1 TABLET BY MOUTH TWICE DAILY, Disp: 60 tablet, Rfl: 11  •  potassium chloride 10 MEQ CR tablet, TAKE 1 TABLET BY MOUTH ONCE DAILY, Disp: 30 tablet, Rfl: 11  •  promethazine (PHENERGAN) 25 MG tablet, Take 1 tablet by mouth Every 6 (Six) Hours As Needed for Nausea or Vomiting., Disp: 30 tablet, Rfl: 5  •  methylPREDNISolone (MEDROL) 4 MG dose pack, Use as directed by package instructions, Disp: 1 each, Rfl: 0    Allergies:   Allergies   Allergen Reactions   • Codeine Sulfate Unknown (See Comments)     Pt took in the 70's-pt not sure of what happened   • Cozaar [Losartan] Unknown (See Comments)     Pt can not remember   • Crestor [Rosuvastatin] Unknown (See Comments)     Pt can not remember   • Dexlansoprazole Unknown (See Comments)     Pt can not remember   • Lipitor [Atorvastatin] Unknown (See Comments)     Pt can not remember   • Lisinopril Unknown (See Comments)     Pt can not remember   • Nexium [Esomeprazole Magnesium] Unknown (See Comments)     Pt can not remember   • Norvasc [Amlodipine] Unknown (See Comments)     Pt can not remember   • Sulfadiazine Unknown (See Comments)     Pt can not remember   • Toprol Xl [Metoprolol Tartrate] Unknown (See Comments)     Pt can not remember   • Zetia [Ezetimibe] Unknown (See Comments)     Pt can not remember   • Zocor [Simvastatin] Unknown (See Comments)     Pt can not remember   • Augmentin [Amoxicillin-Pot Clavulanate] GI Intolerance     nausea   • Celebrex  "[Celecoxib] GI Intolerance       The following portions of the patient's history were reviewed and updated as appropriate: allergies, current medications, past family history, past medical history, past social history, past surgical history and problem list.        Objective    Objective      Vital Signs:   Vitals:    05/31/22 1408   BP: 140/72   Weight: 54.9 kg (121 lb 0.5 oz)   Height: 160 cm (62.99\")       Ortho Exam:  Right shoulder biceps tendinitis pain with uppercut pain with Eastlake's pain with tenderness palpation, negative rotator cuff testing, negative Eleazar deformity, negative bruising  Positive Neer positive Hurley consistent with impingement syndrome.  Painful arc of motion at 90 degrees forward flexion and abduction  130/130/45/80/80    Results Review:   Imaging Results (Last 24 Hours)     Procedure Component Value Units Date/Time    XR Shoulder 2+ View Right [[id number]] Resulted: 05/31/22 1435     Updated: 05/31/22 1435    Narrative:      Imaging: shoulder x-rays 2 views - AP and axillary x-ray views    Side: RIGHT SHOULDER    Indication for shoulder x-ray 2 views: shoulder pain    Comparison: no comparison views available    Findings: No acute bony pathology. Perhaps superior humeral head   migration.  The humeral head remains centered in the glenohumeral joint.   No evidence of calcific tendonitis.    Baseline degenerative AC joint changes.    I personally reviewed the above x-rays and discussed with the patient.              Procedures     Right SHOULDER SUBACROMIAL SPACE INJECTION: Risks and benefits of a shoulder subacromial space injection were discussed and the patient desired to proceed. Verbal consent was obtained. The patient understood the risk of infection, potential skin changes, bump in blood glucose especially with diabetes, nerve injury, possibility of increased pain in the short term, and possible incomplete pain relief.  Using sterile technique, the shoulder subacromial space was " injected from a posterior approach with 1mL of 40 mg triamcinolone acetonide 40 MG/ML and 4cc of lidocaine with aspiration prior to injection. The patient tolerated the procedure without difficulty.  CPT CODE 17566 for major joint aspiration/injection          Assessment / Plan      Assessment/Plan:   Problem List Items Addressed This Visit        Musculoskeletal and Injuries    Biceps tendinitis of right upper extremity - Primary    Relevant Medications    methylPREDNISolone (MEDROL) 4 MG dose pack    Other Relevant Orders    Ambulatory Referral to Physical Therapy Ortho, Evaluate and treat    Impingement syndrome of right shoulder    Relevant Medications    methylPREDNISolone (MEDROL) 4 MG dose pack    Other Relevant Orders    Ambulatory Referral to Physical Therapy Ortho, Evaluate and treat    Bursitis of right shoulder    Relevant Medications    methylPREDNISolone (MEDROL) 4 MG dose pack    Other Relevant Orders    Ambulatory Referral to Physical Therapy Ortho, Evaluate and treat      Other Visit Diagnoses     Right shoulder pain, unspecified chronicity        Relevant Medications    methylPREDNISolone (MEDROL) 4 MG dose pack    Other Relevant Orders    XR Shoulder 2+ View Right (Completed)    Ambulatory Referral to Physical Therapy Ortho, Evaluate and treat          RIGHT SHOULDER  Bicep strain/biceps tendinitis.  Counseled on treatment options, steroid Dosepak, physical therapy, biceps tendon sheath injection.    If not improved in 2 months and we will obtain a CT arthrogram of the right shoulder    Follow Up: 2 months to reassess        Min Hancock MD, FAAOS  Orthopedic Surgeon  Fellowship Trained Shoulder and Elbow Surgeon  [facility]  Orthopedics and Sports Medicine  [address] [place] [address], [address]  [place] [address]    05/31/22  14:40 EDT

## 2022-05-31 NOTE — PROGRESS NOTES
Procedure   Large Joint Arthrocentesis  Date/Time: 5/31/2022 2:42 PM  Consent given by: patient  Site marked: site marked  Timeout: Immediately prior to procedure a time out was called to verify the correct patient, procedure, equipment, support staff and site/side marked as required   Supporting Documentation  Indications: pain   Procedure Details  Location: shoulder - Shoulder joint: Right biceps tendon.  Approach: anterior  Medications administered: 5 mL lidocaine PF 1% 1 %; 40 mg triamcinolone acetonide 40 MG/ML  Patient tolerance: patient tolerated the procedure well with no immediate complications

## 2022-06-27 PROBLEM — I44.2 AV BLOCK, COMPLETE (HCC): Status: ACTIVE | Noted: 2022-01-01

## 2022-06-27 NOTE — PROGRESS NOTES
[facility]  Office visit  Sheryl ROSAS Dalila  8/17/1931    There is no work phone number on file.    VISIT DATE:  6/27/2022      PCP: Manan Garcia MD  [id number] [facility]   [facility] [id number]    CC:  Chief Complaint   Patient presents with   • sss       Previous cardiac studies and procedures:  2012 diagnosed with symptomatic paroxysmal A. fib and tachybradycardia syndrome.  2012 Medtronic pacemaker  2012 cardiac catheterization: No flow limiting stenosis.  June 2018 echo  · Left ventricular systolic function is hyperdynamic (EF > 70).  · Left atrial cavity size is mildly dilated.  · Mean aortic valve gradient is 13 mmhg which is borderline mild aortic stenosis    January 2021 transthoracic echo  · Left ventricular ejection fraction appears to be 61 - 65%. Left ventricular systolic function is normal.  · Left ventricular diastolic function is consistent with (grade II w/high LAP) pseudonormalization.  · Left ventricular wall thickness is consistent with concentric hypertrophy.  · Left atrial cavity is moderate to severely dilated  · Estimated right ventricular systolic pressure from tricuspid regurgitation is mildly elevated (35-45 mmHg). Calculated right ventricular systolic pressure from tricuspid regurgitation is 43 mmHg.  · An electronic lead is present in the right atrium. 1.30cm x 0.70cm echodense, globular structure noted on electronic lead in right atrium. Unchanged from previous evaluation, echocardiographically most consistent with chronic/organized/sclerotic thrombus.    ASSESSMENT:   Diagnosis Plan   1. Essential hypertension     2. Pacemaker     3. Paroxysmal atrial fibrillation (HCC)     4. AV block, complete (HCC)       Device interrogation:  Medtronic dual-chamber  2 % atrial pacing, sensed P wave 2 mV, threshold 5 V at 1 ms, impedance 317 ohms  100% ventricular pacing, no underlying at 40, threshold 0.5 V at 0.4 ms, impedance 660 ohms  1 month  estimated JAYA.  No arrhythmia    PLAN:  High-grade AV block: Currently stable and asymptomatic.  Continue routine follow-up in device clinic.  Device dependent.  Known chronic high atrial threshold, limited atrial pacing, adequate sensing.  No evidence of underlying sick sinus syndrome, essentially just needs her atrial lead for sensing, unlikely to need atrial lead revision at time of GEN change.    Paroxysmal atrial fibrillation: Chads Vas equal to 4. Not a candidate for chronic anticoagulation due to upper GI bleeding and recurrent issues with symptomatic anemia. Minimal burden of arrhythmia.     Hypertension: Goal less than 140/90 mmHg.   currently labile but with overall reasonable control, evidence of whitecoat hypertension.  Previously discontinued combination of amlodipine and olmesartan and due to worsening lower extremity edema.  Will consider thiazide diuretic or Aldactone in the future if we need better afterload reduction.     Venous insufficiency: Continue Bumex on an as-needed basis.  Currently well controlled     Dyspnea on exertion: Likely multifactorial, some element of diastolic congestive heart failure is contributing.  Currently euvolemic and compensated.  Continue current medical therapy.    Right atrial pacemaker lead mass: Stable on follow-up echo, consistent with chronic/organized/sclerotic thrombus.  No further serial surveillance recommended.    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  Denies chest pain, palpitations.    Lower extremity edema is currently well controlled.  Did not tolerate Lasix due to nausea.  Stable shortness of breath in a class II pattern.      PHYSICAL EXAMINATION:  Vitals:    06/27/22 1410   BP: 142/76   BP Location: Left arm   Patient Position: Sitting  "  Pulse: 85   SpO2: 91%   Weight: 54.9 kg (121 lb)   Height: 160 cm (63\")     General Appearance:    Alert, cooperative, no distress, appears stated age   Head:    Normocephalic, without obvious abnormality, atraumatic   Eyes:    conjunctiva/corneas clear   Nose:   Nares normal, septum midline, mucosa normal, no drainage   Throat:   Lips, teeth and gums normal   Neck:   Supple, symmetrical, trachea midline, no carotid    bruit or JVD   Lungs:     Clear to auscultation bilaterally, respirations unlabored   Chest Wall:    No tenderness or deformity    Heart:    Regular rate and rhythm, S1 and S2 normal, 1/6 early peaking systolic murmur right upper sternal border, rub   or gallop, normal carotid impulse bilaterally without bruit.   Abdomen:     Soft, non-tender   Extremities:   Extremities normal, atraumatic, no cyanosis or edema   Pulses:   2+ and symmetric all extremities   Skin:   Skin color, texture, turgor normal, no rashes or lesions       Diagnostic Data:  Procedures  Lab Results   Component Value Date    TRIG 63 07/24/2021    HDL 73 (H) 07/24/2021     Lab Results   Component Value Date    GLUCOSE 117 (H) 11/11/2021    BUN 16 11/11/2021    CREATININE 1.04 (H) 11/11/2021     11/11/2021    K 4.3 11/11/2021     11/11/2021    CO2 29.0 11/11/2021     No results found for: HGBA1C  Lab Results   Component Value Date    WBC 4.30 05/31/2022    HGB 13.0 05/31/2022    HCT 42.4 05/31/2022     05/31/2022       Allergies  Allergies   Allergen Reactions   • Codeine Sulfate Unknown (See Comments)     Pt took in the 70's-pt not sure of what happened   • Cozaar [Losartan] Unknown (See Comments)     Pt can not remember   • Crestor [Rosuvastatin] Unknown (See Comments)     Pt can not remember   • Dexlansoprazole Unknown (See Comments)     Pt can not remember   • Lipitor [Atorvastatin] Unknown (See Comments)     Pt can not remember   • Lisinopril Unknown (See Comments)     Pt can not remember   • Nexium " [Esomeprazole Magnesium] Unknown (See Comments)     Pt can not remember   • Norvasc [Amlodipine] Unknown (See Comments)     Pt can not remember   • Sulfadiazine Unknown (See Comments)     Pt can not remember   • Toprol Xl [Metoprolol Tartrate] Unknown (See Comments)     Pt can not remember   • Zetia [Ezetimibe] Unknown (See Comments)     Pt can not remember   • Zocor [Simvastatin] Unknown (See Comments)     Pt can not remember   • Augmentin [Amoxicillin-Pot Clavulanate] GI Intolerance     nausea   • Celebrex [Celecoxib] GI Intolerance       Current Medications    Current Outpatient Medications:   •  acyclovir (Zovirax) 5 % ointment, Apply 1 application topically to the appropriate area as directed 5 (Five) Times a Day., Disp: 15 g, Rfl: 2  •  amitriptyline (ELAVIL) 25 MG tablet, TAKE 1 TABLET BY MOUTH ONCE DAILY IN THE MORNING AND 1 TABLET ONCE NIGHTLY AT BEDTIME, Disp: 60 tablet, Rfl: 11  •  bumetanide (BUMEX) 0.5 MG tablet, TAKE 1 TABLET BY MOUTH EVERY OTHER DAY AS NEEDED FOR SWELLING, Disp: 90 tablet, Rfl: 0  •  cetirizine (ZyrTEC) 10 MG tablet, Take 10 mg by mouth daily., Disp: , Rfl:   •  Diclofenac Sodium (VOLTAREN) 1 % gel gel, APPLY 4 GRAMS TO AFFECTED AREA 4 TIMES DAILY AS DIRECTED, Disp: 400 g, Rfl: 0  •  fentaNYL (DURAGESIC) 12 MCG/HR, APPLY 1 PATCH TOPICALLY TO SKIN Q 72 H, Disp: , Rfl:   •  fentaNYL (DURAGESIC) 50 MCG/HR patch, APPLY 1 PATCH TOPICALLY Q 72 H, Disp: , Rfl:   •  fluticasone (FLONASE) 50 MCG/ACT nasal spray, fluticasone propionate 50 mcg/actuation nasal spray,suspension  2 sprays each nostril daily, Disp: , Rfl:   •  gabapentin (NEURONTIN) 600 MG tablet, Take 600 mg by mouth 3 (Three) Times a Day., Disp: , Rfl: 1  •  HYDROcodone-acetaminophen (NORCO)  MG per tablet, Take 1 tablet by mouth every 6 (six) hours as needed for moderate pain (4-6)., Disp: , Rfl:   •  methylPREDNISolone (MEDROL) 4 MG dose pack, Use as directed by package instructions, Disp: 1 each, Rfl: 0  •  pantoprazole  (PROTONIX) 40 MG EC tablet, TAKE 1 TABLET BY MOUTH TWICE DAILY, Disp: 60 tablet, Rfl: 11  •  potassium chloride 10 MEQ CR tablet, TAKE 1 TABLET BY MOUTH ONCE DAILY, Disp: 30 tablet, Rfl: 11  •  promethazine (PHENERGAN) 25 MG tablet, Take 1 tablet by mouth Every 6 (Six) Hours As Needed for Nausea or Vomiting., Disp: 30 tablet, Rfl: 5          ROS  Review of Systems   Cardiovascular: Negative for chest pain, dyspnea on exertion, irregular heartbeat and palpitations.   Respiratory: Negative for cough and snoring.        SOCIAL HX  Social History     Socioeconomic History   • Marital status:    Tobacco Use   • Smoking status: Never Smoker   • Smokeless tobacco: Never Used   Substance and Sexual Activity   • Alcohol use: No   • Drug use: No   • Sexual activity: Defer       FAMILY HX  Family History   Problem Relation Age of Onset   • No Known Problems Mother    • Diabetes Father    • Cancer Son              Andi Seals III, MD, FACC

## 2022-06-27 NOTE — PROGRESS NOTES
"PROBLEM LIST:  1. 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 Status post EGD colonoscopy  3. Status post Iv iron  infusion periodically  4. Bone Marrow biopsy  - normal - done by Dr. Reeves  5. 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  lady with normocytic anemia related to chronic blood loss.  She requires periodic iron infusions.  She has not required any transfusion support.  Last blood transfusion on 10/3/2019.     Denies any active bleeding.  Fatigue seems to be tolerating well.  Her biggest concern is her neck pain.  Which is related to significant DJD.  I have been able to manage her transfusion needs by giving her IV iron periodically.  Her last infusion was in Jan 2022.        Past medical history, social history and family history was reviewed and unchanged from prior visit.    Review of Systems:    Review of Systems   Constitutional: Positive for fatigue.   HENT: Negative.    Eyes: Negative.    Respiratory: Positive for shortness of breath.    Cardiovascular: Negative.    Gastrointestinal: Negative.    Endocrine: Negative.    Genitourinary: Negative.    Musculoskeletal: Positive for back pain and neck pain.   Skin: Negative.    Allergic/Immunologic: Negative.    Neurological: Negative.    Hematological: Negative.    Psychiatric/Behavioral: Negative.       A comprehensive 14 point review of systems was performed and was negative except as mentioned.      Medications:  The current medication list was reviewed in the EMR    ALLERGIES:    Allergies   Allergen Reactions   • Codeine Sulfate Unknown (See Comments)     Pt took in the 70's-pt not sure of what happened   • Cozaar [Losartan] Unknown (See Comments)     Pt can not remember   • Crestor [Rosuvastatin] Unknown (See Comments)     Pt can not remember   • Dexlansoprazole Unknown (See Comments)     Pt can not remember   • Lipitor " "[Atorvastatin] Unknown (See Comments)     Pt can not remember   • Lisinopril Unknown (See Comments)     Pt can not remember   • Nexium [Esomeprazole Magnesium] Unknown (See Comments)     Pt can not remember   • Norvasc [Amlodipine] Unknown (See Comments)     Pt can not remember   • Sulfadiazine Unknown (See Comments)     Pt can not remember   • Toprol Xl [Metoprolol Tartrate] Unknown (See Comments)     Pt can not remember   • Zetia [Ezetimibe] Unknown (See Comments)     Pt can not remember   • Zocor [Simvastatin] Unknown (See Comments)     Pt can not remember   • Augmentin [Amoxicillin-Pot Clavulanate] GI Intolerance     nausea   • Celebrex [Celecoxib] GI Intolerance         Physical Exam    VITAL SIGNS:  /84 Comment: Mannual  Pulse 82   Temp 98.2 °F (36.8 °C) (Infrared)   Resp 20   Ht 160 cm (63\")   Wt 54.9 kg (121 lb)   SpO2 91% Comment: RA  BMI 21.43 kg/m²      Performance Status: 1    General: well appearing, in no acute distress  HEENT: sclera anicteric, oropharynx clear, neck is supple  Lymphatics: no cervical, supraclavicular, or axillary adenopathy  Cardiovascular: regular rate and rhythm, no murmurs, rubs or gallops  Lungs: clear to auscultation bilaterally  Abdomen: soft, nontender, nondistended.  No palpable organomegaly  Extremities: no lower extremity edema  Skin: no rashes, lesions, bruising, or petechiae  Msk:  Shows no weakness of the large muscle groups  Psych: Mood is stable        RECENT LABS:    Lab Results   Component Value Date    WBC 4.80 06/27/2022    HGB 13.1 06/27/2022    HCT 42.4 06/27/2022    MCV 87.7 06/27/2022     06/27/2022     Lab Results   Component Value Date    FERRITIN 179.00 (H) 05/31/2022    FERRITIN 160.80 (H) 05/06/2022    FERRITIN 220.80 (H) 03/31/2022     Lab Results   Component Value Date    HGB 13.1 06/27/2022    HGB 13.0 05/31/2022    HGB 12.2 05/06/2022    HGB 12.6 03/31/2022    HGB 13.0 03/02/2022    HGB 12.5 01/28/2022    HGB 11.9 (L) 12/27/2021    " HGB 12.6 12/13/2021    HGB 12.8 11/16/2021    HGB 12.5 11/11/2021     Ct Cervical Spine Without Contrast    Result Date: 5/6/2020  1. Advanced multilevel degenerative disc disease, with probably moderate canal stenosis at C3-4 and C4-5 due to prominent posterior vertebral osteophytes, and some posterior element hypertrophy. 2. Milder degenerative changes, and bony foraminal stenosis elsewhere as discussed above by disc level. No evidence of acute or healing trauma.       Ct Thoracic Spine Without Contrast    Result Date: 5/6/2020  1. Advanced multilevel degenerative disc disease, with probably moderate canal stenosis at C3-4 and C4-5 due to prominent posterior vertebral osteophytes, and some posterior element hypertrophy. 2. Milder degenerative changes, and bony foraminal stenosis elsewhere as discussed above by disc level. No evidence of acute or healing trauma.  CT SCAN OF THE THORACIC SPINE: There is exaggeration of the normal thoracic lordosis. No significant focal subluxation or evidence of vertebral compression deformity is seen. There is moderate multilevel degenerative disc disease. No destructive or blastic bony lesions are seen. Coronal images show a minimal dextroconvex scoliosis. Axial bone window images show no evidence of acute bony trauma. Soft tissue axial images of the thoracic spine show the usual limited detail of the canal for non-myelographic scans. The thoracic canal appears congenitally rather ample. No gross evidence of HNP or canal stenosis is appreciated down to the level of T9-T10 where there is a prominent posterior vertebral osteophyte but only borderline canal narrowing. Below this level, the canal appears unremarkable. No pathologic paraspinous mass or inflammatory change is seen. Review of the included portions of the lungs shows a mild patchy, widely distributed appearance of faint groundglass opacity, not well defined, more central than peripheral, and with no crazy paving pattern  or lung consolidation. This is very similar to the appearance of the 10/05/2019 chest radiograph which showed mild interstitial edema. Although technically indeterminate for Covid-19 pneumonia, there are no typical features of Covid-19, and findings are explainable on the basis of the patient's previously noted interstitial disease alone.  IMPRESSION: 1. No evidence of acute thoracic spine trauma, advanced degenerative disc disease, or gross evidence of thoracic spinal stenosis. 2. Mild and rather diffuse, faint groundglass opacity of the lungs as noted. Although technically indeterminate for Covid-19, the findings are consistent with the mild interstitial edema pattern seen on the 10/05/2019 exam. According to the CT technologist, the patient has no complaint of any current or recent respiratory symptoms.  D:  05/06/2020 E:  05/06/2020         Assessment/Plan    1. normocytic anemia requiring periodic blood transfusions secondary to bleeding AVMs and a Watermelon stomach.  Underwent argon treatment with Dr. Llamas.  She is relatively stable for now.  She does not require any iron infusions.  She can check her numbers every other month.  If she is dropping again then we can repeat iron infusions.      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    3.  Severe neck pain related to DJD        Rahat Morris MD  [facility]    6/27/2022

## 2022-07-01 PROBLEM — Z45.018 ELECTIVE REPLACEMENT INDICATED FOR PACEMAKER: Status: ACTIVE | Noted: 2022-01-01

## 2022-07-15 PROBLEM — R19.7 NAUSEA VOMITING AND DIARRHEA: Status: ACTIVE | Noted: 2022-01-01

## 2022-07-15 PROBLEM — N39.0 UTI (URINARY TRACT INFECTION): Status: ACTIVE | Noted: 2022-01-01

## 2022-07-15 PROBLEM — D72.819 LEUKOPENIA: Status: ACTIVE | Noted: 2022-01-01

## 2022-07-15 PROBLEM — M79.605 LEFT LEG PAIN: Status: ACTIVE | Noted: 2022-01-01

## 2022-07-15 PROBLEM — G93.41 ACUTE METABOLIC ENCEPHALOPATHY: Status: ACTIVE | Noted: 2021-07-24

## 2022-07-15 PROBLEM — R11.2 NAUSEA VOMITING AND DIARRHEA: Status: ACTIVE | Noted: 2022-01-01

## 2022-07-15 PROBLEM — A41.9 SEPSIS (HCC): Status: ACTIVE | Noted: 2022-01-01

## 2022-07-15 PROBLEM — I10 ACCELERATED HYPERTENSION: Status: ACTIVE | Noted: 2022-01-01
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
## 2022-07-15 NOTE — ED PROVIDER NOTES
"[facility]    EMERGENCY DEPARTMENT ENCOUNTER      Pt Name: Sheryl Conner  MRN: [id number]  YOB: 1931  Date of evaluation: 7/15/2022  Provider: FARRAH Asher    CHIEF COMPLAINT       Chief Complaint   Patient presents with   • Altered Mental Status         HISTORY OF PRESENT ILLNESS  (Location/Symptom, Timing/Onset, Context/Setting, Quality, Duration, Modifying Factors, Severity.)   Sheryl Conner is a 89 y.o. female who presents to the emergency department with complaints of increased confusion.  Per patient and family member who is present at bedside patient woke up this morning very confused.  He states that she was not able to find her cell phone.  Patient states that she actually went next-door to talk to her elderly neighbor who then took her over to her daughter's house.  Patient states that she also had an episode this morning of nausea, vomiting and diarrhea.  She reports that her bowel movement looked like \"Jell-O\".  Patient has past medical history significant for myasthenia gravis and was treated in [place], [place].  Also with significant osteoarthritis and fibromyalgia.  Patient denies anything specific that makes her symptoms better or worse.  She reports no additional associated symptoms on exam.  Family member at bedside shares that she has a pacemaker that is scheduled for battery change.         Nursing notes were reviewed.    REVIEW OF SYSTEMS    (2-9 systems for level 4, 10 or more for level 5)   Review of Systems   Constitutional: Positive for activity change. Negative for chills, fatigue and fever.   HENT: Negative.    Respiratory: Negative.    Cardiovascular: Negative.    Gastrointestinal: Positive for diarrhea, nausea and vomiting. Negative for abdominal pain and constipation.   Genitourinary: Negative.    Musculoskeletal: Positive for arthralgias and myalgias.   Skin: Negative.    Neurological: Negative.    Psychiatric/Behavioral: Positive for confusion.        All systems " reviewed and negative except for those discussed in HPI.   PAST MEDICAL HISTORY     Past Medical History:   Diagnosis Date   • Anemia    • Arthritis    • Fibromyalgia    • GERD (gastroesophageal reflux disease)    • History of transfusion    • Hypertension    • Kidney stone    • MG (myasthenia gravis) (HCC)     history of   • Pacemaker          SURGICAL HISTORY       Past Surgical History:   Procedure Laterality Date   • CARDIAC SURGERY      pacemaker placed   • COLONOSCOPY     • FOOT SURGERY Left     in the 1980s   • HEMORRHOIDECTOMY     • TUBAL ABDOMINAL LIGATION     • UPPER GASTROINTESTINAL ENDOSCOPY     • UPPER GASTROINTESTINAL ENDOSCOPY  05/29/2018         CURRENT MEDICATIONS       Current Facility-Administered Medications:   •  Pharmacy to dose vancomycin, , Does not apply, Continuous PRN, Max, Melonie G, DO  •  piperacillin-tazobactam (ZOSYN) 3.375 g in iso-osmotic dextrose 50 ml (premix), 3.375 g, Intravenous, Once, Max, Melonie G, DO  •  [START ON 7/16/2022] piperacillin-tazobactam (ZOSYN) 3.375 g in iso-osmotic dextrose 50 ml (premix), 3.375 g, Intravenous, Q8H, Max, Melonie G, DO  •  sodium chloride 0.9 % bolus 1,000 mL, 1,000 mL, Intravenous, Once, Max, Melonie G, DO  •  sodium chloride 0.9 % flush 10 mL, 10 mL, Intravenous, PRN, Dank Rainey, DO  •  vancomycin (VANCOCIN) 1000 mg/200 mL dextrose 5% IVPB, 20 mg/kg, Intravenous, Once, Taylor Cook, [facility]    Current Outpatient Medications:   •  acyclovir (Zovirax) 5 % ointment, Apply 1 application topically to the appropriate area as directed 5 (Five) Times a Day., Disp: 15 g, Rfl: 2  •  amitriptyline (ELAVIL) 25 MG tablet, TAKE 1 TABLET BY MOUTH ONCE DAILY IN THE MORNING AND 1 TABLET ONCE NIGHTLY AT BEDTIME, Disp: 60 tablet, Rfl: 11  •  bumetanide (BUMEX) 0.5 MG tablet, TAKE 1 TABLET BY MOUTH EVERY OTHER DAY AS NEEDED FOR SWELLING, Disp: 90 tablet, Rfl: 0  •  cetirizine (ZyrTEC) 10 MG tablet, Take 10 mg by mouth daily., Disp: , Rfl:   •  Diclofenac  Sodium (VOLTAREN) 1 % gel gel, APPLY 4 GRAMS TO AFFECTED AREA 4 TIMES DAILY AS DIRECTED, Disp: 400 g, Rfl: 0  •  fentaNYL (DURAGESIC) 12 MCG/HR, APPLY 1 PATCH TOPICALLY TO SKIN Q 72 H, Disp: , Rfl:   •  fentaNYL (DURAGESIC) 50 MCG/HR patch, APPLY 1 PATCH TOPICALLY Q 72 H, Disp: , Rfl:   •  fluticasone (FLONASE) 50 MCG/ACT nasal spray, fluticasone propionate 50 mcg/actuation nasal spray,suspension  2 sprays each nostril daily, Disp: , Rfl:   •  gabapentin (NEURONTIN) 600 MG tablet, Take 600 mg by mouth 3 (Three) Times a Day., Disp: , Rfl: 1  •  HYDROcodone-acetaminophen (NORCO)  MG per tablet, Take 1 tablet by mouth every 6 (six) hours as needed for moderate pain (4-6)., Disp: , Rfl:   •  methylPREDNISolone (MEDROL) 4 MG dose pack, Use as directed by package instructions, Disp: 1 each, Rfl: 0  •  pantoprazole (PROTONIX) 40 MG EC tablet, TAKE 1 TABLET BY MOUTH TWICE DAILY, Disp: 60 tablet, Rfl: 11  •  potassium chloride 10 MEQ CR tablet, TAKE 1 TABLET BY MOUTH ONCE DAILY, Disp: 30 tablet, Rfl: 11  •  promethazine (PHENERGAN) 25 MG tablet, Take 1 tablet by mouth Every 6 (Six) Hours As Needed for Nausea or Vomiting., Disp: 30 tablet, Rfl: 5    ALLERGIES     Codeine sulfate, Cozaar [losartan], Crestor [rosuvastatin], Dexlansoprazole, Lipitor [atorvastatin], Lisinopril, Nexium [esomeprazole magnesium], Norvasc [amlodipine], Sulfadiazine, Toprol xl [metoprolol tartrate], Zetia [ezetimibe], Zocor [simvastatin], Augmentin [amoxicillin-pot clavulanate], and Celebrex [celecoxib]    FAMILY HISTORY       Family History   Problem Relation Age of Onset   • No Known Problems Mother    • Diabetes Father    • Cancer Son           SOCIAL HISTORY       Social History     Socioeconomic History   • Marital status:    Tobacco Use   • Smoking status: Never Smoker   • Smokeless tobacco: Never Used   Vaping Use   • Vaping Use: Never used   Substance and Sexual Activity   • Alcohol use: No   • Drug use: No   • Sexual activity:  Defer         PHYSICAL EXAM    (up to 7 for level 4, 8 or more for level 5)   Physical Exam  Vitals and nursing note reviewed.   Constitutional:       General: She is not in acute distress.     Appearance: Normal appearance. She is not ill-appearing or toxic-appearing.   HENT:      Head: Normocephalic and atraumatic.      Nose: Nose normal.      Mouth/Throat:      Mouth: Mucous membranes are dry.   Eyes:      Extraocular Movements: Extraocular movements intact.   Cardiovascular:      Rate and Rhythm: Normal rate and regular rhythm.   Pulmonary:      Effort: Pulmonary effort is normal. No respiratory distress.      Breath sounds: Normal breath sounds.   Abdominal:      General: There is no distension.      Palpations: Abdomen is soft.      Tenderness: There is no abdominal tenderness.   Musculoskeletal:         General: Normal range of motion.      Cervical back: Normal range of motion.   Skin:     General: Skin is warm and dry.   Neurological:      General: No focal deficit present.      Mental Status: She is alert.   Psychiatric:         Mood and Affect: Mood normal.         Behavior: Behavior normal.         Thought Content: Thought content normal.         Judgment: Judgment normal.          DIAGNOSTIC RESULTS     EKG: All EKGs are interpreted by the Emergency Department Physician who either signs or Co-signs this chart in the absence of a cardiologist.    ECG 12 Lead   Preliminary Result   Test Reason : Weak/Dizzy/AMS protocol   Blood Pressure :   */*   mmHG   Vent. Rate :  76 BPM     Atrial Rate :  76 BPM      P-R Int : 134 ms          QRS Dur : 180 ms       QT Int : 440 ms       P-R-T Axes :  84 -75  97 degrees      QTc Int : 495 ms      Atrial-sensed ventricular-paced rhythm   Abnormal ECG   When compared with ECG of 11-NOV-2021 17:46,   Vent. rate has decreased BY   2 BPM      Referred By: EDMD           Confirmed By:           RADIOLOGY:   Non-plain film images such as CT, Ultrasound and MRI are read by the  radiologist. Plain radiographic images are visualized and preliminarily interpreted by the emergency physician with the below findings:      [x] Radiologist's Report Reviewed:  CT Head Without Contrast   Final Result   No evidence of hemorrhage, mass effect or midline shift. No acute   process identified. Stable chronic findings as above.       This report was finalized on 7/15/2022 8:48 PM by Angelia Diaz MD.          CT Abdomen Pelvis With Contrast   Final Result       1. No acute intra-abdominal or intrapelvic process.   2. Bilobed infrarenal abdominal aortic aneurysm, stable as compared to   the previous study.   3. Ancillary findings as described above.       This report was finalized on 7/15/2022 8:55 PM by Angelia Diaz MD.          XR Chest 1 View   Final Result   No acute cardiopulmonary process.       This report was finalized on 7/15/2022 7:32 PM by Angelia Diaz MD.                ED BEDSIDE ULTRASOUND:   Performed by ED Physician - none    LABS:    I have reviewed and interpreted all of the currently available lab results from this visit (if applicable):  Results for orders placed or performed during the hospital encounter of 07/15/22   COVID-19 and FLU A/B PCR - Swab, Nasopharynx    Specimen: Nasopharynx; Swab   Result Value Ref Range    COVID19 Not Detected Not Detected - Ref. Range    Influenza A PCR Not Detected Not Detected    Influenza B PCR Not Detected Not Detected   Comprehensive Metabolic Panel    Specimen: Arm, Left; Blood   Result Value Ref Range    Glucose 102 (H) 65 - 99 mg/dL    BUN 12 8 - 23 mg/dL    Creatinine 1.10 (H) 0.57 - 1.00 mg/dL    Sodium 141 136 - 145 mmol/L    Potassium 4.1 3.5 - 5.2 mmol/L    Chloride 103 98 - 107 mmol/L    CO2 28.0 22.0 - 29.0 mmol/L    Calcium 9.7 (H) 8.2 - 9.6 mg/dL    Total Protein 6.6 6.0 - 8.5 g/dL    Albumin 4.00 3.50 - 5.20 g/dL    ALT (SGPT) 9 1 - 33 U/L    AST (SGOT) 22 1 - 32 U/L    Alkaline Phosphatase 61 39 - 117 U/L    Total Bilirubin 0.4 0.0 -  1.2 mg/dL    Globulin 2.6 gm/dL    A/G Ratio 1.5 g/dL    BUN/Creatinine Ratio 10.9 7.0 - 25.0    Anion Gap 10.0 5.0 - 15.0 mmol/L    eGFR 47.8 (L) >60.0 mL/min/1.73   Troponin    Specimen: Arm, Left; Blood   Result Value Ref Range    Troponin T <0.010 0.000 - 0.030 ng/mL   Magnesium    Specimen: Arm, Left; Blood   Result Value Ref Range    Magnesium 2.0 1.6 - 2.4 mg/dL   Urinalysis With Microscopic If Indicated (No Culture) - Urine, Clean Catch    Specimen: Urine, Clean Catch   Result Value Ref Range    Color, UA Yellow Yellow, Straw    Appearance, UA Cloudy (A) Clear    pH, UA 6.0 5.0 - 8.0    Specific Gravity, UA 1.015 1.001 - 1.030    Glucose, UA Negative Negative    Ketones, UA Trace (A) Negative    Bilirubin, UA Negative Negative    Blood, UA Negative Negative    Protein, UA Negative Negative    Leuk Esterase, UA Small (1+) (A) Negative    Nitrite, UA Negative Negative    Urobilinogen, UA 0.2 E.U./dL 0.2 - 1.0 E.U./dL   CBC Auto Differential    Specimen: Arm, Left; Blood   Result Value Ref Range    WBC 3.30 (L) 3.40 - 10.80 10*3/mm3    RBC 4.47 3.77 - 5.28 10*6/mm3    Hemoglobin 12.9 12.0 - 15.9 g/dL    Hematocrit 39.8 34.0 - 46.6 %    MCV 89.0 79.0 - 97.0 fL    MCH 28.9 26.6 - 33.0 pg    MCHC 32.4 31.5 - 35.7 g/dL    RDW 12.7 12.3 - 15.4 %    RDW-SD 41.4 37.0 - 54.0 fl    MPV 9.2 6.0 - 12.0 fL    Platelets 156 140 - 450 10*3/mm3    Neutrophil % 78.8 (H) 42.7 - 76.0 %    Lymphocyte % 9.7 (L) 19.6 - 45.3 %    Monocyte % 7.6 5.0 - 12.0 %    Eosinophil % 2.4 0.3 - 6.2 %    Basophil % 1.2 0.0 - 1.5 %    Immature Grans % 0.3 0.0 - 0.5 %    Neutrophils, Absolute 2.60 1.70 - 7.00 10*3/mm3    Lymphocytes, Absolute 0.32 (L) 0.70 - 3.10 10*3/mm3    Monocytes, Absolute 0.25 0.10 - 0.90 10*3/mm3    Eosinophils, Absolute 0.08 0.00 - 0.40 10*3/mm3    Basophils, Absolute 0.04 0.00 - 0.20 10*3/mm3    Immature Grans, Absolute 0.01 0.00 - 0.05 10*3/mm3    nRBC 0.0 0.0 - 0.2 /100 WBC   Lactic Acid, Plasma    Specimen: Arm, Left;  Blood   Result Value Ref Range    Lactate 0.8 0.5 - 2.0 mmol/L   Urinalysis, Microscopic Only - Urine, Clean Catch    Specimen: Urine, Clean Catch   Result Value Ref Range    RBC, UA 0-2 None Seen, 0-2 /HPF    WBC, UA 13-20 (A) None Seen, 0-2 /HPF    Bacteria, UA 4+ (A) None Seen, Trace /HPF    Squamous Epithelial Cells, UA 3-6 (A) None Seen, 0-2 /HPF    Hyaline Casts, UA None Seen 0 - 6 /LPF    Methodology Automated Microscopy    Procalcitonin    Specimen: Arm, Left; Blood   Result Value Ref Range    Procalcitonin 0.07 0.00 - 0.25 ng/mL   D-dimer, Quantitative    Specimen: Arm, Left; Blood   Result Value Ref Range    D-Dimer, Quantitative 1.15 (H) 0.01 - 0.50 MCGFEU/mL   POC Creatinine    Specimen: Blood   Result Value Ref Range    Creatinine 1.00 0.60 - 1.30 mg/dL   ECG 12 Lead   Result Value Ref Range    QT Interval 440 ms    QTC Interval 495 ms   Green Top (Gel)   Result Value Ref Range    Extra Tube Hold for add-ons.    Lavender Top   Result Value Ref Range    Extra Tube hold for add-on    Gold Top - SST   Result Value Ref Range    Extra Tube Hold for add-ons.    Light Blue Top   Result Value Ref Range    Extra Tube Hold for add-ons.       MDM  Number of Diagnoses or Management Options  Acute UTI: new, needed workup  Altered mental status, unspecified altered mental status type: new, needed workup  Elevated blood pressure reading with diagnosis of hypertension: established, worsening     Amount and/or Complexity of Data Reviewed  Clinical lab tests: reviewed  Tests in the radiology section of CPT®: reviewed  Discuss the patient with other providers: yes    Risk of Complications, Morbidity, and/or Mortality  Presenting problems: moderate  Diagnostic procedures: moderate  Management options: moderate    Patient Progress  Patient progress: stable    All other labs were within normal range or not returned as of this dictation.      EMERGENCY DEPARTMENT COURSE and DIFFERENTIAL DIAGNOSIS/MDM:   Vitals:    Vitals:     07/15/22 2100 07/15/22 2130 07/15/22 2200 07/15/22 2230   BP:  (!) 186/89 166/72 155/66   BP Location:       Patient Position:       Pulse:       Resp:       Temp:       TempSrc:       SpO2: 93% 93% 90% (!) 89%   Weight:       Height:           ED Course as of 07/15/22 2305   Fri Jul 15, 2022   2123 Hospitalist consulted for admission [JG]   2126 /86 [JG]   2302 In summary this is a 89-year-old female who presents to the ED for evaluation of increased confusion upon waking up this morning.  Patient and family report that patient has had intermittent episodes of confusion but that this morning it did not seem to resolve.  Patient alert and oriented and answers all questions appropriately on interview and exam.  Patient had an episode of nausea, vomiting and diarrhea this morning.  Negative CT scan of the abdomen and pelvis.  CT head without acute or emergent abnormalities.  Chest x-ray demonstrates no acute cardiopulmonary process.  EKG without evidence of acute ischemic changes.  Labs obtained without acute or emergent abnormalities.  Urinalysis consistent with urinary tract infection.  Blood and urine cultures obtained.  Initial dose of antibiotics ordered for administration in the ED.  Hospitalist consulted for admission and agreeable to accept patient.  Patient and family agreeable to plan of care and hospital admission.  At time of admission disposition patient was resting comfortably, no acute distress, vital signs stable with oxygen saturation of 93% on room air. [JG]      ED Course User Index  [JG] Prosper Blue, PA       MEDICATIONS ADMINISTERED IN ED:  Medications   sodium chloride 0.9 % flush 10 mL (has no administration in time range)   sodium chloride 0.9 % bolus 1,000 mL (has no administration in time range)   Pharmacy to dose vancomycin (has no administration in time range)   piperacillin-tazobactam (ZOSYN) 3.375 g in iso-osmotic dextrose 50 ml (premix) (has no administration in time range)    piperacillin-tazobactam (ZOSYN) 3.375 g in iso-osmotic dextrose 50 ml (premix) (has no administration in time range)   vancomycin (VANCOCIN) 1000 mg/200 mL dextrose 5% IVPB (has no administration in time range)   iopamidol (ISOVUE-300) 61 % injection 100 mL (70 mL Intravenous Given 7/15/22 2044)       PROCEDURES:  Procedures          CRITICAL CARE TIME    Total Critical Care time was 0 minutes, excluding separately reportable procedures.   There was a high probability of clinically significant/life threatening deterioration in the patient's condition which required my urgent intervention.      FINAL IMPRESSION      1. Acute UTI    2. Altered mental status, unspecified altered mental status type    3. Elevated blood pressure reading with diagnosis of hypertension          DISPOSITION/PLAN     ED Disposition     ED Disposition   Decision to Admit    Condition   --    Comment   Level of Care: Telemetry [5]   Diagnosis: UTI (urinary tract infection) [362281]   Admitting Physician: ANDRIA SY [[id number]]   Attending Physician: ANDRIA SY [[id number]]   Bed Request Comments: tele                 Comment: Please note this report has been produced using speech recognition software.      FARRAH Asher Jason C, PA  07/15/22 9527

## 2022-07-16 PROBLEM — N39.0 ACUTE UTI: Status: ACTIVE | Noted: 2022-01-01

## 2022-07-16 PROBLEM — R41.89 COGNITIVE IMPAIRMENT: Status: ACTIVE | Noted: 2022-01-01

## 2022-07-16 NOTE — H&P
[facility] Medicine Services  HISTORY AND PHYSICAL    Patient Name: Sheryl Conner  : 1931  MRN: [id number]  Primary Care Physician: Manan Garcia MD  Date of admission: 7/15/2022      Subjective   Subjective     Chief Complaint:  Confusion    HPI:  Sheryl Connre is a 89 y.o. female with a PMH significant for chronic pain on chronic opioid medications, HTN, HLD, fibromyalgia, GERD, paroxysmal atrial fibrillation not on anticoagulation, history of myasthenia gravis on no current meds, presence of cardiac pacemaker due to history of complete AV block who comes into the ED due to confusion.  Patient says that she was in her normal state of health yesterday.  Today she woke up with confusion.  She reports difficulty/inability to make phone calls, inability to drive.  Her confusion persisted throughout the day.  She reports associated nausea, vomiting, diarrhea.  She denies fever, cough, malaise, headache, chest pain, shortness of breath, dysuria, decreased appetite.  Patient also reports a fall last week with residual left leg pain and superficial wound to her right arm.  Daughter at bedside says that patient has had 3 falls over the past month.      Review of Systems   Constitutional: Negative for appetite change and fever.   HENT: Negative.    Eyes: Negative.    Respiratory: Negative.    Cardiovascular: Negative.    Gastrointestinal: Positive for diarrhea, nausea and vomiting.   Endocrine: Negative.    Genitourinary: Negative.    Musculoskeletal: Positive for gait problem.   Skin: Positive for wound (from fall last week).   Allergic/Immunologic: Negative.    Neurological: Negative for dizziness.   Hematological: Negative.    Psychiatric/Behavioral: Positive for confusion.        All other systems reviewed and are negative.     Personal History     Past Medical History:   Diagnosis Date   • Anemia    • Arthritis    • Fibromyalgia    • GERD (gastroesophageal reflux  disease)    • History of transfusion    • Hypertension    • Kidney stone    • MG (myasthenia gravis) ([facility])     history of   • Pacemaker              Past Surgical History:   Procedure Laterality Date   • CARDIAC SURGERY      pacemaker placed   • COLONOSCOPY     • FOOT SURGERY Left     in the 1980s   • HEMORRHOIDECTOMY     • TUBAL ABDOMINAL LIGATION     • UPPER GASTROINTESTINAL ENDOSCOPY     • UPPER GASTROINTESTINAL ENDOSCOPY  05/29/2018       Family History: family history includes Cancer in her son; Diabetes in her father; No Known Problems in her mother. Otherwise pertinent FHx was reviewed and unremarkable.     Social History:  reports that she has never smoked. She has never used smokeless tobacco. She reports that she does not drink alcohol and does not use drugs.  Social History     Social History Narrative    Caffeine: 1 serving per day. Pt lives at home alone.       Medications:  Available home medication information reviewed.  (Not in a hospital admission)      Allergies   Allergen Reactions   • Codeine Sulfate Unknown (See Comments)     Pt took in the 70's-pt not sure of what happened   • Cozaar [Losartan] Unknown (See Comments)     Pt can not remember   • Crestor [Rosuvastatin] Unknown (See Comments)     Pt can not remember   • Dexlansoprazole Unknown (See Comments)     Pt can not remember   • Lipitor [Atorvastatin] Unknown (See Comments)     Pt can not remember   • Lisinopril Unknown (See Comments)     Pt can not remember   • Nexium [Esomeprazole Magnesium] Unknown (See Comments)     Pt can not remember   • Norvasc [Amlodipine] Unknown (See Comments)     Pt can not remember   • Sulfadiazine Unknown (See Comments)     Pt can not remember   • Toprol Xl [Metoprolol Tartrate] Unknown (See Comments)     Pt can not remember   • Zetia [Ezetimibe] Unknown (See Comments)     Pt can not remember   • Zocor [Simvastatin] Unknown (See Comments)     Pt can not remember   • Augmentin [Amoxicillin-Pot Clavulanate] GI  Intolerance     nausea   • Celebrex [Celecoxib] GI Intolerance       Objective   Objective     Vital Signs:   Temp:  [98.4 °F (36.9 °C)] 98.4 °F (36.9 °C)  Heart Rate:  [91] 91  Resp:  [18] 18  BP: (200)/(85) 200/85       Physical Exam   Constitutional: Awake, alert  Eyes: PERRLA, sclerae anicteric, no conjunctival injection  HENT: NCAT, mucous membranes moist  Neck: Supple, no thyromegaly, no lymphadenopathy, trachea midline  Respiratory: Clear to auscultation bilaterally, nonlabored respirations   Cardiovascular: RRR, no murmurs, rubs, or gallops, palpable pedal pulses bilaterally  Gastrointestinal: Positive bowel sounds, soft, nontender, nondistended  Musculoskeletal: No bilateral ankle edema, no clubbing or cyanosis to extremities  Psychiatric: Appropriate affect, cooperative  Neurologic: Oriented x 3, strength symmetric in all extremities, Cranial Nerves grossly intact to confrontation, speech clear  Skin: Superficial abrasion to left lower leg      Result Review:  I have personally reviewed the results from the time of this admission to 7/15/2022 22:19 EDT and agree with these findings:  [x]  Laboratory list / accordion  [x]  Microbiology  [x]  Radiology  [x]  EKG/Telemetry   []  Cardiology/Vascular   []  Pathology  [x]  Old records  []  Other:      LAB RESULTS:      Lab 07/15/22  2000   WBC 3.30*   HEMOGLOBIN 12.9   HEMATOCRIT 39.8   PLATELETS 156   NEUTROS ABS 2.60   IMMATURE GRANS (ABS) 0.01   LYMPHS ABS 0.32*   MONOS ABS 0.25   EOS ABS 0.08   MCV 89.0   LACTATE 0.8         Lab 07/15/22  2034 07/15/22  2000   SODIUM  --  141   POTASSIUM  --  4.1   CHLORIDE  --  103   CO2  --  28.0   ANION GAP  --  10.0   BUN  --  12   CREATININE 1.00 1.10*   EGFR  --  47.8*   GLUCOSE  --  102*   CALCIUM  --  9.7*   MAGNESIUM  --  2.0         Lab 07/15/22  2000   TOTAL PROTEIN 6.6   ALBUMIN 4.00   GLOBULIN 2.6   ALT (SGPT) 9   AST (SGOT) 22   BILIRUBIN 0.4   ALK PHOS 61         Lab 07/15/22  2000   TROPONIN T <0.010                  UA    Urinalysis 7/23/21 7/23/21 11/11/21 11/11/21 7/15/22 7/15/22    1912 1912 1757 1757 2013 2013   Squamous Epithelial Cells, UA  7-12 (A) 0-2   3-6 (A)   Specific Gravity, UA 1.017   1.013 1.015    Ketones, UA Negative   Negative Trace (A)    Blood, UA Negative   Negative Negative    Leukocytes, UA Small (1+) (A)   Trace (A) Small (1+) (A)    Nitrite, UA Negative   Negative Negative    RBC, UA  0-2 0-2   0-2   WBC, UA  6-12 (A) 0-2   13-20 (A)   Bacteria, UA  None Seen None Seen   4+ (A)   (A) Abnormal value              Microbiology Results (last 10 days)     Procedure Component Value - Date/Time    COVID PRE-OP / PRE-PROCEDURE SCREENING ORDER (NO ISOLATION) - Swab, Nasopharynx [[id number]]  (Normal) Collected: 07/15/22 1936    Lab Status: Final result Specimen: Swab from Nasopharynx Updated: 07/15/22 2011    Narrative:      The following orders were created for panel order COVID PRE-OP / PRE-PROCEDURE SCREENING ORDER (NO ISOLATION) - Swab, Nasopharynx.  Procedure                               Abnormality         Status                     ---------                               -----------         ------                     COVID-19 and FLU A/B PCR...[[id number]]  Normal              Final result                 Please view results for these tests on the individual orders.    COVID-19 and FLU A/B PCR - Swab, Nasopharynx [[id number]]  (Normal) Collected: 07/15/22 1936    Lab Status: Final result Specimen: Swab from Nasopharynx Updated: 07/15/22 2011     COVID19 Not Detected     Influenza A PCR Not Detected     Influenza B PCR Not Detected    Narrative:      Fact sheet for providers: https://www.fda.gov/media/842810/download    Fact sheet for patients: https://www.fda.gov/media/185990/download    Test performed by PCR.          CT Head Without Contrast    Result Date: 7/15/2022  CT HEAD WO CONTRAST-  Date of Exam: 7/15/2022 8:17 PM  Indication: Altered Mental Status.  Comparison: CT 07/23/2021  Technique:   Without contrast, contiguous axial CT images of the head were obtained from skull base to vertex.  Coronal and sagittal reconstructions were performed.  Automated exposure control and iterative reconstruction methods were used.  FINDINGS No evidence of intracranial hemorrhage, mass, or midline shift. The ventricles appear normal in size for the patient's age. No extra-axial collections identified. Moderate periventricular white matter changes are present likely related to chronic microvascular ischemic change, stable as compared to the previous study. The gray white matter differentiation is intact. No air-fluid levels identified within the paranasal sinuses. The extra-axial structures demonstrate no acute process.      Impression: No evidence of hemorrhage, mass effect or midline shift. No acute process identified. Stable chronic findings as above.  This report was finalized on 7/15/2022 8:48 PM by Angelia Diaz MD.      CT Abdomen Pelvis With Contrast    Result Date: 7/15/2022  CT ABDOMEN PELVIS W CONTRAST-  Date of Exam: 7/15/2022 8:17 PM  Indication: lower abdominal pain, nausea, vomiting, diarrhea.  Comparison: CT 11/11/2021  Technique: Contiguous axial CT images were obtained from the lung bases to the superior iliac crests with contrast.  Following uneventful administration of 70 cc of Isovue-300 intravenous and oral contrast, contiguous axial images obtained from lung bases to the pubic symphysis. Sagittal and coronal reconstructions were performed.  Automated exposure control and iterative reconstruction methods were used.  FINDINGS: Lower Thorax: No acute process identified.  Liver: Normal size and density. No focal lesions identified.  Gallbladder: The gallbladder appears normal in size with no evidence of radiopaque gallstones. There is mild prominence of the biliary tree, unchanged compared to the previous study likely related to pancreatic atrophy and age..  Pancreas: No focal masses. The pancreas  appears diffusely atrophic. No pancreatic duct dilation.  Spleen: Spleen is normal size.  No focal splenic lesions.  Adrenal glands: Unremarkable.  Kidneys: The kidneys appear normal in size. No evidence of nephrolithiasis. No evidence of hydronephrosis. No suspicious focal lesions identified.  Retroperitoneum/vascular: No retroperitoneal adenopathy identified. There is a bilobed appearing infrarenal abdominal aortic aneurysm with the superior portion measuring up to 2.2 cm and the inferior portion measuring up to 2.5 cm. This appears similar as compared to the previous study. Atherosclerotic calcifications are present.  Stomach and Bowel: No abnormally dilated loops of bowel no definite mass identified. No significant bowel wall thickening. No free fluid. No focal fluid collections identified. The appendix is not well visualized. No secondary findings of appendicitis. There is diverticulosis of the sigmoid colon extending to the descending colon. No significant inflammatory changes identified. No evidence of significant stool burden.. No mesenteric adenopathy identified. No evidence of free air.  Bladder: The bladder appears unremarkable.  Pelvic Organs: No acute process identified.  Osseous structures: No aggressive focal lytic or sclerotic osseous lesions. No acute osseous abnormality. Moderate degenerative changes are present within the bilateral hip joints. Mild to moderate degenerative changes are present within the spine.      Impression:  1. No acute intra-abdominal or intrapelvic process. 2. Bilobed infrarenal abdominal aortic aneurysm, stable as compared to the previous study. 3. Ancillary findings as described above.  This report was finalized on 7/15/2022 8:55 PM by Angelia Diaz MD.      XR Chest 1 View    Result Date: 7/15/2022  Examination: XR CHEST 1 VW-  Date of Exam: 7/15/2022 7:09 PM  Indication: Weak/Dizzy/AMS triage protocol.  Comparison: Radiograph 05/31/2022  Technique: 1 view of the chest   Findings: There are no airspace consolidations. No pleural effusions. No pneumothorax. The heart size is normal. Median sternotomy wires are present. Chronic interstitial changes are present. Stable left subclavian AICD/pacemaker device. The pulmonary vasculature appears within normal limits. No acute osseous abnormality identified.      Impression: No acute cardiopulmonary process.  This report was finalized on 7/15/2022 7:32 PM by Angelia Diaz MD.        Results for orders placed during the hospital encounter of 07/23/21    Adult Transthoracic Echo Limited W/ Cont if Necessary Per Protocol    Interpretation Summary  · Estimated left ventricular EF = 60% Left ventricular systolic function is normal.  · Incomplete exam due to patient uncooperation      Assessment & Plan   Assessment & Plan     Active Hospital Problems    Diagnosis  POA   • **Acute UTI (urinary tract infection) [N39.0]  Unknown   • Leukopenia [D72.819]  Unknown   • Accelerated hypertension [I10]  Unknown   • Sepsis (HCC) [A41.9]  Unknown   • Nausea vomiting and diarrhea [R11.2, R19.7]  Unknown   • Acute metabolic encephalopathy [G93.41]  Unknown   • Pacemaker [Z95.0]  Yes   • Chronic pain syndrome [G89.4]  Yes   • GERD (gastroesophageal reflux disease) [K21.9]  Yes   • Stage 3a chronic kidney disease (HCC) [N18.31]  Yes   • Essential hypertension [I10]  Yes   • Paroxysmal atrial fibrillation (HCC) [I48.0]  Yes     · 2012 diagnosed with symptomatic paroxysmal A. fib and tachybradycardia syndrome.  · 2012 [employer] pacemaker  · 2012 cardiac catheterization: No flow limiting stenosis.  · June 2018 echo:   hyperdynamic (EF > 70), LA mildly dilated.  Mean aortic valve gradient is 13 mmhg which is borderline mild aortic stenosis     • Hyperlipidemia [E78.5]  Yes   Sheryl Conner is a 89 y.o. female with a PMH significant for chronic pain on chronic opioid medications, HTN, HLD, fibromyalgia, GERD, paroxysmal atrial fibrillation not on anticoagulation,  history of myasthenia gravis on no current meds, presence of cardiac pacemaker due to history of complete AV block who comes into the ED due to confusion.      Sepsis  UTI  Acute metabolic encephalopathy  - Tachycardic, confusion, source of infection, leukopenia  - Lactic acid within normal limits  - Procalcitonin pending  - Blood cultures, urine culture pending  - Start vancomycin and Zosyn for now pending culture data, de-escalate as appropriate  - IVF's  - A.m. labs  - Daughter at bedside who assist with patient's care is state Monday morning.  Daughter requesting home health at discharge to assist with care.  Case management consult for a.m.  - PT/OT    Nausea, vomiting, diarrhea  - Zofran as needed  - If diarrhea recurs, will get stool studies    Accelerated hypertension  - We will add Cardene drip if needed  - Bumex 0.5 mg oral tablet every other day as he only antihypertensive inpatients profile  - May need antihypertensive added prior to DC    Presence of cardiac pacemaker  Paroxysmal atrial fibrillation  - EKG reviewed with paced rhythm,   - Not anticoagulated    Chronic pain syndrome  - Continue fentanyl patch x2, Norco, gabapentin.  Karl reviewed    CKD stage III  - At baseline  - A.m. labs    Hyperlipidemia  - Not on a statin    Left leg pain  - Residual pain after recent fall  - D-dimer pending, if elevated will add on doppler ultrasound  -multiple recent falls, PT/OT      DVT prophylaxis: SubQ heparin      CODE STATUS: Full code, discussed with patient with daughter at bedside  Code Status and Medical Interventions:   Ordered at: 07/15/22 2247     Level Of Support Discussed With:    Patient     Code Status (Patient has no pulse and is not breathing):    CPR (Attempt to Resuscitate)     Medical Interventions (Patient has pulse or is breathing):    Full Support     Comments:    Discussed with patient         Melonie G Max, DO  07/15/22

## 2022-07-16 NOTE — OUTREACH NOTE
Prep Survey    Flowsheet Row Responses   [facility] facility patient discharged from? [facility]   Is LACE score < 7 ? Yes   Emergency Room discharge w/ pulse ox? No   Eligibility [facility]   Date of Admission 07/15/22   Date of Discharge 07/16/22   Discharge Disposition Home or Self Care   Discharge diagnosis Acute UTI, acute metabolic encephalopathy, CKD, cognitive impairment   Does the patient have one of the following disease processes/diagnoses(primary or secondary)? Other   Does the patient have Home health ordered? No   Is there a DME ordered? No   Prep survey completed? Yes          YOBANI DUGGAN - Registered Nurse

## 2022-07-16 NOTE — PROGRESS NOTES
Pharmacy Consult-Vancomycin Dosing  Sheryl Conner is a  89 y.o. female receiving vancomycin therapy.     Indication: Sepsis/UTI  Consulting Provider: Hospitalist   ID Consult: No    Goal AUC: 400 - 600 mg/L*hr    Current Antimicrobial Therapy  Anti-Infectives (From admission, onward)      Ordered     Dose/Rate Route Frequency Start Stop    07/15/22 2217  piperacillin-tazobactam (ZOSYN) 3.375 g in iso-osmotic dextrose 50 ml (premix)        Ordering Provider: Melonie Santana, DO    3.375 g  over 4 Hours Intravenous Every 8 Hours 07/16/22 0600 07/21/22 0559    07/15/22 2217  piperacillin-tazobactam (ZOSYN) 3.375 g in iso-osmotic dextrose 50 ml (premix)        Ordering Provider: Melonie Santana DO    3.375 g  over 30 Minutes Intravenous Once 07/15/22 2300      07/15/22 2236  vancomycin (VANCOCIN) 1000 mg/200 mL dextrose 5% IVPB        Ordering Provider: Taylor Cook RPH    20 mg/kg × 55.3 kg Intravenous Once 07/15/22 2238      07/15/22 2217  Pharmacy to dose vancomycin        Ordering Provider: Melonie Santana DO     Does not apply Continuous PRN 07/15/22 2216 07/20/22 2215            Allergies  Allergies as of 07/15/2022 - Reviewed 07/15/2022   Allergen Reaction Noted    Codeine sulfate Unknown (See Comments) 06/13/2016    Cozaar [losartan] Unknown (See Comments) 06/13/2016    Crestor [rosuvastatin] Unknown (See Comments) 06/13/2016    Dexlansoprazole Unknown (See Comments) 09/09/2016    Lipitor [atorvastatin] Unknown (See Comments) 06/13/2016    Lisinopril Unknown (See Comments) 06/13/2016    Nexium [esomeprazole magnesium] Unknown (See Comments) 06/13/2016    Norvasc [amlodipine] Unknown (See Comments) 06/13/2016    Sulfadiazine Unknown (See Comments) 06/13/2016    Toprol xl [metoprolol tartrate] Unknown (See Comments) 06/13/2016    Zetia [ezetimibe] Unknown (See Comments) 06/13/2016    Zocor [simvastatin] Unknown (See Comments) 06/13/2016    Augmentin [amoxicillin-pot clavulanate] GI Intolerance  "06/13/2016    Celebrex [celecoxib] GI Intolerance 06/13/2016       Labs    Results from last 7 days   Lab Units 07/15/22  2034 07/15/22  2000   BUN mg/dL  --  12   CREATININE mg/dL 1.00 1.10*       Results from last 7 days   Lab Units 07/15/22  2000   WBC 10*3/mm3 3.30*       Evaluation of Dosing     Last Dose Received in the ED/Outside Facility: None  Is Patient on Dialysis or Renal Replacement: No    Ht - 162.6 cm (64\")  Wt - 55.3 kg (122 lb)    Estimated Creatinine Clearance: 32.6 mL/min (by C-G formula based on SCr of 1 mg/dL).    Intake & Output (last 3 days)       None            Microbiology and Radiology  Microbiology Results (last 10 days)       Procedure Component Value - Date/Time    COVID PRE-OP / PRE-PROCEDURE SCREENING ORDER (NO ISOLATION) - Swab, Nasopharynx [[id number]]  (Normal) Collected: 07/15/22 1936    Lab Status: Final result Specimen: Swab from Nasopharynx Updated: 07/15/22 2011    Narrative:      The following orders were created for panel order COVID PRE-OP / PRE-PROCEDURE SCREENING ORDER (NO ISOLATION) - Swab, Nasopharynx.  Procedure                               Abnormality         Status                     ---------                               -----------         ------                     COVID-19 and FLU A/B PCR...[[id number]]  Normal              Final result                 Please view results for these tests on the individual orders.    COVID-19 and FLU A/B PCR - Swab, Nasopharynx [[id number]]  (Normal) Collected: 07/15/22 1936    Lab Status: Final result Specimen: Swab from Nasopharynx Updated: 07/15/22 2011     COVID19 Not Detected     Influenza A PCR Not Detected     Influenza B PCR Not Detected    Narrative:      Fact sheet for providers: https://www.fda.gov/media/560076/download    Fact sheet for patients: https://www.fda.gov/media/641538/download    Test performed by PCR.            Reported Vancomycin Levels                         [employer] AUC Calculation:    Current AUC: -- " mg/L*hr    Predicted Steady State AUC on Current Dose: -- mg/L*hr  _________________________________    Predicted Steady State AUC on New Dose: 491 mg/L*hr    Assessment/Plan:    1. Pharmacy to dose vancomycin for UTI/Sepsis. Goal -600 mg/L*hr.  2. Will give patient a loading dose of vancomycin 1000 mg (~18 mg/kg) IV on 7/16 @ 0000 and initiate a maintenance dose of vancomycin 750 mg IV Q24hr to begin on 7/16 @ 2300.  3. Assess clearance by vancomycin level on 7/17 @ 1200, prior to 3rd overall dose.  4. Pharmacy will continue to monitor renal function, cultures and sensitivities, and clinical status to adjust regimen as necessary.    Taylor Cook, PharmD  7/15/2022   23:15 EDT

## 2022-07-16 NOTE — DISCHARGE SUMMARY
[facility] Medicine Services  DISCHARGE SUMMARY    Patient Name: Sheryl Conner  : 1931  MRN: [id number]    Date of Admission: 7/15/2022  7:02 PM  Date of Discharge:  22  Primary Care Physician: Manan Garcia MD    Consults     No orders found for last 30 day(s).          Hospital Course     Presenting Problem:   UTI (urinary tract infection) [N39.0]    Active Hospital Problems    Diagnosis  POA   • **Acute UTI (urinary tract infection) [N39.0]  Yes   • Cognitive impairment [R41.89]  Yes   • Accelerated hypertension [I10]  Yes   • Nausea vomiting and diarrhea [R11.2, R19.7]  Yes   • Left leg pain [M79.605]  Yes   • Acute metabolic encephalopathy [G93.41]  Yes   • Pacemaker [Z95.0]  Yes   • Chronic pain syndrome [G89.4]  Yes   • Stage 3a chronic kidney disease (HCC) [N18.31]  Yes   • Essential hypertension [I10]  Yes   • Paroxysmal atrial fibrillation (HCC) [I48.0]  Yes   • Hyperlipidemia [E78.5]  Yes      Resolved Hospital Problems   No resolved problems to display.          Hospital Course:  Sheryl Conner is a 89 y.o. female with a history of chronic pain, hypertension, hyperlipidemia, paroxysmal A. fib not on anticoagulation, pacemaker placement who presented to the emergency room with her daughter with complaint of confusion.  Upon talking with the daughter it sounds like the patient has becoming more confused intermittently at times with also some paranoid type of behavior.  I suspect that she has some advancing cognitive impairment.  Daughter wanted to bring her in just to make sure nothing was going on but feels she is not that different from her recent baseline.  Ultimately the emergency room work-up was generally unremarkable including head CT and laboratory work-up.  Urinalysis was slightly abnormal but no fever, leukocytosis, and a normal procalcitonin.  Unclear if there is any component of UTI complicating the picture, urine cultures pending at  this time.  We will complete an additional 3 days of Macrobid just in case.  This morning the patient is very insistent on going home.  She is alert and oriented x3.  She appears competent to make her own decisions.  I discussed with daughter on the phone at length.  Daughter feels that she is capable of caring for herself overall but would like home health arranged as the daughter and her son are going out of town to treat her own medical issues.  Of note the patient did complain of some leg pain and had an elevated D-dimer so a duplex was ordered, however patient is refusing to have this done.  Again she seems competent to make her decisions and my overall suspicion of clot is low.  No respiratory symptoms to suggest a PE at all.    Plan will be to discharge home with home health to be arranged.  We will try to arrange follow-up with primary care physician after August 1 when the daughter will be back in town to take her to the appointments.      Discharge Follow Up Recommendations for outpatient labs/diagnostics:  Follow-up with Dr. Bentley with primary care after August 1 when her daughter is back in town    Day of Discharge     HPI:   Patient is somewhat agitated this morning.  She continues to complain of some frequent and painful urination says antibiotics not working and that she is also allergic to it.  She is somewhat difficult to get accurate history from but says she is not confused and she is oriented on exam.  She is insistent on going home.    Review of Systems  Gen- No fevers, chills  CV- No chest pain, palpitations  Resp- No cough, dyspnea  GI- No N/V/D, abd pain        Vital Signs:   Temp:  [97.9 °F (36.6 °C)-98.4 °F (36.9 °C)] 98.4 °F (36.9 °C)  Heart Rate:  [70-94] 91  Resp:  [18] 18  BP: (142-200)/(63-89) 151/80  Flow (L/min):  [2] 2      Physical Exam:  Constitutional: No acute distress, awake, alert, sitting up in bed  HENT: NCAT, mucous membranes moist  Respiratory: Clear to auscultation  bilaterally, respiratory effort normal   Cardiovascular: RRR, no murmurs, rubs, or gallops  Gastrointestinal: Positive bowel sounds, soft, nontender, nondistended  Musculoskeletal: No bilateral ankle edema  Psychiatric: some paranoid thoughts, no hallucincations  Neurologic: Completely oriented, no focal deficits  Skin: No rashes      Pertinent  and/or Most Recent Results     LAB RESULTS:      Lab 07/16/22  0422 07/15/22  2000   WBC 4.31 3.30*   HEMOGLOBIN 13.0 12.9   HEMATOCRIT 40.6 39.8   PLATELETS 156 156   NEUTROS ABS 3.34 2.60   IMMATURE GRANS (ABS) 0.01 0.01   LYMPHS ABS 0.42* 0.32*   MONOS ABS 0.35 0.25   EOS ABS 0.13 0.08   MCV 88.5 89.0   PROCALCITONIN  --  0.07   LACTATE  --  0.8   D DIMER QUANT  --  1.15*         Lab 07/16/22  0422 07/15/22  2034 07/15/22  2000   SODIUM 140  --  141   POTASSIUM 3.7  --  4.1   CHLORIDE 102  --  103   CO2 27.0  --  28.0   ANION GAP 11.0  --  10.0   BUN 10  --  12   CREATININE 0.95 1.00 1.10*   EGFR 57.0*  --  47.8*   GLUCOSE 126*  --  102*   CALCIUM 9.3  --  9.7*   MAGNESIUM  --   --  2.0   TSH 1.100  --   --          Lab 07/15/22  2000   TOTAL PROTEIN 6.6   ALBUMIN 4.00   GLOBULIN 2.6   ALT (SGPT) 9   AST (SGOT) 22   BILIRUBIN 0.4   ALK PHOS 61         Lab 07/15/22  2000   TROPONIN T <0.010                 Brief Urine Lab Results  (Last result in the past 365 days)      Color   Clarity   Blood   Leuk Est   Nitrite   Protein   CREAT   Urine HCG        07/15/22 2013 Yellow   Cloudy   Negative   Small (1+)   Negative   Negative               Microbiology Results (last 10 days)     Procedure Component Value - Date/Time    MRSA Screen, PCR (Inpatient) - Swab, Nares [[id number]]  (Normal) Collected: 07/16/22 0304    Lab Status: Final result Specimen: Swab from Nares Updated: 07/16/22 0801     MRSA PCR Negative    Narrative:      The negative predictive value of this diagnostic test is high and should only be used to consider de-escalating anti-MRSA therapy. A positive result may  indicate colonization with MRSA and must be correlated clinically.  MRSA Negative    Urine Culture - Urine, Urine, Clean Catch [[id number]]  (Normal) Collected: 07/15/22 2013    Lab Status: Preliminary result Specimen: Urine, Clean Catch Updated: 07/16/22 0928     Urine Culture Culture in progress    COVID PRE-OP / PRE-PROCEDURE SCREENING ORDER (NO ISOLATION) - Swab, Nasopharynx [[id number]]  (Normal) Collected: 07/15/22 1936    Lab Status: Final result Specimen: Swab from Nasopharynx Updated: 07/15/22 2011    Narrative:      The following orders were created for panel order COVID PRE-OP / PRE-PROCEDURE SCREENING ORDER (NO ISOLATION) - Swab, Nasopharynx.  Procedure                               Abnormality         Status                     ---------                               -----------         ------                     COVID-19 and FLU A/B PCR...[[id number]]  Normal              Final result                 Please view results for these tests on the individual orders.    COVID-19 and FLU A/B PCR - Swab, Nasopharynx [[id number]]  (Normal) Collected: 07/15/22 1936    Lab Status: Final result Specimen: Swab from Nasopharynx Updated: 07/15/22 2011     COVID19 Not Detected     Influenza A PCR Not Detected     Influenza B PCR Not Detected    Narrative:      Fact sheet for providers: https://www.fda.gov/media/033108/download    Fact sheet for patients: https://www.fda.gov/media/388294/download    Test performed by PCR.          CT Head Without Contrast    Result Date: 7/15/2022  CT HEAD WO CONTRAST-  Date of Exam: 7/15/2022 8:17 PM  Indication: Altered Mental Status.  Comparison: CT 07/23/2021  Technique:  Without contrast, contiguous axial CT images of the head were obtained from skull base to vertex.  Coronal and sagittal reconstructions were performed.  Automated exposure control and iterative reconstruction methods were used.  FINDINGS No evidence of intracranial hemorrhage, mass, or midline shift. The ventricles  appear normal in size for the patient's age. No extra-axial collections identified. Moderate periventricular white matter changes are present likely related to chronic microvascular ischemic change, stable as compared to the previous study. The gray white matter differentiation is intact. No air-fluid levels identified within the paranasal sinuses. The extra-axial structures demonstrate no acute process.      No evidence of hemorrhage, mass effect or midline shift. No acute process identified. Stable chronic findings as above.  This report was finalized on 7/15/2022 8:48 PM by Angelia Diaz MD.      CT Abdomen Pelvis With Contrast    Result Date: 7/15/2022  CT ABDOMEN PELVIS W CONTRAST-  Date of Exam: 7/15/2022 8:17 PM  Indication: lower abdominal pain, nausea, vomiting, diarrhea.  Comparison: CT 11/11/2021  Technique: Contiguous axial CT images were obtained from the lung bases to the superior iliac crests with contrast.  Following uneventful administration of 70 cc of Isovue-300 intravenous and oral contrast, contiguous axial images obtained from lung bases to the pubic symphysis. Sagittal and coronal reconstructions were performed.  Automated exposure control and iterative reconstruction methods were used.  FINDINGS: Lower Thorax: No acute process identified.  Liver: Normal size and density. No focal lesions identified.  Gallbladder: The gallbladder appears normal in size with no evidence of radiopaque gallstones. There is mild prominence of the biliary tree, unchanged compared to the previous study likely related to pancreatic atrophy and age..  Pancreas: No focal masses. The pancreas appears diffusely atrophic. No pancreatic duct dilation.  Spleen: Spleen is normal size.  No focal splenic lesions.  Adrenal glands: Unremarkable.  Kidneys: The kidneys appear normal in size. No evidence of nephrolithiasis. No evidence of hydronephrosis. No suspicious focal lesions identified.  Retroperitoneum/vascular: No  retroperitoneal adenopathy identified. There is a bilobed appearing infrarenal abdominal aortic aneurysm with the superior portion measuring up to 2.2 cm and the inferior portion measuring up to 2.5 cm. This appears similar as compared to the previous study. Atherosclerotic calcifications are present.  Stomach and Bowel: No abnormally dilated loops of bowel no definite mass identified. No significant bowel wall thickening. No free fluid. No focal fluid collections identified. The appendix is not well visualized. No secondary findings of appendicitis. There is diverticulosis of the sigmoid colon extending to the descending colon. No significant inflammatory changes identified. No evidence of significant stool burden.. No mesenteric adenopathy identified. No evidence of free air.  Bladder: The bladder appears unremarkable.  Pelvic Organs: No acute process identified.  Osseous structures: No aggressive focal lytic or sclerotic osseous lesions. No acute osseous abnormality. Moderate degenerative changes are present within the bilateral hip joints. Mild to moderate degenerative changes are present within the spine.       1. No acute intra-abdominal or intrapelvic process. 2. Bilobed infrarenal abdominal aortic aneurysm, stable as compared to the previous study. 3. Ancillary findings as described above.  This report was finalized on 7/15/2022 8:55 PM by Angelia Diaz MD.      XR Chest 1 View    Result Date: 7/15/2022  Examination: XR CHEST 1 VW-  Date of Exam: 7/15/2022 7:09 PM  Indication: Weak/Dizzy/AMS triage protocol.  Comparison: Radiograph 05/31/2022  Technique: 1 view of the chest  Findings: There are no airspace consolidations. No pleural effusions. No pneumothorax. The heart size is normal. Median sternotomy wires are present. Chronic interstitial changes are present. Stable left subclavian AICD/pacemaker device. The pulmonary vasculature appears within normal limits. No acute osseous abnormality identified.       No acute cardiopulmonary process.  This report was finalized on 7/15/2022 7:32 PM by Angelia Diaz MD.                Results for orders placed during the hospital encounter of 07/23/21    Adult Transthoracic Echo Limited W/ Cont if Necessary Per Protocol    Interpretation Summary  · Estimated left ventricular EF = 60% Left ventricular systolic function is normal.  · Incomplete exam due to patient uncooperation      Plan for Follow-up of Pending Labs/Results: I will follow  Pending Labs     Order Current Status    Blood Culture - Blood, Arm, Left In process    Blood Culture - Blood, Arm, Right In process    Urine Culture - Urine, Urine, Clean Catch Preliminary result        Discharge Details        Discharge Medications      New Medications      Instructions Start Date   nitrofurantoin (macrocrystal-monohydrate) 100 MG capsule  Commonly known as: Macrobid   100 mg, Oral, 2 Times Daily         Continue These Medications      Instructions Start Date   acyclovir 5 % ointment  Commonly known as: Zovirax   1 application, Topical, 5 Times Daily      amitriptyline 25 MG tablet  Commonly known as: ELAVIL   TAKE 1 TABLET BY MOUTH ONCE DAILY IN THE MORNING AND 1 TABLET ONCE NIGHTLY AT BEDTIME      bumetanide 0.5 MG tablet  Commonly known as: BUMEX   TAKE 1 TABLET BY MOUTH EVERY OTHER DAY AS NEEDED FOR SWELLING      cetirizine 10 MG tablet  Commonly known as: zyrTEC   10 mg, Oral, Daily      Diclofenac Sodium 1 % gel gel  Commonly known as: VOLTAREN   APPLY 4 GRAMS TO AFFECTED AREA 4 TIMES DAILY AS DIRECTED      fentaNYL 50 MCG/HR patch  Commonly known as: DURAGESIC   APPLY 1 PATCH TOPICALLY Q 72 H      fentaNYL 12 MCG/HR  Commonly known as: DURAGESIC   APPLY 1 PATCH TOPICALLY TO SKIN Q 72 H      fluticasone 50 MCG/ACT nasal spray  Commonly known as: FLONASE   fluticasone propionate 50 mcg/actuation nasal spray,suspension   2 sprays each nostril daily      gabapentin 600 MG tablet  Commonly known as: NEURONTIN   600 mg,  Oral, 3 Times Daily      HYDROcodone-acetaminophen  MG per tablet  Commonly known as: NORCO   1 tablet, Oral, Every 6 Hours PRN      pantoprazole 40 MG EC tablet  Commonly known as: PROTONIX   TAKE 1 TABLET BY MOUTH TWICE DAILY      potassium chloride 10 MEQ CR tablet   TAKE 1 TABLET BY MOUTH ONCE DAILY         Stop These Medications    methylPREDNISolone 4 MG dose pack  Commonly known as: MEDROL     promethazine 25 MG tablet  Commonly known as: PHENERGAN            Allergies   Allergen Reactions   • Codeine Sulfate Unknown (See Comments)     Pt took in the 70's-pt not sure of what happened   • Cozaar [Losartan] Unknown (See Comments)     Pt can not remember   • Crestor [Rosuvastatin] Unknown (See Comments)     Pt can not remember   • Dexlansoprazole Unknown (See Comments)     Pt can not remember   • Lipitor [Atorvastatin] Unknown (See Comments)     Pt can not remember   • Lisinopril Unknown (See Comments)     Pt can not remember   • Nexium [Esomeprazole Magnesium] Unknown (See Comments)     Pt can not remember   • Norvasc [Amlodipine] Unknown (See Comments)     Pt can not remember   • Sulfadiazine Unknown (See Comments)     Pt can not remember   • Toprol Xl [Metoprolol Tartrate] Unknown (See Comments)     Pt can not remember   • Zetia [Ezetimibe] Unknown (See Comments)     Pt can not remember   • Zocor [Simvastatin] Unknown (See Comments)     Pt can not remember   • Augmentin [Amoxicillin-Pot Clavulanate] GI Intolerance     nausea   • Celebrex [Celecoxib] GI Intolerance         Discharge Disposition:  Home or Self Care    Diet:  Hospital:  Diet Order   Procedures   • Diet Regular; Cardiac       Activity:  Activity Instructions     Activity as Tolerated               CODE STATUS:    Code Status and Medical Interventions:   Ordered at: 07/15/22 9201     Level Of Support Discussed With:    Patient     Code Status (Patient has no pulse and is not breathing):    CPR (Attempt to Resuscitate)     Medical  Interventions (Patient has pulse or is breathing):    Full Support     Comments:    Discussed with patient       Future Appointments   Date Time Provider Department Center   8/9/2022  1:45 PM Min Hancock MD [facility]   12/19/2022  3:45 PM Rahat Morris MD [facility]   1/23/2023  1:45 PM Andi Seals III, MD [facility]       Additional Instructions for the Follow-ups that You Need to Schedule     Discharge Follow-up with PCP   As directed       Currently Documented PCP:    Manan Garcia MD    PCP Phone Number:    [phone]     Follow Up Details: PCP after 8/1 per dtr request                     Michelet Devine MD  07/16/22      Time Spent on Discharge:  I spent  35  minutes on this discharge activity which included: face-to-face encounter with the patient, reviewing the data in the system, coordination of the care with the nursing staff as well as consultants, documentation, and entering orders.

## 2022-07-18 NOTE — CASE MANAGEMENT/SOCIAL WORK
Case Management Discharge Note      Final Note: Ms. Conner was discharged on Saturday. I spoke with her daughter, Sheyla, on the phone Saturday and she would like home health arranged. I spoke with Mamta, admissions liaison with [facility], and they will follow Ms. Conner at home with skilled nursing, physical, and occupational therapies. No additional discharge needs were identified.         Selected Continued Care - Discharged on 7/16/2022 Admission date: 7/15/2022 - Discharge disposition: Home or Self Care    Destination    No services have been selected for the patient.              Durable Medical Equipment    No services have been selected for the patient.              Dialysis/Infusion    No services have been selected for the patient.              Home Medical Care Coordination complete.    Service Provider Selected Services Address Phone Fax Patient Preferred     [facility]  [facility] [address] [address] [phone] --          Therapy    No services have been selected for the patient.              Community [facility]    No services have been selected for the patient.              [facility]    No services have been selected for the patient.                       Final Discharge Disposition Code: 06 - home with home health care

## 2022-07-18 NOTE — OUTREACH NOTE
Call Center TCM Note    Flowsheet Row Responses   [facility] patient discharged from? [place]   Does the patient have one of the following disease processes/diagnoses(primary or secondary)? Other   TCM attempt successful? Yes   Call start time 1537   Call end time 1541   Discharge diagnosis Acute UTI, acute metabolic encephalopathy, CKD, cognitive impairment   Meds reviewed with patient/caregiver? Yes   Is the patient having any side effects they believe may be caused by any medication additions or changes? No   Does the patient have all medications ordered at discharge? Yes   Is the patient taking all medications as directed (includes completed medication regime)? Yes   Does the patient have a primary care provider?  Yes   Does the patient have an appointment with their PCP within 7 days of discharge? Greater than 7 days   Comments regarding PCP Hospital d/c f/u appt is on 7/25/22 at 3:00 pm    What is preventing the patient from scheduling follow up appointments within 7 days of discharge? Earlier appointment not available   Nursing Interventions Verified appointment date/time/provider   Has the patient kept scheduled appointments due by today? N/A   Psychosocial issues? No   Did the patient receive a copy of their discharge instructions? Yes   Nursing interventions Reviewed instructions with patient   What is the patient's perception of their health status since discharge? Improving   Is the patient/caregiver able to teach back signs and symptoms related to disease process for when to call PCP? Yes   Is the patient/caregiver able to teach back signs and symptoms related to disease process for when to call 911? Yes   Is the patient/caregiver able to teach back the hierarchy of who to call/visit for symptoms/problems? PCP, Specialist, Home health nurse, Urgent Care, ED, 911 Yes   TCM call completed? Yes          Elma Angel RN    7/18/2022, 15:41 EDT

## 2022-07-20 NOTE — TELEPHONE ENCOUNTER
Caller: TARA    Relationship: Home Health    Best call back number: [phone]    [facility] CALLED AND STATED THEY STARTED HER HOME HEALTH YESTERDAY- NURSING PT/OT    ALSO NEEDS TO SPEAK WITH A CLINICAL STAFF MEMBER REGARDING A FEW MEDICATIONS SHE IS ON     ALSO THE PATIENT IS SCHEDULED FOR A HOSPITAL F/U 07/25/22. PATIENTS APPOINTMENT NEEDS TO BE AFTER 08/01 THE PATIENTS DAUGHTER IS OUT OF TOWN

## 2022-07-20 NOTE — TELEPHONE ENCOUNTER
Caller: Joni Conner    Relationship: Self    Best call back number: [phone]    What was the call regarding: JONI CALLED TO SEE WHEN SHE NEEDS TO COME IN FOR LABS.    Do you require a callback: YES

## 2022-07-20 NOTE — TELEPHONE ENCOUNTER
Returned call to patient. At this time informing patient that her labs will be drawn every 4 weeks. Informing patient we could use labs that had been drawn on the 16th of July.

## 2022-07-20 NOTE — TELEPHONE ENCOUNTER
Loni wanted us to be aware that pt takes 2 different strengths of Fentanyl patches, ( 12mcg and 50mcg) as well as Norco 5 times a day; both prescribed by pain mgmt. She called their office as well, but hasn't heard back. I told her that we do have those meds on her list. She just wanted to make sure we were aware of this.

## 2022-07-21 NOTE — HOME HEALTH
SOC Note:    Home Health ordered for: disciplines SN, PT, and OT    Reason for Hosp/Primary Dx/Co-morbidities: UTI; chronic kidney disease stage 3a; chronic pain; paroxysmal a-fib; myasthenia gravis; GERD; presence of cardiac pacemaker, fibromyalgia, osteoarthritis    Focus of Care: SN to monitor s/s of UTI and provide medication education.    Current Functional status/mobility/DME: Patient ambulates without the use of any assistive devices. Patient has a cane, but does not use it.    HB status/Living Arrangements: Patient is homebound and lives alone. She stated her daughter and grandson are out of town, but her grandson will be staying with her when they get back home.    Skin Integrity/wound status: Patient's skin is intact, no wounds.    Code Status: Full code    Fall Risk: High fall risk    POC confirmed with Keren (representative for PCP Dr. Garcia's office) on 7/20/22.

## 2022-07-21 NOTE — CASE COMMUNICATION
SOC Note:    Home Health ordered for: disciplines SN, PT, and OT    Reason for Hosp/Primary Dx/Co-morbidities: UTI; chronic kidney disease stage 3a; chronic pain; paroxysmal a-fib; myasthenia gravis; GERD; presence of cardiac pacemaker    Focus of Care: SN to monitor s/s of UTI and provide medication education.    Current Functional status/mobility/DME: Patient ambulates without the use of any assistive devices. Patient has a cane, but  does not use it.    HB status/Living Arrangements: Patient is homebound and lives alone. She stated her daughter and grandson are out of town, but her grandson will be staying with her when they get back home.    Skin Integrity/wound status: Patient's skin is intact, no wounds.    Code Status: Full code    Fall Risk: High fall risk    POC confirmed with Keren (representative for PCP Dr. Garcia's office) on 7/20/22.

## 2022-07-22 NOTE — HOME HEALTH
89 y/o F  who presented to the emergency room with her daughter with complaint of confusion and admitted for UTI. Pt. seen today for OT evaluation and has no further skilled OT needs at this time secondary to pt. had returned to her baseline level of independence with all ADLs/IADLs at home. Pt. demonstrated understanding of basic home safety and has all necessary DME needs at this time. Pt. refused UB exercises that OT offered/pt. stated she will not exercise.     PMH: chronic pain, hypertension, hyperlipidemia, paroxysmal A. fib not on anticoagulation, pacemaker placement

## 2022-07-25 NOTE — HOME HEALTH
[facility] SN visit     UTI; chronic kidney disease stage 3a; chronic pain; paroxysmal a-fib; myasthenia gravis; GERD; presence of cardiac pacemaker, fibromyalgia, osteoarthritis    Focus of Care: SN to monitor s/s of UTI / chronic kidney disease stage 3a; medication education    Patient ambulates without the use of any assistive devices. Patient has a cane, but does not use it.     Patient is homebound and lives alone.  pain 7/10 and takes pain medication daily. pt states no GI/ concerns. and she understands her mdications .    Skin Integrity/wound status: Patient's skin is intact, no wounds.

## 2022-07-25 NOTE — CASE COMMUNICATION
Patient missed a SNV visit from [facility] on 7.22.22.     Reason: Patient reported that she would not be home and is unavailable for SNV.       For your records only.   As per home health protocol, MD must be notified of missed/cancelled visits; therefore the prescribed frequency was not met. prescribed frequency was not met.

## 2022-07-27 NOTE — ACP (ADVANCE CARE PLANNING)
SUMMARY OF ADVANCE CARE PLANNING DISCUSSION:  Reviewed purpose and goals for advance care planning discussion.   Reviewed the qualities to consider when choosing a healthcare decision maker.   Individual  Encouraged ongoing discussions regarding preferences for future care with this person.   Individual identified the following as most important to living well: to stay independent in her own home          Education was provided on: Advance Care Planning    Living will document completed during this facilitation? no    Recommended to talk with provider regarding advance care planning at the next visit.     Patient states she had completed [facility] surrogate form with an  in October 2022.  Actually she had revoked the healthcare surrogates that she had previously appointed.  I explained this to her.  She said she is not interested in completing a new document and she will contact the  who did this for her.  I provided resources to her and she has my number to contact if further assistance is needed.  She said her family knows what her wishes are.    She denied needing any help with anything.  I did provide a resource packet for her.  She said she has a lady who visits her sometimes from her insurance who helps as needed.

## 2022-07-28 NOTE — HOME HEALTH
Met with patient and provided resources.  She denies any further needs or concerns at this time.  She remains in her own home.

## 2022-08-01 NOTE — PROGRESS NOTES
Patient was noted to have hypoxia after the procedure.  Oxygen saturations on room air were as low as 84%.  On 2 L of oxygen she would go back up to around 92%.  On exam she does have some wheezes bilaterally.  She was given a albuterol nebulizer treatment without improvement in her oxygen saturations.  Due to persistent hypoxia we will need to at least watch her overnight to see if we can get her oxygenation back up.  I will go ahead and order a chest x-ray and give her a 40 mg IV Lasix and see how she does.  May need to consider hospitalist consult if we are continuing to have issues.

## 2022-08-01 NOTE — H&P
Cardiology Consult    Sheryl ROSAS [facility]  8/17/1931  [phone]  There is no work phone number on file.    08/01/22    DATE OF ADMISSION: 8/1/2022  [facility] Manan Black MD  [address] [facility] [address] / [place] [facility] [address]  Referring Provider: Joss Rodríguez MD     CC: SSS    Patient Active Problem List    Diagnosis    • *Elective replacement indicated for pacemaker [Z45.018]    • Cognitive impairment [R41.89]    • Acute UTI [N39.0]    • Acute UTI (urinary tract infection) [N39.0]    • Accelerated hypertension [I10]    • Nausea vomiting and diarrhea [R11.2, R19.7]    • Left leg pain [M79.605]    • AV block, complete (HCC) [I44.2]    • Biceps tendinitis of right upper extremity [M75.21]    • Impingement syndrome of right shoulder [M75.41]    • Bursitis of right shoulder [M75.51]    • Acute kidney injury (HCC) [N17.9]    • Acute metabolic encephalopathy [G93.41]    • Pacemaker [Z95.0]    • Cervical spondylosis without myelopathy [M47.812]    • DDD (degenerative disc disease), cervical [M50.30]    • Cervical spinal stenosis [M48.02]    • Fibromyalgia [M79.7]    • Arthritis [M19.90]    • Chronic pain syndrome [G89.4]    • Cervical pain (neck) [M54.2]    • Stage 3a chronic kidney disease (HCC) [N18.31]    • GERD (gastroesophageal reflux disease) [K21.9]    • Paroxysmal atrial fibrillation (HCC) [I48.0]    • SSS (sick sinus syndrome) (HCC) [I49.5]    • Essential hypertension [I10]    • 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 fib and tachybradycardia syndrome.  2012 Medtronic pacemaker  2012 cardiac  catheterization: No flow limiting stenosis.  June 2018 echo  · Left ventricular systolic function is hyperdynamic (EF > 70).  · Left atrial cavity size is mildly dilated.  · Mean aortic valve gradient is 13 mmhg which is borderline mild aortic stenosis     January 2021 transthoracic echo  · Left ventricular ejection fraction appears to be 61 - 65%. Left ventricular systolic function is normal.  · Left ventricular diastolic function is consistent with (grade II w/high LAP) pseudonormalization.  · Left ventricular wall thickness is consistent with concentric hypertrophy.  · Left atrial cavity is moderate to severely dilated  · Estimated right ventricular systolic pressure from tricuspid regurgitation is mildly elevated (35-45 mmHg). Calculated right ventricular systolic pressure from tricuspid regurgitation is 43 mmHg.  · An electronic lead is present in the right atrium. 1.30cm x 0.70cm echodense, globular structure noted on electronic lead in right atrium. Unchanged from previous evaluation, echocardiographically most consistent with chronic/organized/sclerotic thrombus.    History of Present Illness:   Mrs. Conner is a 89-year-old female with a history of paroxysmal atrial fibrillation, tachybradycardia syndrome status post Medtronic dual-chamber pacemaker and gastric antral vascular ectasia with chronic anemia requiring intermittent blood transfusion. She presents today with plans to undergo MDT dual-chamber permanent pacemaker generator change out plus or minus RA lead revision with Dr. Rodríguez.  She is seen today at the request of Dr. Seals.  Patient seen in follow-up with Dr. Seals on 6/21/2022 where overall she is doing well from a cardiac standpoint.  She denies any chest pain, palpitations, lightheadedness/dizziness.  She does have lower extremity edema but this is overall controlled and not bothersome to her.  She did not tolerate Lasix due to nausea.  Upon interrogation of her MDT permanent pacemaker she was  noted to have 1 month until JAYA on her battery.  She has known chronic high atrial threshold.  Patient will undergo generator change out today with Dr. Rodríguez plus or minus atrial lead revision.  She has been n.p.o. since midnight. No recent fevers, skin wounds. She was hospitalized 7/15-7/16 for UTI she was treated with macrobid for, no further illnesses.     Allergies   Allergen Reactions   • Codeine Sulfate Unknown (See Comments)     Pt took in the 70's-pt not sure of what happened   • Cozaar [Losartan] Unknown (See Comments)     Pt can not remember   • Crestor [Rosuvastatin] Unknown (See Comments)     Pt can not remember   • Dexlansoprazole Unknown (See Comments)     Pt can not remember   • Lipitor [Atorvastatin] Unknown (See Comments)     Pt can not remember   • Lisinopril Unknown (See Comments)     Pt can not remember   • Nexium [Esomeprazole Magnesium] Unknown (See Comments)     Pt can not remember   • Norvasc [Amlodipine] Unknown (See Comments)     Pt can not remember   • Sulfadiazine Unknown (See Comments)     Pt can not remember   • Toprol Xl [Metoprolol Tartrate] Unknown (See Comments)     Pt can not remember   • Zetia [Ezetimibe] Unknown (See Comments)     Pt can not remember   • Zocor [Simvastatin] Unknown (See Comments)     Pt can not remember   • Augmentin [Amoxicillin-Pot Clavulanate] GI Intolerance     nausea   • Celebrex [Celecoxib] GI Intolerance       Prior to Admission Medications     Prescriptions Last Dose Informant Patient Reported? Taking?    acyclovir (Zovirax) 5 % ointment 7/31/2022  No Yes    Apply 1 application topically to the appropriate area as directed 5 (Five) Times a Day.    amitriptyline (ELAVIL) 25 MG tablet 7/31/2022  No Yes    TAKE 1 TABLET BY MOUTH ONCE DAILY IN THE MORNING AND 1 TABLET ONCE NIGHTLY AT BEDTIME    bumetanide (BUMEX) 0.5 MG tablet 7/31/2022  No Yes    TAKE 1 TABLET BY MOUTH EVERY OTHER DAY AS NEEDED FOR SWELLING    cetirizine (ZyrTEC) 10 MG tablet 7/31/2022  Yes  Yes    Take 10 mg by mouth daily.    Diclofenac Sodium (VOLTAREN) 1 % gel gel 7/31/2022  No Yes    APPLY 4 GRAMS TO AFFECTED AREA 4 TIMES DAILY AS DIRECTED    fentaNYL (DURAGESIC) 12 MCG/HR 7/31/2022  Yes Yes    APPLY 1 PATCH TOPICALLY TO SKIN Q 72 H    fentaNYL (DURAGESIC) 50 MCG/HR patch 7/31/2022  Yes Yes    APPLY 1 PATCH TOPICALLY Q 72 H    fluticasone (FLONASE) 50 MCG/ACT nasal spray 7/31/2022  Yes Yes    fluticasone propionate 50 mcg/actuation nasal spray,suspension   2 sprays each nostril daily    gabapentin (NEURONTIN) 600 MG tablet 8/1/2022  Yes Yes    Take 600 mg by mouth 3 (Three) Times a Day.    HYDROcodone-acetaminophen (NORCO)  MG per tablet 8/1/2022  Yes Yes    Take 1 tablet by mouth every 6 (six) hours as needed for moderate pain (4-6).    pantoprazole (PROTONIX) 40 MG EC tablet 7/31/2022  No Yes    TAKE 1 TABLET BY MOUTH TWICE DAILY    potassium chloride 10 MEQ CR tablet 8/1/2022  No Yes    TAKE 1 TABLET BY MOUTH ONCE DAILY            Current Facility-Administered Medications:   •  acetaminophen (TYLENOL) tablet 650 mg, 650 mg, Oral, Q4H PRN, Kam Michael, PA  •  ceFAZolin in 0.9% normal saline (ANCEF) IVPB solution 2 g, 2 g, Intravenous, 60 Min Pre-Op, Kam Michael, PA  •  nitroglycerin (NITROSTAT) SL tablet 0.4 mg, 0.4 mg, Sublingual, Q5 Min PRN, Kam Michael, PA  •  ondansetron (ZOFRAN) injection 4 mg, 4 mg, Intravenous, Q6H PRN, Kam Michael, PA  •  sodium chloride 0.9 % flush 10 mL, 10 mL, Intravenous, PRN, Kam Michael, PA  •  sodium chloride 0.9 % flush 3 mL, 3 mL, Intravenous, Q12H, Kam Michael, PA    Social History     Socioeconomic History   • Marital status:    Tobacco Use   • Smoking status: Never Smoker   • Smokeless tobacco: Never Used   Vaping Use   • Vaping Use: Never used   Substance and Sexual Activity   • Alcohol use: No   • Drug use: No   • Sexual activity: Defer       Family History   Problem Relation Age of Onset   • No Known  Problems Mother    • Diabetes Father    • Cancer Son        REVIEW OF SYSTEMS:   CONSTITUTIONAL:         No weight loss, fever, chills, weakness or fatigue.   HEENT:                            No visual loss, blurred vision, double vision, yellow sclerae.                                             No hearing loss, congestion, sore throat.   SKIN:                                No rashes, urticaria, ulcers, sores.     RESPIRATORY:               No shortness of breath, hemoptysis, cough, sputum.   GI:                                     No anorexia, nausea, vomiting, diarrhea. No abdominal pain, melena.   :                                   No burning on urination, hematuria or increased frequency.  ENDOCRINE:                   No diaphoresis, cold or heat intolerance. No polyuria or polydipsia.   NEURO:                            No headache, dizziness, syncope, paralysis, ataxia, or parasthesias.                                            No change in bowel or bladder control. No history of CVA/TIA  MUSCULOSKELETAL:    No muscle, back pain, joint pain or stiffness. +edema  HEMATOLOGY:               No anemia, bleeding, bruising. No history of DVT/PE.  PSYCH:                            No history of depression, anxiety    There were no vitals filed for this visit.      Vital Sign Min/Max for last 24 hours  No data recorded   No data recorded   No data recorded   No data recorded   No data recorded   No data recorded    No intake or output data in the 24 hours ending 08/01/22 1203          Physical Exam:  GEN: Well nourished, Well- developed  No acute distress  HEENT: Normocephalic, Atraumatic, PERRLA, moist mucous membranes  NECK: supple, NO JVD, no thyromegaly, no lymphadenopathy  CARDIAC: S1S2  RRR no murmur, gallop, rub  LUNGS: Clear to ausculation, normal respiratory effort  ABDOMEN: Soft, nontender, normal bowel sounds  EXTREMITIES:No gross deformities,  No clubbing, cyanosis, or edema  SKIN: Warm,  dry  NEURO: No focal deficits  PSYCHIATRIC: Normal affect and mood      I personally viewed and interpreted the patient's EKG/Telemetry/lab data    Data:   Results from last 7 days   Lab Units 08/01/22  1039   WBC 10*3/mm3 3.34*   HEMOGLOBIN g/dL 12.3   HEMATOCRIT % 38.6   PLATELETS 10*3/mm3 177     Results from last 7 days   Lab Units 08/01/22  1039   SODIUM mmol/L 139   POTASSIUM mmol/L 4.7   CHLORIDE mmol/L 102   CO2 mmol/L 28.0   BUN mg/dL 11   CREATININE mg/dL 0.95   GLUCOSE mg/dL 96                                  No intake or output data in the 24 hours ending 08/01/22 1203    Chest X-Ray:  Imaging Results (Last 24 Hours)     ** No results found for the last 24 hours. **          Telemetry: V Paced 68 bpm  EKG: None for review today    Device interrogation 6/27/22:  Medtronic dual-chamber  2 % atrial pacing, sensed P wave 2 mV, threshold 5 V at 1 ms, impedance 317 ohms  100% ventricular pacing, no underlying at 40, threshold 0.5 V at 0.4 ms, impedance 660 ohms  1 month estimated JAYA.  No arrhythmia    Assessment and Plan:     1. High-grade AV block:   -Currently stable and asymptomatic.  Device dependent.  Known chronic high atrial threshold, limited atrial pacing, adequate sensing. See manual device interrogation above from 6/27/22. Device at JAYA, patient will undergo MDT DC PPM generator change out +/- RA lead revision today with Dr. Rodríguez. The risks, benefits, and alternatives of the procedure have been reviewed and the patient wishes to proceed.   NPO since MN. Tx for UTI 7/15, no further illnesses/infections/fevers/skin wounds.      2. Paroxysmal atrial fibrillation:   -Chads Vas equal to 4. Not a candidate for chronic anticoagulation due to upper GI bleeding and recurrent issues with symptomatic anemia. Minimal burden of arrhythmia.     3. Hypertension:   -controlled at home, continue present medications  -Per Dr. Seals     4. Right atrial pacemaker lead mass:   -Stable on follow-up echo, consistent  with chronic/organized/sclerotic thrombus.  No further serial surveillance recommended.    Electronically signed by MICHELLE Lea, 08/01/22, 11:12 AM EDT.

## 2022-08-02 NOTE — CASE MANAGEMENT/SOCIAL WORK
Discharge Planning Assessment  [facility]     Patient Name: Sheryl Conner  MRN: [id number]  Today's Date: 8/2/2022    Admit Date: 8/1/2022     Discharge Needs Assessment     Row Name 08/02/22 0934       Living Environment    People in Home alone    Current Living Arrangements home    Primary Care Provided by self    Provides Primary Care For no one    Family Caregiver if Needed child(red), adult    Quality of Family Relationships unable to assess    Able to Return to Prior Arrangements yes       Resource/Environmental Concerns    Resource/Environmental Concerns none       Transition Planning    Patient/Family Anticipates Transition to home with help/services    Patient/Family Anticipated Services at Transition     Transportation Anticipated family or friend will provide       Discharge Needs Assessment    Readmission Within the Last 30 Days no previous admission in last 30 days    Equipment Currently Used at Home none    Concerns to be Addressed discharge planning    Anticipated Changes Related to Illness none    Equipment Needed After Discharge none               Discharge Plan     Row Name 08/02/22 0934       Plan    Plan Home w/Home Health    Patient/Family in Agreement with Plan yes    Plan Comments Attempted to speak with patient in room to initiate discharge planning.  Patient sleeping.  Information obtained from chart.  She lives alone in [place].  She is independent with ADL's.  She has no DME at home.  She is current with [facility] for skilled nursing only.  Her PCP is Ryan Garcia.  She has an advanced directive in EPIC.  Ms. Conner has RX coverage and has her scripts filled at [facility].  Her plan is to return home with [facility] at discharge.  Notified Talha that patient should be discharging today.  No other needs noted at this time.  CM will continue to follow.    Final Discharge Disposition Code 06 - home with home health care              Continued  Care and Services - Admitted Since 8/1/2022    Coordination has not been started for this encounter.     Selected Continued Care - Prior Encounters Includes selections from prior encounters from 5/3/2022 to 8/2/2022    Discharged on 7/16/2022 Admission date: 7/15/2022 - Discharge disposition: Home or Self Care    Home Medical Care     Service Provider Selected Services Address Phone Fax Patient Preferred    Pending sale to [facility]  Home Health Services [address] [address] [phone] --                    Expected Discharge Date and Time     Expected Discharge Date Expected Discharge Time    Aug 2, 2022          Demographic Summary     Row Name 08/02/22 0932       General Information    Admission Type other (see comments)  outpatient    Arrived From other (see comments)  [facility]    Referral Source admission list    Reason for Consult discharge planning    Preferred Language English               Functional Status     Row Name 08/02/22 0933       Functional Status    Usual Activity Tolerance moderate    Current Activity Tolerance moderate       Functional Status, IADL    Medications independent    Meal Preparation independent    Housekeeping independent    Laundry independent    Shopping independent               Psychosocial    No documentation.                Abuse/Neglect    No documentation.                Legal    No documentation.                Substance Abuse    No documentation.                Patient Forms    No documentation.                   Bridget Sanabria, RN

## 2022-08-02 NOTE — NURSING NOTE
"Per patient, she does not tolerate Lasix (see Dr. Rodríguez's previous note of reason for IV Lasix order). Dr. Rodríguez notified and 2mg IV Bumex ordered because she takes 0.5 mg of Bumex PO at home. Upon speaking with pt in regard to the medication change and time of adminsitration, pt stated that she wanted to wait for her Bumex administration once she got to her room for the night. Report called to ADONIS Dominguez at 2116 and mentioned this to her as well. Pt then changed her mind and said that she didn't want it at all. This RN spoke with pt on the reason for the Bumex administration and is agreeable to administration on floor \"so its gone by morning before she goes home\". Otherwise, pt very pleasant and agreeable to plan of care. Pt then transferred to 3E room 331 by ADONIS Carrington and handoff given. I will also note here that pts home medications were also placed in a bag with pt label and taken to pharmacy for lock-up until discharge.   "

## 2022-08-02 NOTE — PROGRESS NOTES
[facility] at [facility]  Progress Note     LOS: 0 days   Patient Care Team:  Manan Garcia MD as PCP - General (Family Medicine)  Andi Seals III, MD as Cardiologist (Cardiology)    Chief Complaint:  SSS    Subjective     Patient s/p medtronic gen change yesterday with Dr. Rodríguez. Resting in bed. Now on RA sats 98%. VSS. Denies cp, palps, lh/dizziness, sob. Feeling well. Ready to go home.         Review of Systems:   Pertinent positives in HPI, all others reviewed and negative.      Current Facility-Administered Medications:   •  !Patient Home Medications Stored in Pharmacy, , Does not apply, Q12H, Riedel, Taylor, PharmD  •  acetaminophen (TYLENOL) tablet 650 mg, 650 mg, Oral, Q4H PRN **OR** acetaminophen (TYLENOL) suppository 650 mg, 650 mg, Rectal, Q4H PRN, Joss Rodríguez MD  •  amitriptyline (ELAVIL) tablet 25 mg, 25 mg, Oral, Q12H, Joss Rodríguez MD, 25 mg at 08/02/22 0803  •  cetirizine (zyrTEC) tablet 10 mg, 10 mg, Oral, Daily, Joss Rodríguez MD, 10 mg at 08/02/22 0803  •  fluticasone (FLONASE) 50 MCG/ACT nasal spray 2 spray, 2 spray, Each Nare, Daily, Joss Rodríguez MD, 2 spray at 08/02/22 0802  •  gabapentin (NEURONTIN) capsule 600 mg, 600 mg, Oral, Q8H, Joss Rodríguez MD, 600 mg at 08/02/22 0803  •  HYDROcodone-acetaminophen (NORCO)  MG per tablet 1 tablet, 1 tablet, Oral, Q6H PRN, Joss Rodríguez MD, 1 tablet at 08/01/22 1805  •  ibuprofen (ADVIL,MOTRIN) tablet 400 mg, 400 mg, Oral, Q6H PRN, Joss Rodríguez MD  •  pantoprazole (PROTONIX) EC tablet 40 mg, 40 mg, Oral, BID AC, Joss Rodríguez MD, 40 mg at 08/02/22 0803  •  potassium chloride (MICRO-K) CR capsule 10 mEq, 10 mEq, Oral, Daily, Joss Rodríguez MD, 10 mEq at 08/02/22 0803  •  sodium chloride 0.9 % flush 10 mL, 10 mL, Intravenous, PRN, Joss Rodríguez MD  •  sodium chloride 0.9 % flush 3 mL, 3 mL, Intravenous, Q12H, Joss Rodríguez MD, 3 mL at 08/02/22 0803      Objective     Vital Sign Min/Max for last 24  "hours  Temp  Min: 97.7 °F (36.5 °C)  Max: 98.5 °F (36.9 °C)   BP  Min: 124/62  Max: 206/93   Pulse  Min: 61  Max: 86   Resp  Min: 11  Max: 18   SpO2  Min: 78 %  Max: 100 %   Flow (L/min)  Min: 2  Max: 3   Weight  Min: 55.5 kg (122 lb 6.4 oz)  Max: 55.5 kg (122 lb 6.4 oz)     Flowsheet Rows    Flowsheet Row First Filed Value   Admission Height 160 cm (63\") Documented at 08/01/2022 2146   Admission Weight 55.5 kg (122 lb 6.4 oz) Documented at 08/01/2022 2146          Physical Exam:     General Appearance:    Alert, cooperative, in no acute distress   Lungs:     Clear to auscultation,respirations regular, even and                unlabored    Heart:    Regular rhythm and normal rate, normal S1 and S2, no          murmur, no gallop, no rub, no click   Chest Wall:    No abnormalities observed   Abdomen:     Normal bowel sounds, no masses,  soft nontender, nondistended,    Extremities:   No gross deformities, no edema, no cyanosis,    Pulses:   Pulses palpable and equal bilaterally   Skin:   Implant site clean dry intact without signs of hematoma or infection.         Results Review:   Results from last 7 days   Lab Units 08/01/22  1039   WBC 10*3/mm3 3.34*   HEMOGLOBIN g/dL 12.3   HEMATOCRIT % 38.6   PLATELETS 10*3/mm3 177     Results from last 7 days   Lab Units 08/01/22  1039   SODIUM mmol/L 139   POTASSIUM mmol/L 4.7   CHLORIDE mmol/L 102   CO2 mmol/L 28.0   BUN mg/dL 11   CREATININE mg/dL 0.95   GLUCOSE mg/dL 96                              No intake or output data in the 24 hours ending 08/02/22 1253    I personally viewed and interpreted the patient's EKG/Telemetry data    Chest X-ray: no penumothorax; acceptable lead position.     Telemetry: Paced 61-86 bpm        Present on Admission:  • Elective replacement indicated for pacemaker    Assessment & Plan     1. High-grade AV block:   -s/p successful medtronic DC generator change, 8/1/22 with Dr. Rodríguez. Developed hypoxia following procedure requiring 2LNC. Now on RA " with sats 98%. S/p Neb tx with improvement of wheezes.   -The chest Xray and chest incision site are unremarkable. Wound care and post device care have been reviewed with the patient in detail. Pt will have a wound check in 7-10 days then a follow up with  in 3 months.      2. Paroxysmal atrial fibrillation:   -Chads Vas equal to 4. Not a candidate for chronic anticoagulation due to upper GI bleeding and recurrent issues with symptomatic anemia. Minimal burden of arrhythmia.     3. Hypertension:   -controlled at home, continue present medications  -Per Dr. Seals      4. Right atrial pacemaker lead mass:   -Stable on follow-up echo, consistent with chronic/organized/sclerotic thrombus.  No further serial surveillance recommended.    Plan for disposition:The patient is stable and will be discharged to home today. Will need wound check in 7-10 days and 3 month hospital f/u with Dr. Rodríguez in [place].     Electronically signed by MICHELLE Lea, 08/02/22, 12:53 PM EDT.

## 2022-08-02 NOTE — CASE COMMUNICATION
Patient missed a SNV visit from [facility] on 8.1.22.     Reason: Patient is observation status at [facility].       For your records only.   As per home health protocol, MD must be notified of missed/cancelled visits; therefore the prescribed frequency was not met.

## 2022-08-08 NOTE — CASE COMMUNICATION
Patient discharged from  services to care of self in her home in stable condition effective 8.8.22 d/t goals met, teaching complete, and patient independent with care.  No further SN needs at this time.

## 2022-08-11 NOTE — TELEPHONE ENCOUNTER
Called patient about missed appointment 8/9 and she stated ' she was in the hospital for her pacemaker and said her shoulder was doing ok as of now. Patient does not need to reschedule and will call when need to.

## 2022-08-17 NOTE — PROGRESS NOTES
Subjective   Sheryl Conner is a 89 y.o. female    Chief Complaint    Hospital follow-up.  Pacemaker battery replacement.  Hypertension.  Anxiety and depression.  Mental status changes.    History of Present Illness  Patient presents today for hospital follow-up after recent pacemaker change.  Procedure went well without any complications.  Apparently they were replacing the battery of the pacemaker.  Also here to follow-up regarding her hypertension, anxiety, depression and mental status changes.  Patient presents without any family members or caregivers.  She tells me she drove here today by herself.  Certainly that is a testament that she is functioning pretty well for a 89-year-old lady.  She has no acute complaints today.  She tells me she is frustrated that she cannot physically do more than she can as she would like to be more active.    The following portions of the patient's history were reviewed and updated as appropriate: allergies, current medications, past social history and problem list    Review of Systems   Constitutional: Negative for fatigue, fever and unexpected weight change.   Respiratory: Negative for cough, chest tightness and shortness of breath.    Cardiovascular: Negative for chest pain, palpitations and leg swelling.   Gastrointestinal: Negative for nausea.   Musculoskeletal: Negative for arthralgias.   Skin: Negative for color change, pallor, rash and wound.   Neurological: Negative for dizziness, syncope, weakness and headaches.       Objective     Vitals:    08/17/22 1504   BP: 130/80   Pulse: 85   Resp: 18   Temp: 96.6 °F (35.9 °C)   SpO2: 99%       Physical Exam  Vitals and nursing note reviewed.   Constitutional:       General: She is not in acute distress.     Appearance: Normal appearance. She is well-developed. She is not ill-appearing, toxic-appearing or diaphoretic.   Neck:      Vascular: No carotid bruit or JVD.   Cardiovascular:      Rate and Rhythm: Normal rate and regular  rhythm.      Pulses: Normal pulses.      Heart sounds: Normal heart sounds. No murmur heard.  Pulmonary:      Effort: Pulmonary effort is normal. No respiratory distress.      Breath sounds: Normal breath sounds.   Abdominal:      Palpations: Abdomen is soft.      Tenderness: There is no abdominal tenderness.   Skin:     General: Skin is warm and dry.      Coloration: Skin is not pale.      Findings: Erythema and rash present.   Neurological:      Mental Status: She is alert.     PROCEDURE: Bandages removed from pacemaker site. Areas cleaned with Hibiclens. No dressing necessary. Recent abrasion of her left upper arm, bandages were removed. There is no active bleeding. Wound is well adhered. Advised to leave bandages off.    Assessment & Plan   Problems Addressed this Visit        Cardiac and Vasculature    Essential hypertension      Other Visit Diagnoses     Altered mental status, unspecified altered mental status type    -  Primary    Anxiety and depression          Diagnoses       Codes Comments    Altered mental status, unspecified altered mental status type    -  Primary ICD-10-CM: R41.82  ICD-9-CM: 780.97     Essential hypertension     ICD-10-CM: I10  ICD-9-CM: 401.9     Anxiety and depression     ICD-10-CM: F41.9, F32.A  ICD-9-CM: 300.00, 311         I spent 25 minutes in patient care: Reviewing records prior to the visit, examining the patient, entering orders and documentation    Part of this note may be an electronic transcription/translation of spoken language to printed text using the Dragon Dictation System.           Transcribed from ambient dictation for MARICARMEN Garcia MD by LINDA JUAREZ.  08/17/22   15:44 EDT    Patient verbalized consent to the visit recording.  I have personally performed the services described in this document as transcribed by the above individual, and it is both accurate and complete.  MARICARMEN Garcia MD  8/21/2022  21:54 EDT

## 2022-08-23 NOTE — PROGRESS NOTES
[facility] Office Follow Up       Office Follow Up Visit       Patient Name: Sheryl Conner    Chief Complaint:   Chief Complaint   Patient presents with   • Follow-up     3 month follow up -- Biceps tendinitis of right upper extremity; Bursitis of right shoulder; Impingement syndrome of right shoulder       Referring Physician: No ref. provider found    History of Present Illness:   It has been 3  month(s) since Sheryl Conner's last visit. Sheryl Conner returns to clinic today for F/U: follow-up of rightBody Part: shoulderReason: arthritis. The issue has been ongoing for 4 month(s). Sheryl Conner rates HIS/HER: herpain at 0/10 on the pain scale. Previous/current treatments: nothing. Current symptoms:Symptoms: none. The pain is worse with nothing; nothing improves the pain. Overall, he/she: sheis doing better.  I have reviewed the patient's history of present illness as noted/entered above.    I have reviewed the patient's past medical history, surgical history, social history, family history, medications, and allergies as noted in the electronic medical record and as noted/entered.  I have reviewed the patient's review of systems as noted/enter and updated as noted in the patient's HPI.      Right shoulder pain  Anterior biceps strain 1 month ago lifting fertilizer bag     Enjoys gardening       Last visit 5/31/2022- subacromial injection at that time.  RIGHT Shoulder    8/23/2022:  Right shoulder improvements noted  Recent birthday, 89st  She is doing great  No pain    Born in [facility]    Subjective   Subjective      Review of Systems   Constitutional: Negative.  Negative for chills, fatigue and fever.   HENT: Negative.  Negative for congestion and dental problem.    Eyes: Negative.  Negative for blurred vision.   Respiratory: Negative.  Negative for shortness of breath.    Cardiovascular: Negative.  Negative for leg  swelling.   Gastrointestinal: Negative.  Negative for abdominal pain.   Endocrine: Negative.  Negative for polyuria.   Genitourinary: Negative.  Negative for difficulty urinating.   Musculoskeletal: Positive for arthralgias.   Skin: Negative.    Allergic/Immunologic: Negative.    Neurological: Negative.    Hematological: Negative.  Negative for adenopathy.   Psychiatric/Behavioral: Negative.  Negative for behavioral problems.        Past Medical History:   Past Medical History:   Diagnosis Date   • Anemia    • Arthritis    • Fibromyalgia    • GERD (gastroesophageal reflux disease)    • History of transfusion    • Hypertension    • Kidney stone    • MG (myasthenia gravis) ([facility])     history of   • Pacemaker        Past Surgical History:   Past Surgical History:   Procedure Laterality Date   • CARDIAC ELECTROPHYSIOLOGY PROCEDURE N/A 8/1/2022    Procedure: DDD PPM Gen Chg(MDT) +/- new RA lead, C&T JWL, no meds to hold;  Surgeon: Joss Rodríguez MD;  Location: [facility] INVASIVE LOCATION;  Service: Cardiology;  Laterality: N/A;   • CARDIAC SURGERY      pacemaker placed   • COLONOSCOPY     • FOOT SURGERY Left     in the 1980s   • HEMORRHOIDECTOMY     • TUBAL ABDOMINAL LIGATION     • UPPER GASTROINTESTINAL ENDOSCOPY     • UPPER GASTROINTESTINAL ENDOSCOPY  05/29/2018       Family History:   Family History   Problem Relation Age of Onset   • No Known Problems Mother    • Diabetes Father    • Cancer Son        Social History:   Social History     Socioeconomic History   • Marital status:    Tobacco Use   • Smoking status: Never Smoker   • Smokeless tobacco: Never Used   Vaping Use   • Vaping Use: Never used   Substance and Sexual Activity   • Alcohol use: No   • Drug use: No   • Sexual activity: Defer       Medications:   Current Outpatient Medications:   •  acyclovir (Zovirax) 5 % ointment, Apply 1 application topically to the appropriate area as directed 5 (Five) Times a Day., Disp: 15 g, Rfl: 2  •  amitriptyline  (ELAVIL) 25 MG tablet, TAKE 1 TABLET BY MOUTH ONCE DAILY IN THE MORNING AND 1 TABLET ONCE NIGHTLY AT BEDTIME, Disp: 60 tablet, Rfl: 11  •  bumetanide (BUMEX) 0.5 MG tablet, TAKE 1 TABLET BY MOUTH EVERY OTHER DAY AS NEEDED FOR SWELLING, Disp: 90 tablet, Rfl: 0  •  cetirizine (ZyrTEC) 10 MG tablet, Take 10 mg by mouth daily., Disp: , Rfl:   •  Diclofenac Sodium (VOLTAREN) 1 % gel gel, APPLY 4 GRAMS TO AFFECTED AREA 4 TIMES DAILY AS DIRECTED, Disp: 400 g, Rfl: 0  •  fentaNYL (DURAGESIC) 12 MCG/HR, APPLY 1 PATCH TOPICALLY TO SKIN Q 72 H, Disp: , Rfl:   •  fentaNYL (DURAGESIC) 50 MCG/HR patch, APPLY 1 PATCH TOPICALLY Q 72 H, Disp: , Rfl:   •  fluticasone (FLONASE) 50 MCG/ACT nasal spray, fluticasone propionate 50 mcg/actuation nasal spray,suspension  2 sprays each nostril daily, Disp: , Rfl:   •  gabapentin (NEURONTIN) 600 MG tablet, Take 600 mg by mouth 3 (Three) Times a Day., Disp: , Rfl: 1  •  HYDROcodone-acetaminophen (NORCO)  MG per tablet, Take 1 tablet by mouth every 6 (six) hours as needed for moderate pain (4-6)., Disp: , Rfl:   •  pantoprazole (PROTONIX) 40 MG EC tablet, TAKE 1 TABLET BY MOUTH TWICE DAILY, Disp: 60 tablet, Rfl: 11  •  potassium chloride 10 MEQ CR tablet, TAKE 1 TABLET BY MOUTH ONCE DAILY, Disp: 30 tablet, Rfl: 11    Allergies:   Allergies   Allergen Reactions   • Codeine Sulfate Unknown (See Comments)     Pt took in the 70's-pt not sure of what happened   • Cozaar [Losartan] Unknown (See Comments)     Pt can not remember   • Crestor [Rosuvastatin] Unknown (See Comments)     Pt can not remember   • Dexlansoprazole Unknown (See Comments)     Pt can not remember   • Lipitor [Atorvastatin] Unknown (See Comments)     Pt can not remember   • Lisinopril Unknown (See Comments)     Pt can not remember   • Nexium [Esomeprazole Magnesium] Unknown (See Comments)     Pt can not remember   • Norvasc [Amlodipine] Unknown (See Comments)     Pt can not remember   • Sulfadiazine Unknown (See Comments)      "Pt can not remember   • Toprol Xl [Metoprolol Tartrate] Unknown (See Comments)     Pt can not remember   • Zetia [Ezetimibe] Unknown (See Comments)     Pt can not remember   • Zocor [Simvastatin] Unknown (See Comments)     Pt can not remember   • Augmentin [Amoxicillin-Pot Clavulanate] GI Intolerance     nausea   • Celebrex [Celecoxib] GI Intolerance       The following portions of the patient's history were reviewed and updated as appropriate: allergies, current medications, past family history, past medical history, past social history, past surgical history and problem list.        Objective    Objective      Vital Signs:   Vitals:    08/23/22 1444   BP: 130/70   Weight: 52.2 kg (115 lb)   Height: 160 cm (62.99\")       Ortho Exam:  RIGHT SHOULDER  Pain free  Excellent strength on exam today  Doing well    Results Review:  Imaging Results (Last 24 Hours)     ** No results found for the last 24 hours. **          Procedures          Assessment / Plan      Assessment/Plan:   Problem List Items Addressed This Visit        Musculoskeletal and Injuries    Biceps tendinitis of right upper extremity - Primary    Impingement syndrome of right shoulder    Bursitis of right shoulder      Other Visit Diagnoses     Right shoulder pain, unspecified chronicity            RIGHT SHOULDER doing great.  She will keep me updated as needed    Follow Up: as needed      Min Hancock MD, FAAOS  Orthopedic Surgeon  Fellowship Trained Shoulder and Elbow Surgeon  [facility]  Orthopedics and Sports Medicine  [address], [address]  [place] [address]    08/23/22  15:31 EDT  "

## 2022-10-07 NOTE — PROGRESS NOTES
Subjective   Sheryl oCnner is a 89 y.o. female  Hip Pain (Lt hip after a fall a few months ago. Pain was intermittent but now constant) and Knee Pain (RF Voltaren)      History of Present Illness    Sheryl Conner, 08/17/1931, presents to discuss left hip and bilateral knee pain.     The patient states her bilateral knee pain has been ongoing for some time and is seeing a pain management specialist. The patient requests a refill of Voltaren gel which she uses for her knees. She states her hip pain since falling months ago is her main concern today. She shares that she cannot remember when she fell but believes she fell on her left hip while working in her yard. She reports temporary relief with Voltaren gel and is still able to ambulate without assistance. She denies her pain radiating into her buttocks.     The following portions of the patient's history were reviewed and updated as appropriate: allergies, current medications, past social history and problem list    Review of Systems   Constitutional: Positive for activity change.   Musculoskeletal: Positive for arthralgias, gait problem and myalgias. Negative for joint swelling.   Skin: Negative.    Neurological: Negative for weakness and numbness.       Objective     Vitals:    10/07/22 1347   BP: 152/80   Pulse: 88   Resp: 14   Temp: 97.3 °F (36.3 °C)   SpO2: 95%       Physical Exam  Vitals and nursing note reviewed.   Constitutional:       General: She is not in acute distress.     Appearance: Normal appearance. She is well-developed. She is not ill-appearing, toxic-appearing or diaphoretic.   Cardiovascular:      Pulses: Normal pulses.   Musculoskeletal:         General: Tenderness ( mild left posterior hip SI joint) present. No swelling, deformity or signs of injury. Normal range of motion.   Skin:     General: Skin is warm and dry.      Coloration: Skin is not pale.      Findings: No erythema or rash.   Neurological:      Mental Status: She is alert.       Motor: No weakness or abnormal muscle tone.      Coordination: Coordination normal.      Gait: Gait normal.         Assessment & Plan     Diagnoses and all orders for this visit:    1. Hip pain, chronic, right (Primary)  -     XR Hip With or Without Pelvis 2 - 3 View Left; Future    2. Primary osteoarthritis of both knees    Other orders  -     methylPREDNISolone (MEDROL) 4 MG dose pack; Take as directed on package instructions.  Dispense: 1 each; Refill: 0       An x-ray of the left hip has been ordered to rule out dislocation and a prescription for 6 days worth of prednisone has been sent.    Transcribed from ambient dictation for June Rossi PA-C by Jacob Young.  10/07/22   14:58 EDT    Patient or patient representative verbalized consent to the visit recording.  I have personally performed the services described in this document as transcribed by the above individual, and it is both accurate and complete.  June Rossi PA-C  10/7/2022  17:40 EDT

## 2022-10-10 NOTE — TELEPHONE ENCOUNTER
----- Message from June Rossi PA-C sent at 10/7/2022  5:53 PM EDT -----  Please notify patient that her x-ray of her hip just shows severe arthritis no fracture

## 2022-10-24 PROBLEM — R41.0 DELIRIUM: Status: ACTIVE | Noted: 2022-01-01

## 2022-11-01 PROBLEM — E44.0 MODERATE MALNUTRITION (HCC): Status: ACTIVE | Noted: 2022-01-01

## 2022-11-01 NOTE — CASE MANAGEMENT/SOCIAL WORK
Continued Stay Note  [facility]     Patient Name: Sheryl Conner  MRN: [id number]  Today's Date: 11/1/2022    Admit Date: 10/24/2022    Plan: SW   Discharge Plan     Row Name 11/01/22 1422       Plan    Plan SW    Plan Comments SW’er spoke with patient’s daughter to discuss Rec’s by  [facility] team for follow-up with psychiatry for medication management 12/15 at 2pm with Darrell Sanchez (telehealth appointment)  and cassius Rouse 11/17 at 2:30 (tele health) with [facility] at [address], [place] [address] Phone: [phone].                                               Discharge Codes    No documentation.               Expected Discharge Date and Time     Expected Discharge Date Expected Discharge Time    Nov 1, 2022             SHARRI Bhatia (Kay)

## 2022-11-01 NOTE — PROGRESS NOTES
Continued Stay Note  [facility]     Patient Name: Sheryl ROSAS Dalila  MRN: [id number]  Today's Date: 11/1/2022    Admit Date: 10/24/2022    Plan: TBD   Discharge Plan     Row Name 11/01/22 1453       Plan    Plan TBD    Patient/Family in Agreement with Plan yes    Plan Comments Call from Staff RNBetito, stating that the pt's grandson, Ethan, was @ BS & has additional questions. BS visit made w/ pt./grandson. Writer educated on HH/Palliative Care/PACE program/Home & Community Based Waiver program/STR. Mercedes states that he feels that the pt would benefit from STR & wanted additional information from CM about their options. Ethan does have the 24 hr hospice contact number for additional hospice related questions. Writer explained that she would updated the care team. Staff RNBetito, was made aware face 2 face. Message has been sent to the pt's attending,Dr. Whittaker & GEGE Russell.                                         Discharge Codes    No documentation.               Expected Discharge Date and Time     Expected Discharge Date Expected Discharge Time    Nov 1, 2022             Ilene Gray

## 2022-11-01 NOTE — CONSULTS
Hospice consult received. Chart reviewed. Per chart review, pt. does not have a clear hospice diagnosis that suggests 6 month or less prognosis. Writer did speak w/ Janae Merino, who did review the pt.’s chart & assesses that senile degeneration of the brain could be an option for the pt. BS visit made w/ the pt. Pt was alert w/ no family @ BS. Writer did introduce herself self & pt. immediately asked “Why are you here?” “I do not want hospice.” Writer asked the pt. if she could tell her what hospice means. Pt stated “It is for people who are dying”, “My  has hospice & I am not dying”. Writer did speak w/ the pt.’s daughter, Sheyla, to explain that the pt. does not want hospice services & educated on the PACE program, HH services, & Palliative Care. Daughter is agreeable to a referral to the PACE. Contact number was provided to Sheyla. Referral has been sent. Pt.’s care team has been updated via [employer]. Palliative Care has also updated. Pt.’s hospice referral will be closed.

## 2022-11-01 NOTE — PLAN OF CARE
Goal Outcome Evaluation:  Plan of Care Reviewed With: patient        Progress: improving  Outcome Evaluation: Able to sidestep 3 + 4 ft w/ mod A (BUE supp; decl AD) to BSC,then recliner, + hygiene sit & stand,& ther ex EOB & UIC. Pt c/o

## 2022-11-01 NOTE — CASE MANAGEMENT/SOCIAL WORK
Continued Stay Note  [facility]     Patient Name: Sheryl Conner  MRN: [id number]  Today's Date: 11/1/2022    Admit Date: 10/24/2022    Plan: Home with [facility]   Discharge Plan     Row Name 11/01/22 0837       Plan    Plan Home with [facility]    Patient/Family in Agreement with Plan yes    Plan Comments Spoke with Sheyla and Ethan by phone. They are requesting a Hospice consult before patient comes home for assistance with home care and pain medication. MD's were notified by [employer]. CM will continue to follow.    Final Discharge Disposition Code 06 - home with home health care               Discharge Codes    No documentation.               Expected Discharge Date and Time     Expected Discharge Date Expected Discharge Time    Nov 1, 2022             Dionte Ann RN

## 2022-11-01 NOTE — DISCHARGE SUMMARY
[facility] Medicine Services  DISCHARGE SUMMARY    Patient Name: Sheryl Conner  : 1931  MRN: [id number]    Date of Admission: 10/24/2022  9:06 AM  Date of Discharge: 2022  Primary Care Physician: Manan Garcia MD    Consults     Date and Time Order Name Status Description    10/26/2022  9:19 AM Inpatient Psychiatrist Consult Completed     10/25/2022  4:11 AM Inpatient Palliative Care MD Consult Completed           Hospital Course     Presenting Problem:   Delirium [R41.0]    Active Hospital Problems    Diagnosis  POA   • **Delirium [R41.0]  Yes   • Moderate malnutrition (HCC) [E44.0]  Yes   • Gastroesophageal reflux disease without esophagitis [K21.9]  Yes   • Primary hypertension [I10]  Yes   • HLD (hyperlipidemia) [E78.5]  Unknown      Resolved Hospital Problems   No resolved problems to display.          Hospital Course:  Sheryl Conner is a 89 y.o. female with past medical history of essential hypertension, dyslipidemia, GERD, fibromyalgia, arthralgia, history of myasthenia gravis, chronic pain with narcotic dependence, presented to the hospital with increasing confusion/acute encephalopathy that was thought to be multifactorial, secondary to acute delirium with a component of hypertensive encephalopathy.  She was found to have vitamin B12 deficiency and that was treated with IM vitamin B12 supplementation as well as empiric therapy with IV thiamine.  She was started on Depakote for mood stabilization as well as Seroquel at bedtime which helped her acute psychosis tremendously.  Psych evaluated the patient and agreed to the treatment plan with Seroquel , amitriptyline and Depakote.  Patient improved and returned to her baseline.  Her blood pressure was much controlled after starting nifedipine XL 90 mg daily (she has a known allergy to amlodipine but tolerated nifedipine well during this hospitalization).  With her acute on chronic debility, age, multiple  comorbidities, family wanted the patient to go home with hospice but the patient declined.  Patient also declined acute rehab.  She was eventually discharged home with home PT and home health.     Acute metabolic encephalopathy, multifactorial  Acute delirium  Chronic pain with chronic narcotic dependence  Fibromyalgia and severe osteoarthritis  Dementia  • Encephalopathy was multifactorial, likely metabolic secondary to vitamin B12 deficiency and thiamine deficiency.  That was treated with IM vitamin B12 supplementation and high doses of IV thiamine.  Discharged with p.o. supplements for both  • Also improved with p.o. Seroquel at bedtime to help regulate her sleep cycle in addition to p.o. amitriptyline.  Also with low-dose Tegretol 100 mg 3 times daily as a mood stabilizer which helped a lot and was discharged home milligram twice daily  • Acute encephalopathy was also felt to be partially secondary to hypertensive emergency/encephalopathy and that was also treated and improved  • Discharged on home dos  • Behavioral health/psych evaluated the patient through telehealth and agreed with the above-mentioned medication regimen    Moderate malnutrition  Anorexia  • Appetite improved with Megace  • Given prescription for Megace upon discharge     Hypertensive crisis, improving  Concern for hypertensive encephalopathy  • Improved with nifedipine XL 90 mg daily  • Could not tolerate clonidine as it made her very lethargic and sleepy gy     Generalized weakness  • PT/OT teams consulted  • Patient declined rehab  • Discharged home with home health and home PT    Discharge Follow Up Recommendations for outpatient labs/diagnostics:  PCP in 1 week    Day of Discharge     HPI:   I have seen and evaluated the patient this morning.  Sitting comfortably in the bed, getting ready to eat breakfast.  Adamant to go home today and repeatedly declined rehab despite counseling.  I discussed with her the need to go home with hospice  care as advised by her grandson and her daughter but she declined.  She will be discharged home with her family this today    Review of Systems  General : no fevers, chills  CVS: No chest pain, palpitations  Respiratory: No cough, dyspnea  GI: No N/V/D, abd pain  10 point review of systems is negative except for what is mentioned in HPI    Vital Signs:   Temp:  [97.7 °F (36.5 °C)-98.5 °F (36.9 °C)] 98.5 °F (36.9 °C)  Heart Rate:  [88-95] 93  Resp:  [18] 18  BP: (148-157)/(72-88) 149/88      Physical Exam:  General: Chronically ill looking, more conversant.   Head: Atraumatic and normocephalic  Eyes: No Icterus. No pallor  Ears:  Ears appear intact with no abnormalities noted  Throat: No oral lesions, no thrush  Neck: Supple, trachea midline  Lungs: Clear to auscultation bilaterally, equal air entry, no wheezing or crackles  Heart:  Normal S1 and S2, no murmur, no gallop, No JVD, no lower extremity swelling  Abdomen:  Soft, no tenderness, no organomegaly, normal bowel sounds, no organomegaly  Extremities: pulses equal bilaterally  Skin: No bleeding, bruising or rash, normal skin turgor and elasticity  Neurologic: Cranial nerves appear intact with no evidence of facial asymmetry, normal motor and sensory functions in all 4 extremities  Psych: oriented x3       Pertinent  and/or Most Recent Results     LAB RESULTS:      Lab 10/29/22  0416 10/27/22  0426 10/26/22  0507   WBC 6.51 6.87 5.64   HEMOGLOBIN 13.9 13.0 13.9   HEMATOCRIT 42.5 40.1 42.2   PLATELETS 212 188 193   NEUTROS ABS 5.45 5.83 4.71   IMMATURE GRANS (ABS) 0.02 0.08* 0.01   LYMPHS ABS 0.45* 0.41* 0.45*   MONOS ABS 0.47 0.49 0.38   EOS ABS 0.07 0.02 0.05   MCV 88.2 87.6 87.2         Lab 10/29/22  0416 10/27/22  0426 10/26/22  0507   SODIUM 142 141 143   POTASSIUM 3.5 3.3* 3.8   CHLORIDE 107 107 104   CO2 23.0 21.0* 23.0   ANION GAP 12.0 13.0 16.0*   BUN 15 14 14   CREATININE 0.80 0.79 0.92   EGFR 69.7 70.7 58.9*   GLUCOSE 85 99 73   CALCIUM 9.5 9.2 9.1    MAGNESIUM  --   --  1.8   PHOSPHORUS  --   --  3.6         Lab 10/26/22  0507   TOTAL PROTEIN 6.6   ALBUMIN 4.10   GLOBULIN 2.5   ALT (SGPT) 12   AST (SGOT) 36*   BILIRUBIN 0.7   ALK PHOS 59                     Brief Urine Lab Results  (Last result in the past 365 days)      Color   Clarity   Blood   Leuk Est   Nitrite   Protein   CREAT   Urine HCG        10/24/22 0935 Yellow   Clear   Negative   Negative   Negative   Negative               Microbiology Results (last 10 days)     Procedure Component Value - Date/Time    COVID PRE-OP / PRE-PROCEDURE SCREENING ORDER (NO ISOLATION) - Swab, Nasopharynx [[id number]]  (Normal) Collected: 10/24/22 0935    Lab Status: Final result Specimen: Swab from Nasopharynx Updated: 10/24/22 1040    Narrative:      The following orders were created for panel order COVID PRE-OP / PRE-PROCEDURE SCREENING ORDER (NO ISOLATION) - Swab, Nasopharynx.  Procedure                               Abnormality         Status                     ---------                               -----------         ------                     COVID-19 and FLU A/B PCR...[[id number]]  Normal              Final result                 Please view results for these tests on the individual orders.    COVID-19 and FLU A/B PCR - Swab, Nasopharynx [[id number]]  (Normal) Collected: 10/24/22 0935    Lab Status: Final result Specimen: Swab from Nasopharynx Updated: 10/24/22 1040     COVID19 Not Detected     Influenza A PCR Not Detected     Influenza B PCR Not Detected    Narrative:      Fact sheet for providers: https://www.fda.gov/media/753397/download    Fact sheet for patients: https://www.fda.gov/media/868444/download    Test performed by PCR.          CT Head Without Contrast    Result Date: 10/24/2022  CT HEAD WO CONTRAST-  Date of Exam: 10/24/2022 10:24 AM  Indication: headache.  Comparison: 7/15/2022  Technique:  Without contrast, contiguous axial CT images of the head were obtained from skull base to vertex.  Coronal  and sagittal reconstructions were performed.  Automated exposure control and iterative reconstruction methods were used.  FINDINGS There is parenchymal volume loss. There is stable periventricular white matter hypodensity most commonly secondary to chronic small vessel ischemic change. Ventricles are appropriate in size and configuration for degree of volume loss. There is no evidence of mass effect, midline shift, acute hemorrhage or edema. There is normal gray-white differentiation. No abnormal fluid collections are identified. Atherosclerotic vascular calcification is present. Paranasal sinuses and mastoid air cells are clear. No acute calvarial defects are seen. Orbits are unremarkable for age.      No acute intracranial abnormality identified. Parenchymal volume loss and probable chronic small vessel ischemic change similar to prior. Brain MRI is more sensitive to evaluate for acute or subacute infarcts and to evaluate for intracranial metastatic disease.  This report was finalized on 10/24/2022 10:51 AM by Isaiah Jaimes MD.      XR Chest 1 View    Result Date: 10/24/2022  DATE OF EXAM: 10/24/2022 9:33 AM  PROCEDURE: XR CHEST 1 VW-  INDICATIONS: Weak/Dizzy/AMS triage protocol  COMPARISON: 08/01/2022  TECHNIQUE: Single radiographic AP view of the chest was obtained.  FINDINGS: Sternotomy wires noted. Cardiac pacemaker device is present. The heart is not definitely enlarged. The lungs seem relatively clear. There are no pleural effusions. There is suggestion of a radiopaque line in the right axillary region that might relate to a PICC line which should be correlated with clinical findings versus artifact.      1.  No definite acute pulmonary process.  This report was finalized on 10/24/2022 10:06 AM by Joe Giraldo MD.      Duplex Carotid Ultrasound CAR    Result Date: 10/26/2022  •  Proximal right internal carotid artery plaque without significant stenosis. •  Right internal carotid artery stenosis of 0-49%. •   Proximal left internal carotid artery plaque without significant stenosis. •  Left internal carotid artery stenosis of 0-49%.       Results for orders placed during the hospital encounter of 10/24/22    Duplex Carotid Ultrasound CAR    Interpretation Summary  •  Proximal right internal carotid artery plaque without significant stenosis.  •  Right internal carotid artery stenosis of 0-49%.  •  Proximal left internal carotid artery plaque without significant stenosis.  •  Left internal carotid artery stenosis of 0-49%.      Results for orders placed during the hospital encounter of 10/24/22    Duplex Carotid Ultrasound CAR    Interpretation Summary  •  Proximal right internal carotid artery plaque without significant stenosis.  •  Right internal carotid artery stenosis of 0-49%.  •  Proximal left internal carotid artery plaque without significant stenosis.  •  Left internal carotid artery stenosis of 0-49%.      Results for orders placed during the hospital encounter of 07/23/21    Adult Transthoracic Echo Limited W/ Cont if Necessary Per Protocol    Interpretation Summary  · Estimated left ventricular EF = 60% Left ventricular systolic function is normal.  · Incomplete exam due to patient uncooperation      Plan for Follow-up of Pending Labs/Results:     Discharge Details        Discharge Medications      New Medications      Instructions Start Date   carBAMazepine 200 MG tablet  Commonly known as: TEGretol   100 mg, Oral, 2 Times Daily      megestrol 40 MG/ML suspension  Commonly known as: MEGACE   400 mg, Oral, Daily   Start Date: November 2, 2022     NIFEdipine XL 90 MG 24 hr tablet  Commonly known as: PROCARDIA XL   90 mg, Oral, Every 24 Hours Scheduled   Start Date: November 2, 2022     oxyCODONE-acetaminophen 5-325 MG per tablet  Commonly known as: PERCOCET   1 tablet, Oral, Every 4 Hours PRN      QUEtiapine 25 MG tablet  Commonly known as: SEROquel   25 mg, Oral, Nightly      rOPINIRole 0.5 MG tablet  Commonly  known as: REQUIP   0.5 mg, Oral, Nightly, Take 1 hour before bedtime.      thiamine 100 MG tablet  Commonly known as: VITAMIN B1   100 mg, Oral, Daily   Start Date: November 2, 2022     vitamin B-12 1000 MCG tablet  Commonly known as: CYANOCOBALAMIN   1,000 mcg, Oral, Daily         Changes to Medications      Instructions Start Date   amitriptyline 25 MG tablet  Commonly known as: ELAVIL  What changed:   · how much to take  · how to take this  · when to take this  · additional instructions   25 mg, Oral, Nightly      gabapentin 600 MG tablet  Commonly known as: NEURONTIN  What changed:   · how much to take  · when to take this   300 mg, Oral, 2 Times Daily      pantoprazole 40 MG EC tablet  Commonly known as: PROTONIX  What changed: how to take this   TAKE 1 TABLET BY MOUTH TWICE DAILY      potassium chloride 10 MEQ CR tablet  What changed: how to take this   TAKE 1 TABLET BY MOUTH ONCE DAILY         Continue These Medications      Instructions Start Date   cetirizine 10 MG tablet  Commonly known as: zyrTEC   10 mg, Oral, Daily, OTC      Diclofenac Sodium 1 % gel gel  Commonly known as: VOLTAREN   Topical, 4 Times Daily, To knees for arthritis      fentaNYL 50 MCG/HR patch  Commonly known as: DURAGESIC   Place 1 patch on the skin as directed by provider Every 72 (Seventy-Two) Hours.      fentaNYL 12 MCG/HR  Commonly known as: DURAGESIC   Place 1 patch on the skin as directed by provider Every 72 (Seventy-Two) Hours.      fluticasone 50 MCG/ACT nasal spray  Commonly known as: FLONASE   2 sprays into the nostril(s) as directed by provider Daily. OTC         Stop These Medications    bumetanide 0.5 MG tablet  Commonly known as: BUMEX     HYDROcodone-acetaminophen  MG per tablet  Commonly known as: NORCO            Allergies   Allergen Reactions   • Codeine Sulfate Unknown (See Comments)     Pt took in the 70's-pt not sure of what happened   • Cozaar [Losartan] Unknown (See Comments)     Pt can not remember   •  Crestor [Rosuvastatin] Unknown (See Comments)     Pt can not remember   • Dexlansoprazole Unknown (See Comments)     Pt can not remember   • Lipitor [Atorvastatin] Unknown (See Comments)     Pt can not remember   • Lisinopril Unknown (See Comments)     Pt can not remember   • Nexium [Esomeprazole Magnesium] Unknown (See Comments)     Pt can not remember   • Norvasc [Amlodipine] Unknown (See Comments)     Pt can not remember   • Sulfadiazine Unknown (See Comments)     Pt can not remember   • Toprol Xl [Metoprolol Tartrate] Unknown (See Comments)     Pt can not remember   • Zetia [Ezetimibe] Unknown (See Comments)     Pt can not remember   • Zocor [Simvastatin] Unknown (See Comments)     Pt can not remember   • Augmentin [Amoxicillin-Pot Clavulanate] GI Intolerance     nausea   • Celebrex [Celecoxib] GI Intolerance         Discharge Disposition:  Home or Self Care    Diet:  Hospital:  Diet Order   Procedures   • Diet Regular; Cardiac       Activity:  Activity Instructions     Activity as Tolerated            Restrictions or Other Recommendations:  None        CODE STATUS:    Code Status and Medical Interventions:   Ordered at: 10/24/22 1651     Medical Intervention Limits:    NO intubation (DNI)     Level Of Support Discussed With:    Next of Kin (If No Surrogate)     Code Status (Patient has no pulse and is not breathing):    No CPR (Do Not Attempt to Resuscitate)     Medical Interventions (Patient has pulse or is breathing):    Limited Support       Future Appointments   Date Time Provider Department Center   11/8/2022  1:45 PM hTerese Bentley APRN [facility]   11/16/2022  3:00 PM Manan Garcia MD [facility]   11/22/2022  1:30 PM Joss Rodríguez MD [facility]   12/19/2022  3:45 PM Rahat Morris MD [facility]   1/23/2023  1:45 PM Andi Seals III, MD [facility]       Additional Instructions for the Follow-ups that You Need to Schedule     Ambulatory Referral to Home  Health   As directed      Face to Face Visit Date: 11/1/2022    Follow-up provider for Plan of Care?: I will be treating the patient on an ongoing basis.  Please send me the Plan of Care for signature.    Follow-up provider: HATTIE RODRÍGUEZ [[id number]]    Reason/Clinical Findings: Delirium    Describe mobility limitations that make leaving home difficult: Impaired mobility    Nursing/Therapeutic Services Requested: Physical Therapy Occupational Therapy    PT orders: Therapeutic exercise Gait Training Strengthening Home safety assessment    Weight Bearing Status: Full Weight Bearing    Occupational orders: Activities of daily living Energy conservation Strengthening Home safety assessment    Frequency: 1 Week 1                     Sergey Whittaker MD  11/01/22      Time Spent on Discharge:  I spent  48 minutes on this discharge activity which included: face-to-face encounter with the patient, reviewing the data in the system, coordination of the care with the nursing staff as well as consultants, documentation, and entering orders.

## 2022-11-01 NOTE — DISCHARGE INSTR - APPOINTMENTS
Rec's by  [facility] team for follow-up with psychiatry for  medication management 12/15 at 2pm with Darrell Sanchez (telehealth appointment)    and   therapy Ifeoma Rouse 11/17 at 2:30 (tele health)   with   [facility] at [address],   [place] [address] Phone: [phone]

## 2022-11-01 NOTE — THERAPY DISCHARGE NOTE
Patient Name: Sheryl Conner  : 1931    MRN: [id number]                              Today's Date: 2022       Admit Date: 10/24/2022    Visit Dx:     ICD-10-CM ICD-9-CM   1. Delirium  R41.0 780.09   2. Disorientation  R41.0 780.99   3. Cognitive impairment  R41.89 294.9   4. Left leg pain  M79.605 729.5   5. Biceps tendinitis of right upper extremity  M75.21 726.12   6. Bursitis of right shoulder  M75.51 726.10     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   • Right knee pain   • GAVE (gastric antral vascular ectasia)   • Paroxysmal atrial fibrillation ([facility])   • SSS (sick sinus syndrome) ([facility])   • Primary hypertension   • Functional heart murmur   • Idiopathic osteoarthritis   • HLD (hyperlipidemia)   • Stage 3a chronic kidney disease ([facility])   • Gastroesophageal reflux disease without esophagitis   • Cervical pain (neck)   • Fibromyalgia   • Arthritis   • Chronic pain syndrome   • Pacemaker   • Cervical spondylosis without myelopathy   • DDD (degenerative disc disease), cervical   • Cervical spinal stenosis   • Acute metabolic encephalopathy   • Acute kidney injury ([facility])   • Biceps tendinitis of right upper extremity   • Impingement syndrome of right shoulder   • Bursitis of right shoulder   • AV block, complete ([facility])   • Elective replacement indicated for pacemaker   • Acute UTI (urinary tract infection)   • Accelerated hypertension   • Nausea vomiting and diarrhea   • Left leg pain   • Cognitive impairment   • Acute UTI   • Epiretinal membrane   • Delirium   • Moderate malnutrition ([facility])     Past Medical History:   Diagnosis Date   • Anemia    • Arthritis    • Fibromyalgia    • GERD (gastroesophageal reflux disease)    • History of transfusion    • Hypertension    • Kidney stone    • MG (myasthenia gravis) ([facility])     history of   • Pacemaker      Past Surgical History:   Procedure Laterality  Date   • CARDIAC ELECTROPHYSIOLOGY PROCEDURE N/A 8/1/2022    Procedure: DDD PPM Gen Chg(MDT) +/- new RA lead, C&T JWL, no meds to hold;  Surgeon: Joss Rodríguez MD;  Location: [facility] INVASIVE LOCATION;  Service: Cardiology;  Laterality: N/A;   • CARDIAC SURGERY      pacemaker placed   • COLONOSCOPY     • FOOT SURGERY Left     in the 1980s   • HEMORRHOIDECTOMY     • TUBAL ABDOMINAL LIGATION     • UPPER GASTROINTESTINAL ENDOSCOPY     • UPPER GASTROINTESTINAL ENDOSCOPY  05/29/2018      General Information     Row Name 11/01/22 1141          Physical Therapy Time and Intention    Document Type discharge treatment  -DM     Mode of Treatment physical therapy  -DM     Row Name 11/01/22 1141          General Information    Existing Precautions/Restrictions fall;other (see comments)  AMS;met enceph/delirium; anx.;chr pain/fibromy; FTT;incont.  -DM           User Key  (r) = Recorded By, (t) = Taken By, (c) = Cosigned By    Initials Name Provider Type    DM Salome Dumont, PT Physical Therapist               Mobility     Row Name 11/01/22 1141          Bed Mobility    Bed Mobility rolling right  -DM     Rolling Right [place] (Bed Mobility) verbal cues;contact guard  -DM     Scooting/Bridging [place] (Bed Mobility) verbal cues;minimum assist (75% patient effort)  -DM     Supine-Sit [place] (Bed Mobility) verbal cues;minimum assist (75% patient effort)  -DM     Assistive Device (Bed Mobility) bed rails;head of bed elevated  -DM     Comment, (Bed Mobility) pt calling out for help & yelling she is in a pool of pee as PT entering;holding call bell & stating staff not responding(PCT states pt calling out cont.all morning, & wants staff to sit & talk w/her);noted pt had pulled pure wick forward (holding in hand),& linens damp;pt stating she needed to pee again;brought BSC & placed tdsocks; PCT req. pt be transf to chair s/p toileting  -DM     Row Name 11/01/22 1141          Transfers    Comment, (Transfers) Cues  "for HP, seq  -DM     Row Name 11/01/22 1141          Sit-Stand Transfer    Sit-Stand [place] (Transfers) verbal cues;minimum assist (75% patient effort)  decl AD;pt prefers PT facing her for close guarding & min HHA;utilized R Bedrail, BSC handlebars, chair armrests  -DM     Row Name 11/01/22 1141          Gait/Stairs (Locomotion)    [place] Level (Gait) verbal cues;moderate assist (50% patient effort)  side stepped 3 ft to BSC;toileted intermitt.over 10min.period (pt ref.transf to recliner,& insisting \"leave me right here\");coaxed to allow hygiene,then sidestepped 4 ft to chair;decl. further gt,& insisted on calling grandson,then asked for Tums  -DM     Assistive Device (Gait) other (see comments)  decl. R wx;mod BUE supp(pt resting hands on PT's arms)  -DM     Distance in Feet (Gait) 7 ( 3 + 4)  -DM     Deviations/Abnormal Patterns (Gait) bilateral deviations;base of support, narrow;khalif decreased;stride length decreased;weight shifting decreased  decr step length  -DM     Bilateral Gait Deviations forward flexed posture;heel strike decreased  -DM     Comment, (Gait/Stairs) cues for incr step length & MARILU, & PLB; incont urine on floor w/ transf to BSC (cleaned & disinfect.)  -DM           User Key  (r) = Recorded By, (t) = Taken By, (c) = Cosigned By    Initials Name Provider Type    DM Salome Dumont, PT Physical Therapist               Obj/Interventions     Row Name 11/01/22 1141          Motor Skills    Therapeutic Exercise hip;knee;ankle;shoulder  pt stating \"this is helping; I should have been doing these all along\"  -DM     Row Name 11/01/22 1141          Hip (Therapeutic Exercise)    Hip (Therapeutic Exercise) AROM (active range of motion);AAROM (active assistive range of motion);isometric exercises  -DM     Hip AROM (Therapeutic Exercise) flexion;aBduction;aDduction;external rotation;internal rotation;sitting;10 repetitions  -DM     Hip AAROM (Therapeutic Exercise) " bilateral;flexion;extension;sitting;10 repetitions  sitting marches  -DM     Hip Isometrics (Therapeutic Exercise) bilateral;gluteal sets;sitting;10 repetitions  -DM     Row Name 11/01/22 1141          Knee (Therapeutic Exercise)    Knee (Therapeutic Exercise) isometric exercises;AROM (active range of motion)  -DM     Knee AROM (Therapeutic Exercise) bilateral;flexion;extension;SAQ (short arc quad);LAQ (long arc quad);heel slides;sitting;10 repetitions  -DM     Knee AAROM (Therapeutic Exercise) bilateral;flexion;sitting;10 repetitions  min A for flex w/ h.slides,then able to perform w/ Cues  -DM     Knee Isometrics (Therapeutic Exercise) bilateral;quad sets;sitting;10 repetitions  not comprehending HS  -DM     Row Name 11/01/22 1141          Ankle (Therapeutic Exercise)    Ankle (Therapeutic Exercise) AROM (active range of motion);AAROM (active assistive range of motion)  -DM     Ankle AROM (Therapeutic Exercise) bilateral;dorsiflexion;plantarflexion;sitting;10 repetitions  -DM     Ankle AAROM (Therapeutic Exercise) bilateral;sitting;10 repetitions;other (see comments)  AC  -DM     Row Name 11/01/22 1141          Balance    Balance Assessment sitting static balance;sitting dynamic balance;standing static balance;standing dynamic balance  -DM     Static Sitting Balance independent  -DM     Dynamic Sitting Balance supervision  recip scooting  -DM     Position, Sitting Balance unsupported;sitting in chair;sitting edge of bed;other (see comments)  BSC  -DM     Static Standing Balance minimal assist;verbal cues  -DM     Dynamic Standing Balance moderate assist;verbal cues  WS to init sidesteps  -DM     Position/Device Used, Standing Balance supported;other (see comments)  BUE supp.  -DM     Balance Interventions sitting;standing;static;dynamic;weight shifting activity  -DM           User Key  (r) = Recorded By, (t) = Taken By, (c) = Cosigned By    Initials Name Provider Type    DM Salome Dumont, PT Physical Therapist                Goals/Plan     Row Name 11/01/22 1141          Bed Mobility Goal 1 (PT)    Activity/Assistive Device (Bed Mobility Goal 1, PT) bed mobility activities, all  -DM     [place] Level/Cues Needed (Bed Mobility Goal 1, PT) moderate assist (50-74% patient effort)  -DM     Time Frame (Bed Mobility Goal 1, PT) long term goal (LTG);10 days  -DM     Progress/Outcomes (Bed Mobility Goal 1, PT) goal partially met  -DM     Row Name 11/01/22 1141          Transfer Goal 1 (PT)    Activity/Assistive Device (Transfer Goal 1, PT) sit-to-stand/stand-to-sit;bed-to-chair/chair-to-bed;walker, rolling  -DM     [place] Level/Cues Needed (Transfer Goal 1, PT) maximum assist (25-49% patient effort)  -DM     Time Frame (Transfer Goal 1, PT) long term goal (LTG);10 days  -DM     Progress/Outcome (Transfer Goal 1, PT) goal met  -DM     Row Name 11/01/22 1141          Gait Training Goal 1 (PT)    Activity/Assistive Device (Gait Training Goal 1, PT) gait (walking locomotion);walker, rolling  -DM     [place] Level (Gait Training Goal 1, PT) maximum assist (25-49% patient effort)  -DM     Distance (Gait Training Goal 1, PT) 5  -DM     Time Frame (Gait Training Goal 1, PT) long term goal (LTG);10 days  -DM     Progress/Outcome (Gait Training Goal 1, PT) goal partially met  -DM     Row Name 11/01/22 1141          Patient Education Goal (PT)    Activity (Patient Education Goal, PT) HEP exer  -DM     [place]/Cues/Accuracy (Memory Goal 2, PT) demonstrates adequately  -DM     Time Frame (Patient Education Goal, PT) long term goal (LTG);10 days  -DM     Progress/Outcome (Patient Education Goal, PT) goal partially met  -DM           User Key  (r) = Recorded By, (t) = Taken By, (c) = Cosigned By    Initials Name Provider Type    Salome Terrell, PT Physical Therapist               Clinical Impression     Row Name 11/01/22 1141          Pain    Pain Intervention(s) Repositioned;Elevated;Rest  -DM     Additional  Documentation Pain Scale: FACES Pre/Post-Treatment (Group)  -DM     Row Name 11/01/22 1141          Pain Scale: FACES Pre/Post-Treatment    Pain: FACES Scale, Pretreatment 2-->hurts little bit  -DM     Posttreatment Pain Rating 2-->hurts little bit  -DM     Pain Location - Side/Orientation Bilateral  -DM     Pain Location lower  -DM     Pain Location - back;abdomen  -DM     Pre/Posttreatment Pain Comment back & abdom/indigestion;nsg notified (wants Tums)  -DM     Row Name 11/01/22 1141          Plan of Care Review    Plan of Care Reviewed With patient  -DM     Progress improving  -DM     Outcome Evaluation Able to sidestep 3 + 4 ft w/ mod A (BUE supp; decl AD) to BSC,then recliner, + hygiene sit & stand,& ther ex EOB & UIC. Pt c/o  -DM     Row Name 11/01/22 1141          Vital Signs    Pre Systolic BP Rehab 149  -DM     Pre Treatment Diastolic BP 88  -DM     Post Systolic BP Rehab 161  -DM     Post Treatment Diastolic BP 75  -DM     Pretreatment Heart Rate (beats/min) 93  -DM     Intratreatment Heart Rate (beats/min) 101  -DM     Posttreatment Heart Rate (beats/min) 91  -DM     Pre SpO2 (%) 93  -DM     O2 Delivery Pre Treatment room air  -DM     Intra SpO2 (%) 92  -DM     O2 Delivery Intra Treatment room air  -DM     Post SpO2 (%) 94  -DM     O2 Delivery Post Treatment room air  -DM     Pre Patient Position Supine  -DM     Intra Patient Position Standing  -DM     Post Patient Position Sitting  -DM     Row Name 11/01/22 1141          Positioning and Restraints    Pre-Treatment Position in bed  -DM     Post Treatment Position chair  -DM     In Chair notified nsg;reclined;call light within reach;encouraged to call for assist;exit alarm on;waffle cushion;legs elevated  -DM           User Key  (r) = Recorded By, (t) = Taken By, (c) = Cosigned By    Initials Name Provider Type    Salome Terrell, PT Physical Therapist               Outcome Measures     Row Name 11/01/22 1141 11/01/22 1000       How much help from  another person do you currently need...    Turning from your back to your side while in flat bed without using bedrails? 3  -DM 2  -GE    Moving from lying on back to sitting on the side of a flat bed without bedrails? 2  -DM 2  -GE    Moving to and from a bed to a chair (including a wheelchair)? 2  -DM 1  -GE    Standing up from a chair using your arms (e.g., wheelchair, bedside chair)? 2  -DM 2  -GE    Climbing 3-5 steps with a railing? 1  -DM 1  -GE    To walk in hospital room? 2  -DM 1  -GE    AM-PAC 6 Clicks Score (PT) 12  -DM 9  -GE    Highest level of mobility 4 --> Transferred to chair/commode  -DM 3 --> Sat at edge of bed  -GE    Row Name 11/01/22 1141          Functional Assessment    Outcome Measure Options AM-PAC 6 Clicks Basic Mobility (PT)  -DM           User Key  (r) = Recorded By, (t) = Taken By, (c) = Cosigned By    Initials Name Provider Type    Salome Terrell, PT Physical Therapist    Betito Nguyen RN Registered Nurse              Physical Therapy Education     Title: PT OT SLP Therapies (In Progress)     Topic: Physical Therapy (In Progress)     Point: Mobility training (In Progress)     Learning Progress Summary           Patient Acceptance, E,D,H, NR by DM at 11/1/2022 1243    Acceptance, E, VU,NR by NS at 10/31/2022 1425    Acceptance, E,D, NR by DM at 10/30/2022 1652    Acceptance, E,D, NL by DM at 10/25/2022 1716                   Point: Home exercise program (In Progress)     Learning Progress Summary           Patient Acceptance, E,D,H, NR by DM at 11/1/2022 1243    Acceptance, E,D, NR by DM at 10/30/2022 1652    Acceptance, E,D, NL by DM at 10/25/2022 1716                   Point: Body mechanics (In Progress)     Learning Progress Summary           Patient Acceptance, E,D,H, NR by DM at 11/1/2022 1243    Acceptance, E, VU,NR by NS at 10/31/2022 1425    Acceptance, E,D, NR by DM at 10/30/2022 1652    Acceptance, E,D, NL by DM at 10/25/2022 1716                   Point: Precautions  (In Progress)     Learning Progress Summary           Patient Acceptance, E,D,H, NR by DM at 11/1/2022 1243    Acceptance, E, VU,NR by NS at 10/31/2022 1425    Acceptance, E,D, NR by DM at 10/30/2022 1652    Acceptance, E,D, NL by DM at 10/25/2022 1716                               User Key     Initials Effective Dates Name Provider Type Discipline    DM 06/16/21 -  Salome Dumont, PT Physical Therapist PT    NS 06/16/21 -  Kelsie Turner, CHELSIE Physical Therapist PT              PT Recommendation and Plan  Planned Therapy Interventions (PT): balance training, bed mobility training, gait training, strengthening, patient/family education, transfer training  Plan of Care Reviewed With: patient  Progress: improving  Outcome Evaluation: Able to sidestep 3 + 4 ft w/ mod A (BUE supp; decl AD) to BSC,then recliner, + hygiene sit & stand,& ther ex EOB & UIC. Pt c/o     Time Calculation:    PT Charges     Row Name 11/01/22 1243             Time Calculation    Start Time 1141  -DM      PT Received On 11/01/22  -DM      PT Goal Re-Cert Due Date 11/04/22  -DM         Time Calculation- PT    Total Timed Code Minutes- PT 38 minute(s)  -DM         Timed Charges    13860 - PT Therapeutic Exercise Minutes 19  -DM      94270 - Gait Training Minutes  9  -DM      58881 - PT Therapeutic Activity Minutes 10  -DM         Total Minutes    Timed Charges Total Minutes 38  -DM       Total Minutes 38  -DM            User Key  (r) = Recorded By, (t) = Taken By, (c) = Cosigned By    Initials Name Provider Type    DM Salome Dumont, PT Physical Therapist              Therapy Charges for Today     Code Description Service Date Service Provider Modifiers Qty    17336948275 HC PT THER PROC EA 15 MIN 11/1/2022 Salome Dumont, PT GP 1    04831656312 HC GAIT TRAINING EA 15 MIN 11/1/2022 Salome Dumont, PT GP 1    99743783446 HC PT THERAPEUTIC ACT EA 15 MIN 11/1/2022 Salome Dumont, PT GP 1          PT G-Codes  Outcome Measure Options: AM-PAC 6  Clicks Basic Mobility (PT)  AM-PAC 6 Clicks Score (PT): 12  AM-PAC 6 Clicks Score (OT): 15    PT Discharge Summary  Anticipated Discharge Disposition (PT): skilled nursing facility    Salome Dumont, PT  11/1/2022

## 2022-11-02 NOTE — HOME HEALTH
PT Eval Note:   Home Health ordered for: disciplines SN, PT, OT  Reason for Hosp/Primary Dx/Co-morbidities: encephalopathy/essential hypertension, dyslipidemia, GERD, fibromyalgia, arthralgia, history of myasthenia gravis, chronic pain with narcotic dependence  Focus of Care: HHPT 1wk1, 2wk2, 1wk4 for gait training, balance training, therapeutic exercise, pt education/HEP instruction related to encephalopathy  Current Functional status/mobility/DME: Pt has a FWW but is not compliant with use; requires CGA/min A for functional mobility in home  HB status/Living Arrangements: Pt lives alone, has a daughter and grandson who live in town but are unable to provide 24 hour care  Skin Integrity/wound status: no deficits noted  Fall Risk: high per [place]

## 2022-11-02 NOTE — OUTREACH NOTE
Call Center TCM Note    Flowsheet Row Responses   [facility] patient discharged from? [place]   Does the patient have one of the following disease processes/diagnoses(primary or secondary)? Other   TCM attempt successful? No   Unsuccessful attempts Attempt 2          Kristin Borjas RN    11/2/2022, 15:31 EDT

## 2022-11-02 NOTE — OUTREACH NOTE
Call Center TCM Note    Flowsheet Row Responses   [facility] patient discharged from? [place]   Does the patient have one of the following disease processes/diagnoses(primary or secondary)? Other   TCM attempt successful? No  [No VR for PCP]   Unsuccessful attempts Attempt 1   Call Status Left message          Kristin Borjas RN    11/2/2022, 13:27 EDT

## 2022-11-02 NOTE — OUTREACH NOTE
Prep Survey    Flowsheet Row Responses   [facility] patient discharged from? [place]   Is LACE score < 7 ? No   Emergency Room discharge w/ pulse ox? No   Eligibility [facility]   Date of Admission 10/24/22   Date of Discharge 11/01/22   Discharge Disposition Home or Self Care   Discharge diagnosis Acute metabolic encephalopathy   Does the patient have one of the following disease processes/diagnoses(primary or secondary)? Other   Does the patient have Home health ordered? Yes   What is the Home health agency?   [facility]    Is there a DME ordered? No   Prep survey completed? Yes          ROSIO JEFFRIES - Registered Nurse

## 2022-11-03 NOTE — HOME HEALTH
"SOC Note:    Home Health ordered for: disciplines sn, pt, ot. Referral for MSW     Reason for Hosp/Primary Dx/Co-morbidities: Patient is a 89 yr old. female with PMH of essential hypertension, dyslipidemia, GERD, fibromyalgia, arthralgia, history of myasthenia gravis, chronic pain with narcotic dependence, admitted to the hospital with increasing confusion/acute encephalopathy. Patient was found to have vitamin B12 deficiency. She was started on Depakote for mood stabilization and Seroquel at bedtime for acute psychosis. Psych evaluated. Patient refused rehab and hospice. Patient lives alone but family, daughter and grandson are assisting as able. Daughter has health issues and grandson works at [employer].     Focus of Care: disease process education, medication management and falls prevention    Current Functional status/mobility/DME: walker    HB status/Living Arrangements: lives alone with assistance from daughter/grandson    Skin Integrity/wound status: pt refused skin assessment. Foam border dressing to RLE placed prior to d/c from hospital. Pt reports \"hitting it on a flower pot out front\"    Code Status: DNR    Fall Risk: YES    POC confirmed with Dr Garcia on 11.2.22"

## 2022-11-03 NOTE — OUTREACH NOTE
Call Center TCM Note    Flowsheet Row Responses   [facility] patient discharged from? [place]   Does the patient have one of the following disease processes/diagnoses(primary or secondary)? Other   TCM attempt successful? No   Unsuccessful attempts Attempt 3          Tyson Singh RN    11/3/2022, 09:22 EDT

## 2022-11-03 NOTE — TELEPHONE ENCOUNTER
[facility] HAS CALLED REQUESTING A VERBAL ORDER FOR SOCIAL WORK EVALUATION FOR NEXT WEEK.    CALL BACK NUMBER [phone]

## 2022-11-04 NOTE — TELEPHONE ENCOUNTER
I suggest she talk to the home health people about increasing the number of visits per week.  That is generally not up to me but is up to her insurance paying for it.  I support as much PT as she can get.

## 2022-11-04 NOTE — ED PROVIDER NOTES
Subjective   History of Present Illness  Sheryl Conner is a 89 yr old female presents to the ER with c/o constipation.  Patient's last bowel movement was approximate 11 days ago.  Patient takes chronic pain medication for her chronic back pain.  Patient reports diffuse lower abdominal pain.  She denies any nausea, vomiting.  Negative for fever, chills.  Patient tried 3 laxatives today and a mineral enema.    History provided by:  Patient   used: No    Constipation  Severity:  Moderate  Time since last bowel movement:  11 days  Timing:  Constant  Progression:  Worsening  Context: narcotics    Stool description:  Small  Relieved by:  Nothing  Associated symptoms: abdominal pain and flatus    Associated symptoms: no dysuria, no fever, no nausea and no vomiting        Review of Systems   Constitutional: Negative for chills and fever.   Respiratory: Negative for shortness of breath.    Cardiovascular: Negative for chest pain.   Gastrointestinal: Positive for abdominal pain, constipation and flatus. Negative for nausea and vomiting.   Genitourinary: Negative for difficulty urinating and dysuria.   Neurological: Negative for dizziness and weakness.   All other systems reviewed and are negative.      Past Medical History:   Diagnosis Date   • Anemia    • Arthritis    • Fibromyalgia    • GERD (gastroesophageal reflux disease)    • History of transfusion    • Hypertension    • Kidney stone    • MG (myasthenia gravis) ([facility])     history of   • Pacemaker        Allergies   Allergen Reactions   • Codeine Sulfate Unknown (See Comments)     Pt took in the 70's-pt not sure of what happened   • Cozaar [Losartan] Unknown (See Comments)     Pt can not remember   • Crestor [Rosuvastatin] Unknown (See Comments)     Pt can not remember   • Dexlansoprazole Unknown (See Comments)     Pt can not remember   • Lipitor [Atorvastatin] Unknown (See Comments)     Pt can not remember   • Lisinopril Unknown (See Comments)      Pt can not remember   • Nexium [Esomeprazole Magnesium] Unknown (See Comments)     Pt can not remember   • Norvasc [Amlodipine] Unknown (See Comments)     Pt can not remember   • Sulfadiazine Unknown (See Comments)     Pt can not remember   • Toprol Xl [Metoprolol Tartrate] Unknown (See Comments)     Pt can not remember   • Zetia [Ezetimibe] Unknown (See Comments)     Pt can not remember   • Zocor [Simvastatin] Unknown (See Comments)     Pt can not remember   • Augmentin [Amoxicillin-Pot Clavulanate] GI Intolerance     nausea   • Celebrex [Celecoxib] GI Intolerance       Past Surgical History:   Procedure Laterality Date   • CARDIAC ELECTROPHYSIOLOGY PROCEDURE N/A 8/1/2022    Procedure: DDD PPM Gen Chg(MDT) +/- new RA lead, C&T JWL, no meds to hold;  Surgeon: Joss Rodríguez MD;  Location: [facility] INVASIVE LOCATION;  Service: Cardiology;  Laterality: N/A;   • CARDIAC SURGERY      pacemaker placed   • COLONOSCOPY     • FOOT SURGERY Left     in the 1980s   • HEMORRHOIDECTOMY     • TUBAL ABDOMINAL LIGATION     • UPPER GASTROINTESTINAL ENDOSCOPY     • UPPER GASTROINTESTINAL ENDOSCOPY  05/29/2018       Family History   Problem Relation Age of Onset   • No Known Problems Mother    • Diabetes Father    • Cancer Son        Social History     Socioeconomic History   • Marital status:    Tobacco Use   • Smoking status: Never   • Smokeless tobacco: Never   Vaping Use   • Vaping Use: Never used   Substance and Sexual Activity   • Alcohol use: No   • Drug use: No   • Sexual activity: Defer           Objective   Physical Exam  Vitals and nursing note reviewed.   Constitutional:       Appearance: Normal appearance. She is well-developed. She is not toxic-appearing.   HENT:      Head: Normocephalic and atraumatic.   Eyes:      General: Lids are normal.      Conjunctiva/sclera: Conjunctivae normal.   Neck:      Trachea: Trachea normal.   Cardiovascular:      Rate and Rhythm: Regular rhythm.      Pulses: Normal pulses.       Heart sounds: Normal heart sounds.   Pulmonary:      Effort: Pulmonary effort is normal. No respiratory distress.      Breath sounds: Normal breath sounds. No decreased breath sounds, wheezing, rhonchi or rales.   Abdominal:      General: Bowel sounds are normal.      Palpations: Abdomen is soft.      Tenderness: There is abdominal tenderness.   Musculoskeletal:         General: Normal range of motion.      Cervical back: Full passive range of motion without pain and normal range of motion.   Skin:     General: Skin is warm and dry.      Findings: No rash.   Neurological:      Mental Status: She is alert and oriented to person, place, and time.      Cranial Nerves: No cranial nerve deficit.   Psychiatric:         Speech: Speech normal.         Behavior: Behavior normal. Behavior is cooperative.         Procedures           ED Course  ED Course as of 11/18/22 1727   Thu Nov 03, 2022 2042 WBC(!): 14.04 [KG]   2205 Bacteria, UA(!): 3+ [KG]   2205 Leukocytes, UA(!): Trace [KG]   2205 Creatinine(!): 1.06 [KG]   2217 Attempted to disimpact the patient.  Patient unable to tolerate.  We will administer an enema. [KG]   Fri Nov 04, 2022   0000 Patient was able to help a good bowel movement following the enema.  Patient will be discharged home.  Patient to follow-up with PCP as needed.  Patient to return to the ER as needed. [KG]      ED Course User Index  [KG] Anne Jerome, APRN           No results found for this or any previous visit (from the past 24 hour(s)).  Note: In addition to lab results from this visit, the labs listed above may include labs taken at another facility or during a different encounter within the last 24 hours. Please correlate lab times with ED admission and discharge times for further clarification of the services performed during this visit.    CT Abdomen Pelvis With Contrast   Final Result   There is a large amount of stool throughout the colon with fluid   surrounding the stool in the  ascending and descending transverse colon.   There is a large amount stool in the rectum with rectal distention up to   6.7 cm. Constipation with fecal impaction is most likely.   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   Diverticulosis without diverticulitis.               This report was finalized on 11/3/2022 9:26 PM by Kaur Bingham MD.            Vitals:    11/03/22 2059 11/03/22 2130 11/03/22 2200 11/04/22 0031   BP:  142/90 128/68 119/60   Pulse: 85 88 92 84   Resp:    16   Temp:       TempSrc:       SpO2: 91% 95% 94% 94%   Weight:       Height:         Medications   sodium chloride 0.9 % bolus 500 mL (0 mL Intravenous Stopped 11/4/22 0012)   iopamidol (ISOVUE-370) 76 % injection 100 mL (100 mL Intravenous Given 11/3/22 2110)     ECG/EMG Results (last 24 hours)     ** No results found for the last 24 hours. **        No orders to display                                       MDM    Final diagnoses:   Constipation, unspecified constipation type   Fecal impaction (HCC)   Urinary tract infection in female       ED Disposition  ED Disposition     ED Disposition   Discharge    Condition   Stable    Comment   --             Manan Garcia MD  [address] [place], [place] [address] [phone]               Medication List      New Prescriptions    cefdinir 300 MG capsule  Commonly known as: OMNICEF  Take 1 capsule by mouth 2 (Two) Times a Day.     sennosides-docusate 8.6-50 MG per tablet  Commonly known as: PERICOLACE  Take 1 tablet by mouth Daily.        Changed    pantoprazole 40 MG EC tablet  Commonly known as: PROTONIX  TAKE 1 TABLET BY MOUTH TWICE DAILY  What changed: how to take this     potassium chloride 10 MEQ CR tablet  TAKE 1 TABLET BY MOUTH ONCE DAILY  What changed: how to take this        ASK your doctor about these medications    oxyCODONE-acetaminophen 5-325 MG per tablet  Commonly known as:  PERCOCET  Take 1 tablet by mouth Every 4 (Four) Hours As Needed for Moderate Pain for up to 4 days.  Ask about: Should I take this medication?           Where to Get Your Medications      These medications were sent to [facility] - [place], [place] - [address] [facility] - [phone]  - [phone] FX  [address] [facility] [address]    Phone: [phone]   · cefdinir 300 MG capsule  · sennosides-docusate 8.6-50 MG per tablet          nAne Jerome, APRN  11/18/22 4873

## 2022-11-04 NOTE — TELEPHONE ENCOUNTER
Sheyla stating that Sheryl was seen in the ER yesterday for severe constipation and was released back home. Sheryl is currently getting PT once a week at home. Sheyla thinks she needs more assistance/rehab after her hospitalization, and that her not being able to walk much along her dementia is what caused the constipation. She would like a call back about what can be done to get Sheryl more assistance. Please advise.     Sheyla  P: [phone]

## 2022-11-04 NOTE — DISCHARGE INSTRUCTIONS
Increase fluid intake.     Take antibiotics as ordered.     Follow up with primary care physician as needed.

## 2022-11-04 NOTE — TELEPHONE ENCOUNTER
Spoke with daughter, she verbalized understanding. She wants to know what you recc for patient for pain mgmt? She had been seeing Dr. Mic Stafford and getting  fentanyl patches but recently missed her appt with them because she was in the hospital, and now they won't take her back; what should she do to get her patches?

## 2022-11-05 NOTE — TELEPHONE ENCOUNTER
"  Reason for Disposition  • Violent behavior, or threatening to physically hurt or kill someone    Additional Information  • Negative: Patient attempted suicide  • Negative: Patient is threatening suicide now    Answer Assessment - Initial Assessment Questions  1. MAIN CONCERN: \"What happened that made you call today?\"      She is psychotic acting wants to hurt us.   2. RISK OF HARM - SUICIDAL IDEATION:  \"Do you ever have thoughts of hurting or killing yourself?\"  (e.g., yes, no, no but preoccupation with thoughts about death)    - WISH TO BE DEAD:  \"Have you wished you were dead or wished you could go to sleep and not wake up?\"    - INTENT:  \"Have you had any thoughts of hurting or killing yourself?\" (e.g., yes, no, N/A) If Yes, ask: \"Are you having these thoughts about killing yourself right NOW?\"    - PLAN: \"Have you thought about how you might do this?\" \"Do you have a specific plan for how you would do this?\" (e.g., gun, knife, overdose, no plan, N/A)    - ACCESS: If yes to PLAN, \"Do you have access to *No Answer*?\" (e.g., pills, gun in house, knife in kitchen)      She does have access to knife,   3. RISK OF HARM - SUICIDE ATTEMPT: \"Have you tried to harm yourself recently?\" If Yes, ask: When was this?\"        She has not   4. RISK OF HARM - SUICIDAL BEHAVIOR: \"Have you ever done anything, started to do anything, or prepared to do anything to end your life?\" (e.g., collected pills, bought a gun, wrote a suicide note, cut yourself, started but changed your mind)      *No Answer*  5. EVENTS AND STRESSORS: \"Has there been any new stress or recent changes in your life?\" (e.g., recent loss of loved one, negative event, homelessness)      unknown  6. FUNCTIONAL IMPAIRMENT: \"How have things been going for you overall? Have you had more difficulty than usual doing your normal daily activities?\"  (e.g., better, same, worse; self-care, school, work, interactions)      She is acting wild   7. SUPPORT: \"Who is with you " "now?\" \"Who do you live with?\" \"Do you have family or friends who you can talk to?\"       Daughter   8. THERAPIST: \"Do you have a counselor or therapist? Name?\"      n  9. ALCOHOL USE OR SUBSTANCE USE (DRUG USE): \"Do you drink alcohol or use any illegal drugs?\"      n  10. OTHER: \"Do you have any other physical symptoms right now?\" (e.g., fever)        n  11. PREGNANCY or POSTPARTUM: \"Is there any chance you are pregnant?\" \"When was your last menstrual period?\" \"Were you recently pregnant?\" \"When did you give birth?\"        n    Protocols used: SUICIDE CONCERNS-ADULT-AH    "

## 2022-11-23 NOTE — TELEPHONE ENCOUNTER
Called patient to ask her to unplug her beside home monitor then plug it back up. [employer] is having network issues.    Phone number we have on file for patient isn't active. Left message on daughter's phone.     Awaiting return call.

## 2022-11-23 NOTE — TELEPHONE ENCOUNTER
Daughter called back to let us know that Ms. Conner is now under hospice care.     Home Monitor has been unplugged.    We have discontinued her remote monitor account in [employer].

## 2023-03-09 ENCOUNTER — TELEPHONE (OUTPATIENT)
Dept: FAMILY MEDICINE CLINIC | Facility: CLINIC | Age: 88
End: 2023-03-09

## 2023-03-09 NOTE — TELEPHONE ENCOUNTER
Caller: Sheyla Zimmerman    Relationship: Emergency Contact    Best call back number: [phone]    What is the best time to reach you: ANYTIME    Who are you requesting to speak with (clinical staff, provider,  specific staff member):PCP/MA    What was the call regarding: PATIENTS DAUGHTER CALLED IN AND IS REQUESTING A CALLBACK TODAY TO ASK SOME QUESTIONS REGARDING THE PATIENT. SHE HAS PASSED AWAY ON 12/5/22    Do you require a callback: YES

## 2023-03-09 NOTE — TELEPHONE ENCOUNTER
“Please be informed that patient has passed. Patient has been marked  in the system. The date of death is: 2023    Caller: Sheyla Zimmerman    Relationship: Emergency Contact    Best call back number:[phone]

## 2023-05-28 NOTE — TELEPHONE ENCOUNTER
Caller: JONI    Relationship: PATIENT    Best call back number: [phone]     What test was performed: LAB    When was the test performed: 05/24    Where was the test performed:  [facility]    
Returned call to patient. At this time informing patient that labs looked good.   
1 person assist

## 2024-10-10 NOTE — HOME HEALTH
Bed: 10  Expected date: 10/9/24  Expected time:   Means of arrival:   Comments:  54F weakness, chills, abd pain for 16 hours  Aaox4 140/p, 89, 18, 94% 6L non rebreather, 87% at RA   sn d/c without a visit. Pt is admitted to hospice.

## (undated) DEVICE — TUBING, SUCTION, 1/4" X 10', STRAIGHT: Brand: MEDLINE

## (undated) DEVICE — MEDI-VAC YANKAUER SUCTION HANDLE W/BULBOUS TIP: Brand: CARDINAL HEALTH

## (undated) DEVICE — ELECTRD RETRN/GRND MEGADYNE SGL/PLT W/CORD 9FT DISP

## (undated) DEVICE — ADULT, W/LG. BACK PAD, RADIOTRANSPARENT ELEMENT AND LEAD WIRE: Brand: DEFIBRILLATION ELECTRODES

## (undated) DEVICE — ST INF PRI SMRTSTE 20DRP 2VLV 24ML 117

## (undated) DEVICE — DECANT BG O JET

## (undated) DEVICE — PLASMABLADE PS210-030S 3.0S LOCK: Brand: PLASMABLADE™

## (undated) DEVICE — ST EXT IV SMARTSITE 2VLV SP M LL 5ML IV1

## (undated) DEVICE — IRRIGATOR BULB ASEPTO 60CC STRL

## (undated) DEVICE — SOL NACL 0.9PCT 1000ML

## (undated) DEVICE — SET PRIMARY GRVTY 10DP MALE LL 104IN

## (undated) DEVICE — CANN NASL CO2 DIVIDED A/

## (undated) DEVICE — DRSNG SURG AQUACEL AG 9X10CM

## (undated) DEVICE — LEX ELECTRO PHYSIOLOGY: Brand: MEDLINE INDUSTRIES, INC.

## (undated) DEVICE — LIMB HOLDER, WRIST/ANKLE: Brand: DEROYAL